# Patient Record
Sex: MALE | Race: ASIAN | NOT HISPANIC OR LATINO | Employment: OTHER | URBAN - METROPOLITAN AREA
[De-identification: names, ages, dates, MRNs, and addresses within clinical notes are randomized per-mention and may not be internally consistent; named-entity substitution may affect disease eponyms.]

---

## 2017-01-18 DIAGNOSIS — M25.559 HIP JOINT PAIN: Primary | ICD-10-CM

## 2017-01-18 LAB
% GRANULOCYTES: 53.5 % (ref 42.2–75.2)
ERYTHROCYTE [SEDIMENTATION RATE] IN BLOOD: 1 MM/HR (ref 0–15)
HCT VFR BLD AUTO: 52.7 % (ref 39–51)
HEMOGLOBIN: 17 G/DL (ref 13.4–17.5)
LYMPHOCYTES NFR BLD AUTO: 38 % (ref 20.5–51.1)
MCH RBC QN AUTO: 29.9 PG (ref 27–31)
MCHC RBC AUTO-ENTMCNC: 32.4 G/DL (ref 33–37)
MCV RBC AUTO: 92.3 FL (ref 80–100)
MONOCYTES NFR BLD AUTO: 8.5 % (ref 1.7–9.3)
PLATELET # BLD AUTO: 160 K/UL (ref 140–450)
RBC # BLD AUTO: 5.7 X10/CMM (ref 4.2–5.9)
WBC # BLD AUTO: 7.5 X10/CMM (ref 3.8–11)

## 2017-01-18 PROCEDURE — 85025 COMPLETE CBC W/AUTO DIFF WBC: CPT | Performed by: FAMILY MEDICINE

## 2017-01-18 PROCEDURE — 86140 C-REACTIVE PROTEIN: CPT | Mod: 90 | Performed by: FAMILY MEDICINE

## 2017-01-18 PROCEDURE — 36415 COLL VENOUS BLD VENIPUNCTURE: CPT | Performed by: FAMILY MEDICINE

## 2017-01-18 PROCEDURE — 85651 RBC SED RATE NONAUTOMATED: CPT | Performed by: FAMILY MEDICINE

## 2017-01-19 LAB — CRP SERPL-MCNC: 5.7 MG/L (ref 0–4.9)

## 2017-02-06 NOTE — PROGRESS NOTES
[facility]  [address] [place] [id number] [phone]  Dept: [phone]    PRE-OP EVALUATION:  Today's date: 2017    Oseas Edwards (: 1970) presents for pre-operative evaluation assessment as requested by Dr. Alcaraz.  He requires evaluation and anesthesia risk assessment prior to undergoing surgery/procedure for treatment of LEFT hip pain .  Proposed procedure: LEFT TOTAL HIP ARTHROPLASTY REVISION     Date of Surgery/ Procedure: 17  Time of Surgery/ Procedure: 10:00am  Hospital/Surgical Facility: [facility]  Primary Physician: Willy Forrest  Type of Anesthesia Anticipated: General    Patient has a Health Care Directive or Living Will:  NO    1. NO - Do you have a history of heart attack, stroke, stent, bypass or surgery on an artery in the head, neck, heart or legs?  2. NO - Do you ever have any pain or discomfort in your chest?  3. NO - Do you have a history of  Heart Failure?  4. NO - Are you troubled by shortness of breath when: walking on the level, up a slight hill or at night?  5. NO - DO YOU CURRENTLY HAVE A COLD, BRONCHITIS OR OTHER RESPIRATORY INFECTION?  6. No - DO YOU HAVE A COUGH, SHORTNESS OF BREATH OR WHEEZING?   7. YES - DO YOU SOMETIMES GET PAINS IN THE CALVES OF YOUR LEGS WHEN YOU WALK? Yes because of hip  8. NO - Do you or anyone in your family have previous history of blood clots?  9. NO - Do you or does anyone in your family have a serious bleeding problem such as prolonged bleeding following surgeries or cuts?  10. NO - Have you ever had problems with anemia or been told to take iron pills?   11. YES - HAVE YOU HAD ANY ABNORMAL BLOOD LOSS SUCH AS BLACK, TARRY OR BLOODY STOOLS, OR ABNORMAL VAGINAL BLEEDING?  2013 due to medication  12. YES - HAVE YOU EVER HAD A BLOOD TRANSFUSION?   13. NO - Have you or any of your relatives ever had problems with anesthesia?  14. YES - DO YOU HAVE SLEEP APNEA, EXCESSIVE SNORING OR DAYTIME DROWSINESS?  snoring  15. NO - Do you have any prosthetic heart valves?  16. YES - DO YOU HAVE PROSTHETIC JOINTS? Bilateral hips  17. NO - Is there any chance that you may be pregnant?      HPI:                                                      Brief HPI related to upcoming procedure: Long term hip pain from JAMES's at 24 years old now ready for revision.  Because of his history of GI bleed and heavy use of Indocin he has ended up on narcotic pain relief which is now at a fairly high level.      See problem list for active medical problems.  Problems all longstanding and stable, except as noted/documented.  See ROS for pertinent symptoms related to these conditions.                                                                                                  .    MEDICAL HISTORY:                                                      Patient Active Problem List    Diagnosis Date Noted     Keloid scar, mostly just dark 02/07/2017     Priority: Medium     Chronic pain syndrome 06/20/2016     Priority: Medium     Hx of gastric ulcer 06/06/2016     Priority: Medium     Rectus diastasis 11/11/2013     Priority: Medium     Chronic narcotic dependence (H) 11/04/2013     Priority: Medium     Due to long term pain management.  Patient is followed by FARHAT ALBARADO for ongoing prescription of pain medication.  All refills should be approved by this provider, or covering partner.    Medication(s): oxycodone IR around 3 per day and Tylenol #3 30/300 max of 4 per day.     Clinic visit frequency required: Q 2 month     Controlled substance agreement on file: Yes       Date(s): 10/15/15    Pain Clinic evaluation in the past: but now referring to [facility] pain clinic    DIRE Total Score(s):  No flowsheet data found.    Last [facility] website verification:  none   [email or web address]         Health Care Home 10/07/2013     Priority: Medium     Low serum testosterone level 01/30/2012     Priority: Medium     PA (pernicious anemia)  06/16/2011     Priority: Medium     AS (ankylosing spondylitis) (H) 04/28/2011     Priority: Medium      Past Medical History   Diagnosis Date     Cellulitis of leg 2011     PA (pernicious anemia) 6/16/2011     Low serum testosterone level 1/30/2012     AS (ankylosing spondylitis) (H) 1985     Optic neuritis 3/20/2012     Chronic narcotic dependence (H) 11/4/2013     Due to long term pain management.      Hypertension      Arthritis      Ankylosing spondylitis (H)      Keloid scar, mostly just dark 2/7/2017     Past Surgical History   Procedure Laterality Date     Joint replacement, hip rt/lt  1994     Left     Joint replacement, hip rt/lt  2000ish     Right     Herniorrhaphy ventral  11/11/2013     Procedure: HERNIORRHAPHY VENTRAL;  UMBILICAL HERNIA REPAIR WITH MESH, RECTUS DIASTASIS REPAIR WITH MESH;  Surgeon: Jason Dosdon MD;  Location: [facility]     Open separation component herniorrhaphy  11/11/2013     Procedure: OPEN SEPARATION COMPONENT HERNIORRHAPHY;;  Surgeon: Jason Dodson MD;  Location: [facility]     Current Outpatient Prescriptions   Medication Sig Dispense Refill     oxyCODONE (ROXICODONE) 5 MG IR tablet Take 1 tablet (5 mg) by mouth every 8 hours as needed for pain Try to keep to 2 a day 200 tablet 0     acetaminophen-codeine (TYLENOL #3) 300-30 MG per tablet Take 1 tablet by mouth every 4 hours as needed for pain maximum 4 tablet(s) per day 200 tablet 0     VIAGRA 100 MG cap/tab Take 1 tablet (100 mg) by mouth daily as needed for erectile dysfunction 12 tablet 1     ANDROGEL 50 MG/5GM (1%) gel APPLY ONE PACKET TOPICALLY DAILY 450 g 0     finasteride (PROSCAR) 5 MG tablet Take 1 tablet (5 mg) by mouth daily 90 tablet 3     lisinopril-hydrochlorothiazide (PRINZIDE,ZESTORETIC) 10-12.5 MG per tablet Take 1 tablet by mouth daily 90 tablet 3     indomethacin (INDOCIN) 50 MG capsule TAKE ONE CAPSULE BY MOUTH THREE TIMES DAILY WITH MEALS 270 capsule 3     cyclobenzaprine (FLEXERIL) 10 MG tablet  "TAKE ONE TABLET BY MOUTH TWICE DAILY AS NEEDED FOR MUSCLE SPASM 200 tablet 0     Avanafil 200 MG TABS Take 200 mg by mouth as needed 12 tablet 3     docusate sodium (COLACE) 100 MG tablet Take 100 mg by mouth daily 60 tablet 1     SIMPONI 50 MG/0.5ML auto-injector pen Inject 50 mg Subcutaneous every 28 days        cyanocobalamin (VITAMIN B12) 1000 MCG/ML injection INJECT 1ML (1000MCG) INTRAMUSCULARLY EVERY 30 DAYS 1 mL 11     triamcinolone (KENALOG) 0.1 % ointment Apply sparingly to affected area 80 g 1     mometasone (ELOCON) 0.1 % ointment Apply sparingly to affected area twice daily as needed.  Do not apply to face. 60 g 0     Multiple Vitamin (MULTI VITAMIN MENS PO) Take  by mouth daily.       omeprazole (PRILOSEC) 20 MG capsule TAKE ONE CAPSULE BY MOUTH ONCE DAILY (TAKE 30-60 MINUTES BEFORE A MEAL) 90 capsule 3     OTC products: None, except as noted above    Allergies   Allergen Reactions     Infliximab      Other reaction(s): *Unknown  Caused LT eye blindness     No Clinical Screening - See Comments      methotrexate- mood alterating     Remicade [Infliximab Injection]      Blindness in one eye      Latex Allergy: NO    Social History   Substance Use Topics     Smoking status: Former Smoker     Quit date: 01/01/2009     Smokeless tobacco: Never Used      Comment: quit 2 1/2 yrs     Alcohol Use: Yes      Comment: 7 per week     History   Drug Use No       REVIEW OF SYSTEMS:                                                    Constitutional, neuro, ENT, endocrine, pulmonary, cardiac, gastrointestinal, genitourinary, musculoskeletal, integument and psychiatric systems are negative, except as otherwise noted.    EXAM:                                                    /86 mmHg  Pulse 77  Ht 1.473 m (4' 10\")  Wt 74.844 kg (165 lb)  BMI 34.49 kg/m2  SpO2 98%    GENERAL APPEARANCE: healthy, alert and no distress     EYES: EOMI, - PERRL     HENT: ear canals and TM's normal and nose and mouth without ulcers " or lesions     NECK: no adenopathy, no asymmetry, masses, or scars and thyroid normal to palpation     RESP: lungs clear to auscultation - no rales, rhonchi or wheezes     BREAST: normal without masses, tenderness or nipple discharge and no palpable axillary masses or adenopathy     CV: regular rates and rhythm, normal S1 S2, no S3 or S4 and no murmur, click or rub -     ABDOMEN:  soft, nontender, no HSM or masses and bowel sounds normal     MS: arthritis of the the lower spine and decreased rom in both hips     SKIN: no suspicious lesions or rashes     NEURO: Normal strength and tone, sensory exam grossly normal, mentation intact and speech normal     PSYCH: mentation appears normal. and affect normal/bright     LYMPHATICS: No axillary, cervical, inguinal, or supraclavicular nodes    DIAGNOSTICS:                                                      EKG: appears normal, NSR, normal axis, normal intervals, no acute ST/T changes c/w ischemia, no LVH by voltage criteria, unchanged from previous tracings  Labs Resulted Today:   Results for orders placed or performed in visit on 02/07/17   CBC with Diff/Plt ([facility])   Result Value Ref Range    WBC x10/cmm 4.8 3.8 - 11.0 x10/cmm    % Lymphocytes 28.4 20.5 - 51.1 %    % Monocytes 8.2 1.7 - 9.3 %    % Granulocytes 63.4 42.2 - 75.2 %    RBC x10/cmm 5.31 4.2 - 5.9 x10/cmm    Hemoglobin 15.8 13.4 - 17.5 g/dl    Hematocrit 49.0 39 - 51 %    MCV 92.2 80 - 100 fL    MCH 29.8 27.0 - 31.0 pg    MCHC 32.3 (A) 33.0 - 37.0 g/dL    Platelet Count 139 (A) 140 - 450 K/uL       Recent Labs   Lab Test  01/18/17   1319  06/06/16   1141  06/06/16   1055   07/14/14   1223   HGB  17.0  16.5   --    < >   --    PLT  160  202   --    < >   --    NA   --    --   141   --   137   POTASSIUM   --    --   4.1   --   4.5   CR   --    --   0.87   --   0.94    < > = values in this interval not displayed.        IMPRESSION:                                                    Reason for surgery/procedure:  artificial hips that now need revision.    The proposed surgical procedure is considered INTERMEDIATE risk.    REVISED CARDIAC RISK INDEX  The patient has the following serious cardiovascular risks for perioperative complications such as (MI, PE, VFib and 3  AV Block):  No serious cardiac risks  INTERPRETATION: 0 risks: Class I (very low risk - 0.4% complication rate)    The patient has the following additional risks for perioperative complications:  High tolerance to opioid analgesics due to Current dose of Tylenol # 3 taken with IR Oxycontin 5mg up to 5 times daily.      ICD-10-CM    1. Preop general physical exam Z01.818 CBC with Diff/Plt ([facility])     Potassium  Serum ([facility])     EKG 12-lead complete w/read - Clinics   2. AS (ankylosing spondylitis) (H) M45.9 oxyCODONE (ROXICODONE) 5 MG IR tablet     acetaminophen-codeine (TYLENOL #3) 300-30 MG per tablet   3. PA (pernicious anemia) D51.0    4. Low serum testosterone level E29.1    5. Chronic narcotic dependence (H) F11.20    6. Rectus diastasis M62.08    7. Hx of gastric ulcer Z87.19    8. Keloid scar, mostly just dark L91.0        RECOMMENDATIONS:                                                    --Will need to look at decreasing narcotic use as pain improves.    --Consult hospital rounder / IM to assist post-op medical management    --Patient is to take all scheduled medications on the day of surgery EXCEPT for modifications listed below.    APPROVAL GIVEN to proceed with proposed procedure, without further diagnostic evaluation         Signed Electronically by: Willy Forrest MD    Copy of this evaluation report is provided to requesting physician.    [facility] Preop Guidelines

## 2017-02-07 ENCOUNTER — OFFICE VISIT (OUTPATIENT)
Dept: FAMILY MEDICINE | Facility: CLINIC | Age: 47
End: 2017-02-07

## 2017-02-07 VITALS
WEIGHT: 165 LBS | OXYGEN SATURATION: 98 % | SYSTOLIC BLOOD PRESSURE: 122 MMHG | DIASTOLIC BLOOD PRESSURE: 86 MMHG | HEART RATE: 77 BPM | BODY MASS INDEX: 32.39 KG/M2 | HEIGHT: 60 IN

## 2017-02-07 DIAGNOSIS — D51.0 PA (PERNICIOUS ANEMIA): ICD-10-CM

## 2017-02-07 DIAGNOSIS — M45.9 AS (ANKYLOSING SPONDYLITIS) (H): ICD-10-CM

## 2017-02-07 DIAGNOSIS — Z01.818 PREOP GENERAL PHYSICAL EXAM: Primary | ICD-10-CM

## 2017-02-07 DIAGNOSIS — R79.89 LOW SERUM TESTOSTERONE LEVEL: ICD-10-CM

## 2017-02-07 DIAGNOSIS — L91.0 KELOID SCAR: ICD-10-CM

## 2017-02-07 DIAGNOSIS — F11.20 CHRONIC NARCOTIC DEPENDENCE (H): ICD-10-CM

## 2017-02-07 DIAGNOSIS — Z87.11 HX OF GASTRIC ULCER: ICD-10-CM

## 2017-02-07 DIAGNOSIS — M62.08 RECTUS DIASTASIS: ICD-10-CM

## 2017-02-07 LAB
% GRANULOCYTES: 63.4 % (ref 42.2–75.2)
HCT VFR BLD AUTO: 49 % (ref 39–51)
HEMOGLOBIN: 15.8 G/DL (ref 13.4–17.5)
LYMPHOCYTES NFR BLD AUTO: 28.4 % (ref 20.5–51.1)
MCH RBC QN AUTO: 29.8 PG (ref 27–31)
MCHC RBC AUTO-ENTMCNC: 32.3 G/DL (ref 33–37)
MCV RBC AUTO: 92.2 FL (ref 80–100)
MONOCYTES NFR BLD AUTO: 8.2 % (ref 1.7–9.3)
PLATELET # BLD AUTO: 139 K/UL (ref 140–450)
RBC # BLD AUTO: 5.31 X10/CMM (ref 4.2–5.9)
WBC # BLD AUTO: 4.8 X10/CMM (ref 3.8–11)

## 2017-02-07 PROCEDURE — 36415 COLL VENOUS BLD VENIPUNCTURE: CPT | Performed by: FAMILY MEDICINE

## 2017-02-07 PROCEDURE — 99215 OFFICE O/P EST HI 40 MIN: CPT | Performed by: FAMILY MEDICINE

## 2017-02-07 PROCEDURE — 85025 COMPLETE CBC W/AUTO DIFF WBC: CPT | Performed by: FAMILY MEDICINE

## 2017-02-07 PROCEDURE — 93000 ELECTROCARDIOGRAM COMPLETE: CPT | Performed by: FAMILY MEDICINE

## 2017-02-07 PROCEDURE — 84132 ASSAY OF SERUM POTASSIUM: CPT | Mod: 90 | Performed by: FAMILY MEDICINE

## 2017-02-07 RX ORDER — OXYCODONE HYDROCHLORIDE 5 MG/1
5 TABLET ORAL EVERY 8 HOURS PRN
Qty: 200 TABLET | Refills: 0 | Status: ON HOLD | OUTPATIENT
Start: 2017-02-07 | End: 2017-02-16

## 2017-02-08 LAB — POTASSIUM SERPL-SCNC: 4.6 MMOL/L (ref 3.5–5.2)

## 2017-02-10 NOTE — H&P (VIEW-ONLY)
[facility]  [address] [place] [id number] [phone]  Dept: [phone]    PRE-OP EVALUATION:  Today's date: 2017    Oseas Edawrds (: 1970) presents for pre-operative evaluation assessment as requested by Dr. Alcaraz.  He requires evaluation and anesthesia risk assessment prior to undergoing surgery/procedure for treatment of LEFT hip pain .  Proposed procedure: LEFT TOTAL HIP ARTHROPLASTY REVISION     Date of Surgery/ Procedure: 17  Time of Surgery/ Procedure: 10:00am  Hospital/Surgical Facility: [facility]  Primary Physician: Willy Forrest  Type of Anesthesia Anticipated: General    Patient has a Health Care Directive or Living Will:  NO    1. NO - Do you have a history of heart attack, stroke, stent, bypass or surgery on an artery in the head, neck, heart or legs?  2. NO - Do you ever have any pain or discomfort in your chest?  3. NO - Do you have a history of  Heart Failure?  4. NO - Are you troubled by shortness of breath when: walking on the level, up a slight hill or at night?  5. NO - DO YOU CURRENTLY HAVE A COLD, BRONCHITIS OR OTHER RESPIRATORY INFECTION?  6. No - DO YOU HAVE A COUGH, SHORTNESS OF BREATH OR WHEEZING?   7. YES - DO YOU SOMETIMES GET PAINS IN THE CALVES OF YOUR LEGS WHEN YOU WALK? Yes because of hip  8. NO - Do you or anyone in your family have previous history of blood clots?  9. NO - Do you or does anyone in your family have a serious bleeding problem such as prolonged bleeding following surgeries or cuts?  10. NO - Have you ever had problems with anemia or been told to take iron pills?   11. YES - HAVE YOU HAD ANY ABNORMAL BLOOD LOSS SUCH AS BLACK, TARRY OR BLOODY STOOLS, OR ABNORMAL VAGINAL BLEEDING?  2013 due to medication  12. YES - HAVE YOU EVER HAD A BLOOD TRANSFUSION?   13. NO - Have you or any of your relatives ever had problems with anesthesia?  14. YES - DO YOU HAVE SLEEP APNEA, EXCESSIVE SNORING OR DAYTIME DROWSINESS?  snoring  15. NO - Do you have any prosthetic heart valves?  16. YES - DO YOU HAVE PROSTHETIC JOINTS? Bilateral hips  17. NO - Is there any chance that you may be pregnant?      HPI:                                                      Brief HPI related to upcoming procedure: Long term hip pain from JAMES's at 24 years old now ready for revision.  Because of his history of GI bleed and heavy use of Indocin he has ended up on narcotic pain relief which is now at a fairly high level.      See problem list for active medical problems.  Problems all longstanding and stable, except as noted/documented.  See ROS for pertinent symptoms related to these conditions.                                                                                                  .    MEDICAL HISTORY:                                                      Patient Active Problem List    Diagnosis Date Noted     Keloid scar, mostly just dark 02/07/2017     Priority: Medium     Chronic pain syndrome 06/20/2016     Priority: Medium     Hx of gastric ulcer 06/06/2016     Priority: Medium     Rectus diastasis 11/11/2013     Priority: Medium     Chronic narcotic dependence (H) 11/04/2013     Priority: Medium     Due to long term pain management.  Patient is followed by FARHAT ALBARADO for ongoing prescription of pain medication.  All refills should be approved by this provider, or covering partner.    Medication(s): oxycodone IR around 3 per day and Tylenol #3 30/300 max of 4 per day.     Clinic visit frequency required: Q 2 month     Controlled substance agreement on file: Yes       Date(s): 10/15/15    Pain Clinic evaluation in the past: but now referring to [facility] pain clinic    DIRE Total Score(s):  No flowsheet data found.    Last [facility] website verification:  none   [email or web address]         Health Care Home 10/07/2013     Priority: Medium     Low serum testosterone level 01/30/2012     Priority: Medium     PA (pernicious anemia)  06/16/2011     Priority: Medium     AS (ankylosing spondylitis) (H) 04/28/2011     Priority: Medium      Past Medical History   Diagnosis Date     Cellulitis of leg 2011     PA (pernicious anemia) 6/16/2011     Low serum testosterone level 1/30/2012     AS (ankylosing spondylitis) (H) 1985     Optic neuritis 3/20/2012     Chronic narcotic dependence (H) 11/4/2013     Due to long term pain management.      Hypertension      Arthritis      Ankylosing spondylitis (H)      Keloid scar, mostly just dark 2/7/2017     Past Surgical History   Procedure Laterality Date     Joint replacement, hip rt/lt  1994     Left     Joint replacement, hip rt/lt  2000ish     Right     Herniorrhaphy ventral  11/11/2013     Procedure: HERNIORRHAPHY VENTRAL;  UMBILICAL HERNIA REPAIR WITH MESH, RECTUS DIASTASIS REPAIR WITH MESH;  Surgeon: Jason Dodson MD;  Location: [facility]     Open separation component herniorrhaphy  11/11/2013     Procedure: OPEN SEPARATION COMPONENT HERNIORRHAPHY;;  Surgeon: Jason Dodson MD;  Location: [facility]     Current Outpatient Prescriptions   Medication Sig Dispense Refill     oxyCODONE (ROXICODONE) 5 MG IR tablet Take 1 tablet (5 mg) by mouth every 8 hours as needed for pain Try to keep to 2 a day 200 tablet 0     acetaminophen-codeine (TYLENOL #3) 300-30 MG per tablet Take 1 tablet by mouth every 4 hours as needed for pain maximum 4 tablet(s) per day 200 tablet 0     VIAGRA 100 MG cap/tab Take 1 tablet (100 mg) by mouth daily as needed for erectile dysfunction 12 tablet 1     ANDROGEL 50 MG/5GM (1%) gel APPLY ONE PACKET TOPICALLY DAILY 450 g 0     finasteride (PROSCAR) 5 MG tablet Take 1 tablet (5 mg) by mouth daily 90 tablet 3     lisinopril-hydrochlorothiazide (PRINZIDE,ZESTORETIC) 10-12.5 MG per tablet Take 1 tablet by mouth daily 90 tablet 3     indomethacin (INDOCIN) 50 MG capsule TAKE ONE CAPSULE BY MOUTH THREE TIMES DAILY WITH MEALS 270 capsule 3     cyclobenzaprine (FLEXERIL) 10 MG tablet  "TAKE ONE TABLET BY MOUTH TWICE DAILY AS NEEDED FOR MUSCLE SPASM 200 tablet 0     Avanafil 200 MG TABS Take 200 mg by mouth as needed 12 tablet 3     docusate sodium (COLACE) 100 MG tablet Take 100 mg by mouth daily 60 tablet 1     SIMPONI 50 MG/0.5ML auto-injector pen Inject 50 mg Subcutaneous every 28 days        cyanocobalamin (VITAMIN B12) 1000 MCG/ML injection INJECT 1ML (1000MCG) INTRAMUSCULARLY EVERY 30 DAYS 1 mL 11     triamcinolone (KENALOG) 0.1 % ointment Apply sparingly to affected area 80 g 1     mometasone (ELOCON) 0.1 % ointment Apply sparingly to affected area twice daily as needed.  Do not apply to face. 60 g 0     Multiple Vitamin (MULTI VITAMIN MENS PO) Take  by mouth daily.       omeprazole (PRILOSEC) 20 MG capsule TAKE ONE CAPSULE BY MOUTH ONCE DAILY (TAKE 30-60 MINUTES BEFORE A MEAL) 90 capsule 3     OTC products: None, except as noted above    Allergies   Allergen Reactions     Infliximab      Other reaction(s): *Unknown  Caused LT eye blindness     No Clinical Screening - See Comments      methotrexate- mood alterating     Remicade [Infliximab Injection]      Blindness in one eye      Latex Allergy: NO    Social History   Substance Use Topics     Smoking status: Former Smoker     Quit date: 01/01/2009     Smokeless tobacco: Never Used      Comment: quit 2 1/2 yrs     Alcohol Use: Yes      Comment: 7 per week     History   Drug Use No       REVIEW OF SYSTEMS:                                                    Constitutional, neuro, ENT, endocrine, pulmonary, cardiac, gastrointestinal, genitourinary, musculoskeletal, integument and psychiatric systems are negative, except as otherwise noted.    EXAM:                                                    /86 mmHg  Pulse 77  Ht 1.473 m (4' 10\")  Wt 74.844 kg (165 lb)  BMI 34.49 kg/m2  SpO2 98%    GENERAL APPEARANCE: healthy, alert and no distress     EYES: EOMI, - PERRL     HENT: ear canals and TM's normal and nose and mouth without ulcers " or lesions     NECK: no adenopathy, no asymmetry, masses, or scars and thyroid normal to palpation     RESP: lungs clear to auscultation - no rales, rhonchi or wheezes     BREAST: normal without masses, tenderness or nipple discharge and no palpable axillary masses or adenopathy     CV: regular rates and rhythm, normal S1 S2, no S3 or S4 and no murmur, click or rub -     ABDOMEN:  soft, nontender, no HSM or masses and bowel sounds normal     MS: arthritis of the the lower spine and decreased rom in both hips     SKIN: no suspicious lesions or rashes     NEURO: Normal strength and tone, sensory exam grossly normal, mentation intact and speech normal     PSYCH: mentation appears normal. and affect normal/bright     LYMPHATICS: No axillary, cervical, inguinal, or supraclavicular nodes    DIAGNOSTICS:                                                      EKG: appears normal, NSR, normal axis, normal intervals, no acute ST/T changes c/w ischemia, no LVH by voltage criteria, unchanged from previous tracings  Labs Resulted Today:   Results for orders placed or performed in visit on 02/07/17   CBC with Diff/Plt ([facility])   Result Value Ref Range    WBC x10/cmm 4.8 3.8 - 11.0 x10/cmm    % Lymphocytes 28.4 20.5 - 51.1 %    % Monocytes 8.2 1.7 - 9.3 %    % Granulocytes 63.4 42.2 - 75.2 %    RBC x10/cmm 5.31 4.2 - 5.9 x10/cmm    Hemoglobin 15.8 13.4 - 17.5 g/dl    Hematocrit 49.0 39 - 51 %    MCV 92.2 80 - 100 fL    MCH 29.8 27.0 - 31.0 pg    MCHC 32.3 (A) 33.0 - 37.0 g/dL    Platelet Count 139 (A) 140 - 450 K/uL       Recent Labs   Lab Test  01/18/17   1319  06/06/16   1141  06/06/16   1055   07/14/14   1223   HGB  17.0  16.5   --    < >   --    PLT  160  202   --    < >   --    NA   --    --   141   --   137   POTASSIUM   --    --   4.1   --   4.5   CR   --    --   0.87   --   0.94    < > = values in this interval not displayed.        IMPRESSION:                                                    Reason for surgery/procedure:  artificial hips that now need revision.    The proposed surgical procedure is considered INTERMEDIATE risk.    REVISED CARDIAC RISK INDEX  The patient has the following serious cardiovascular risks for perioperative complications such as (MI, PE, VFib and 3  AV Block):  No serious cardiac risks  INTERPRETATION: 0 risks: Class I (very low risk - 0.4% complication rate)    The patient has the following additional risks for perioperative complications:  High tolerance to opioid analgesics due to Current dose of Tylenol # 3 taken with IR Oxycontin 5mg up to 5 times daily.      ICD-10-CM    1. Preop general physical exam Z01.818 CBC with Diff/Plt ([facility])     Potassium  Serum ([facility])     EKG 12-lead complete w/read - Clinics   2. AS (ankylosing spondylitis) (H) M45.9 oxyCODONE (ROXICODONE) 5 MG IR tablet     acetaminophen-codeine (TYLENOL #3) 300-30 MG per tablet   3. PA (pernicious anemia) D51.0    4. Low serum testosterone level E29.1    5. Chronic narcotic dependence (H) F11.20    6. Rectus diastasis M62.08    7. Hx of gastric ulcer Z87.19    8. Keloid scar, mostly just dark L91.0        RECOMMENDATIONS:                                                    --Will need to look at decreasing narcotic use as pain improves.    --Consult hospital rounder / IM to assist post-op medical management    --Patient is to take all scheduled medications on the day of surgery EXCEPT for modifications listed below.    APPROVAL GIVEN to proceed with proposed procedure, without further diagnostic evaluation         Signed Electronically by: Willy Forrest MD    Copy of this evaluation report is provided to requesting physician.    [facility] Preop Guidelines

## 2017-02-14 ENCOUNTER — APPOINTMENT (OUTPATIENT)
Dept: GENERAL RADIOLOGY | Facility: CLINIC | Age: 47
DRG: 467 | End: 2017-02-14
Attending: ORTHOPAEDIC SURGERY
Payer: COMMERCIAL

## 2017-02-14 ENCOUNTER — ANESTHESIA EVENT (OUTPATIENT)
Dept: SURGERY | Facility: CLINIC | Age: 47
DRG: 467 | End: 2017-02-14
Payer: COMMERCIAL

## 2017-02-14 ENCOUNTER — APPOINTMENT (OUTPATIENT)
Dept: PHYSICAL THERAPY | Facility: CLINIC | Age: 47
DRG: 467 | End: 2017-02-14
Attending: ORTHOPAEDIC SURGERY
Payer: COMMERCIAL

## 2017-02-14 ENCOUNTER — HOSPITAL ENCOUNTER (INPATIENT)
Facility: CLINIC | Age: 47
LOS: 2 days | Discharge: HOME | DRG: 467 | End: 2017-02-16
Attending: ORTHOPAEDIC SURGERY | Admitting: ORTHOPAEDIC SURGERY
Payer: COMMERCIAL

## 2017-02-14 ENCOUNTER — ANESTHESIA (OUTPATIENT)
Dept: SURGERY | Facility: CLINIC | Age: 47
DRG: 467 | End: 2017-02-14
Payer: COMMERCIAL

## 2017-02-14 DIAGNOSIS — Z96.649 STATUS POST REVISION OF TOTAL HIP REPLACEMENT: Primary | ICD-10-CM

## 2017-02-14 PROBLEM — T84.028A FAILED TOTAL HIP ARTHROPLASTY WITH DISLOCATION, INITIAL ENCOUNTER (H): Status: ACTIVE | Noted: 2017-02-14

## 2017-02-14 LAB
ABO + RH BLD: NORMAL
ABO + RH BLD: NORMAL
APPEARANCE FLD: NORMAL
BLD GP AB SCN SERPL QL: NORMAL
BLOOD BANK CMNT PATIENT-IMP: NORMAL
COLOR FLD: NORMAL
CREAT SERPL-MCNC: 1.09 MG/DL (ref 0.66–1.25)
EOSINOPHIL NFR FLD MANUAL: 1 %
GFR SERPL CREATININE-BSD FRML MDRD: 72 ML/MIN/1.7M2
GRAM STN SPEC: NORMAL
HGB BLD-MCNC: 16.3 G/DL (ref 13.3–17.7)
LYMPHOCYTES NFR FLD MANUAL: 59 %
MICRO REPORT STATUS: NORMAL
NEUTS BAND NFR FLD MANUAL: 40 %
PLATELET # BLD AUTO: 185 10E9/L (ref 150–450)
POTASSIUM SERPL-SCNC: 4.2 MMOL/L (ref 3.4–5.3)
RBC # FLD: NORMAL /UL
SPECIMEN EXP DATE BLD: NORMAL
SPECIMEN SOURCE FLD: NORMAL
SPECIMEN SOURCE: NORMAL
WBC # FLD AUTO: NORMAL /UL

## 2017-02-14 PROCEDURE — 85049 AUTOMATED PLATELET COUNT: CPT | Performed by: PHYSICIAN ASSISTANT

## 2017-02-14 PROCEDURE — 25000128 H RX IP 250 OP 636: Performed by: ANESTHESIOLOGY

## 2017-02-14 PROCEDURE — 0SUB09Z SUPPLEMENT LEFT HIP JOINT WITH LINER, OPEN APPROACH: ICD-10-PCS | Performed by: ORTHOPAEDIC SURGERY

## 2017-02-14 PROCEDURE — 99222 1ST HOSP IP/OBS MODERATE 55: CPT | Performed by: PHYSICIAN ASSISTANT

## 2017-02-14 PROCEDURE — 85018 HEMOGLOBIN: CPT | Performed by: PHYSICIAN ASSISTANT

## 2017-02-14 PROCEDURE — 87075 CULTR BACTERIA EXCEPT BLOOD: CPT | Performed by: ORTHOPAEDIC SURGERY

## 2017-02-14 PROCEDURE — 25000125 ZZHC RX 250: Performed by: ORTHOPAEDIC SURGERY

## 2017-02-14 PROCEDURE — 25000128 H RX IP 250 OP 636

## 2017-02-14 PROCEDURE — 25000132 ZZH RX MED GY IP 250 OP 250 PS 637: Mod: GY | Performed by: ORTHOPAEDIC SURGERY

## 2017-02-14 PROCEDURE — 82565 ASSAY OF CREATININE: CPT | Performed by: PHYSICIAN ASSISTANT

## 2017-02-14 PROCEDURE — 0SPB09Z REMOVAL OF LINER FROM LEFT HIP JOINT, OPEN APPROACH: ICD-10-PCS | Performed by: ORTHOPAEDIC SURGERY

## 2017-02-14 PROCEDURE — 97110 THERAPEUTIC EXERCISES: CPT | Mod: GP | Performed by: PHYSICAL THERAPIST

## 2017-02-14 PROCEDURE — 87070 CULTURE OTHR SPECIMN AEROBIC: CPT | Performed by: ORTHOPAEDIC SURGERY

## 2017-02-14 PROCEDURE — 86901 BLOOD TYPING SEROLOGIC RH(D): CPT | Performed by: PHYSICIAN ASSISTANT

## 2017-02-14 PROCEDURE — C1776 JOINT DEVICE (IMPLANTABLE): HCPCS | Performed by: ORTHOPAEDIC SURGERY

## 2017-02-14 PROCEDURE — 27211024 ZZHC OR SUPPLY OTHER OPNP: Performed by: ORTHOPAEDIC SURGERY

## 2017-02-14 PROCEDURE — 36000069 ZZH SURGERY LEVEL 5 EA 15 ADDTL MIN: Performed by: ORTHOPAEDIC SURGERY

## 2017-02-14 PROCEDURE — 40000940 XR PELVIS AND HIP PORTABLE LEFT 1 VIEW

## 2017-02-14 PROCEDURE — 97161 PT EVAL LOW COMPLEX 20 MIN: CPT | Mod: GP | Performed by: PHYSICAL THERAPIST

## 2017-02-14 PROCEDURE — 0SRB019 REPLACEMENT OF LEFT HIP JOINT WITH METAL SYNTHETIC SUBSTITUTE, CEMENTED, OPEN APPROACH: ICD-10-PCS | Performed by: ORTHOPAEDIC SURGERY

## 2017-02-14 PROCEDURE — 86900 BLOOD TYPING SEROLOGIC ABO: CPT | Performed by: PHYSICIAN ASSISTANT

## 2017-02-14 PROCEDURE — 25000125 ZZHC RX 250: Performed by: PHYSICIAN ASSISTANT

## 2017-02-14 PROCEDURE — A9270 NON-COVERED ITEM OR SERVICE: HCPCS | Mod: GY | Performed by: ORTHOPAEDIC SURGERY

## 2017-02-14 PROCEDURE — 25800025 ZZH RX 258: Performed by: ORTHOPAEDIC SURGERY

## 2017-02-14 PROCEDURE — 25000128 H RX IP 250 OP 636: Performed by: ORTHOPAEDIC SURGERY

## 2017-02-14 PROCEDURE — 37000009 ZZH ANESTHESIA TECHNICAL FEE, EACH ADDTL 15 MIN: Performed by: ORTHOPAEDIC SURGERY

## 2017-02-14 PROCEDURE — A9270 NON-COVERED ITEM OR SERVICE: HCPCS | Mod: GY | Performed by: ANESTHESIOLOGY

## 2017-02-14 PROCEDURE — 25000132 ZZH RX MED GY IP 250 OP 250 PS 637: Mod: GY | Performed by: ANESTHESIOLOGY

## 2017-02-14 PROCEDURE — 25000125 ZZHC RX 250: Performed by: ANESTHESIOLOGY

## 2017-02-14 PROCEDURE — 25000128 H RX IP 250 OP 636: Performed by: NURSE ANESTHETIST, CERTIFIED REGISTERED

## 2017-02-14 PROCEDURE — 86850 RBC ANTIBODY SCREEN: CPT | Performed by: PHYSICIAN ASSISTANT

## 2017-02-14 PROCEDURE — 25000125 ZZHC RX 250: Performed by: NURSE ANESTHETIST, CERTIFIED REGISTERED

## 2017-02-14 PROCEDURE — 25000566 ZZH SEVOFLURANE, EA 15 MIN: Performed by: ORTHOPAEDIC SURGERY

## 2017-02-14 PROCEDURE — 27210995 ZZH RX 272: Performed by: ORTHOPAEDIC SURGERY

## 2017-02-14 PROCEDURE — 25000128 H RX IP 250 OP 636: Performed by: PHYSICIAN ASSISTANT

## 2017-02-14 PROCEDURE — 25800025 ZZH RX 258: Performed by: NURSE ANESTHETIST, CERTIFIED REGISTERED

## 2017-02-14 PROCEDURE — 37000008 ZZH ANESTHESIA TECHNICAL FEE, 1ST 30 MIN: Performed by: ORTHOPAEDIC SURGERY

## 2017-02-14 PROCEDURE — 71000012 ZZH RECOVERY PHASE 1 LEVEL 1 FIRST HR: Performed by: ORTHOPAEDIC SURGERY

## 2017-02-14 PROCEDURE — C1762 CONN TISS, HUMAN(INC FASCIA): HCPCS | Performed by: ORTHOPAEDIC SURGERY

## 2017-02-14 PROCEDURE — 25900017 H RX MED GY IP 259 OP 259 PS 637: Mod: GY | Performed by: ORTHOPAEDIC SURGERY

## 2017-02-14 PROCEDURE — 40000170 ZZH STATISTIC PRE-PROCEDURE ASSESSMENT II: Performed by: ORTHOPAEDIC SURGERY

## 2017-02-14 PROCEDURE — 40000940 XR PELVIS PORT 1/2 VW

## 2017-02-14 PROCEDURE — 12000007 ZZH R&B INTERMEDIATE

## 2017-02-14 PROCEDURE — 89051 BODY FLUID CELL COUNT: CPT | Performed by: ORTHOPAEDIC SURGERY

## 2017-02-14 PROCEDURE — 40000193 ZZH STATISTIC PT WARD VISIT: Performed by: PHYSICAL THERAPIST

## 2017-02-14 PROCEDURE — 84132 ASSAY OF SERUM POTASSIUM: CPT | Performed by: PHYSICIAN ASSISTANT

## 2017-02-14 PROCEDURE — 97530 THERAPEUTIC ACTIVITIES: CPT | Mod: GP | Performed by: PHYSICAL THERAPIST

## 2017-02-14 PROCEDURE — 25800025 ZZH RX 258: Performed by: ANESTHESIOLOGY

## 2017-02-14 PROCEDURE — A9270 NON-COVERED ITEM OR SERVICE: HCPCS | Mod: GY | Performed by: PHYSICIAN ASSISTANT

## 2017-02-14 PROCEDURE — C1713 ANCHOR/SCREW BN/BN,TIS/BN: HCPCS | Performed by: ORTHOPAEDIC SURGERY

## 2017-02-14 PROCEDURE — 87205 SMEAR GRAM STAIN: CPT | Performed by: ORTHOPAEDIC SURGERY

## 2017-02-14 PROCEDURE — 27210794 ZZH OR GENERAL SUPPLY STERILE: Performed by: ORTHOPAEDIC SURGERY

## 2017-02-14 PROCEDURE — 0SPB0JZ REMOVAL OF SYNTHETIC SUBSTITUTE FROM LEFT HIP JOINT, OPEN APPROACH: ICD-10-PCS | Performed by: ORTHOPAEDIC SURGERY

## 2017-02-14 PROCEDURE — 36000067 ZZH SURGERY LEVEL 5 1ST 30 MIN: Performed by: ORTHOPAEDIC SURGERY

## 2017-02-14 PROCEDURE — 36415 COLL VENOUS BLD VENIPUNCTURE: CPT | Performed by: PHYSICIAN ASSISTANT

## 2017-02-14 PROCEDURE — 25000132 ZZH RX MED GY IP 250 OP 250 PS 637: Mod: GY | Performed by: PHYSICIAN ASSISTANT

## 2017-02-14 DEVICE — IMPLANTABLE DEVICE: Type: IMPLANTABLE DEVICE | Site: HIP | Status: FUNCTIONAL

## 2017-02-14 DEVICE — GRAFT BONE PUTTY DBX 05ML 038050: Type: IMPLANTABLE DEVICE | Site: HIP | Status: FUNCTIONAL

## 2017-02-14 RX ORDER — SODIUM CHLORIDE, SODIUM LACTATE, POTASSIUM CHLORIDE, CALCIUM CHLORIDE 600; 310; 30; 20 MG/100ML; MG/100ML; MG/100ML; MG/100ML
INJECTION, SOLUTION INTRAVENOUS CONTINUOUS PRN
Status: DISCONTINUED | OUTPATIENT
Start: 2017-02-14 | End: 2017-02-14

## 2017-02-14 RX ORDER — ONDANSETRON 2 MG/ML
4 INJECTION INTRAMUSCULAR; INTRAVENOUS EVERY 6 HOURS PRN
Status: DISCONTINUED | OUTPATIENT
Start: 2017-02-14 | End: 2017-02-16 | Stop reason: HOSPADM

## 2017-02-14 RX ORDER — LIDOCAINE 40 MG/G
CREAM TOPICAL
Status: DISCONTINUED | OUTPATIENT
Start: 2017-02-14 | End: 2017-02-16 | Stop reason: HOSPADM

## 2017-02-14 RX ORDER — PROPOFOL 10 MG/ML
INJECTION, EMULSION INTRAVENOUS PRN
Status: DISCONTINUED | OUTPATIENT
Start: 2017-02-14 | End: 2017-02-14

## 2017-02-14 RX ORDER — ONDANSETRON 4 MG/1
4 TABLET, ORALLY DISINTEGRATING ORAL EVERY 30 MIN PRN
Status: DISCONTINUED | OUTPATIENT
Start: 2017-02-14 | End: 2017-02-14 | Stop reason: HOSPADM

## 2017-02-14 RX ORDER — TRIAMCINOLONE ACETONIDE 1 MG/G
OINTMENT TOPICAL DAILY PRN
Status: DISCONTINUED | OUTPATIENT
Start: 2017-02-14 | End: 2017-02-16 | Stop reason: HOSPADM

## 2017-02-14 RX ORDER — LISINOPRIL 10 MG/1
10 TABLET ORAL DAILY
Status: DISCONTINUED | OUTPATIENT
Start: 2017-02-15 | End: 2017-02-16 | Stop reason: HOSPADM

## 2017-02-14 RX ORDER — SODIUM CHLORIDE, SODIUM LACTATE, POTASSIUM CHLORIDE, CALCIUM CHLORIDE 600; 310; 30; 20 MG/100ML; MG/100ML; MG/100ML; MG/100ML
INJECTION, SOLUTION INTRAVENOUS CONTINUOUS
Status: DISCONTINUED | OUTPATIENT
Start: 2017-02-14 | End: 2017-02-14 | Stop reason: HOSPADM

## 2017-02-14 RX ORDER — DEXTROSE MONOHYDRATE, SODIUM CHLORIDE, AND POTASSIUM CHLORIDE 50; 1.49; 4.5 G/1000ML; G/1000ML; G/1000ML
INJECTION, SOLUTION INTRAVENOUS CONTINUOUS
Status: DISCONTINUED | OUTPATIENT
Start: 2017-02-14 | End: 2017-02-16

## 2017-02-14 RX ORDER — NALOXONE HYDROCHLORIDE 0.4 MG/ML
.1-.4 INJECTION, SOLUTION INTRAMUSCULAR; INTRAVENOUS; SUBCUTANEOUS
Status: DISCONTINUED | OUTPATIENT
Start: 2017-02-14 | End: 2017-02-16 | Stop reason: HOSPADM

## 2017-02-14 RX ORDER — DEXAMETHASONE SODIUM PHOSPHATE 4 MG/ML
INJECTION, SOLUTION INTRA-ARTICULAR; INTRALESIONAL; INTRAMUSCULAR; INTRAVENOUS; SOFT TISSUE PRN
Status: DISCONTINUED | OUTPATIENT
Start: 2017-02-14 | End: 2017-02-14

## 2017-02-14 RX ORDER — FENTANYL CITRATE 0.05 MG/ML
25-50 INJECTION, SOLUTION INTRAMUSCULAR; INTRAVENOUS
Status: DISCONTINUED | OUTPATIENT
Start: 2017-02-14 | End: 2017-02-14 | Stop reason: HOSPADM

## 2017-02-14 RX ORDER — DEXMEDETOMIDINE HYDROCHLORIDE 100 UG/ML
INJECTION, SOLUTION INTRAVENOUS CONTINUOUS PRN
Status: DISCONTINUED | OUTPATIENT
Start: 2017-02-14 | End: 2017-02-14

## 2017-02-14 RX ORDER — DIPHENHYDRAMINE HYDROCHLORIDE 50 MG/ML
25 INJECTION INTRAMUSCULAR; INTRAVENOUS EVERY 6 HOURS PRN
Status: DISCONTINUED | OUTPATIENT
Start: 2017-02-14 | End: 2017-02-16 | Stop reason: HOSPADM

## 2017-02-14 RX ORDER — VECURONIUM BROMIDE 1 MG/ML
INJECTION, POWDER, LYOPHILIZED, FOR SOLUTION INTRAVENOUS PRN
Status: DISCONTINUED | OUTPATIENT
Start: 2017-02-14 | End: 2017-02-14

## 2017-02-14 RX ORDER — ONDANSETRON 2 MG/ML
INJECTION INTRAMUSCULAR; INTRAVENOUS PRN
Status: DISCONTINUED | OUTPATIENT
Start: 2017-02-14 | End: 2017-02-14

## 2017-02-14 RX ORDER — KETAMINE HCL IN 0.9 % NACL 20 MG/2 ML
SYRINGE (ML) INTRAVENOUS PRN
Status: DISCONTINUED | OUTPATIENT
Start: 2017-02-14 | End: 2017-02-14

## 2017-02-14 RX ORDER — MAGNESIUM HYDROXIDE 1200 MG/15ML
LIQUID ORAL PRN
Status: DISCONTINUED | OUTPATIENT
Start: 2017-02-14 | End: 2017-02-14 | Stop reason: HOSPADM

## 2017-02-14 RX ORDER — HYDROMORPHONE HYDROCHLORIDE 1 MG/ML
INJECTION, SOLUTION INTRAMUSCULAR; INTRAVENOUS; SUBCUTANEOUS
Status: COMPLETED
Start: 2017-02-14 | End: 2017-02-14

## 2017-02-14 RX ORDER — FENTANYL CITRATE 50 UG/ML
INJECTION, SOLUTION INTRAMUSCULAR; INTRAVENOUS PRN
Status: DISCONTINUED | OUTPATIENT
Start: 2017-02-14 | End: 2017-02-14

## 2017-02-14 RX ORDER — MOMETASONE FUROATE 1 MG/G
OINTMENT TOPICAL 2 TIMES DAILY PRN
COMMUNITY
End: 2018-01-15

## 2017-02-14 RX ORDER — KETOROLAC TROMETHAMINE 30 MG/ML
30 INJECTION, SOLUTION INTRAMUSCULAR; INTRAVENOUS EVERY 6 HOURS
Status: COMPLETED | OUTPATIENT
Start: 2017-02-14 | End: 2017-02-15

## 2017-02-14 RX ORDER — MORPHINE SULFATE 15 MG/1
15 TABLET, FILM COATED, EXTENDED RELEASE ORAL EVERY 12 HOURS
Status: COMPLETED | OUTPATIENT
Start: 2017-02-14 | End: 2017-02-16

## 2017-02-14 RX ORDER — AMOXICILLIN 250 MG
1-2 CAPSULE ORAL 2 TIMES DAILY
Status: DISCONTINUED | OUTPATIENT
Start: 2017-02-14 | End: 2017-02-16 | Stop reason: HOSPADM

## 2017-02-14 RX ORDER — CEFAZOLIN SODIUM 1 G/3ML
1 INJECTION, POWDER, FOR SOLUTION INTRAMUSCULAR; INTRAVENOUS EVERY 8 HOURS
Status: COMPLETED | OUTPATIENT
Start: 2017-02-14 | End: 2017-02-15

## 2017-02-14 RX ORDER — TESTOSTERONE 12.5 MG/1.25G
50 GEL TOPICAL
Status: DISCONTINUED | OUTPATIENT
Start: 2017-02-14 | End: 2017-02-16 | Stop reason: HOSPADM

## 2017-02-14 RX ORDER — DIPHENHYDRAMINE HCL 25 MG
25 CAPSULE ORAL EVERY 6 HOURS PRN
Status: DISCONTINUED | OUTPATIENT
Start: 2017-02-14 | End: 2017-02-16 | Stop reason: HOSPADM

## 2017-02-14 RX ORDER — CYANOCOBALAMIN 1000 UG/ML
1000 INJECTION, SOLUTION INTRAMUSCULAR; SUBCUTANEOUS ONCE
Status: COMPLETED | OUTPATIENT
Start: 2017-02-14 | End: 2017-02-14

## 2017-02-14 RX ORDER — KETOROLAC TROMETHAMINE 30 MG/ML
INJECTION, SOLUTION INTRAMUSCULAR; INTRAVENOUS PRN
Status: DISCONTINUED | OUTPATIENT
Start: 2017-02-14 | End: 2017-02-14

## 2017-02-14 RX ORDER — MOMETASONE FUROATE 1 MG/G
OINTMENT TOPICAL DAILY PRN
Status: DISCONTINUED | OUTPATIENT
Start: 2017-02-14 | End: 2017-02-16 | Stop reason: HOSPADM

## 2017-02-14 RX ORDER — GABAPENTIN 300 MG/1
300 CAPSULE ORAL ONCE
Status: COMPLETED | OUTPATIENT
Start: 2017-02-14 | End: 2017-02-14

## 2017-02-14 RX ORDER — MULTIPLE VITAMINS W/ MINERALS TAB 9MG-400MCG
1 TAB ORAL DAILY
Status: DISCONTINUED | OUTPATIENT
Start: 2017-02-14 | End: 2017-02-16 | Stop reason: HOSPADM

## 2017-02-14 RX ORDER — HYDROMORPHONE HYDROCHLORIDE 1 MG/ML
.3-.5 INJECTION, SOLUTION INTRAMUSCULAR; INTRAVENOUS; SUBCUTANEOUS
Status: DISCONTINUED | OUTPATIENT
Start: 2017-02-14 | End: 2017-02-16 | Stop reason: HOSPADM

## 2017-02-14 RX ORDER — PROCHLORPERAZINE MALEATE 5 MG
5-10 TABLET ORAL EVERY 6 HOURS PRN
Status: DISCONTINUED | OUTPATIENT
Start: 2017-02-14 | End: 2017-02-16 | Stop reason: HOSPADM

## 2017-02-14 RX ORDER — OXYCODONE HCL 10 MG/1
10 TABLET, FILM COATED, EXTENDED RELEASE ORAL ONCE
Status: COMPLETED | OUTPATIENT
Start: 2017-02-14 | End: 2017-02-14

## 2017-02-14 RX ORDER — TRIAMCINOLONE ACETONIDE 1 MG/G
OINTMENT TOPICAL DAILY PRN
COMMUNITY
End: 2017-11-06

## 2017-02-14 RX ORDER — ONDANSETRON 2 MG/ML
4 INJECTION INTRAMUSCULAR; INTRAVENOUS EVERY 30 MIN PRN
Status: DISCONTINUED | OUTPATIENT
Start: 2017-02-14 | End: 2017-02-14 | Stop reason: HOSPADM

## 2017-02-14 RX ORDER — CYCLOBENZAPRINE HCL 10 MG
10 TABLET ORAL 3 TIMES DAILY PRN
Status: DISCONTINUED | OUTPATIENT
Start: 2017-02-14 | End: 2017-02-16 | Stop reason: HOSPADM

## 2017-02-14 RX ORDER — ONDANSETRON 4 MG/1
4 TABLET, ORALLY DISINTEGRATING ORAL EVERY 6 HOURS PRN
Status: DISCONTINUED | OUTPATIENT
Start: 2017-02-14 | End: 2017-02-16 | Stop reason: HOSPADM

## 2017-02-14 RX ORDER — LIDOCAINE HYDROCHLORIDE 20 MG/ML
INJECTION, SOLUTION INFILTRATION; PERINEURAL PRN
Status: DISCONTINUED | OUTPATIENT
Start: 2017-02-14 | End: 2017-02-14

## 2017-02-14 RX ORDER — CEFAZOLIN SODIUM 1 G/3ML
1 INJECTION, POWDER, FOR SOLUTION INTRAMUSCULAR; INTRAVENOUS SEE ADMIN INSTRUCTIONS
Status: DISCONTINUED | OUTPATIENT
Start: 2017-02-14 | End: 2017-02-14 | Stop reason: HOSPADM

## 2017-02-14 RX ORDER — NEOSTIGMINE METHYLSULFATE 1 MG/ML
VIAL (ML) INJECTION PRN
Status: DISCONTINUED | OUTPATIENT
Start: 2017-02-14 | End: 2017-02-14

## 2017-02-14 RX ORDER — EPHEDRINE SULFATE 50 MG/ML
INJECTION, SOLUTION INTRAMUSCULAR; INTRAVENOUS; SUBCUTANEOUS PRN
Status: DISCONTINUED | OUTPATIENT
Start: 2017-02-14 | End: 2017-02-14

## 2017-02-14 RX ORDER — INDOMETHACIN 50 MG/1
50 CAPSULE ORAL 2 TIMES DAILY
Status: DISCONTINUED | OUTPATIENT
Start: 2017-02-14 | End: 2017-02-14

## 2017-02-14 RX ORDER — DOCUSATE SODIUM 100 MG/1
100 CAPSULE, LIQUID FILLED ORAL DAILY PRN
Status: DISCONTINUED | OUTPATIENT
Start: 2017-02-14 | End: 2017-02-16 | Stop reason: HOSPADM

## 2017-02-14 RX ORDER — LISINOPRIL/HYDROCHLOROTHIAZIDE 10-12.5 MG
1 TABLET ORAL DAILY
Status: DISCONTINUED | OUTPATIENT
Start: 2017-02-14 | End: 2017-02-14

## 2017-02-14 RX ORDER — OXYCODONE HYDROCHLORIDE 5 MG/1
5-10 TABLET ORAL
Status: DISCONTINUED | OUTPATIENT
Start: 2017-02-14 | End: 2017-02-16

## 2017-02-14 RX ORDER — FINASTERIDE 5 MG/1
5 TABLET, FILM COATED ORAL EVERY EVENING
Status: DISCONTINUED | OUTPATIENT
Start: 2017-02-14 | End: 2017-02-16 | Stop reason: HOSPADM

## 2017-02-14 RX ORDER — ACETAMINOPHEN 325 MG/1
325 TABLET ORAL EVERY 8 HOURS
Status: DISCONTINUED | OUTPATIENT
Start: 2017-02-14 | End: 2017-02-16 | Stop reason: HOSPADM

## 2017-02-14 RX ORDER — CEFAZOLIN SODIUM 2 G/100ML
2 INJECTION, SOLUTION INTRAVENOUS
Status: COMPLETED | OUTPATIENT
Start: 2017-02-14 | End: 2017-02-14

## 2017-02-14 RX ORDER — TESTOSTERONE 12.5 MG/1.25G
50 GEL TOPICAL
COMMUNITY
End: 2017-05-01

## 2017-02-14 RX ADMIN — SODIUM CHLORIDE, POTASSIUM CHLORIDE, SODIUM LACTATE AND CALCIUM CHLORIDE: 600; 310; 30; 20 INJECTION, SOLUTION INTRAVENOUS at 08:00

## 2017-02-14 RX ADMIN — MIDAZOLAM HYDROCHLORIDE 1 MG: 1 INJECTION, SOLUTION INTRAMUSCULAR; INTRAVENOUS at 08:07

## 2017-02-14 RX ADMIN — HYDROMORPHONE HYDROCHLORIDE 0.5 MG: 1 INJECTION, SOLUTION INTRAMUSCULAR; INTRAVENOUS; SUBCUTANEOUS at 19:13

## 2017-02-14 RX ADMIN — FENTANYL CITRATE 100 MCG: 50 INJECTION, SOLUTION INTRAMUSCULAR; INTRAVENOUS at 08:47

## 2017-02-14 RX ADMIN — PHENYLEPHRINE HYDROCHLORIDE 200 MCG: 10 INJECTION, SOLUTION INTRAMUSCULAR; INTRAVENOUS; SUBCUTANEOUS at 11:15

## 2017-02-14 RX ADMIN — Medication 20 MG: at 08:19

## 2017-02-14 RX ADMIN — NEOSTIGMINE METHYLSULFATE 3.5 MG: 1 INJECTION INTRAMUSCULAR; INTRAVENOUS; SUBCUTANEOUS at 11:38

## 2017-02-14 RX ADMIN — DEXAMETHASONE SODIUM PHOSPHATE 4 MG: 4 INJECTION, SOLUTION INTRAMUSCULAR; INTRAVENOUS at 08:17

## 2017-02-14 RX ADMIN — LIDOCAINE HYDROCHLORIDE 60 MG: 20 INJECTION, SOLUTION INFILTRATION; PERINEURAL at 08:09

## 2017-02-14 RX ADMIN — OXYCODONE HYDROCHLORIDE 10 MG: 10 TABLET, FILM COATED, EXTENDED RELEASE ORAL at 07:15

## 2017-02-14 RX ADMIN — SUCCINYLCHOLINE CHLORIDE 100 MG: 20 INJECTION, SOLUTION INTRAMUSCULAR; INTRAVENOUS at 08:09

## 2017-02-14 RX ADMIN — Medication 10 MG: at 09:49

## 2017-02-14 RX ADMIN — PHENYLEPHRINE HYDROCHLORIDE 200 MCG: 10 INJECTION, SOLUTION INTRAMUSCULAR; INTRAVENOUS; SUBCUTANEOUS at 10:46

## 2017-02-14 RX ADMIN — TRANEXAMIC ACID 1 G: 100 INJECTION, SOLUTION INTRAVENOUS at 11:06

## 2017-02-14 RX ADMIN — Medication 2.5 MG: at 21:40

## 2017-02-14 RX ADMIN — PHENYLEPHRINE HYDROCHLORIDE 200 MCG: 10 INJECTION, SOLUTION INTRAMUSCULAR; INTRAVENOUS; SUBCUTANEOUS at 11:05

## 2017-02-14 RX ADMIN — CYANOCOBALAMIN 1000 MCG: 1000 INJECTION, SOLUTION INTRAMUSCULAR at 18:30

## 2017-02-14 RX ADMIN — PHENYLEPHRINE HYDROCHLORIDE 50 MCG: 10 INJECTION, SOLUTION INTRAMUSCULAR; INTRAVENOUS; SUBCUTANEOUS at 09:38

## 2017-02-14 RX ADMIN — TRANEXAMIC ACID 1 G: 100 INJECTION, SOLUTION INTRAVENOUS at 08:29

## 2017-02-14 RX ADMIN — Medication 5 MG: at 09:38

## 2017-02-14 RX ADMIN — PHENYLEPHRINE HYDROCHLORIDE 150 MCG: 10 INJECTION, SOLUTION INTRAMUSCULAR; INTRAVENOUS; SUBCUTANEOUS at 10:08

## 2017-02-14 RX ADMIN — PHENYLEPHRINE HYDROCHLORIDE 200 MCG: 10 INJECTION, SOLUTION INTRAMUSCULAR; INTRAVENOUS; SUBCUTANEOUS at 09:53

## 2017-02-14 RX ADMIN — MORPHINE SULFATE 15 MG: 15 TABLET, EXTENDED RELEASE ORAL at 18:30

## 2017-02-14 RX ADMIN — SODIUM CHLORIDE, POTASSIUM CHLORIDE, SODIUM LACTATE AND CALCIUM CHLORIDE: 600; 310; 30; 20 INJECTION, SOLUTION INTRAVENOUS at 11:15

## 2017-02-14 RX ADMIN — ACETAMINOPHEN 325 MG: 325 TABLET, FILM COATED ORAL at 21:40

## 2017-02-14 RX ADMIN — CEFAZOLIN SODIUM 1 G: 1 INJECTION, POWDER, FOR SOLUTION INTRAMUSCULAR; INTRAVENOUS at 18:30

## 2017-02-14 RX ADMIN — PHENYLEPHRINE HYDROCHLORIDE 200 MCG: 10 INJECTION, SOLUTION INTRAMUSCULAR; INTRAVENOUS; SUBCUTANEOUS at 11:25

## 2017-02-14 RX ADMIN — PHENYLEPHRINE HYDROCHLORIDE 200 MCG: 10 INJECTION, SOLUTION INTRAMUSCULAR; INTRAVENOUS; SUBCUTANEOUS at 11:10

## 2017-02-14 RX ADMIN — VECURONIUM BROMIDE 1 MG: 1 INJECTION, POWDER, LYOPHILIZED, FOR SOLUTION INTRAVENOUS at 09:30

## 2017-02-14 RX ADMIN — KETOROLAC TROMETHAMINE 30 MG: 30 INJECTION, SOLUTION INTRAMUSCULAR at 18:30

## 2017-02-14 RX ADMIN — VECURONIUM BROMIDE 1 MG: 1 INJECTION, POWDER, LYOPHILIZED, FOR SOLUTION INTRAVENOUS at 10:55

## 2017-02-14 RX ADMIN — HYDROMORPHONE HYDROCHLORIDE 0.5 MG: 1 INJECTION, SOLUTION INTRAMUSCULAR; INTRAVENOUS; SUBCUTANEOUS at 10:31

## 2017-02-14 RX ADMIN — VECURONIUM BROMIDE 2 MG: 1 INJECTION, POWDER, LYOPHILIZED, FOR SOLUTION INTRAVENOUS at 10:09

## 2017-02-14 RX ADMIN — PHENYLEPHRINE HYDROCHLORIDE 200 MCG: 10 INJECTION, SOLUTION INTRAMUSCULAR; INTRAVENOUS; SUBCUTANEOUS at 11:30

## 2017-02-14 RX ADMIN — SODIUM CHLORIDE, POTASSIUM CHLORIDE, SODIUM LACTATE AND CALCIUM CHLORIDE: 600; 310; 30; 20 INJECTION, SOLUTION INTRAVENOUS at 09:36

## 2017-02-14 RX ADMIN — PHENYLEPHRINE HYDROCHLORIDE 100 MCG: 10 INJECTION, SOLUTION INTRAMUSCULAR; INTRAVENOUS; SUBCUTANEOUS at 08:23

## 2017-02-14 RX ADMIN — CEFAZOLIN SODIUM 2 G: 2 INJECTION, SOLUTION INTRAVENOUS at 08:25

## 2017-02-14 RX ADMIN — HYDROMORPHONE HYDROCHLORIDE 0.5 MG: 1 INJECTION, SOLUTION INTRAMUSCULAR; INTRAVENOUS; SUBCUTANEOUS at 15:33

## 2017-02-14 RX ADMIN — SENNOSIDES AND DOCUSATE SODIUM 1 TABLET: 8.6; 5 TABLET ORAL at 21:40

## 2017-02-14 RX ADMIN — PHENYLEPHRINE HYDROCHLORIDE 200 MCG: 10 INJECTION, SOLUTION INTRAMUSCULAR; INTRAVENOUS; SUBCUTANEOUS at 10:16

## 2017-02-14 RX ADMIN — PHENYLEPHRINE HYDROCHLORIDE 200 MCG: 10 INJECTION, SOLUTION INTRAMUSCULAR; INTRAVENOUS; SUBCUTANEOUS at 09:56

## 2017-02-14 RX ADMIN — TESTOSTERONE 50 MG: 50 GEL TOPICAL at 21:39

## 2017-02-14 RX ADMIN — FENTANYL CITRATE 50 MCG: 50 INJECTION, SOLUTION INTRAMUSCULAR; INTRAVENOUS at 09:46

## 2017-02-14 RX ADMIN — PHENYLEPHRINE HYDROCHLORIDE 200 MCG: 10 INJECTION, SOLUTION INTRAMUSCULAR; INTRAVENOUS; SUBCUTANEOUS at 11:01

## 2017-02-14 RX ADMIN — KETOROLAC TROMETHAMINE 0.5 MG: 30 INJECTION, SOLUTION INTRAMUSCULAR at 11:38

## 2017-02-14 RX ADMIN — PHENYLEPHRINE HYDROCHLORIDE 200 MCG: 10 INJECTION, SOLUTION INTRAMUSCULAR; INTRAVENOUS; SUBCUTANEOUS at 10:57

## 2017-02-14 RX ADMIN — HYDROMORPHONE HYDROCHLORIDE 0.5 MG: 1 INJECTION, SOLUTION INTRAMUSCULAR; INTRAVENOUS; SUBCUTANEOUS at 10:53

## 2017-02-14 RX ADMIN — SODIUM CHLORIDE, POTASSIUM CHLORIDE, SODIUM LACTATE AND CALCIUM CHLORIDE: 600; 310; 30; 20 INJECTION, SOLUTION INTRAVENOUS at 07:16

## 2017-02-14 RX ADMIN — PHENYLEPHRINE HYDROCHLORIDE 100 MCG: 10 INJECTION, SOLUTION INTRAMUSCULAR; INTRAVENOUS; SUBCUTANEOUS at 08:55

## 2017-02-14 RX ADMIN — FENTANYL CITRATE 100 MCG: 50 INJECTION, SOLUTION INTRAMUSCULAR; INTRAVENOUS at 08:09

## 2017-02-14 RX ADMIN — ONDANSETRON 4 MG: 2 INJECTION INTRAMUSCULAR; INTRAVENOUS at 11:00

## 2017-02-14 RX ADMIN — CEFAZOLIN SODIUM 1 G: 2 INJECTION, SOLUTION INTRAVENOUS at 10:25

## 2017-02-14 RX ADMIN — HYDROMORPHONE HYDROCHLORIDE 0.5 MG: 1 INJECTION, SOLUTION INTRAMUSCULAR; INTRAVENOUS; SUBCUTANEOUS at 12:21

## 2017-02-14 RX ADMIN — PHENYLEPHRINE HYDROCHLORIDE 200 MCG: 10 INJECTION, SOLUTION INTRAMUSCULAR; INTRAVENOUS; SUBCUTANEOUS at 11:21

## 2017-02-14 RX ADMIN — DEXMEDETOMIDINE HYDROCHLORIDE 0.3 MCG/KG/HR: 4 INJECTION, SOLUTION INTRAVENOUS at 08:32

## 2017-02-14 RX ADMIN — MIDAZOLAM HYDROCHLORIDE 1 MG: 1 INJECTION, SOLUTION INTRAMUSCULAR; INTRAVENOUS at 08:00

## 2017-02-14 RX ADMIN — POTASSIUM CHLORIDE, DEXTROSE MONOHYDRATE AND SODIUM CHLORIDE: 150; 5; 450 INJECTION, SOLUTION INTRAVENOUS at 17:40

## 2017-02-14 RX ADMIN — HYDROMORPHONE HYDROCHLORIDE 0.5 MG: 1 INJECTION, SOLUTION INTRAMUSCULAR; INTRAVENOUS; SUBCUTANEOUS at 10:44

## 2017-02-14 RX ADMIN — Medication 20 MG: at 11:00

## 2017-02-14 RX ADMIN — VECURONIUM BROMIDE 2 MG: 1 INJECTION, POWDER, LYOPHILIZED, FOR SOLUTION INTRAVENOUS at 09:45

## 2017-02-14 RX ADMIN — PROPOFOL 150 MG: 10 INJECTION, EMULSION INTRAVENOUS at 08:09

## 2017-02-14 RX ADMIN — HYDROMORPHONE HYDROCHLORIDE 0.5 MG: 1 INJECTION, SOLUTION INTRAMUSCULAR; INTRAVENOUS; SUBCUTANEOUS at 11:58

## 2017-02-14 RX ADMIN — HYDROMORPHONE HYDROCHLORIDE 0.5 MG: 1 INJECTION, SOLUTION INTRAMUSCULAR; INTRAVENOUS; SUBCUTANEOUS at 11:44

## 2017-02-14 RX ADMIN — KETOROLAC TROMETHAMINE 30 MG: 30 INJECTION, SOLUTION INTRAMUSCULAR at 12:07

## 2017-02-14 RX ADMIN — VECURONIUM BROMIDE 3 MG: 1 INJECTION, POWDER, LYOPHILIZED, FOR SOLUTION INTRAVENOUS at 09:03

## 2017-02-14 RX ADMIN — FINASTERIDE 5 MG: 5 TABLET, FILM COATED ORAL at 21:40

## 2017-02-14 RX ADMIN — GABAPENTIN 300 MG: 300 CAPSULE ORAL at 07:51

## 2017-02-14 RX ADMIN — VECURONIUM BROMIDE 2 MG: 1 INJECTION, POWDER, LYOPHILIZED, FOR SOLUTION INTRAVENOUS at 10:31

## 2017-02-14 RX ADMIN — Medication 5 MG: at 09:07

## 2017-02-14 RX ADMIN — OXYCODONE HYDROCHLORIDE 10 MG: 5 TABLET ORAL at 21:40

## 2017-02-14 RX ADMIN — PHENYLEPHRINE HYDROCHLORIDE 100 MCG: 10 INJECTION, SOLUTION INTRAMUSCULAR; INTRAVENOUS; SUBCUTANEOUS at 09:07

## 2017-02-14 RX ADMIN — LIDOCAINE HYDROCHLORIDE 0.2 ML: 10 INJECTION, SOLUTION EPIDURAL; INFILTRATION; INTRACAUDAL; PERINEURAL at 07:17

## 2017-02-14 RX ADMIN — ROCURONIUM BROMIDE 50 MG: 10 INJECTION INTRAVENOUS at 08:14

## 2017-02-14 RX ADMIN — PHENYLEPHRINE HYDROCHLORIDE 100 MCG: 10 INJECTION, SOLUTION INTRAMUSCULAR; INTRAVENOUS; SUBCUTANEOUS at 09:35

## 2017-02-14 ASSESSMENT — COPD QUESTIONNAIRES: COPD: 0

## 2017-02-14 ASSESSMENT — ENCOUNTER SYMPTOMS: DYSRHYTHMIAS: 0

## 2017-02-14 ASSESSMENT — LIFESTYLE VARIABLES: TOBACCO_USE: 1

## 2017-02-14 ASSESSMENT — PAIN DESCRIPTION - DESCRIPTORS
DESCRIPTORS: ACHING
DESCRIPTORS: ACHING

## 2017-02-14 NOTE — IP AVS SNAPSHOT
MRN:[id number]                      After Visit Summary   2/14/2017    Oseas Edwards    MRN: [id number]           Thank you!     Thank you for choosing [facility] for your care. Our goal is always to provide you with excellent care. Hearing back from our patients is one way we can continue to improve our services. Please take a few minutes to complete the written survey that you may receive in the mail after you visit with us. Thank you!        Patient Information     Date Of Birth          1970        About your hospital stay     You were admitted on:  February 14, 2017 You last received care in the:  [address] [facility]    You were discharged on:  February 16, 2017        Reason for your hospital stay       Left total hip arthroplasty revision                  Who to Call     For medical emergencies, please call 911.  For non-urgent questions about your medical care, please call your primary care provider or clinic, [phone]  For questions related to your surgery, please call your surgery clinic        Attending Provider     Provider Specialty    Saravanan Alcaraz MD Orthopedics       Primary Care Provider Office Phone # Fax #    Willy Forrest [phone]       [facility] [address] [facility] [address]         When to contact your care team       When you should become concerned:     Call your surgeon if you have:   - a temperature higher than 101.6   - problems or signs of infection at your incision site: increased redness, tenderness, drainage, odor, warmth, or green or yellow discharge   - any change in movement such as new weakness or inability to move affected arm or leg   - any change in sensation such as new numbness or tingling   - any unusual bruising or bleeding some spotting is normal  - severe pain that is not relieved by medicine, rest, or ice   - any other problems, questions, or concerns about your  surgery    Call your primary provider if you have:  - dizziness or lightheadedness that will not stop   - nausea (upset stomach) and vomiting (throwing up) that will not stop   - any bowel problems such as constipation or bloody stools   - any problems urinating such as burning, urgency, or frequency   - any other problems, questions, or concerns                  After Care Instructions     Activity       Your activity upon discharge: activity as tolerated: partial weight bearing with assistive device for ambulation. Observe standard total hip arthroplasty precautions.   Activity Total Hip or Partial Hip Precautions For Total Hip Replacement or partial hip replacement:  1) Posterolateral Approach:   -No hip internal rotation: do not point your toes together   -No hip adduction beyond neutral: do not cross your legs    2) Flexion restrictions: May flex to only 90 degrees/only bend to 90 degrees at the waist   3) When lying in bed place a pillow between legs and do not to cross legs.   4) When turning in bed, place 1 to 2 pillows between legs to support affected hip.            Diet       Follow this diet upon discharge: Orders Placed This Encounter      Advance Diet as Tolerated: Regular Diet Adult            Wound care and dressings       Instructions to care for your wound at home: Leave Aquacel dressing in place until your two week post operative appointment.   Caring for your dressing: Leave dressing undisturbed until follow up with orthopedic surgeon. Okay to shower with dressing in place. Do not soak incision/wound. If dressing becomes saturated >60% then transition to daily dry dressing changes.                  Follow-up Appointments     Follow Up and recommended labs and tests       Follow up appointments: Follow up in 10-14 days in clinic with Dr. Alcaraz. Please call the clinic to schedule an appointment. [facility] [address] [place] [address] Phone: [phone]             Follow-up and recommended labs and tests        Follow up appointments: Follow up in 10-14 days in clinic with Dr. Alcaraz. Please call the clinic to schedule an appointment. [facility] [address] [place] [address] Phone: [phone]                  Additional Services     Home Care PT Referral for Hospital Discharge       PT to eval and treat    Your provider has ordered home care - physical therapy. If you have not been contacted within 2 days of your discharge please call the department phone number listed on the top of this document.                  Further instructions from your care team       Home care arranged by:  [facility]  Phone number #[phone]  Fax [phone]      Pending Results     Date and Time Order Name Status Description    2/14/2017 0903 Fluid Culture Aerobic Bacterial Preliminary     2/14/2017 0903 Anaerobic bacterial culture Preliminary             Statement of Approval     Ordered          02/16/17 1346  I have reviewed and agree with all the recommendations and orders detailed in this document.  EFFECTIVE NOW     Approved and electronically signed by:  Onel Nichols PA-C             Admission Information     Date & Time Provider Department Dept. Phone    2/14/2017 Saravanan Alcaraz MD [place] [address] [facility] [phone]      Your Vitals Were     Blood Pressure Pulse Temperature Respirations Height Weight    116/77 100 97.9  F (36.6  C) (Oral) 16 1.524 m (5') 71.4 kg (157 lb 8 oz)    Pulse Oximetry BMI (Body Mass Index)                98% 30.76 kg/m2          MyChart Information     [employer] gives you secure access to your electronic health record. If you see a primary care provider, you can also send messages to your care team and make appointments. If you have questions, please call your primary care clinic.  If you do not have a primary care provider, please call [phone] and they will assist you.        Care EveryWhere  ID     This is your Care EveryWhere ID. This could be used by other organizations to access your [facility] medical records  [id number]           Review of your medicines      START taking        Dose / Directions    enoxaparin 40 MG/0.4ML injection   Commonly known as:  LOVENOX   Used for:  Status post revision of total hip replacement        Dose:  40 mg   Inject 0.4 mLs (40 mg) Subcutaneous every 24 hours   Quantity:  14 Syringe   Refills:  0       ondansetron 4 MG ODT tab   Commonly known as:  ZOFRAN-ODT   Used for:  Status post revision of total hip replacement        Dose:  4 mg   Take 1 tablet (4 mg) by mouth every 6 hours as needed for nausea   Quantity:  96 tablet   Refills:  0       order for DME   Used for:  Status post revision of total hip replacement        Equipment being ordered: Crutches () Treatment Diagnosis: impaired gait.   Quantity:  1 each   Refills:  0         CONTINUE these medicines which may have CHANGED, or have new prescriptions. If we are uncertain of the size of tablets/capsules you have at home, strength may be listed as something that might have changed.        Dose / Directions    * oxyCODONE 10 MG IR tablet   Commonly known as:  ROXICODONE   This may have changed:    - medication strength  - how much to take  - when to take this  - reasons to take this  - additional instructions   Used for:  Status post revision of total hip replacement        Dose:  10-15 mg   Take 1-1.5 tablets (10-15 mg) by mouth every 3 hours as needed for moderate to severe pain   Quantity:  120 tablet   Refills:  0       * oxyCODONE 10 MG 12 hr tablet   Commonly known as:  OxyCONTIN   This may have changed:  You were already taking a medication with the same name, and this prescription was added. Make sure you understand how and when to take each.   Used for:  Status post revision of total hip replacement        Dose:  10 mg   Take 1 tablet (10 mg) by mouth every 12 hours as needed for moderate to severe  pain maximum 2 tablet(s) per day   Quantity:  36 tablet   Refills:  0       * Notice:  This list has 2 medication(s) that are the same as other medications prescribed for you. Read the directions carefully, and ask your doctor or other care provider to review them with you.      CONTINUE these medicines which have NOT CHANGED        Dose / Directions    ARTIFICIAL TEARS OP        Dose:  1 drop   Place 1 drop into both eyes daily as needed   Refills:  0       cyanocobalamin 1000 MCG/ML injection   Commonly known as:  VITAMIN B12   Used for:  Encounter for medication refill        INJECT 1ML (1000MCG) INTRAMUSCULARLY EVERY 30 DAYS   Quantity:  1 mL   Refills:  11       cyclobenzaprine 10 MG tablet   Commonly known as:  FLEXERIL   Used for:  Encounter for medication refill        TAKE ONE TABLET BY MOUTH TWICE DAILY AS NEEDED FOR MUSCLE SPASM   Quantity:  200 tablet   Refills:  0       docusate sodium 100 MG tablet   Commonly known as:  COLACE        Dose:  100 mg   Take 100 mg by mouth daily as needed   Quantity:  60 tablet   Refills:  1       finasteride 5 MG tablet   Commonly known as:  PROSCAR   Used for:  Encounter for medication refill        Dose:  1 tablet   Take 1 tablet (5 mg) by mouth daily   Quantity:  90 tablet   Refills:  3       lisinopril-hydrochlorothiazide 10-12.5 MG per tablet   Commonly known as:  PRINZIDE/ZESTORETIC   Used for:  Encounter for medication refill        Dose:  1 tablet   Take 1 tablet by mouth daily   Quantity:  90 tablet   Refills:  3       mometasone 0.1 % ointment   Commonly known as:  ELOCON        Apply topically 2 times daily as needed   Refills:  0       MULTI VITAMIN MENS PO        Dose:  1 tablet   Take 1 tablet by mouth daily as needed   Refills:  0       OMEPRAZOLE PO        Dose:  20 mg   Take 20 mg by mouth daily as needed   Refills:  0       SIMPONI 50 MG/0.5ML auto-injector pen   Generic drug:  golimumab        Dose:  50 mg   Inject 50 mg Subcutaneous every 28 days    Refills:  0       testosterone 50 MG/5GM (1%) topical gel   Commonly known as:  ANDROGEL/TESTIM        Dose:  50 mg   Place 50 mg onto the skin every 48 hours   Refills:  0       triamcinolone 0.1 % ointment   Commonly known as:  KENALOG        Apply topically daily as needed for irritation   Refills:  0         STOP taking     acetaminophen-codeine 300-30 MG per tablet   Commonly known as:  TYLENOL #3           INDOMETHACIN PO                Where to get your medicines      These medications were sent to [facility] - [address]  [address], [place] [address]     Phone:  [phone]     enoxaparin 40 MG/0.4ML injection    ondansetron 4 MG ODT tab         Some of these will need a paper prescription and others can be bought over the counter. Ask your nurse if you have questions.     Bring a paper prescription for each of these medications     order for DME    oxyCODONE 10 MG 12 hr tablet    oxyCODONE 10 MG IR tablet                Protect others around you: Learn how to safely use, store and throw away your medicines at www.disposemymeds.org.             Medication List: This is a list of all your medications and when to take them. Check marks below indicate your daily home schedule. Keep this list as a reference.      Medications           Morning Afternoon Evening Bedtime As Needed    ARTIFICIAL TEARS OP   Place 1 drop into both eyes daily as needed                                cyanocobalamin 1000 MCG/ML injection   Commonly known as:  VITAMIN B12   INJECT 1ML (1000MCG) INTRAMUSCULARLY EVERY 30 DAYS   Last time this was given:  1,000 mcg on 2/14/2017  6:30 PM                                   cyclobenzaprine 10 MG tablet   Commonly known as:  FLEXERIL   TAKE ONE TABLET BY MOUTH TWICE DAILY AS NEEDED FOR MUSCLE SPASM                                   docusate sodium 100 MG tablet   Commonly known as:  COLACE   Take 100 mg by mouth daily as needed                                    enoxaparin 40 MG/0.4ML injection   Commonly known as:  LOVENOX   Inject 0.4 mLs (40 mg) Subcutaneous every 24 hours   Last time this was given:  40 mg on 2/16/2017  9:00 AM            2/17/2017 9:00 am                       finasteride 5 MG tablet   Commonly known as:  PROSCAR   Take 1 tablet (5 mg) by mouth daily   Last time this was given:  5 mg on 2/15/2017  8:16 PM                    2/16/2017 6:00 pm                 lisinopril-hydrochlorothiazide 10-12.5 MG per tablet   Commonly known as:  PRINZIDE/ZESTORETIC   Take 1 tablet by mouth daily                                   mometasone 0.1 % ointment   Commonly known as:  ELOCON   Apply topically 2 times daily as needed                                   MULTI VITAMIN MENS PO   Take 1 tablet by mouth daily as needed                                   OMEPRAZOLE PO   Take 20 mg by mouth daily as needed   Last time this was given:  20 mg on 2/16/2017  9:12 AM            2/17/2017  9:00 am                         ondansetron 4 MG ODT tab   Commonly known as:  ZOFRAN-ODT   Take 1 tablet (4 mg) by mouth every 6 hours as needed for nausea                                   order for DME   Equipment being ordered: Crutches () Treatment Diagnosis: impaired gait.                                * oxyCODONE 10 MG IR tablet   Commonly known as:  ROXICODONE   Take 1-1.5 tablets (10-15 mg) by mouth every 3 hours as needed for moderate to severe pain   Last time this was given:  15 mg on 2/16/2017  4:20 PM                    2/17/2017 7:20 pm                 * oxyCODONE 10 MG 12 hr tablet   Commonly known as:  OxyCONTIN   Take 1 tablet (10 mg) by mouth every 12 hours as needed for moderate to severe pain maximum 2 tablet(s) per day   Last time this was given:  10 mg on 2/14/2017  7:15 AM                                SIMPONI 50 MG/0.5ML auto-injector pen   Inject 50 mg Subcutaneous every 28 days   Generic drug:  golimumab                                    testosterone 50 MG/5GM (1%) topical gel   Commonly known as:  ANDROGEL/TESTIM   Place 50 mg onto the skin every 48 hours   Last time this was given:  50 mg on 2/14/2017  9:39 PM                                   triamcinolone 0.1 % ointment   Commonly known as:  KENALOG   Apply topically daily as needed for irritation                                   * Notice:  This list has 2 medication(s) that are the same as other medications prescribed for you. Read the directions carefully, and ask your doctor or other care provider to review them with you.

## 2017-02-14 NOTE — ANESTHESIA CARE TRANSFER NOTE
Patient: Oseas Edwards    Procedure(s):  LEFT TOTAL HIP ARTHROPLASTY REVISION (OUT: OSTEONICS^ / IN TU STEM, BIOMET CUP)^  - Wound Class: I-Clean    Diagnosis: FAILED TOTAL LEFT HIP ARTHROPLASTY  Diagnosis Additional Information: No value filed.    Anesthesia Type:   General, ETT     Note:  Airway :Face Mask  Patient transferred to:PACU  Comments: Awake, VSS report to RN, monitors and alarms on, IV patent.  Sats 98%      Vitals: (Last set prior to Anesthesia Care Transfer)    CRNA VITALS  2/14/2017 1115 - 2/14/2017 1215      2/14/2017             Pulse: 101    SpO2: (!)  81 %    Resp Rate (set): 10    EKG: Sinus rhythm                Electronically Signed By: CIRA Del Rio CRNA  February 14, 2017  12:36 PM

## 2017-02-14 NOTE — IP AVS SNAPSHOT
[place] [address] [facility]: [phone]                                              INTERAGENCY TRANSFER FORM - PHYSICIAN ORDERS   2017                    Hospital Admission Date: 2017  JUANPABLO RODRIGUEZ   : 1970  Sex: Male        Attending Provider: Sraavanan Alcaraz MD     Allergies:  Infliximab, No Clinical Screening - See Comments, Remicade [Infliximab Injection]    Infection:  None   Service:  SURGERY    Ht:  1.524 m (5')   Wt:  71.4 kg (157 lb 8 oz)   Admission Wt:  71.4 kg (157 lb 8 oz)    BMI:  30.76 kg/m 2   BSA:  1.74 m 2            Patient PCP Information     Provider PCP Type    Willy Forrest MD General      ED Clinical Impression     Diagnosis Description Comment Added By Time Added    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] 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]  Onel Nichols PA-C 2017  7:41 AM      Hospital Problems as of 2017              Priority Class Noted POA    Failed total hip arthroplasty with dislocation, initial encounter (H) Medium  2017 Yes      Non-Hospital Problems as of 2017              Priority Class Noted    AS (ankylosing spondylitis) (H)   2011    PA (pernicious anemia)   2011    Low serum testosterone level   2012    Health Care Home Medium  10/7/2013    Chronic narcotic dependence (H)   2013    Rectus diastasis   2013    Hx of gastric ulcer Medium  2016    Chronic pain syndrome Medium  2016    Keloid scar, mostly just dark Medium  2017      Code Status History     Date Active Date Inactive Code Status Order ID Comments User Context    2013  1:42 PM 2013  1:44 PM Full Code [id number]  Dia Vaca, RN Inpatient         Medication Review      START taking        Dose / Directions Comments    enoxaparin 40 MG/0.4ML injection   Commonly known as:  LOVENOX   Used for:  Status post revision of total hip replacement        Dose:   40 mg   Inject 0.4 mLs (40 mg) Subcutaneous every 24 hours   Quantity:  14 Syringe   Refills:  0        ondansetron 4 MG ODT tab   Commonly known as:  ZOFRAN-ODT   Used for:  Status post revision of total hip replacement        Dose:  4 mg   Take 1 tablet (4 mg) by mouth every 6 hours as needed for nausea   Quantity:  96 tablet   Refills:  0        order for DME   Used for:  Status post revision of total hip replacement        Equipment being ordered: Crutches () Treatment Diagnosis: impaired gait.   Quantity:  1 each   Refills:  0          CONTINUE these medications which may have CHANGED, or have new prescriptions. If we are uncertain of the size of tablets/capsules you have at home, strength may be listed as something that might have changed.        Dose / Directions Comments    * oxyCODONE 10 MG IR tablet   Commonly known as:  ROXICODONE   This may have changed:    - medication strength  - how much to take  - when to take this  - reasons to take this  - additional instructions   Used for:  Status post revision of total hip replacement        Dose:  10-15 mg   Take 1-1.5 tablets (10-15 mg) by mouth every 3 hours as needed for moderate to severe pain   Quantity:  120 tablet   Refills:  0        * oxyCODONE 10 MG 12 hr tablet   Commonly known as:  OxyCONTIN   This may have changed:  You were already taking a medication with the same name, and this prescription was added. Make sure you understand how and when to take each.   Used for:  Status post revision of total hip replacement        Dose:  10 mg   Take 1 tablet (10 mg) by mouth every 12 hours as needed for moderate to severe pain maximum 2 tablet(s) per day   Quantity:  36 tablet   Refills:  0        * Notice:  This list has 2 medication(s) that are the same as other medications prescribed for you. Read the directions carefully, and ask your doctor or other care provider to review them with you.      CONTINUE these medications which have NOT CHANGED         Dose / Directions Comments    ARTIFICIAL TEARS OP        Dose:  1 drop   Place 1 drop into both eyes daily as needed   Refills:  0        cyanocobalamin 1000 MCG/ML injection   Commonly known as:  VITAMIN B12   Used for:  Encounter for medication refill        INJECT 1ML (1000MCG) INTRAMUSCULARLY EVERY 30 DAYS   Quantity:  1 mL   Refills:  11        cyclobenzaprine 10 MG tablet   Commonly known as:  FLEXERIL   Used for:  Encounter for medication refill        TAKE ONE TABLET BY MOUTH TWICE DAILY AS NEEDED FOR MUSCLE SPASM   Quantity:  200 tablet   Refills:  0        docusate sodium 100 MG tablet   Commonly known as:  COLACE        Dose:  100 mg   Take 100 mg by mouth daily as needed   Quantity:  60 tablet   Refills:  1        finasteride 5 MG tablet   Commonly known as:  PROSCAR   Used for:  Encounter for medication refill        Dose:  1 tablet   Take 1 tablet (5 mg) by mouth daily   Quantity:  90 tablet   Refills:  3        lisinopril-hydrochlorothiazide 10-12.5 MG per tablet   Commonly known as:  PRINZIDE/ZESTORETIC   Used for:  Encounter for medication refill        Dose:  1 tablet   Take 1 tablet by mouth daily   Quantity:  90 tablet   Refills:  3        mometasone 0.1 % ointment   Commonly known as:  ELOCON        Apply topically 2 times daily as needed   Refills:  0        MULTI VITAMIN MENS PO        Dose:  1 tablet   Take 1 tablet by mouth daily as needed   Refills:  0        OMEPRAZOLE PO        Dose:  20 mg   Take 20 mg by mouth daily as needed   Refills:  0        SIMPONI 50 MG/0.5ML auto-injector pen   Generic drug:  golimumab        Dose:  50 mg   Inject 50 mg Subcutaneous every 28 days   Refills:  0        testosterone 50 MG/5GM (1%) topical gel   Commonly known as:  ANDROGEL/TESTIM        Dose:  50 mg   Place 50 mg onto the skin every 48 hours   Refills:  0        triamcinolone 0.1 % ointment   Commonly known as:  KENALOG        Apply topically daily as needed for irritation   Refills:  0           STOP taking     acetaminophen-codeine 300-30 MG per tablet   Commonly known as:  TYLENOL #3           INDOMETHACIN PO                   Summary of Visit     Reason for your hospital stay       Left total hip arthroplasty revision             After Care     Activity       Your activity upon discharge: activity as tolerated: partial weight bearing with assistive device for ambulation. Observe standard total hip arthroplasty precautions.   Activity Total Hip or Partial Hip Precautions For Total Hip Replacement or partial hip replacement:  1) Posterolateral Approach:   -No hip internal rotation: do not point your toes together   -No hip adduction beyond neutral: do not cross your legs    2) Flexion restrictions: May flex to only 90 degrees/only bend to 90 degrees at the waist   3) When lying in bed place a pillow between legs and do not to cross legs.   4) When turning in bed, place 1 to 2 pillows between legs to support affected hip.       Diet       Follow this diet upon discharge: Orders Placed This Encounter      Advance Diet as Tolerated: Regular Diet Adult       Wound care and dressings       Instructions to care for your wound at home: Leave Aquacel dressing in place until your two week post operative appointment.   Caring for your dressing: Leave dressing undisturbed until follow up with orthopedic surgeon. Okay to shower with dressing in place. Do not soak incision/wound. If dressing becomes saturated >60% then transition to daily dry dressing changes.             Referrals     Home Care PT Referral for Hospital Discharge       PT to eval and treat    Your provider has ordered home care - physical therapy. If you have not been contacted within 2 days of your discharge please call the department phone number listed on the top of this document.              MD face to face encounter       Documentation of Face to Face and Certification for Home Health Services    I certify that patient: Oseas Edwards is under my  care and that I, or a nurse practitioner or physician's assistant working with me, had a face-to-face encounter that meets the physician face-to-face encounter requirements with this patient on: 2/16/2017.    This encounter with the patient was in whole, or in part, for the following medical condition, which is the primary reason for home health care: total hip revision.    I certify that, based on my findings, the following services are medically necessary home health services: Physical Therapy.    My clinical findings support the need for the above services because: Physical Therapy Services are needed to assess and treat the following functional impairments: deconditioning.    Further, I certify that my clinical findings support that this patient is homebound (i.e. absences from home require considerable and taxing effort and are for medical reasons or Baptism services or infrequently or of short duration when for other reasons) because: Requires assistance of another person or specialized equipment to access medical services because patient: Has prohibitive pain during ambulation...    Based on the above findings. I certify that this patient is confined to the home and needs intermittent skilled nursing care, physical therapy and/or speech therapy.  The patient is under my care, and I have initiated the establishment of the plan of care.  This patient will be followed by a physician who will periodically review the plan of care.  Physician/Provider to provide follow up care: Willy Forrest    Attending [facility] physician (the Medicare certified PEC provider): Saravanan Alcaraz MD  Physician Signature: See electronic signature associated with these discharge orders.  Date: 2/16/2017                  Follow-Up Appointment Instructions     Future Labs/Procedures    Follow Up and recommended labs and tests     Comments:    Follow up appointments: Follow up in 10-14 days in clinic with Dr. Alcaraz. Please  call the clinic to schedule an appointment. [facility] [address] [place] [id number] Phone: [phone]    Follow-up and recommended labs and tests      Comments:    Follow up appointments: Follow up in 10-14 days in clinic with Dr. Alcaraz. Please call the clinic to schedule an appointment. [facility] [address] Phone: [phone]      Follow-Up Appointment Instructions     Follow Up and recommended labs and tests       Follow up appointments: Follow up in 10-14 days in clinic with Dr. Alcaraz. Please call the clinic to schedule an appointment. [facility] [address] Phone: [phone]       Follow-up and recommended labs and tests        Follow up appointments: Follow up in 10-14 days in clinic with Dr. Alcaraz. Please call the clinic to schedule an appointment. [facility] [address] Phone: [phone]             Statement of Approval     Ordered          02/16/17 1346  I have reviewed and agree with all the recommendations and orders detailed in this document.  EFFECTIVE NOW     Approved and electronically signed by:  Onel Nichols PA-C

## 2017-02-14 NOTE — ANESTHESIA PREPROCEDURE EVALUATION
Anesthesia Evaluation     . Pt has had prior anesthetic. Type: General    No history of anesthetic complications     ROS/MED HX    ENT/Pulmonary:     (+)tobacco use, Past use , . .   (-) asthma, COPD, sleep apnea and recent URI   Neurologic: Comment: Optic neuritis in past     (-) CVA   Cardiovascular:     (+) hypertension----. : . . . :. .      (-) CAD, CHF, arrhythmias and dyslipidemia   METS/Exercise Tolerance:  >4 METS   Hematologic:     (+) History of Transfusion -      Musculoskeletal: Comment: Ankylosing spondylitis  S/p bilateral JAMES  Stiff neck, back pain        GI/Hepatic: Comment: GI Bleed 2/2 NSAIDS  Gastric ulcer        Renal/Genitourinary:      (-) renal disease   Endo:         Psychiatric:  - neg psychiatric ROS       Infectious Disease:         Malignancy:         Other:    (+) H/O Chronic Pain,H/O chronic opiod use ,              Physical Exam      Airway   Mallampati: III  TM distance: >3 FB  Neck ROM: limited    Dental   (+) missing and chipped    Cardiovascular   Rhythm and rate: regular and normal      Pulmonary    breath sounds clear to auscultation                    Anesthesia Plan      History & Physical Review  History and physical reviewed and following examination; no interval change.    ASA Status:  3 .    NPO Status:  > 8 hours    Plan for General and ETT with Intravenous and Propofol induction. Maintenance will be Balanced.    PONV prophylaxis:  Ondansetron (or other 5HT-3) and Dexamethasone or Solumedrol  Additional equipment: Videolaryngoscope Oxycontin, neurontin pre op  Dilaudid and Ketamine intraop.      Postoperative Care  Postoperative pain management:  IV analgesics and Oral pain medications.      Consents  Anesthetic plan, risks, benefits and alternatives discussed with:  Patient.  Use of blood products discussed: Yes.   Use of blood products discussed with Patient.  Consented to blood products.  .                          .

## 2017-02-14 NOTE — IP AVS SNAPSHOT
[address] [facility]    [address] [place] [address]    Phone:  [phone]                                       After Visit Summary   2/14/2017    Oseas Edwards    MRN: [id number]           After Visit Summary Signature Page     I have received my discharge instructions, and my questions have been answered. I have discussed any challenges I see with this plan with the nurse or doctor.    ..........................................................................................................................................  Patient/Patient Representative Signature      ..........................................................................................................................................  Patient Representative Print Name and Relationship to Patient    ..................................................               ................................................  Date                                            Time    ..........................................................................................................................................  Reviewed by Signature/Title    ...................................................              ..............................................  Date                                                            Time

## 2017-02-14 NOTE — BRIEF OP NOTE
[facility] Brief Operative Note    Pre-operative diagnosis: FAILED TOTAL LEFT HIP ARTHROPLASTY   Post-operative diagnosis same   Procedure: Procedure(s):  LEFT TOTAL HIP ARTHROPLASTY REVISION (OUT: OSTEONICS^ / IN TU STEM, BIOMET CUP)^  - Wound Class: I-Clean   Surgeon(s): Surgeon(s) and Role:     * Saravanan Alcaraz MD - Primary     * Onel Nichols PA-C - Assisting   Estimated blood loss: 350 mL    Specimens:   ID Type Source Tests Collected by Time Destination   1 : LEFT HIP FLUID Fluid Hip, Left ANAEROBIC BACTERIAL CULTURE, CELL COUNT WITH DIFFERENTIAL FLUID, FLUID CULTURE AEROBIC BACTERIAL, GRAM STAIN Saravanan Alcaraz MD 2/14/2017  8:58 AM       Findings: Grossly loose cup and solid stem

## 2017-02-14 NOTE — CONSULTS
INTERNAL MEDICINE CONSULTATION       PRIMARY CARE PROVIDER:  Willy Forrest MD      REQUESTING PHYSICIAN:  Saravanan Alcaraz MD      REASON FOR CONSULTATION:  Hospitalist consult.        DATE OF SERVICE:  02/14/2017.      HISTORY OF PRESENT ILLNESS:  Oseas Edwards is a 46-year-old male with past medical history of hypertension, optic neuritis, ankylosing spondylitis and history of GI bleed who is under the care of Orthopedics and is status post left total hip arthroplasty revision.  He previously had undergone bilateral hip arthroplasties, left which has failed.  He is on chronic pain management.  He elected to undergo surgical intervention which was performed by Dr. Saravanan Alcaraz under general anesthesia.  The patient tolerated the procedure well.  Estimated blood loss was 350  mL.  Presently the patient is evaluated in his hospital room with family at bedside.  Complains of some nasal congestion, but otherwise no complaints.  Denies chest pain or shortness breath.  No nausea or vomiting.  He took his antihypertensives today prior to surgery.      PAST MEDICAL HISTORY:   1.  History of GI bleed in 2011.   2.  Ankylosing spondylitis.   3.  Hypertension.   4.  Optic neuritis.   5.  Chronic pain.   6.  Low serum testosterone.      PRIOR TO ADMISSION MEDICATIONS:    Prior to Admission Medications   Prescriptions Last Dose Informant Patient Reported? Taking?   Hypromellose (ARTIFICIAL TEARS OP) Past Week at prn Self Yes Yes   Sig: Place 1 drop into both eyes daily as needed   INDOMETHACIN PO 2/7/2017 Self Yes Yes   Sig: Take 50 mg by mouth 2 times daily   Multiple Vitamin (MULTI VITAMIN MENS PO) more than a month at prn Self Yes Yes   Sig: Take 1 tablet by mouth daily as needed    OMEPRAZOLE PO more than a month at prn Self Yes Yes   Sig: Take 20 mg by mouth daily as needed   SIMPONI 50 MG/0.5ML auto-injector pen more than 3 weeks Self Yes Yes   Sig: Inject 50 mg Subcutaneous every 28 days    acetaminophen-codeine (TYLENOL #3)  300-30 MG per tablet 2/14/2017 at 0400 Self No Yes   Sig: Take 1 tablet by mouth every 4 hours as needed for pain maximum 4 tablet(s) per day   cyanocobalamin (VITAMIN B12) 1000 MCG/ML injection more than a month Self No Yes   Sig: INJECT 1ML (1000MCG) INTRAMUSCULARLY EVERY 30 DAYS   cyclobenzaprine (FLEXERIL) 10 MG tablet 2/13/2017 at pm Self No Yes   Sig: TAKE ONE TABLET BY MOUTH TWICE DAILY AS NEEDED FOR MUSCLE SPASM   docusate sodium (COLACE) 100 MG tablet more than a week at prn Self Yes Yes   Sig: Take 100 mg by mouth daily as needed    finasteride (PROSCAR) 5 MG tablet 2/13/2017 at pm Self No Yes   Sig: Take 1 tablet (5 mg) by mouth daily   lisinopril-hydrochlorothiazide (PRINZIDE,ZESTORETIC) 10-12.5 MG per tablet 2/14/2017 at 0400 Self No Yes   Sig: Take 1 tablet by mouth daily   mometasone (ELOCON) 0.1 % ointment more than a week at prn Self Yes Yes   Sig: Apply topically 2 times daily as needed   oxyCODONE (ROXICODONE) 5 MG IR tablet 2/14/2017 at 0400 Self No Yes   Sig: Take 1 tablet (5 mg) by mouth every 8 hours as needed for pain Try to keep to 2 a day   testosterone (ANDROGEL/TESTIM) 50 MG/5GM (1%) topical gel 2/12/2017 Self Yes Yes   Sig: Place 50 mg onto the skin every 48 hours   triamcinolone (KENALOG) 0.1 % ointment more than a week at prn Self Yes Yes   Sig: Apply topically daily as needed for irritation      Facility-Administered Medications: None          SURGICAL HISTORY:     1.  Bilateral hip arthroplasties.   2.  Hernia repair.      FAMILY HISTORY:  Denies family history of heart disease, cancer or diabetes.      SOCIAL HISTORY:  He is a previous smoker, he smoked for approximately 20 years and quit in 2009.  Drinks minimal alcohol, is .      REVIEW OF SYSTEMS:  A 10-point review of systems was completed.  Pertinent positives are noted.  All other systems negative.      PHYSICAL EXAMINATION:   GENERAL:  Oseas Edwards is a well-developed, well-nourished 46-year-old male who appears his stated  age and is lying in bed in no acute distress.   VITAL SIGNS:  Blood pressure is 115/74, heart rate 92, temperature 98.4-70% on 2 liters.   HEENT:  Normocephalic, atraumatic.  Eyes:  Pupils equal, round, reactive to light.   NECK:  Supple, no adenopathy, no thyromegaly.   CARDIOVASCULAR:  Regular rhythm, no murmurs.   PULMONARY:  Normal effort.  Clear to auscultation anteriorly.   ABDOMEN:  Nondistended, diminished bowel sounds,  soft and nontender.   EXTREMITIES:  Dorsalis pedis and radial pulses are palpated bilaterally.   NEUROLOGIC:  Alert and oriented.  Cranial nerves II-XII are grossly intact.      LABORATORY DATA:  Labs reviewed in Epic.      ASSESSMENT:  Oseas Edwards is a 46-year-old male with past medical history of ankylosing spondylitis, hypertension and chronic pain who is status post a left total hip arthroplasty revision.  Hospitalist Service was consulted for medical comanagement.   1.  Status post left total hip arthroplasty revision,  postoperative day #0.  Routine postoperative cares and pain control will be deferred to Orthopedics.   2.  Hypertension:  Blood pressure is adequately controlled.  Prior to admission regimen includes   3.  Lisinopril/hydrochlorothiazide 10/12.5 mg tablet.  Will hold hydrochlorothiazide, continue lisinopril with parameters in place.  Repeat BMP in the morning and resume as indicated.   4.  Chronic pain.  The patient has chronic pain secondary to arthritis and ankylosing spondylitis.  He is managed by his PCP.  Prior to admission, he was on Tylenol No. 3, Flexeril and indomethacin.  Postoperative pain has been deferred to Orthopedics.  I see patient has been initiated on MS Contin every 12 hours as well as p.r.n. oxycodone.  The patient has also been initiated on scheduled ketorolac postoperatively.  Will hold indomethacin while receiving ketorolac.  Can resume at discharge.   5.  History of gastrointestinal bleed.  The patient has a history of GI bleed, though has been  prescribed high-dose NSAID by his PCP.  He denies any melena or hematochezia.  Hemoglobin is stable at 16.3.  Will continue PPI.  As stated previously, indomethacin is on hold while receiving ketorolac.  Will follow hemoglobin postoperatively.   6.  Ankylosing spondylitis.  Will hold prior to admission golimumab which he receives injected every 28 days while hospitalized.  This can be resumed at discharge.   7.  Low testosterone.  Continue prior to admission finasteride and AndroGel.   8.  Deep venous thrombosis prophylaxis.  Lovenox per Ortho.      CODE STATUS:  Full code.      We, the Hospitalist Service, thank you for this consult.  We will follow along with you.  Please call if questions.      This patient was discussed with Dr. Danielle Pimentel of the Hospitalist Service who independently interviewed and examined the patient.  She is in agreement with the above plan.         DANIELLE PIMENTEL MD       As dictated by ROSA ISELA RUIZ PA-C            D: 2017 14:11   T: 2017 14:34   MT: RADHA      Name:     JUANPABLO RODRIGUEZ   MRN:      [id number]        Account:       [id number]   :      1970           Consult Date:  2017      Document: [id number]       cc: Saravanan Forrest MD

## 2017-02-14 NOTE — PROGRESS NOTES
02/14/17 1515   Quick Adds   Type of Visit Initial PT Evaluation   Living Environment   Lives With spouse;child(jena), dependent   Living Arrangements house   Home Accessibility stairs to enter home;stairs within home;bed and bath are not on the first floor   Number of Stairs to Enter Home 4  (railing present)   Number of Stairs Within Home 12  (railing present)   Transportation Available car;family or friend will provide   Living Environment Comment Bedroom/bathroom on top floor.   Self-Care   Usual Activity Tolerance moderate   Current Activity Tolerance moderate   Regular Exercise no  (was limited by pain)   Equipment Currently Used at Home cane, straight   Activity/Exercise/Self-Care Comment Was needing to lean on shopping cart to walk around grocery store, used a cane in the community.   Functional Level Prior   Ambulation 0-->independent  (used a cane if walking > 1 block)   Transferring 0-->independent   Toileting 0-->independent   Bathing 0-->independent   Dressing 0-->independent   Eating 0-->independent   Communication 0-->understands/communicates without difficulty   Swallowing 0-->swallows foods/liquids without difficulty   Cognition 0 - no cognition issues reported   Fall history within last six months no   Which of the above functional risks had a recent onset or change? ambulation;transferring   General Information   Onset of Illness/Injury or Date of Surgery - Date 02/14/17   Referring Physician Saravanan Alcaraz MD   Patient/Family Goals Statement Walk with crutches   Pertinent History of Current Problem (include personal factors and/or comorbidities that impact the POC) Per chart: 46-year-old male with past medical history of hypertension, optic neuritis, ankylosing spondylitis and history of GI bleed who is under the care of Orthopedics and is status post left total hip arthroplasty revision.  He previously had undergone bilateral hip arthroplasties, left which has failed.  He is on chronic  pain management.  He elected to undergo surgical intervention which was performed by Dr. Saravanan Alcaraz under general anesthesia.    Precautions/Limitations left hip precautions   Weight-Bearing Status - LLE weight-bearing as tolerated;partial weight-bearing (% in comments)  (both orders entered by Dr. Alcaraz- will need to clarify)   General Info Comments Activity: ambulate with assist; Posterior or lateral approach hip precautions   Cognitive Status Examination   Orientation orientation to person, place and time   Level of Consciousness alert   Follows Commands and Answers Questions 100% of the time   Personal Safety and Judgment intact   Memory intact   Pain Assessment   Patient Currently in Pain Yes, see Vital Sign flowsheet   Integumentary/Edema   Integumentary/Edema Comments No significant edema in LEs   Posture    Posture Forward head position   Range of Motion (ROM)   ROM Comment Grossly WFL in B LEs, limited LLE due to posterior hip precautions   Strength   Strength Comments WFL in B LEs in all planes of motion with exception of less then antigravity strength in L hip flexion   Bed Mobility   Bed Mobility Comments mod A x 1   Transfer Skills   Transfer Comments CGA-min A x 1    Gait   Gait Comments CGA for gait with FWW at EOB x 3 ft, partial weight bearing ~ 50% and slowed gait speed in step to pattern   Balance   Balance Comments Impaired requiring B UE support for transfers and gait, sitting balance WNL   Sensory Examination   Sensory Perception Comments Denies numbness/tingling in hands or feet. Intact to light touch and pressure in LLE    Coordination   Coordination Comments WFL   Muscle Tone   Muscle Tone Comments WFL   General Therapy Interventions   Planned Therapy Interventions balance training;bed mobility training;gait training;ROM;strengthening;transfer training;risk factor education;home program guidelines;progressive activity/exercise   Clinical Impression   Criteria for Skilled Therapeutic  "Intervention yes, treatment indicated   PT Diagnosis difficulty with functional transfers, gait and stair negotiation   Influenced by the following impairments balance impairments, weakness, pain, postural deficits   Functional limitations due to impairments difficulty with functional transfers, gait and stair negotiation   Clinical Presentation Stable/Uncomplicated   Clinical Presentation Rationale VSS, pain controlled with medications, agreeable to recs   Clinical Decision Making (Complexity) Low complexity   Therapy Frequency` 2 times/day   Predicted Duration of Therapy Intervention (days/wks) 3 days   Anticipated Equipment Needs at Discharge front wheeled walker;crutches  (pending progress )   Anticipated Discharge Disposition Home with Assist   Risk & Benefits of therapy have been explained Yes   Patient, Family & other staff in agreement with plan of care Yes   [facility] TM \"6 Clicks\"   2016, Trustees of [facility], under license to [employer].  All rights reserved.   6 Clicks Short Forms Basic Mobility Inpatient Short Form   [facility] AM-PAC  \"6 Clicks\" V.2 Basic Mobility Inpatient Short Form   1. Turning from your back to your side while in a flat bed without using bedrails? 3 - A Little   2. Moving from lying on your back to sitting on the side of a flat bed without using bedrails? 2 - A Lot   3. Moving to and from a bed to a chair (including a wheelchair)? 3 - A Little   4. Standing up from a chair using your arms (e.g., wheelchair, or bedside chair)? 3 - A Little   5. To walk in hospital room? 3 - A Little   6. Climbing 3-5 steps with a railing? 2 - A Lot   Basic Mobility Raw Score (Score out of 24.Lower scores equate to lower levels of function) 16   Total Evaluation Time   Total Evaluation Time (Minutes) 11     "

## 2017-02-14 NOTE — IP AVS SNAPSHOT
"` ` Patient Information     Patient Name Sex     Oseas Rodriguez ([id number]) Male 1970       Room Bed    0161 5527-02      Patient Demographics     Address Phone E-mail Address    [address] [place] [address] [phone] (Home)  [phone] (Mobile) [email or web address]      Patient Ethnicity & Race     Ethnic Group Patient Race    Sinhala       PCP and Center    Primary Care Provider  Phone [place]     Willy Forrest [phone] [facility] [address]        Emergency Contact(s)     Name Relation Home Work Mobile    CHUCK BERRIOS Spouse [phone]    BRIONNA RODRIGUEZ Son [phone]      Documents on File        Status Date Received Description       Documents for the Patient    Waiver - Payment  09     Privacy Notice - [place] Received 12     Face Sheet  () 09     MICHELLE Face Sheet Received () 09     Consent for Services - [facility] Received () 11     Privacy Notice - [facility] Received () 11 [facility]-PRIVACY    Patient ID Received 10/21/13 -    Insurance Card Received () 11 BC/[facility]- Secondary Card to     Insurance Card Received () 11 Medicare-Primary    HIM ANN Authorization - File Only   \"My Chart\" Authorization..............2011    Insurance Card Received () 12 [facility]-BCBS    External Medication Information Consent Accepted () 12 [facility]-EXTERNAL    Consent for Services - Hospital/Clinic Received () 12     Consent for Services - Hospital/Clinic Received () 12     Consent for Services - Hospital/Clinic  () 12 GENERAL CONSENT FORM - ENGLISH    Consent for Services - [facility] Received () 12 [facility]-CONSENT    Privacy Notice - [facility] Received 12 [facility]-PRIVACY    Consent for EHR Access  13 Copied from existing Consent for services - C/HOD collected on 2012    " Insurance Card Received () 13 [facility]-BCBS    HIM ANN Authorization - File Only   12 MEDICAL RELEASE TO [facility]    Consent for Services - [facility] Received () 13 [facility]-CONSENT    External Medication Information Consent Accepted 10/07/13 [facility]-EXTERNAL    [facility] Specified Other       Insurance Card Received 10/21/13 -    Insurance Card Received 10/21/13 -    Consent for EHR Access Received 10/21/13     Consent for Services - Hospital/Clinic Received () 10/21/13     Consent to Communicate Received 10/21/13     Insurance Card Received 10/21/13 -    Consent for Services - [facility] Received () 14 [facility]-CONSENT    Insurance Card Received () 05/18/15 [facility]-BC/BS    HIM ANN Authorization - File Only   14 MEDICAL RELEASE TO [facility]    Consent for Services - [facility] Received () 10/15/15 [facility]-Consent    Insurance Card Received 17 [facility]-BC/BS    HIM ANN Authorization - File Only   16 MEDICL RELEASE TO [facility]    Consent for Services/Privacy Notice - Hospital/Clinic Received 16     Patient ID Received 16 -    Insurance Card Received 16 -    Consent for Services - [facility] Received 16 [facility]-CONSENT    HIM ANN Authorization  17 [facility]THOPED[facility]    Insurance Card  (Deleted) 09        Documents for the Encounter    CMS IM for Patient Signature         Admission Information     Attending Provider Admitting Provider Admission Type Admission Date/Time    Saravanan Alcaraz MD Johnson, Saravanan Perrin MD Elective 17  0618    Discharge Date Hospital Service Auth/Cert Status Service Area     Surgery Incomplete [facility] SERVICES    Unit Room/Bed Admission Status        55 ORTHO SPEC UNIT 5527/5527-02 Admission (Confirmed)       Admission     Complaint    FAILED TOTAL LEFT HIP ARTHROPLASTY, Failed total hip arthroplasty with dislocation, initial encounter (H)      Hospital Account     Name Acct ID Class Status Primary Coverage     Oseas Edwards [id number] Inpatient Open MEDICARE - MEDICARE            Guarantor Account (for Hospital Account #[id number])     Name Relation to Pt Service Area Active? Acct Type    Oseas Edwards  FCS Yes Personal/Family    Address Phone          [address] [place] [address] [phone](H)  [phone](O)              Coverage Information (for Hospital Account #[id number])     1. MEDICARE/MEDICARE     F/O Payor/Plan Precert #    MEDICARE/MEDICARE     Subscriber Subscriber #    Oseas Edwards [id number]    Address Phone    [employer] CLAIM[employer]  [address]  [place] [address] [phone]          2. BCBS/BCBS OUT OF STATE     F/O Payor/Plan Precert #    BCBS/BCBS OUT OF STATE     Subscriber Subscriber #    Oseas Edwards [id number]    Address Phone    [address] [address]  [place], [place] [id number] [phone]

## 2017-02-14 NOTE — ANESTHESIA POSTPROCEDURE EVALUATION
Patient: Oseas Edwards    Procedure(s):  LEFT TOTAL HIP ARTHROPLASTY REVISION (OUT: OSTEONICS^ / IN TU STEM, BIOMET CUP)^  - Wound Class: I-Clean    Diagnosis:FAILED TOTAL LEFT HIP ARTHROPLASTY  Diagnosis Additional Information: No value filed.    Anesthesia Type:  General, ETT    Note:  Anesthesia Post Evaluation    Patient location during evaluation: PACU  Patient participation: Able to fully participate in evaluation  Level of consciousness: awake  Pain management: adequate  Airway patency: patent  Cardiovascular status: acceptable  Respiratory status: acceptable  Hydration status: acceptable  PONV: none     Anesthetic complications: None          Last vitals:  Vitals:    02/14/17 1310 02/14/17 1318 02/14/17 1330   BP: 112/83  115/74   Resp: 15  12   Temp:   36.9  C (98.4  F)   SpO2:  98% 98%         Electronically Signed By: Renato Pope MD  February 14, 2017  1:55 PM

## 2017-02-14 NOTE — PROGRESS NOTES
Admission medication history interview status for the 2/14/2017  admission is complete. See EPIC admission navigator for prior to admission medications     Medication history source reliability:Good    Medication history interview source(s):Patient    Medication history resources (including written lists, pill bottles, clinic record):None    Primary pharmacy.[facility]    Additional medication history information not noted on PTA med list :None    Time spent in this activity: 45 minutes    Prior to Admission medications    Medication Sig Last Dose Taking? Auth Provider   testosterone (ANDROGEL/TESTIM) 50 MG/5GM (1%) topical gel Place 50 mg onto the skin every 48 hours 2/12/2017 Yes Reported, Patient   INDOMETHACIN PO Take 50 mg by mouth 2 times daily 2/7/2017 Yes Reported, Patient   mometasone (ELOCON) 0.1 % ointment Apply topically 2 times daily as needed more than a week at prn Yes Reported, Patient   OMEPRAZOLE PO Take 20 mg by mouth daily as needed more than a month at prn Yes Reported, Patient   triamcinolone (KENALOG) 0.1 % ointment Apply topically daily as needed for irritation more than a week at prn Yes Reported, Patient   Hypromellose (ARTIFICIAL TEARS OP) Place 1 drop into both eyes daily as needed Past Week at prn Yes Reported, Patient   oxyCODONE (ROXICODONE) 5 MG IR tablet Take 1 tablet (5 mg) by mouth every 8 hours as needed for pain Try to keep to 2 a day 2/14/2017 at 0400 Yes Willy Forrest MD   acetaminophen-codeine (TYLENOL #3) 300-30 MG per tablet Take 1 tablet by mouth every 4 hours as needed for pain maximum 4 tablet(s) per day 2/14/2017 at 0400 Yes Willy Forrest MD   finasteride (PROSCAR) 5 MG tablet Take 1 tablet (5 mg) by mouth daily 2/13/2017 at pm Yes Willy Forrest MD   lisinopril-hydrochlorothiazide (PRINZIDE,ZESTORETIC) 10-12.5 MG per tablet Take 1 tablet by mouth daily 2/14/2017 at 0400 Yes Willy Forrest MD   cyclobenzaprine (FLEXERIL) 10 MG tablet TAKE ONE  TABLET BY MOUTH TWICE DAILY AS NEEDED FOR MUSCLE SPASM 2/13/2017 at pm Yes Willy Forrest MD   docusate sodium (COLACE) 100 MG tablet Take 100 mg by mouth daily as needed  more than a week at prn Yes    SIMPONI 50 MG/0.5ML auto-injector pen Inject 50 mg Subcutaneous every 28 days  more than 3 weeks Yes Reported, Patient   cyanocobalamin (VITAMIN B12) 1000 MCG/ML injection INJECT 1ML (1000MCG) INTRAMUSCULARLY EVERY 30 DAYS more than a month Yes Willy Forrest MD   Multiple Vitamin (MULTI VITAMIN MENS PO) Take 1 tablet by mouth daily as needed  more than a month at prn Yes Reported, Patient

## 2017-02-14 NOTE — OP NOTE
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       PROCEDURE:  Left total hip arthroplasty revision with replacement of a 50 cup to a 56 Regenerex cup with a standard liner and going from a +5, 28 mm head to a +2.5 head and over 90 mL of bone grafting in both the acetabulum and femur.      ASSISTANT:  Onel Nichols PA-C      INDICATIONS:  Oseas Edwards is a 46-year-old male with hypertension and ankylosing spondylitis.  He had a total hip on the left side 20 some years ago.  He came in to see one of my partners and was found to have a cup that had totally verticalized, partially subluxing his head and was extremely uncomfortable, we got him on as soon as possible.  He had slight elevated CRP and sed rate, but he does have ankylosing spondylitis.  The risks, benefits and possible outcomes were explained to him at length.  He had a very small cup to begin with.       ANTIBIOTICS:  Two grams of Ancef were given preoperatively.  He will be on 24 hours of IV antibiotics.  He will be given DVT prophylaxis using Lovenox and then go home on Ancef.  He received tranexamic acid at the beginning and at the end of the case.      DESCRIPTION OF PROCEDURE:  The patient was brought to the operating room and underwent general anesthesia and intubation without difficulty, although he is very stiff.  He was placed in the lateral position with his left side up using a Oneal frame.  He was prepped and draped in a sterile fashion.  His previous incision was marked with a marker.  We then paused to ensure laterality.  We made a longitudinal and cut and took over his previous incision.  We did not lengthen on either end.  We came down through the fascia in a similar fashion and identified the posterior capsule.  The  previous approach had gone posterior, so we went posterior.  We came down right off the femur and removed all the bursa and short external rotators.  We  then came down on the capsule and tagged that in 3 separate places for later repair.  We then cleaned off lots of scar tissue from around inside the joint.  We did send a culture, in which no organisms were seen.  When we had enough mobilization, we dislocated the hip, removed the femoral head and then placed it anteriorly with some anterior retractors which were on the bone.  The cup was grossly loose, and we just took it out by levering it out with a    Reid.  He had lots of bone loss behind the cup.  We cleaned it at length and clean the tissues out.  He had 1 hole through the acetabulum posteriorly at about the central portion.  The rest of them were not through the cup at all.  We then reamed from a 50 up to a 55.  We had excellent fit.  At this point, we then took a part of our 90 mL of bone and impacted that into the acetabulum in all places.  We also finger packed our bone as well.        We then placed a 56 Regenerex cup, and we did 4 screws, 1 very anterior and 1 very posterior and 2 up, all of which had excellent bites.  Our cup was placed in about 25 degrees of anteversion and 40-45 degrees of abduction.  We placed our liner and cleaned out the proximal femur.  We were concerned that he might have some femoral loosening, but it was rock solid along the stem.  We therefore cleaned all the osteolysis around the proximal stem and took the rest of our crushed cancellous bone and placed that with DBX bone cement 10 mL mixed together and packed that aggressively into the cavernous osteolytic areas around his proximal femur.  At this point, the stem again remained solid.  We trialed between a 0 and a +2.5.  Intraoperative x-rays with a +5 showed the cup to be in good position, but he was slightly long.  I do not know where he was preoperatively when his cup  was placed , but I think he was long, and so we came down to 2.5.  The 0 was a little loose.      We then reattached our posterior capsule with #5 Ethibond that we had tagged previously, and then held did 3 minutes of Betadine and washed it out with pulse lavage.  We injected all around the hip with ropivacaine and then closed the posterior capsule bursa and short external rotators with #1 Vicryl, closed the fascia with #1 Vicryl, closed the skin with #1 Vicryl, 2-0 Vicryl, 2-0 Stratafix and then staples.        At the end of the case, sponge, and needle count were correct x2.  There were no apparent complications.  He tolerated the procedure very well and was transferred to the [facility] in stable condition.         BRYAN GARCIA MD             D: 2017 11:39   T: 2017 15:47   MT: [id number]      Name:     JUANPABLO RODRIGUEZ   MRN:      [id number]        Account:        [id number]   :      1970           Procedure Date: 2017      Document: [id number]

## 2017-02-14 NOTE — PLAN OF CARE
Problem: Goal Outcome Summary  Goal: Goal Outcome Summary  PT: PT lives in house with his wife, has 4 stairs to enter the home and flight of stairs to reach bedroom/bathroom, railing present both inside and outside. Previously using SPC for ambulation > 1 block, no AD shorter, household distances. Was limited in activity tolerance and exercise secondary to 9/10 pain prior to surgery. Currently reporting 3-5/10 pain throughout session.        Surgeon Discharge Plan: None documented.     Current Functional Status: Mod A x 1 for supine <> sit following posterior hip precautions. CGA-min A x 1 for sit <> stand from EOB to FWW, ambulated forward/backward at EOB with FWW maintaining partial weight bearing approx 50% through LLE. Pt complete LE ROM/strengthening exercises per JAMES protocol, requiring min A for heel slide.      Barriers to Plan/Home: Pain control, stairs to enter home and stairs to reach bedroom/bathroom.

## 2017-02-15 ENCOUNTER — APPOINTMENT (OUTPATIENT)
Dept: PHYSICAL THERAPY | Facility: CLINIC | Age: 47
DRG: 467 | End: 2017-02-15
Attending: ORTHOPAEDIC SURGERY
Payer: COMMERCIAL

## 2017-02-15 LAB
ANION GAP SERPL CALCULATED.3IONS-SCNC: 7 MMOL/L (ref 3–14)
BUN SERPL-MCNC: 18 MG/DL (ref 7–30)
CALCIUM SERPL-MCNC: 7.8 MG/DL (ref 8.5–10.1)
CHLORIDE SERPL-SCNC: 107 MMOL/L (ref 94–109)
CO2 SERPL-SCNC: 26 MMOL/L (ref 20–32)
CREAT SERPL-MCNC: 0.96 MG/DL (ref 0.66–1.25)
GFR SERPL CREATININE-BSD FRML MDRD: 84 ML/MIN/1.7M2
GLUCOSE SERPL-MCNC: 121 MG/DL (ref 70–99)
HGB BLD-MCNC: 11.6 G/DL (ref 13.3–17.7)
POTASSIUM SERPL-SCNC: 3.9 MMOL/L (ref 3.4–5.3)
SODIUM SERPL-SCNC: 140 MMOL/L (ref 133–144)

## 2017-02-15 PROCEDURE — 25000132 ZZH RX MED GY IP 250 OP 250 PS 637: Mod: GY | Performed by: PHYSICIAN ASSISTANT

## 2017-02-15 PROCEDURE — 25000132 ZZH RX MED GY IP 250 OP 250 PS 637: Mod: GY | Performed by: ORTHOPAEDIC SURGERY

## 2017-02-15 PROCEDURE — 99232 SBSQ HOSP IP/OBS MODERATE 35: CPT | Performed by: INTERNAL MEDICINE

## 2017-02-15 PROCEDURE — A9270 NON-COVERED ITEM OR SERVICE: HCPCS | Mod: GY | Performed by: ORTHOPAEDIC SURGERY

## 2017-02-15 PROCEDURE — 12000007 ZZH R&B INTERMEDIATE

## 2017-02-15 PROCEDURE — 25900017 H RX MED GY IP 259 OP 259 PS 637: Mod: GY | Performed by: ORTHOPAEDIC SURGERY

## 2017-02-15 PROCEDURE — 25000128 H RX IP 250 OP 636: Performed by: ORTHOPAEDIC SURGERY

## 2017-02-15 PROCEDURE — 97110 THERAPEUTIC EXERCISES: CPT | Mod: GP

## 2017-02-15 PROCEDURE — A9270 NON-COVERED ITEM OR SERVICE: HCPCS | Mod: GY | Performed by: PHYSICIAN ASSISTANT

## 2017-02-15 PROCEDURE — 25800025 ZZH RX 258: Performed by: ORTHOPAEDIC SURGERY

## 2017-02-15 PROCEDURE — 97116 GAIT TRAINING THERAPY: CPT | Mod: GP

## 2017-02-15 PROCEDURE — 80048 BASIC METABOLIC PNL TOTAL CA: CPT | Performed by: ORTHOPAEDIC SURGERY

## 2017-02-15 PROCEDURE — 40000193 ZZH STATISTIC PT WARD VISIT

## 2017-02-15 PROCEDURE — 85018 HEMOGLOBIN: CPT | Performed by: ORTHOPAEDIC SURGERY

## 2017-02-15 PROCEDURE — 36415 COLL VENOUS BLD VENIPUNCTURE: CPT | Performed by: ORTHOPAEDIC SURGERY

## 2017-02-15 RX ADMIN — CEFAZOLIN SODIUM 1 G: 1 INJECTION, POWDER, FOR SOLUTION INTRAMUSCULAR; INTRAVENOUS at 01:50

## 2017-02-15 RX ADMIN — OMEPRAZOLE 20 MG: 20 CAPSULE, DELAYED RELEASE ORAL at 08:34

## 2017-02-15 RX ADMIN — ACETAMINOPHEN 325 MG: 325 TABLET, FILM COATED ORAL at 13:46

## 2017-02-15 RX ADMIN — FINASTERIDE 5 MG: 5 TABLET, FILM COATED ORAL at 20:16

## 2017-02-15 RX ADMIN — MULTIPLE VITAMINS W/ MINERALS TAB 1 TABLET: TAB at 08:35

## 2017-02-15 RX ADMIN — HYDROMORPHONE HYDROCHLORIDE 0.5 MG: 1 INJECTION, SOLUTION INTRAMUSCULAR; INTRAVENOUS; SUBCUTANEOUS at 20:20

## 2017-02-15 RX ADMIN — Medication 5 MG: at 23:56

## 2017-02-15 RX ADMIN — OXYCODONE HYDROCHLORIDE 10 MG: 5 TABLET ORAL at 07:04

## 2017-02-15 RX ADMIN — Medication 2.5 MG: at 08:34

## 2017-02-15 RX ADMIN — LISINOPRIL 10 MG: 10 TABLET ORAL at 08:34

## 2017-02-15 RX ADMIN — HYDROMORPHONE HYDROCHLORIDE 0.5 MG: 1 INJECTION, SOLUTION INTRAMUSCULAR; INTRAVENOUS; SUBCUTANEOUS at 01:50

## 2017-02-15 RX ADMIN — SENNOSIDES AND DOCUSATE SODIUM 2 TABLET: 8.6; 5 TABLET ORAL at 20:16

## 2017-02-15 RX ADMIN — OXYCODONE HYDROCHLORIDE 10 MG: 5 TABLET ORAL at 10:42

## 2017-02-15 RX ADMIN — KETOROLAC TROMETHAMINE 30 MG: 30 INJECTION, SOLUTION INTRAMUSCULAR at 06:21

## 2017-02-15 RX ADMIN — POTASSIUM CHLORIDE, DEXTROSE MONOHYDRATE AND SODIUM CHLORIDE: 150; 5; 450 INJECTION, SOLUTION INTRAVENOUS at 00:08

## 2017-02-15 RX ADMIN — OXYCODONE HYDROCHLORIDE 10 MG: 5 TABLET ORAL at 21:54

## 2017-02-15 RX ADMIN — ENOXAPARIN SODIUM 40 MG: 40 INJECTION SUBCUTANEOUS at 08:35

## 2017-02-15 RX ADMIN — HYDROMORPHONE HYDROCHLORIDE 0.5 MG: 1 INJECTION, SOLUTION INTRAMUSCULAR; INTRAVENOUS; SUBCUTANEOUS at 08:33

## 2017-02-15 RX ADMIN — OXYCODONE HYDROCHLORIDE 10 MG: 5 TABLET ORAL at 04:07

## 2017-02-15 RX ADMIN — SENNOSIDES AND DOCUSATE SODIUM 1 TABLET: 8.6; 5 TABLET ORAL at 08:35

## 2017-02-15 RX ADMIN — OXYCODONE HYDROCHLORIDE 10 MG: 5 TABLET ORAL at 17:46

## 2017-02-15 RX ADMIN — MORPHINE SULFATE 15 MG: 15 TABLET, EXTENDED RELEASE ORAL at 18:49

## 2017-02-15 RX ADMIN — KETOROLAC TROMETHAMINE 30 MG: 30 INJECTION, SOLUTION INTRAMUSCULAR at 00:08

## 2017-02-15 RX ADMIN — OXYCODONE HYDROCHLORIDE 10 MG: 5 TABLET ORAL at 00:51

## 2017-02-15 RX ADMIN — MORPHINE SULFATE 15 MG: 15 TABLET, EXTENDED RELEASE ORAL at 06:21

## 2017-02-15 RX ADMIN — OXYCODONE HYDROCHLORIDE 10 MG: 5 TABLET ORAL at 13:46

## 2017-02-15 RX ADMIN — ACETAMINOPHEN 325 MG: 325 TABLET, FILM COATED ORAL at 21:54

## 2017-02-15 NOTE — PROGRESS NOTES
[facility]    Hospitalist Progress Note    Date of Service (when I saw the patient): 02/15/2017    Assessment & Plan   Oseas Edwards is a 46 year old male who was admitted on 2/14/2017. He underwent revision of left total hip arthroplasty 2/14/17. IM consultation is requested for evaluation of patient's chronic medical issues as listed below.     Failed total hip arthroplasty with dislocation, initial encounter   -  S/P revision left total hip arthroplasty 2/14/17 by Saravanan Alcaraz MD  -  Continue post op care per Orthopedics      -   Including pain control and deep vein thrombosis prophylaxis    Acute blood loss anemia  -  Mild and expected due to type of procedure performed (this is not considered a complication)  -  Pre-op Hemoglobin 16.3  -  Follow serial Hemoglobin levels until stable  -  Support with Packed red blood cells transfusion for Hemoglobin less than 7    Hypertension  - Chronic and stable  - current home medications include: lisinopril, hydrochlorothiazide   - Changes made during hospital stay: None    Chronic pain syndrome  Continuous opiate dependence  - Chronic and stable  - current home medications include: indomethacin, cyclobenzaprine, Tylenol #3  - Changes made during hospital stay: None so far      -   Pain medications being managed by surgical team    Ankylosing spondylitis   - Chronic and stable  - current home medications include: indomethacin, Simponi  - Changes made during hospital stay: plan to resume Simponi at discharge (this is a every 4 week injection)    Personal history of peptic ulcer disease   -  Patient on chronic NSAID therapy  -  Continue proton pump inhibitor for prophylaxis  -  Patient has previous history of GI bleed    DVT Prophylaxis: Enoxaprain (Lovenox) SQ  Code Status: Full Code    Disposition: Expected discharge in 1-2 days once cleared by Orthopedics.    Misha Jett MD   Text page me here   Available 7 am - 6 pm     Interval History   The  patient is doing well this morning. His pain is well controlled. He was curious as to why he doesn't have a drain with this surgery as he did with the initial surgery. He denies nausea/vomiting. He is working well with PT.     -Data reviewed today: I reviewed all new labs and imaging results over the last 24 hours. I personally reviewed no images or EKG's today.    Physical Exam   Temp: 98.3  F (36.8  C) Temp src: Oral BP: 121/74 Pulse: 90 Heart Rate: 90 Resp: 16 SpO2: 97 % O2 Device: None (Room air) Oxygen Delivery: 1 LPM  Vitals:    02/14/17 0726   Weight: 71.4 kg (157 lb 8 oz)     Vital Signs with Ranges  Temp:  [97  F (36.1  C)-99  F (37.2  C)] 98.3  F (36.8  C)  Pulse:  [90] 90  Heart Rate:  [] 90  Resp:  [11-19] 16  BP: ()/(61-93) 121/74  SpO2:  [95 %-100 %] 97 %  I/O last 3 completed shifts:  In: 4550 [P.O.:350; I.V.:4200]  Out: 2325 [Urine:1975; Blood:350]    General Appearance: Pleasant, alert. No acute distress  Head Exam: Normocephalic  Eye Exam: Conjunctiva clear  OroPharynx Exam: Membranes moist.  Neck Exam:  Supple.   Chest/Respiratory Exam: Normal chest wall and respirations. Clear to auscultation bilaterally with no wheezes, crackles, or rhonchi.  Cardiovascular Exam: Regular rate and rhythm. Normal S1, S2. No murmur. No gallops or rubs. No lower extremity edema.  Gastrointestinal Exam: Soft, nontender and nondistended. Bowel sounds present.  Skin: No rashes or jaundice.  Psychiatric Exam: Alert and oriented, appropriate affect.     Medications     dextrose 5% and 0.45% NaCl + KCl 20 mEq/L 100 mL/hr at 02/15/17 0008       finasteride  5 mg Oral QPM     multivitamin, therapeutic with minerals  1 tablet Oral Daily     omeprazole (priLOSEC) CR capsule 20 mg  20 mg Oral QAM     testosterone  50 mg Transdermal Q48H     sodium chloride (PF)  3 mL Intracatheter Q8H     enoxaparin  40 mg Subcutaneous Q24H     morphine  15 mg Oral Q12H     senna-docusate  1-2 tablet Oral BID     acetaminophen  325  mg Oral Q8H     lisinopril  10 mg Oral Daily       Data     Recent Labs  Lab 02/15/17  0650 02/14/17  0707   HGB 11.6* 16.3   PLT  --  185     --    POTASSIUM 3.9 4.2   CHLORIDE 107  --    CO2 26  --    BUN 18  --    CR 0.96 1.09   ANIONGAP 7  --    ANDREW 7.8*  --    *  --        Recent Results (from the past 24 hour(s))   XR Pelvis Port 1/2 Views    Narrative    PORTABLE PELVIS ONE-TWO VIEWS  2/14/2017 10:30 AM     HISTORY: Intraoperative pelvis film.    COMPARISON: None.      Impression    IMPRESSION: Intraoperative AP view shows right acetabular cup  prosthesis and right femoral prosthesis sizing device.    KIMANI FOX MD   XR Pelvis w Hip Port Left 1 View    Narrative    XR PELVIS AND HIP PORTABLE LEFT 1 VIEW  2/14/2017 12:30 PM     HISTORY:  Postop.      Impression    IMPRESSION: 2 views of the left hip. Left total hip arthroplasty.  Components appear well seated. Previous right JAMES.    DEANDRE LIN MD

## 2017-02-15 NOTE — PROGRESS NOTES
ORTHOPEDIC PROGRESS NOTE LOWER EXTREMITY    PROCEDURE: left revision hip replacement  POD 1  PAIN: moderate  NAUSEA: absent    Blood pressure 131/73, pulse 100, temperature 98.4  F (36.9  C), temperature source Oral, resp. rate 16, height 1.524 m (5'), weight 71.4 kg (157 lb 8 oz), SpO2 99 %.    Temp (24hrs), Av.4  F (36.9  C), Min:98  F (36.7  C), Max:99  F (37.2  C)    Body mass index is 30.76 kg/(m^2).          Intake/Output Summary (Last 24 hours) at 02/15/17 1324  Last data filed at 02/15/17 1000   Gross per 24 hour   Intake             1510 ml   Output             1600 ml   Net              -90 ml       Patient Vitals for the past 48 hrs:   BP Temp Temp src Pulse Heart Rate Resp SpO2 Height Weight   02/15/17 1155 131/73 98.4  F (36.9  C) Oral 100 - 16 99 % - -   02/15/17 1129 113/66 98.5  F (36.9  C) Oral 101 102 16 95 % - -   02/15/17 0925 - - - - - 16 - - -   02/15/17 0848 - - - - - 16 - - -   02/15/17 0830 - - - - - 16 - - -   02/15/17 0800 - - - - - 16 - - -   02/15/17 0732 121/74 98.3  F (36.8  C) Oral 90 90 16 97 % - -   02/15/17 0407 - - - - - 16 - - -   02/15/17 0350 115/71 98  F (36.7  C) Oral - 97 16 95 % - -   02/15/17 0150 - - - - - 16 - - -   02/15/17 0051 - - - - - 16 - - -   02/15/17 0015 115/74 99  F (37.2  C) Oral - 105 16 98 % - -   02/15/17 0008 - - - - - 16 - - -   17 2000 98/61 98.1  F (36.7  C) Oral - 118 16 99 % - -   17 1630 106/73 98.8  F (37.1  C) Axillary - 110 14 99 % - -   17 1545 - - - - - 12 - - -   17 1533 109/82 - - - 102 - 99 % - -   17 1530 111/74 - - - 99 - 96 % - -   17 1430 117/74 - - - 110 12 100 % - -   17 1400 116/68 - - - 105 12 100 % - -   17 1330 115/74 98.4  F (36.9  C) Oral - 92 12 98 % - -   17 1318 - - - - - - 98 % - -   17 1310 112/83 - - - 97 15 - - -   17 1300 134/84 - - - 97 11 - - -   17 1250 116/90 - - - 85 12 - - -   17 1245 - - - - - - 97 % - -   17 1240 99/77 - - - 86  11 96 % - -   02/14/17 1230 (!) 145/93 - - - 80 15 100 % - -   02/14/17 1220 126/86 - - - 84 16 98 % - -   02/14/17 1210 138/72 - - - 86 19 97 % - -   02/14/17 1148 146/77 97  F (36.1  C) - - 84 16 100 % - -   02/14/17 0726 (!) 116/91 97.2  F (36.2  C) Temporal - 85 - 99 % 1.524 m (5') 71.4 kg (157 lb 8 oz)        Recent Labs   Lab Test  02/15/17   0650  02/14/17   0707  02/07/17   1503   HGB  11.6*  16.3  15.8     No results for input(s): INR in the last 54708 hours.  Recent Labs   Lab Test  02/14/17   0707  02/07/17   1503  01/18/17   1319   PLT  185  139*  160     Recent Labs   Lab Test  02/07/17   1503  01/18/17   1319  06/06/16   1141   WBC  4.8  7.5  7.5     Invalid input(s): K      EXAM   The patient is healthy, alert and no distress  Calves are soft and non-tender.   Sensation is intact.  Dorsiflexion and plantar flexion is intact.  Dorsalis pedis pulses intact.   The incision is covered.     Patient Active Problem List   Diagnosis     AS (ankylosing spondylitis) (H)     PA (pernicious anemia)     Low serum testosterone level     Health Care Home     Chronic narcotic dependence (H)     Rectus diastasis     Hx of gastric ulcer     Chronic pain syndrome     Keloid scar, mostly just dark     Failed total hip arthroplasty with dislocation, initial encounter (H)         ASSESSMENT  Doing well   PLAN  Patient would like to go home tomorrow.  I told him that we should check some things before we make that decision, but it is possible      Saravanan Alcaraz M.D  [facility]  [address] [place] [address]

## 2017-02-15 NOTE — PROGRESS NOTES
ORTHOPEDIC PROGRESS NOTE LOWER EXTREMITY    PROCEDURE: left revision hip replacement  POD 1  PAIN: moderate  NAUSEA: absent    Blood pressure 121/74, pulse 90, temperature 98.3  F (36.8  C), temperature source Oral, resp. rate 16, height 1.524 m (5'), weight 71.4 kg (157 lb 8 oz), SpO2 97 %.    Temp (24hrs), Av.2  F (36.8  C), Min:97  F (36.1  C), Max:99  F (37.2  C)    Body mass index is 30.76 kg/(m^2).      Intake/Output Summary (Last 24 hours) at 02/15/17 0816  Last data filed at 02/15/17 0647   Gross per 24 hour   Intake             4550 ml   Output             2325 ml   Net             2225 ml        Recent Labs   Lab Test  02/15/17   0650  17   0707  17   1503   HGB  11.6*  16.3  15.8     No results for input(s): INR in the last 13729 hours.  Recent Labs   Lab Test  17   0707  17   1503  17   1319   PLT  185  139*  160     Recent Labs   Lab Test  17   1503  17   1319  16   1141   WBC  4.8  7.5  7.5     Invalid input(s): K      EXAM   The patient is healthy, alert and no distress  Calves are soft and non-tender.   Sensation is intact.  Dorsiflexion and plantar flexion is intact.  Dorsalis pedis pulses intact.   The incision is dry and intact.     Patient Active Problem List   Diagnosis     AS (ankylosing spondylitis) (H)     PA (pernicious anemia)     Low serum testosterone level     Health Care Home     Chronic narcotic dependence (H)     Rectus diastasis     Hx of gastric ulcer     Chronic pain syndrome     Keloid scar, mostly just dark     Failed total hip arthroplasty with dislocation, initial encounter (H)         ASSESSMENT  S/p left total hip arthroplasty revision - doing well; no new concerns.     PLAN  Pain well controlled w/ current orders  Partial weight bearing for at least 2 weeks; use assistive device for ambulation and transfers   Lovenox, PCD, and ambulation for DVT prophylaxis    Anticipated discharge to home w/o skilled service POD 2 or 3    Normal diet; advance as tolerated and encourage oral fluid intake   Encourage incentive spirometry     Onel Nichols PA-C   [facility]  [phone]

## 2017-02-15 NOTE — PLAN OF CARE
Problem: Goal Outcome Summary  Goal: Goal Outcome Summary  Outcome: Improving  Patient doing well post op.  VSS, Dressing CDI, ferrer patient, using IS.  Makes needs known.  Continue to monitor.

## 2017-02-15 NOTE — PLAN OF CARE
Problem: Goal Outcome Summary  Goal: Goal Outcome Summary  Outcome: Improving  Pain well controlled, dressing clean, dry and intact, CMS is WNL, VSS, taking food and fluids without nausea. Belcher patent and intact.

## 2017-02-15 NOTE — PLAN OF CARE
Problem: Goal Outcome Summary  Goal: Goal Outcome Summary  Outcome: Improving  Pain well controlled with Oxycodone, dressing clean, dry and intact, CMS is WNL, VSS, taking food and fluids without nausea. Voids per urinal in good amounts.

## 2017-02-15 NOTE — PLAN OF CARE
Problem: Goal Outcome Summary  Goal: Goal Outcome Summary  Outcome: Improving  Patient taking PO pain pills & IV dilaudid given x 1 for breakthrough pain. Belcher removed this morning, DTV. Repositioned Q2H. Progressing per plan of care.

## 2017-02-15 NOTE — PLAN OF CARE
"Problem: Goal Outcome Summary  Goal: Goal Outcome Summary  Clarified that pt is TTWB left LE.       Surgeon Discharge Plan:  \"Anticipated discharge to home w/o skilled service POD 2 or 3\"      Current Functional Status:  SBA with bed mobility, CGA and FWW with functional transfers, CGA and FWW with ambulation up to 300'.  Pt demonstrated good adherence to TTWB left LE with all mobility.        Barriers to Plan/Home:  Stairs, current level of assist required.                "

## 2017-02-16 ENCOUNTER — APPOINTMENT (OUTPATIENT)
Dept: PHYSICAL THERAPY | Facility: CLINIC | Age: 47
DRG: 467 | End: 2017-02-16
Attending: ORTHOPAEDIC SURGERY
Payer: COMMERCIAL

## 2017-02-16 VITALS
DIASTOLIC BLOOD PRESSURE: 77 MMHG | RESPIRATION RATE: 16 BRPM | TEMPERATURE: 97.9 F | HEART RATE: 100 BPM | SYSTOLIC BLOOD PRESSURE: 116 MMHG | BODY MASS INDEX: 30.92 KG/M2 | OXYGEN SATURATION: 98 % | WEIGHT: 157.5 LBS | HEIGHT: 60 IN

## 2017-02-16 LAB
GLUCOSE SERPL-MCNC: 122 MG/DL (ref 70–99)
HGB BLD-MCNC: 11.1 G/DL (ref 13.3–17.7)

## 2017-02-16 PROCEDURE — 82947 ASSAY GLUCOSE BLOOD QUANT: CPT | Performed by: ORTHOPAEDIC SURGERY

## 2017-02-16 PROCEDURE — 25000128 H RX IP 250 OP 636: Performed by: ORTHOPAEDIC SURGERY

## 2017-02-16 PROCEDURE — A9270 NON-COVERED ITEM OR SERVICE: HCPCS | Mod: GY | Performed by: PHYSICIAN ASSISTANT

## 2017-02-16 PROCEDURE — 99231 SBSQ HOSP IP/OBS SF/LOW 25: CPT | Performed by: HOSPITALIST

## 2017-02-16 PROCEDURE — 97116 GAIT TRAINING THERAPY: CPT | Mod: GP

## 2017-02-16 PROCEDURE — 97530 THERAPEUTIC ACTIVITIES: CPT | Mod: GP

## 2017-02-16 PROCEDURE — A9270 NON-COVERED ITEM OR SERVICE: HCPCS | Mod: GY | Performed by: ORTHOPAEDIC SURGERY

## 2017-02-16 PROCEDURE — 85018 HEMOGLOBIN: CPT | Performed by: ORTHOPAEDIC SURGERY

## 2017-02-16 PROCEDURE — 40000894 ZZH STATISTIC OT IP EVAL DEFER

## 2017-02-16 PROCEDURE — 40000193 ZZH STATISTIC PT WARD VISIT

## 2017-02-16 PROCEDURE — 25000132 ZZH RX MED GY IP 250 OP 250 PS 637: Mod: GY | Performed by: ORTHOPAEDIC SURGERY

## 2017-02-16 PROCEDURE — 25000132 ZZH RX MED GY IP 250 OP 250 PS 637: Mod: GY | Performed by: PHYSICIAN ASSISTANT

## 2017-02-16 PROCEDURE — 36415 COLL VENOUS BLD VENIPUNCTURE: CPT | Performed by: ORTHOPAEDIC SURGERY

## 2017-02-16 RX ORDER — OXYCODONE HYDROCHLORIDE 10 MG/1
10-15 TABLET ORAL
Qty: 120 TABLET | Refills: 0 | Status: SHIPPED | OUTPATIENT
Start: 2017-02-16 | End: 2017-03-06

## 2017-02-16 RX ORDER — ONDANSETRON 4 MG/1
4 TABLET, ORALLY DISINTEGRATING ORAL EVERY 6 HOURS PRN
Qty: 96 TABLET | Refills: 0 | Status: SHIPPED
Start: 2017-02-16 | End: 2020-04-23

## 2017-02-16 RX ORDER — OXYCODONE HCL 10 MG/1
10 TABLET, FILM COATED, EXTENDED RELEASE ORAL EVERY 12 HOURS
Status: CANCELLED | OUTPATIENT
Start: 2017-02-16 | End: 2017-02-18

## 2017-02-16 RX ORDER — OXYCODONE HYDROCHLORIDE 5 MG/1
10-15 TABLET ORAL
Status: DISCONTINUED | OUTPATIENT
Start: 2017-02-16 | End: 2017-02-16 | Stop reason: HOSPADM

## 2017-02-16 RX ORDER — OXYCODONE HCL 10 MG/1
10 TABLET, FILM COATED, EXTENDED RELEASE ORAL EVERY 12 HOURS PRN
Qty: 36 TABLET | Refills: 0 | Status: SHIPPED | OUTPATIENT
Start: 2017-02-16 | End: 2017-04-03 | Stop reason: DRUGHIGH

## 2017-02-16 RX ADMIN — OXYCODONE HYDROCHLORIDE 15 MG: 5 TABLET ORAL at 16:20

## 2017-02-16 RX ADMIN — Medication 5 MG: at 09:12

## 2017-02-16 RX ADMIN — OXYCODONE HYDROCHLORIDE 10 MG: 5 TABLET ORAL at 06:12

## 2017-02-16 RX ADMIN — MORPHINE SULFATE 15 MG: 15 TABLET, EXTENDED RELEASE ORAL at 06:11

## 2017-02-16 RX ADMIN — ACETAMINOPHEN 325 MG: 325 TABLET, FILM COATED ORAL at 16:20

## 2017-02-16 RX ADMIN — LISINOPRIL 10 MG: 10 TABLET ORAL at 09:12

## 2017-02-16 RX ADMIN — SENNOSIDES AND DOCUSATE SODIUM 1 TABLET: 8.6; 5 TABLET ORAL at 09:12

## 2017-02-16 RX ADMIN — OMEPRAZOLE 20 MG: 20 CAPSULE, DELAYED RELEASE ORAL at 09:12

## 2017-02-16 RX ADMIN — ACETAMINOPHEN 325 MG: 325 TABLET, FILM COATED ORAL at 06:11

## 2017-02-16 RX ADMIN — OXYCODONE HYDROCHLORIDE 10 MG: 5 TABLET ORAL at 01:57

## 2017-02-16 RX ADMIN — ENOXAPARIN SODIUM 40 MG: 40 INJECTION SUBCUTANEOUS at 09:00

## 2017-02-16 RX ADMIN — MULTIPLE VITAMINS W/ MINERALS TAB 1 TABLET: TAB at 09:12

## 2017-02-16 RX ADMIN — OXYCODONE HYDROCHLORIDE 15 MG: 5 TABLET ORAL at 12:47

## 2017-02-16 RX ADMIN — OXYCODONE HYDROCHLORIDE 15 MG: 5 TABLET ORAL at 09:12

## 2017-02-16 RX ADMIN — Medication 5 MG: at 16:21

## 2017-02-16 NOTE — PROGRESS NOTES
ORTHOPEDIC PROGRESS NOTE LOWER EXTREMITY    PROCEDURE: left revision hip replacement  POD 2  PAIN: moderate  NAUSEA: absent    Blood pressure 122/82, pulse 100, temperature 98.5  F (36.9  C), temperature source Oral, resp. rate 16, height 1.524 m (5'), weight 71.4 kg (157 lb 8 oz), SpO2 98 %.    Temp (24hrs), Av.5  F (36.9  C), Min:97.8  F (36.6  C), Max:99.2  F (37.3  C)    Body mass index is 30.76 kg/(m^2).          Intake/Output Summary (Last 24 hours) at 17 0738  Last data filed at 17 0631   Gross per 24 hour   Intake             1020 ml   Output             1150 ml   Net             -130 ml       Patient Vitals for the past 48 hrs:   BP Temp Temp src Pulse Heart Rate Resp SpO2   17 0612 - - - - - 17 0332 122/82 98.5  F (36.9  C) Oral - 112 16 98 %   17 0232 - - - - - 17 0157 - - - - - 17 0056 - - - - - 17 0040 126/79 98.6  F (37  C) Oral - 114 16 98 %   02/15/17 2240 - - - - - 16 -   02/15/17 2154 - - - - - 16 -   02/15/17 2100 - - - - - 16 -   02/15/17 2020 - - - - - 16 -   02/15/17 2012 109/77 99.2  F (37.3  C) Oral - 113 16 99 %   02/15/17 1651 113/68 97.8  F (36.6  C) Oral - 95 16 99 %   02/15/17 1400 - - - - - 12 -   02/15/17 1155 131/73 98.4  F (36.9  C) Oral 100 - 16 99 %   02/15/17 0925 - - - - - 16 -   02/15/17 0848 - - - - - 16 -   02/15/17 0830 - - - - - 16 -   02/15/17 0800 - - - - - 15/17 0732 121/74 98.3  F (36.8  C) Oral 90 90 16 97 %   02/15/17 0407 - - - - -  -   02/15/17 0350 115/71 98  F (36.7  C) Oral - 97 16 95 %   02/15/17 0150 - - - - -  -   02/15/17 0051 - - - - -  -   02/15/17 0015 115/74 99  F (37.2  C) Oral - 105 16 98 %   02/15/17 0008 - - - - -  -   17 2000 98/61 98.1  F (36.7  C) Oral - 118 16 99 %   17 1630 106/73 98.8  F (37.1  C) Axillary - 110 14 99 %   17 1545 - - - - - 12 -   17 1533 109/82 - - - 102 - 99 %   17 1530 111/74 - - - 99 - 96 %   17  1430 117/74 - - - 110 12 100 %   02/14/17 1400 116/68 - - - 105 12 100 %   02/14/17 1330 115/74 98.4  F (36.9  C) Oral - 92 12 98 %   02/14/17 1318 - - - - - - 98 %   02/14/17 1310 112/83 - - - 97 15 -   02/14/17 1300 134/84 - - - 97 11 -   02/14/17 1250 116/90 - - - 85 12 -   02/14/17 1245 - - - - - - 97 %   02/14/17 1240 99/77 - - - 86 11 96 %   02/14/17 1230 (!) 145/93 - - - 80 15 100 %   02/14/17 1220 126/86 - - - 84 16 98 %   02/14/17 1210 138/72 - - - 86 19 97 %   02/14/17 1148 146/77 97  F (36.1  C) - - 84 16 100 %        Recent Labs   Lab Test  02/16/17   0713  02/15/17   0650  02/14/17   0707   HGB  11.1*  11.6*  16.3     No results for input(s): INR in the last 45420 hours.  Recent Labs   Lab Test  02/14/17   0707  02/07/17   1503  01/18/17   1319   PLT  185  139*  160     Recent Labs   Lab Test  02/07/17   1503  01/18/17   1319  06/06/16   1141   WBC  4.8  7.5  7.5     Invalid input(s): K      EXAM   The patient is healthy, alert and no distress  Calves are soft and non-tender.   Sensation is intact.  Dorsiflexion and plantar flexion is intact.  Dorsalis pedis pulses intact.   The incision is dry and intact.     Patient Active Problem List   Diagnosis     AS (ankylosing spondylitis) (H)     PA (pernicious anemia)     Low serum testosterone level     Health Care Home     Chronic narcotic dependence (H)     Rectus diastasis     Hx of gastric ulcer     Chronic pain syndrome     Keloid scar, mostly just dark     Failed total hip arthroplasty with dislocation, initial encounter (H)         ASSESSMENT  Doing well pain is still an issue   PLAN  Will change pain meds around and see if he wants to leave.  He is interested in going home today if his pain is controlled better      Saravanan Alcaraz M.D  [facility]  [address] [place] [phone]

## 2017-02-16 NOTE — PROGRESS NOTES
Pt VSS on RA. Ambulating well with crutches. Taking oxycodone for pain with good relief. Pt is to discharge to home with spouse all instructions and medications given. Aquacel dressing in place.

## 2017-02-16 NOTE — PROGRESS NOTES
Spoke to ortho MD regarding [facility] following at discharge, and he said it was okay for home PT. Telephone order placed. Patient was at first reluctant to have HHC at discharge, but PT happened to be in the next bed working and overheard the beginnings of refusal and notified him of their plan to help him with the goal to get up the stairs, so referral faxed to [place]. Will follow. Joyce Aceves RN

## 2017-02-16 NOTE — PLAN OF CARE
Problem: Goal Outcome Summary  Goal: Goal Outcome Summary  Outcome: Improving  A/OX4,cms intact.Pain control with oxycodone,valium.Up with SBA/walker.Dreg CDI.Voiding per urinal.Progressing per plan of  care.

## 2017-02-16 NOTE — PLAN OF CARE
Problem: Goal Outcome Summary  Goal: Goal Outcome Summary  Surgeon Discharge Plan:  Home      Current Functional Status:  SBA with functional transfers with FWW and axillary crutches with good adherence to TTWB left LE, pt ambulated with FWW and axillary crutches with SBA with good adherence to TTWB left LE initially but inconsistent adherence as he fatigued.  Pt ascended/descended 4 stairs x 2 with B handrails and CGA with good adherence to TTWB left LE during first trial but inconsistent adherence as he fatigued.          Barriers to Plan/Home:  No barriers to home - pt's wife is taking 4 weeks of from work and will be able to assist.  Pt will be staying on the main level initially and will have home PT.

## 2017-02-16 NOTE — PLAN OF CARE
Problem: Goal Outcome Summary  Goal: Goal Outcome Summary  Physical Therapy Discharge Summary     Reason for therapy discharge:    Discharged to home with assist from wife and home PT     Progress towards therapy goal(s). See goals on [facility] record for goal details.  Goals met     Therapy recommendation(s):    Continued therapy is recommended.  Rationale/Recommendations:  Recommend home PT to progress mobility.

## 2017-02-16 NOTE — PROGRESS NOTES
[facility]    Hospitalist Progress Note    Date of Service (when I saw the patient): 02/16/2017    Assessment & Plan   Oseas Edwards is a 46 year old male who was admitted on 2/14/2017. He underwent revision of left total hip arthroplasty 2/14/17. IM consultation is requested for evaluation of patient's chronic medical issues as listed below.     Failed total hip arthroplasty with dislocation, initial encounter   -  S/P revision left total hip arthroplasty 2/14/17 by Saravanan Alcaraz MD  -  Continue post op care per Orthopedics      -   Including pain control and deep vein thrombosis prophylaxis    Tachycardia, mild  Suspect due to uncontrolled pain.  Denies fever/chills, palpitations, lightheadedness, chest pressure, dyspnea.  - do not feel this should hold discharge    Acute blood loss anemia  -  Mild and expected due to type of procedure performed (this is not considered a complication)  -  Pre-op Hemoglobin 16.3, appears to be stabilizing about 11 g/dL    Hypertension  - current home medications include: lisinopril, hydrochlorothiazide     Chronic pain syndrome  Continuous opiate dependence  - current home medications include: indomethacin, cyclobenzaprine, Tylenol #3  - pain management per surgery    Ankylosing spondylitis   - current home medications include: indomethacin, Simponi  - plan to resume Simponi at discharge (this is a every 4 week injection)    Personal history of peptic ulcer disease with GIB   -  Patient on chronic NSAID therapy, continue proton pump inhibitor for prophylaxis    DVT Prophylaxis: Enoxaprain (Lovenox) SQ  Code Status: Full Code    Disposition: Expected discharge today or tomorrow per Orthopedics.    Mark Rashid MD       Interval History   Feeling well after adjustments in pain meds, feels like he wants to go home.  Denies fever/chills, palpitations, lightheadedness, chest pressure, dyspnea.    -Data reviewed today: I reviewed all new labs and imaging results over  the last 24 hours. I personally reviewed no images or EKG's today.    Physical Exam   Temp: 98.9  F (37.2  C) Temp src: Oral BP: 121/76 Pulse: 100 Heart Rate: 108 Resp: 16 SpO2: 97 % O2 Device: None (Room air)    Vitals:    02/14/17 0726   Weight: 71.4 kg (157 lb 8 oz)     Vital Signs with Ranges  Temp:  [97.8  F (36.6  C)-99.2  F (37.3  C)] 98.9  F (37.2  C)  Pulse:  [100] 100  Heart Rate:  [] 108  Resp:  [12-16] 16  BP: (109-131)/(68-82) 121/76  SpO2:  [97 %-99 %] 97 %  I/O last 3 completed shifts:  In: 1020 [P.O.:1020]  Out: 1150 [Urine:1150]    General Appearance: Pleasant, alert. No acute distress  Chest/Respiratory Exam: Clear to auscultation bilaterally with no wheezes, crackles, or rhonchi.  Cardiovascular Exam: mild tachycardia, regular rhythm. Normal S1, S2. No murmur. No gallops or rubs.   Gastrointestinal Exam: Soft. Bowel sounds present.  Psychiatric Exam: Alert and oriented, appropriate affect.     Medications        finasteride  5 mg Oral QPM     multivitamin, therapeutic with minerals  1 tablet Oral Daily     omeprazole (priLOSEC) CR capsule 20 mg  20 mg Oral QAM     testosterone  50 mg Transdermal Q48H     sodium chloride (PF)  3 mL Intracatheter Q8H     enoxaparin  40 mg Subcutaneous Q24H     senna-docusate  1-2 tablet Oral BID     acetaminophen  325 mg Oral Q8H     lisinopril  10 mg Oral Daily       Data     Recent Labs  Lab 02/16/17  0713 02/15/17  0650 02/14/17  0707   HGB 11.1* 11.6* 16.3   PLT  --   --  185   NA  --  140  --    POTASSIUM  --  3.9 4.2   CHLORIDE  --  107  --    CO2  --  26  --    BUN  --  18  --    CR  --  0.96 1.09   ANIONGAP  --  7  --    ANDREW  --  7.8*  --    * 121*  --        No results found for this or any previous visit (from the past 24 hour(s)).

## 2017-02-16 NOTE — PLAN OF CARE
Problem: Goal Outcome Summary  Goal: Goal Outcome Summary  OT: Order received, chart reviewed. Pt working with PT initially. Upon return to initiate OT eval, pt declined skilled OT need stating wife plans to provide A for ADLs for the next ~4 weeks. Provided brief education re: dressing and bathing. Pt appears to have good understanding and in agreement to discontinue OT order at this time.

## 2017-02-16 NOTE — DISCHARGE SUMMARY
[facility] Discharge Summary    Oseas Edwards MRN# [id number]   Age: 46 year old YOB: 1970     Date of Admission:  2/14/2017  Date of Discharge:  2/16/2017  Admitting Provider:  Saravanan Alcaraz MD  Discharge Provider:  Onel Nichols PA-C  [facility]      Primary Provider: Willy Forrest  Primary Care Physician Phone Number: [phone]          Admission Diagnoses:   Principle Diagnosis: FAILED TOTAL LEFT HIP ARTHROPLASTY  Failed total hip arthroplasty with dislocation, initial encounter (H)  Secondary Diagnoses:          Discharge Diagnosis:   FAILED TOTAL LEFT HIP ARTHROPLASTY          Procedures:   Procedure(s): Left total hip arthroplasty revision             Discharge Medications:     Current Discharge Medication List      START taking these medications    Details   enoxaparin (LOVENOX) 40 MG/0.4ML injection Inject 0.4 mLs (40 mg) Subcutaneous every 24 hours  Qty: 14 Syringe, Refills: 0    Associated Diagnoses: Status post revision of total hip replacement      ondansetron (ZOFRAN-ODT) 4 MG ODT tab Take 1 tablet (4 mg) by mouth every 6 hours as needed for nausea  Qty: 96 tablet, Refills: 0    Associated Diagnoses: Status post revision of total hip replacement      order for DME Equipment being ordered: Crutches ()  Treatment Diagnosis: impaired gait.  Qty: 1 each, Refills: 0    Associated Diagnoses: Status post revision of total hip replacement      oxyCODONE (OXYCONTIN) 10 MG 12 hr tablet Take 1 tablet (10 mg) by mouth every 12 hours as needed for moderate to severe pain maximum 2 tablet(s) per day  Qty: 36 tablet, Refills: 0    Associated Diagnoses: Status post revision of total hip replacement         CONTINUE these medications which have CHANGED    Details   oxyCODONE (ROXICODONE) 10 MG IR tablet Take 1-1.5 tablets (10-15 mg) by mouth every 3 hours as needed for moderate to severe pain  Qty: 120 tablet, Refills: 0    Associated  Diagnoses: Status post revision of total hip replacement         CONTINUE these medications which have NOT CHANGED    Details   testosterone (ANDROGEL/TESTIM) 50 MG/5GM (1%) topical gel Place 50 mg onto the skin every 48 hours      mometasone (ELOCON) 0.1 % ointment Apply topically 2 times daily as needed      OMEPRAZOLE PO Take 20 mg by mouth daily as needed      triamcinolone (KENALOG) 0.1 % ointment Apply topically daily as needed for irritation      Hypromellose (ARTIFICIAL TEARS OP) Place 1 drop into both eyes daily as needed      finasteride (PROSCAR) 5 MG tablet Take 1 tablet (5 mg) by mouth daily  Qty: 90 tablet, Refills: 3    Associated Diagnoses: Encounter for medication refill      lisinopril-hydrochlorothiazide (PRINZIDE,ZESTORETIC) 10-12.5 MG per tablet Take 1 tablet by mouth daily  Qty: 90 tablet, Refills: 3    Associated Diagnoses: Encounter for medication refill      cyclobenzaprine (FLEXERIL) 10 MG tablet TAKE ONE TABLET BY MOUTH TWICE DAILY AS NEEDED FOR MUSCLE SPASM  Qty: 200 tablet, Refills: 0    Associated Diagnoses: Encounter for medication refill      docusate sodium (COLACE) 100 MG tablet Take 100 mg by mouth daily as needed   Qty: 60 tablet, Refills: 1      SIMPONI 50 MG/0.5ML auto-injector pen Inject 50 mg Subcutaneous every 28 days       cyanocobalamin (VITAMIN B12) 1000 MCG/ML injection INJECT 1ML (1000MCG) INTRAMUSCULARLY EVERY 30 DAYS  Qty: 1 mL, Refills: 11    Associated Diagnoses: Encounter for medication refill      Multiple Vitamin (MULTI VITAMIN MENS PO) Take 1 tablet by mouth daily as needed          STOP taking these medications       INDOMETHACIN PO Comments:   Reason for Stopping:         acetaminophen-codeine (TYLENOL #3) 300-30 MG per tablet Comments:   Reason for Stopping:                   Allergies:         Allergies   Allergen Reactions     Infliximab      Other reaction(s): *Unknown  Caused LT eye blindness     No Clinical Screening - See Comments      methotrexate- mood  alterating     Remicade [Infliximab Injection]      Blindness in one eye             Brief History of Illness:   Oseas Edwards underwent the above stated procedure.     After discussing the risks, benefits, and possible complications, informed consent was obtained and the patient underwent the above procedure.  There were no complications.  Please see the Operative Report for full details.           Hospital Course:   Oseas Edwards's hospital course was unremarkable.  He recovered as anticipated and experienced no post-operative complications.     On the date of discharge, the patient was discharged to home in stable condition and afebrile.  He verbalized understanding of all discharge instructions and felt comfortable with the discharge plan.  He was asked to call with any further questions or concerns.         Condition on Discharge:      Discharge condition: Stable   Discharge vitals: Blood pressure 116/77, pulse 100, temperature 97.9  F (36.6  C), temperature source Oral, resp. rate 16, height 1.524 m (5'), weight 71.4 kg (157 lb 8 oz), SpO2 98 %.           Discharge Disposition:   Discharged to home          Discharge Instructions and Follow-Up:      Follow up appointments: Follow up in 10-14 days in clinic with Dr. Alcaraz. Please call the clinic to schedule an appointment. [facility] [address] [place] [address] Phone: [phone]        Onel Nichols PA-C   [facility]  [phone]

## 2017-02-19 LAB
BACTERIA SPEC CULT: NO GROWTH
MICRO REPORT STATUS: NORMAL
SPECIMEN SOURCE: NORMAL

## 2017-02-21 ENCOUNTER — CARE COORDINATION (OUTPATIENT)
Dept: CARE COORDINATION | Facility: CLINIC | Age: 47
End: 2017-02-21

## 2017-02-21 RX ORDER — FERROUS SULFATE 325(65) MG
1 TABLET ORAL WEEKLY
COMMUNITY
Start: 2011-06-18 | End: 2018-07-16

## 2017-02-21 RX ORDER — SILDENAFIL CITRATE 100 MG
TABLET ORAL PRN
COMMUNITY
Start: 2016-12-29 | End: 2017-08-29

## 2017-02-21 NOTE — PROGRESS NOTES
"Care Coordination Contact Attempt    Referral Source: IP list    Clinical Data: 2/16/17 - failed total left hip arthroplasty, with dislocation    Outreach attempted x 1.  Left message on voicemail with call back information and requested return call.    Plan:  Will attempt to contact in 1 business days and await response to message.    levenox 0.4mg inj, zofran as needed, oxydocone 10mg 1-1.5mg q 3hr, oxycotine 10 mg 1 po q 12 hrs as needed  0 ER- 1@ IP  Has 3/6/17 appt with PCP - Lon      ED/Discharge Protocol    \"Hi, my name is @ Graciela Mccabe MA CC  @, a Care Coordinator, and I am calling on behalf of Dr. Forrest, Willy Vaca's office at [facility].  I am calling to follow up and see how things are going for you after your recent visit.\"    \"I see that you were in the (ER/UC/IP) on IP - released 2/16/17.    How are you doing now that you are home?\" ok. Using crutches - 2 level home with 14 steps - using extra caution - also has active 3 yo son  Using prune juice to control BM's to avoid constipation, nl diet  Has home care RN twice weekly - wound care - changed dressing this am    Is patient experiencing symptoms that may require a hospital visit?  no    Discharge Instructions    \"Let's review your discharge instructions.  What is/are the follow-up recommendations?  Pt. Response: watch for change in pain level, drainage from surgical site, watch for fever, using prune juice & taking pain meds, caution with 2 level home while on crutches    \"Were you instructed to make a follow-up appointment?\"  Pt. Response: Yes.  Has appointment been made?   Yes  3/3/17 with surgeon - Dr Alcaraz, 3/7/17 with PCP - Dr Forrest    \"When you see the provider, I would recommend that you bring your discharge instructions with you. And bring medications/or list along with you.    Medications    \"How many new medications are you on since your hospitalization/ED visit?\"    2 or more - [facility] referral needed  \"How many of " "your current medicines changed (dose, timing, name, etc.) while you were in the hospital/ED visit?\"   No changes  \"Do you have questions about your medications?\"   No  \"Were you newly diagnosed with heart failure, COPD, diabetes, dvt, blood clot, pulmonary emboli or did you have a heart attack?\"   No  For patients on insulin: \"Did you start on insulin in the hospital or did you have your insulin dose changed?\"   No    Medication reconciliation completed? Yes    Was MTM referral placed (*Make sure to put transitions as reason for referral)?   No    Call Summary    \"Do you have any questions or concerns about your condition or care plan at the moment?\"    No  Triage advice given: yes will follow up with PCP             yes will continue with ED advise             yes will  contact hospital, if symptoms return or get worse    Patient was in ER 0 in the past year (assess appropriateness of ER visits.)      \"If you have questions or things don't continue to improve, we encourage you contact us through the main clinic number,  [phone]. If the clinic is not open, the on-call provider is available to help you.     \"Thank you for your time and take care!\"              "

## 2017-02-28 LAB
BACTERIA SPEC CULT: NORMAL
Lab: NORMAL
MICRO REPORT STATUS: NORMAL
SPECIMEN SOURCE: NORMAL

## 2017-03-06 ENCOUNTER — OFFICE VISIT (OUTPATIENT)
Dept: FAMILY MEDICINE | Facility: CLINIC | Age: 47
End: 2017-03-06

## 2017-03-06 VITALS
OXYGEN SATURATION: 99 % | HEART RATE: 99 BPM | SYSTOLIC BLOOD PRESSURE: 90 MMHG | WEIGHT: 160 LBS | BODY MASS INDEX: 31.25 KG/M2 | DIASTOLIC BLOOD PRESSURE: 80 MMHG

## 2017-03-06 DIAGNOSIS — Z96.649 STATUS POST REVISION OF TOTAL HIP REPLACEMENT: ICD-10-CM

## 2017-03-06 DIAGNOSIS — F11.20 CHRONIC NARCOTIC DEPENDENCE (H): ICD-10-CM

## 2017-03-06 DIAGNOSIS — Z96.649 FAILED TOTAL HIP ARTHROPLASTY WITH DISLOCATION, INITIAL ENCOUNTER (H): Primary | ICD-10-CM

## 2017-03-06 DIAGNOSIS — T84.028A FAILED TOTAL HIP ARTHROPLASTY WITH DISLOCATION, INITIAL ENCOUNTER (H): Primary | ICD-10-CM

## 2017-03-06 DIAGNOSIS — M45.9 AS (ANKYLOSING SPONDYLITIS) (H): ICD-10-CM

## 2017-03-06 PROCEDURE — 99214 OFFICE O/P EST MOD 30 MIN: CPT | Performed by: FAMILY MEDICINE

## 2017-03-06 RX ORDER — OXYCODONE HYDROCHLORIDE 5 MG/1
TABLET ORAL
COMMUNITY
Start: 2017-02-27 | End: 2017-04-03 | Stop reason: DRUGHIGH

## 2017-03-06 RX ORDER — OXYCODONE HYDROCHLORIDE 10 MG/1
10-15 TABLET ORAL
Qty: 120 TABLET | Refills: 0 | Status: SHIPPED | OUTPATIENT
Start: 2017-03-06 | End: 2017-04-03

## 2017-03-06 NOTE — PROGRESS NOTES
Problem(s) Oriented visit        SUBJECTIVE:                                                    Oseas Edwards is a 46 year old male who presents to clinic today for the following health issues :        Hospital Follow-up Visit:    Hospital/Nursing Home/ Rehab Facility: [facility] Discharge Summary     Oseas Edwards MRN# [id number]   Age: 46 year old YOB: 1970      Date of Admission:  2/14/2017  Date of Discharge:  2/16/2017  Admitting Provider:  Saravanan Alcaraz MD  Discharge Provider:  Onel Nichols PA-C  [facility]       Primary Provider: Willy Forrest  Primary Care Physician Phone Number: [phone]      Admission Diagnoses:   Principle Diagnosis: FAILED TOTAL LEFT HIP ARTHROPLASTY  Failed total hip arthroplasty with dislocation, initial encounter (H)  Secondary Diagnoses:          Discharge Diagnosis:    FAILED TOTAL LEFT HIP ARTHROPLASTY           Procedures:    Procedure(s): Left total hip arthroplasty revision    Discharge Medications:            Current Discharge Medication List             START taking these medications     Details   enoxaparin (LOVENOX) 40 MG/0.4ML injection Inject 0.4 mLs (40 mg) Subcutaneous every 24 hours  Qty: 14 Syringe, Refills: 0     Associated Diagnoses: Status post revision of total hip replacement       ondansetron (ZOFRAN-ODT) 4 MG ODT tab Take 1 tablet (4 mg) by mouth every 6 hours as needed for nausea  Qty: 96 tablet, Refills: 0     Associated Diagnoses: Status post revision of total hip replacement       order for DME Equipment being ordered: Crutches ()  Treatment Diagnosis: impaired gait.  Qty: 1 each, Refills: 0     Associated Diagnoses: Status post revision of total hip replacement       oxyCODONE (OXYCONTIN) 10 MG 12 hr tablet Take 1 tablet (10 mg) by mouth every 12 hours as needed for moderate to severe pain maximum 2 tablet(s) per day  Qty: 36 tablet, Refills: 0      Associated Diagnoses: Status post revision of total hip replacement                 CONTINUE these medications which have CHANGED     Details   oxyCODONE (ROXICODONE) 10 MG IR tablet Take 1-1.5 tablets (10-15 mg) by mouth every 3 hours as needed for moderate to severe pain  Qty: 120 tablet, Refills: 0     Associated Diagnoses: Status post revision of total hip replacement                 CONTINUE these medications which have NOT CHANGED     Details   testosterone (ANDROGEL/TESTIM) 50 MG/5GM (1%) topical gel Place 50 mg onto the skin every 48 hours       mometasone (ELOCON) 0.1 % ointment Apply topically 2 times daily as needed       OMEPRAZOLE PO Take 20 mg by mouth daily as needed       triamcinolone (KENALOG) 0.1 % ointment Apply topically daily as needed for irritation       Hypromellose (ARTIFICIAL TEARS OP) Place 1 drop into both eyes daily as needed       finasteride (PROSCAR) 5 MG tablet Take 1 tablet (5 mg) by mouth daily  Qty: 90 tablet, Refills: 3     Associated Diagnoses: Encounter for medication refill       lisinopril-hydrochlorothiazide (PRINZIDE,ZESTORETIC) 10-12.5 MG per tablet Take 1 tablet by mouth daily  Qty: 90 tablet, Refills: 3     Associated Diagnoses: Encounter for medication refill       cyclobenzaprine (FLEXERIL) 10 MG tablet TAKE ONE TABLET BY MOUTH TWICE DAILY AS NEEDED FOR MUSCLE SPASM  Qty: 200 tablet, Refills: 0     Associated Diagnoses: Encounter for medication refill       docusate sodium (COLACE) 100 MG tablet Take 100 mg by mouth daily as needed   Qty: 60 tablet, Refills: 1       SIMPONI 50 MG/0.5ML auto-injector pen Inject 50 mg Subcutaneous every 28 days        cyanocobalamin (VITAMIN B12) 1000 MCG/ML injection INJECT 1ML (1000MCG) INTRAMUSCULARLY EVERY 30 DAYS  Qty: 1 mL, Refills: 11     Associated Diagnoses: Encounter for medication refill       Multiple Vitamin (MULTI VITAMIN MENS PO) Take 1 tablet by mouth daily as needed                 STOP taking these medications          INDOMETHACIN PO Comments:   Reason for Stopping:            acetaminophen-codeine (TYLENOL #3) 300-30 MG per tablet Comments:   Reason for Stopping:                    Allergies:               Allergies   Allergen Reactions     Infliximab         Other reaction(s): *Unknown  Caused LT eye blindness     No Clinical Screening - See Comments         methotrexate- mood alterating     Remicade [Infliximab Injection]         Blindness in one eye          Brief History of Illness:   Oseas Edwards underwent the above stated procedure.      After discussing the risks, benefits, and possible complications, informed consent was obtained and the patient underwent the above procedure. There were no complications. Please see the Operative Report for full details.      Hospital Course:   Oseas Edwards's hospital course was unremarkable. He recovered as anticipated and experienced no post-operative complications.      On the date of discharge, the patient was discharged to home in stable condition and afebrile. He verbalized understanding of all discharge instructions and felt comfortable with the discharge plan. He was asked to call with any further questions or concerns.          Condition on Discharge:     Discharge condition: Stable   Discharge vitals: Blood pressure 116/77, pulse 100, temperature 97.9  F (36.6  C), temperature source Oral, resp. rate 16, height 1.524 m (5'), weight 71.4 kg (157 lb 8 oz), SpO2 98 %.          Discharge Disposition:    Discharged to home      Discharge Instructions and Follow-Up:    Follow up appointments: Follow up in 10-14 days in clinic with Dr. Alcaraz. Please call the clinic to schedule an appointment. [facility] [address] [place] [address] Phone: [phone]                  Problems taking medications regularly:  None       Medication changes since discharge: None       Problems adhering to non-medication therapy:  None    Summary of hospitalization:  [facility]  discharge summary reviewed  Diagnostic Tests/Treatments reviewed.  Follow up needed: none  Other Healthcare Providers Involved in Patient s Care:         None  Update since discharge: improved.     Post Discharge Medication Reconciliation: discharge medications reconciled and changed, per note/orders (see AVS).  Plan of care communicated with patient     Coding guidelines for this visit:  Type of Medical   Decision Making Face-to-Face Visit       within 7 Days of discharge Face-to-Face Visit        within 14 days of discharge   Moderate Complexity [id number]   High Complexity [id number]                Problem list, Medication list, Allergies, and Medical/Social/Surgical histories reviewed in [facility] and updated as appropriate.   Additional history: as documented    ROS:  General and CV completed and negative except as noted above    Histories:   Patient Active Problem List   Diagnosis     AS (ankylosing spondylitis) (H)     PA (pernicious anemia)     Low serum testosterone level     Health Care Home     Chronic narcotic dependence (H)     Rectus diastasis     Hx of gastric ulcer     Chronic pain syndrome     Keloid scar, mostly just dark     Failed total hip arthroplasty with dislocation, initial encounter (H)     Past Surgical History   Procedure Laterality Date     Joint replacement, hip rt/lt  1994     Left     Joint replacement, hip rt/lt  2000ish     Right     Herniorrhaphy ventral  11/11/2013     Procedure: HERNIORRHAPHY VENTRAL;  UMBILICAL HERNIA REPAIR WITH MESH, RECTUS DIASTASIS REPAIR WITH MESH;  Surgeon: Jason Dodson MD;  Location: [facility]     Open separation component herniorrhaphy  11/11/2013     Procedure: OPEN SEPARATION COMPONENT HERNIORRHAPHY;;  Surgeon: Jason Dodson MD;  Location: [facility]     Arthroplasty revision hip Left 2/14/2017     Procedure: ARTHROPLASTY REVISION HIP;  Surgeon: Saravanan Alcaraz MD;  Location:  OR       Social History   Substance Use Topics     Smoking  status: Former Smoker     Quit date: 1/1/2009     Smokeless tobacco: Never Used      Comment: quit 2 1/2 yrs     Alcohol use Yes      Comment: 1 to 2 per week     No family history on file.        OBJECTIVE:                                                    BP 90/80  Pulse 99  Wt 72.6 kg (160 lb)  SpO2 99%  BMI 31.25 kg/m2  Body mass index is 31.25 kg/(m^2).   APPEARANCE: = mild distress  Conj/Eyelids = noninjected and lids and lashes are without inflammation  PERRLA/Irises = Pupils Round Reactive to Light and Irisis without inflammation  Neck = No anterior or posterior adenopathy appreciated.  Thyroid = Not enlarged and no masses felt  Resp effort = Calm regular breathing  Breath Sounds = Good air movement with no rales or rhonchi in any lung fields  Heart Rate, Rythym, & sounds (no Murm)  = Regular rate and rythym with no S3, S4, or murmer appreciated.  Carotid Art's = Pulses full and equal and no bruits appreciated  Abdomen = Soft, nontender, no masses, & bowel sounds in all quadrants  Liver/Spleen = Normal span and no splenomegaly noted  Digits and Nails = FROM in all finger joints, no nail dystrophy  Ext (edema) = No pretibial edema noted or elsewhere  Musculsktl =  Strength and ROM of major joints are within normal limits  SKIN = absent significant rashes or lesions   Recent/Remote Memory = Alert and Oriented x 3  Mood/Affect = Cooperative and interested     ASSESSMENT/PLAN:                                                        Oseas was seen today for surgical followup.    Diagnoses and all orders for this visit:    Failed total hip arthroplasty with dislocation, initial encounter (H)    AS (ankylosing spondylitis) (H)    Chronic narcotic dependence (H)      See Patient Instructions  GOing back to pain clinic to explore topical  The following health maintenance items are reviewed in Epic and correct as of today:  Health Maintenance   Topic Date Due     JOSÉ QUESTIONNAIRE 1 YEAR  1970     URINE DRUG  SCREEN Q1 YR  04/04/1984     PHQ-9 Q1YR (NO INBASKET)  06/06/2017     INFLUENZA VACCINE (SYSTEM ASSIGNED)  09/01/2017     TETANUS IMMUNIZATION (SYSTEM ASSIGNED)  08/26/2020     LIPID SCREEN Q5 YR MALE (SYSTEM ASSIGNED)  06/06/2021       Willy Forrest MD  [facility]  [phone]    For any issues my office # is [phone]

## 2017-03-06 NOTE — MR AVS SNAPSHOT
After Visit Summary   3/6/2017    Oseas Edwards    MRN: [id number]           Patient Information     Date Of Birth          1970        Visit Information        Provider Department      3/6/2017 9:45 AM Willy Forrest MD [facility]        Today's Diagnoses     Failed total hip arthroplasty with dislocation, initial encounter (H)    -  1    AS (ankylosing spondylitis) (H)        Chronic narcotic dependence (H)        Status post revision of total hip replacement           Follow-ups after your visit        Who to contact     If you have questions or need follow up information about today's clinic visit or your schedule please contact [facility] directly at [phone].  Normal or non-critical lab and imaging results will be communicated to you by Muzico Internationalhart, letter or phone within 4 business days after the clinic has received the results. If you do not hear from us within 7 days, please contact the clinic through InSupplyt or phone. If you have a critical or abnormal lab result, we will notify you by phone as soon as possible.  Submit refill requests through Poke'n Call or call your pharmacy and they will forward the refill request to us. Please allow 3 business days for your refill to be completed.          Additional Information About Your Visit        MyChart Information     Poke'n Call gives you secure access to your electronic health record. If you see a primary care provider, you can also send messages to your care team and make appointments. If you have questions, please call your primary care clinic.  If you do not have a primary care provider, please call [phone] and they will assist you.        Care EveryWhere ID     This is your Care EveryWhere ID. This could be used by other organizations to access your [place] medical records  [id number]        Your Vitals Were     Pulse Pulse Oximetry BMI (Body Mass Index)             99 99% 31.25 kg/m2          Blood Pressure  from Last 3 Encounters:   03/06/17 90/80   02/16/17 116/77   02/07/17 122/86    Weight from Last 3 Encounters:   03/06/17 72.6 kg (160 lb)   02/14/17 71.4 kg (157 lb 8 oz)   02/07/17 74.8 kg (165 lb)              Today, you had the following     No orders found for display         Where to get your medicines      Some of these will need a paper prescription and others can be bought over the counter.  Ask your nurse if you have questions.     Bring a paper prescription for each of these medications     oxyCODONE 10 MG IR tablet          Primary Care Provider Office Phone # Fax #    Willy Forrest [phone]       [facility] [address]  [facility] [address]        Thank you!     Thank you for choosing [facility]  for your care. Our goal is always to provide you with excellent care. Hearing back from our patients is one way we can continue to improve our services. Please take a few minutes to complete the written survey that you may receive in the mail after your visit with us. Thank you!             Your Updated Medication List - Protect others around you: Learn how to safely use, store and throw away your medicines at www.disposemymeds.org.          This list is accurate as of: 3/6/17 10:09 AM.  Always use your most recent med list.                   Brand Name Dispense Instructions for use    ARTIFICIAL TEARS OP      Place 1 drop into both eyes daily as needed       calcium 500/D 500-200 MG-UNIT per tablet   Generic drug:  calcium carb 1250 mg (500 mg [place])/vitamin D 200 unit      Take 1 tablet by mouth once a week       cyanocobalamin 1000 MCG/ML injection    VITAMIN B12    1 mL    INJECT 1ML (1000MCG) INTRAMUSCULARLY EVERY 30 DAYS       cyclobenzaprine 10 MG tablet    FLEXERIL    200 tablet    TAKE ONE TABLET BY MOUTH TWICE DAILY AS NEEDED FOR MUSCLE SPASM       docusate sodium 100 MG tablet    COLACE    60 tablet    Take 100 mg by mouth daily as needed        enoxaparin 40 MG/0.4ML injection    LOVENOX    14 Syringe    Inject 0.4 mLs (40 mg) Subcutaneous every 24 hours       ferrous sulfate 325 (65 FE) MG tablet    IRON     Take 1 tablet by mouth once a week       finasteride 5 MG tablet    PROSCAR    90 tablet    Take 1 tablet (5 mg) by mouth daily       lisinopril-hydrochlorothiazide 10-12.5 MG per tablet    PRINZIDE/ZESTORETIC    90 tablet    Take 1 tablet by mouth daily       mometasone 0.1 % ointment    ELOCON     Apply topically 2 times daily as needed       MULTI VITAMIN MENS PO      Take 1 tablet by mouth daily as needed       OMEGA 3 PO      Take by mouth once a week       OMEPRAZOLE PO      Take 20 mg by mouth daily as needed       ondansetron 4 MG ODT tab    ZOFRAN-ODT    96 tablet    Take 1 tablet (4 mg) by mouth every 6 hours as needed for nausea       order for DME     1 each    Equipment being ordered: Crutches () Treatment Diagnosis: impaired gait.       * oxyCODONE 10 MG 12 hr tablet    OxyCONTIN    36 tablet    Take 1 tablet (10 mg) by mouth every 12 hours as needed for moderate to severe pain maximum 2 tablet(s) per day       * oxyCODONE 5 MG IR tablet    ROXICODONE     Reported on 3/6/2017       * oxyCODONE 10 MG IR tablet    ROXICODONE    120 tablet    Take 1-1.5 tablets (10-15 mg) by mouth every 3 hours as needed for moderate to severe pain       SIMPONI 50 MG/0.5ML auto-injector pen   Generic drug:  golimumab      Inject 50 mg Subcutaneous every 28 days       testosterone 50 MG/5GM (1%) topical gel    ANDROGEL/TESTIM     Place 50 mg onto the skin every 48 hours       triamcinolone 0.1 % ointment    KENALOG     Apply topically daily as needed for irritation       VIAGRA 100 MG cap/tab   Generic drug:  sildenafil      as needed       * Notice:  This list has 3 medication(s) that are the same as other medications prescribed for you. Read the directions carefully, and ask your doctor or other care provider to review them with you.

## 2017-03-30 ENCOUNTER — TRANSFERRED RECORDS (OUTPATIENT)
Dept: FAMILY MEDICINE | Facility: CLINIC | Age: 47
End: 2017-03-30

## 2017-04-03 ENCOUNTER — OFFICE VISIT (OUTPATIENT)
Dept: FAMILY MEDICINE | Facility: CLINIC | Age: 47
End: 2017-04-03

## 2017-04-03 VITALS
HEIGHT: 60 IN | WEIGHT: 164 LBS | HEART RATE: 87 BPM | OXYGEN SATURATION: 99 % | BODY MASS INDEX: 32.2 KG/M2 | DIASTOLIC BLOOD PRESSURE: 78 MMHG | SYSTOLIC BLOOD PRESSURE: 102 MMHG

## 2017-04-03 DIAGNOSIS — M45.9 AS (ANKYLOSING SPONDYLITIS) (H): Primary | ICD-10-CM

## 2017-04-03 PROCEDURE — 99213 OFFICE O/P EST LOW 20 MIN: CPT | Performed by: FAMILY MEDICINE

## 2017-04-03 RX ORDER — OXYCODONE HYDROCHLORIDE 10 MG/1
10-15 TABLET ORAL EVERY 6 HOURS PRN
Qty: 130 TABLET | Refills: 0 | Status: SHIPPED | OUTPATIENT
Start: 2017-04-03 | End: 2017-08-29

## 2017-04-03 NOTE — MR AVS SNAPSHOT
"              After Visit Summary   4/3/2017    Oseas Edwards    MRN: [id number]           Patient Information     Date Of Birth          1970        Visit Information        Provider Department      4/3/2017 9:30 AM Willy Forrest MD [facility]        Today's Diagnoses     AS (ankylosing spondylitis) (H)    -  1       Follow-ups after your visit        Who to contact     If you have questions or need follow up information about today's clinic visit or your schedule please contact [facility] directly at [phone].  Normal or non-critical lab and imaging results will be communicated to you by Segwayhart, letter or phone within 4 business days after the clinic has received the results. If you do not hear from us within 7 days, please contact the clinic through Iframe Appst or phone. If you have a critical or abnormal lab result, we will notify you by phone as soon as possible.  Submit refill requests through [employer] or call your pharmacy and they will forward the refill request to us. Please allow 3 business days for your refill to be completed.          Additional Information About Your Visit        MyChart Information     [employer] gives you secure access to your electronic health record. If you see a primary care provider, you can also send messages to your care team and make appointments. If you have questions, please call your primary care clinic.  If you do not have a primary care provider, please call [phone] and they will assist you.        Care EveryWhere ID     This is your Care EveryWhere ID. This could be used by other organizations to access your [place] medical records  [id number]        Your Vitals Were     Pulse Height Pulse Oximetry BMI (Body Mass Index)          87 1.473 m (4' 10\") 99% 34.28 kg/m2         Blood Pressure from Last 3 Encounters:   04/03/17 102/78   03/06/17 90/80   02/16/17 116/77    Weight from Last 3 Encounters:   04/03/17 74.4 kg (164 lb)   03/06/17 " 72.6 kg (160 lb)   02/14/17 71.4 kg (157 lb 8 oz)              Today, you had the following     No orders found for display         Today's Medication Changes          These changes are accurate as of: 4/3/17 10:13 AM.  If you have any questions, ask your nurse or doctor.               These medicines have changed or have updated prescriptions.        Dose/Directions    oxyCODONE 10 MG IR tablet   Commonly known as:  ROXICODONE   This may have changed:    - when to take this  - Another medication with the same name was removed. Continue taking this medication, and follow the directions you see here.   Used for:  AS (ankylosing spondylitis) (H)   Changed by:  Willy Forrest MD        Dose:  10-15 mg   Take 1-1.5 tablets (10-15 mg) by mouth every 6 hours as needed for moderate to severe pain   Quantity:  130 tablet   Refills:  0            Where to get your medicines      Some of these will need a paper prescription and others can be bought over the counter.  Ask your nurse if you have questions.     Bring a paper prescription for each of these medications     oxyCODONE 10 MG IR tablet                Primary Care Provider Office Phone # Fax #    Willy Forrest [phone]       [facility] [address] [facility] [address]        Thank you!     Thank you for choosing [facility]  for your care. Our goal is always to provide you with excellent care. Hearing back from our patients is one way we can continue to improve our services. Please take a few minutes to complete the written survey that you may receive in the mail after your visit with us. Thank you!             Your Updated Medication List - Protect others around you: Learn how to safely use, store and throw away your medicines at www.disposemymeds.org.          This list is accurate as of: 4/3/17 10:13 AM.  Always use your most recent med list.                   Brand Name Dispense Instructions for use     ARTIFICIAL TEARS OP      Place 1 drop into both eyes daily as needed       calcium 500/D 500-200 MG-UNIT per tablet   Generic drug:  calcium carb 1250 mg (500 mg [place])/vitamin D 200 unit      Take 1 tablet by mouth once a week       cyanocobalamin 1000 MCG/ML injection    VITAMIN B12    3 mL    INJECT ONE ML (CC) INTRAMUSCULARLY ONCE EVERY MONTH       cyclobenzaprine 10 MG tablet    FLEXERIL    200 tablet    TAKE ONE TABLET BY MOUTH TWICE DAILY AS NEEDED FOR MUSCLE SPASM       docusate sodium 100 MG tablet    COLACE    60 tablet    Take 100 mg by mouth daily as needed       enoxaparin 40 MG/0.4ML injection    LOVENOX    14 Syringe    Inject 0.4 mLs (40 mg) Subcutaneous every 24 hours       ferrous sulfate 325 (65 FE) MG tablet    IRON     Take 1 tablet by mouth once a week       finasteride 5 MG tablet    PROSCAR    90 tablet    Take 1 tablet (5 mg) by mouth daily       lisinopril-hydrochlorothiazide 10-12.5 MG per tablet    PRINZIDE/ZESTORETIC    90 tablet    Take 1 tablet by mouth daily       mometasone 0.1 % ointment    ELOCON     Apply topically 2 times daily as needed       MULTI VITAMIN MENS PO      Take 1 tablet by mouth daily as needed       OMEGA 3 PO      Take by mouth once a week       OMEPRAZOLE PO      Take 20 mg by mouth daily as needed       ondansetron 4 MG ODT tab    ZOFRAN-ODT    96 tablet    Take 1 tablet (4 mg) by mouth every 6 hours as needed for nausea       order for DME     1 each    Equipment being ordered: Lionutches () Treatment Diagnosis: impaired gait.       oxyCODONE 10 MG IR tablet    ROXICODONE    130 tablet    Take 1-1.5 tablets (10-15 mg) by mouth every 6 hours as needed for moderate to severe pain       SIMPONI 50 MG/0.5ML auto-injector pen   Generic drug:  golimumab      Inject 50 mg Subcutaneous every 28 days       testosterone 50 MG/5GM (1%) topical gel    ANDROGEL/TESTIM     Place 50 mg onto the skin every 48 hours       triamcinolone 0.1 % ointment    KENALOG     Apply  topically daily as needed for irritation       VIAGRA 100 MG cap/tab   Generic drug:  sildenafil      as needed

## 2017-04-03 NOTE — PROGRESS NOTES
Problem(s) Oriented visit        SUBJECTIVE:                                                    Oseas Edwards is a 46 year old male who presents to clinic today for the following health issues :      1. AS (ankylosing spondylitis) (H)  Hips are much improved, now off the T3's and the indocin  And is only taking the Oxycodone  Is going to see the pain doc to try and find alternates  - oxyCODONE (ROXICODONE) 10 MG IR tablet; Take 1-1.5 tablets (10-15 mg) by mouth every 6 hours as needed for moderate to severe pain  Dispense: 130 tablet; Refill: 0         Problem list, Medication list, Allergies, and Medical/Social/Surgical histories reviewed in [facility] and updated as appropriate.   Additional history: as documented    ROS:  General and CV completed and negative except as noted above    Histories:   Patient Active Problem List   Diagnosis     AS (ankylosing spondylitis) (H)     PA (pernicious anemia)     Low serum testosterone level     Health Care Home     Chronic narcotic dependence (H)     Rectus diastasis     Hx of gastric ulcer     Chronic pain syndrome     Keloid scar, mostly just dark     Failed total hip arthroplasty with dislocation, initial encounter (H)     Past Surgical History:   Procedure Laterality Date     ARTHROPLASTY REVISION HIP Left 2/14/2017    Procedure: ARTHROPLASTY REVISION HIP;  Surgeon: Saravanan Alcaraz MD;  Location:  OR     HERNIORRHAPHY VENTRAL  11/11/2013    Procedure: HERNIORRHAPHY VENTRAL;  UMBILICAL HERNIA REPAIR WITH MESH, RECTUS DIASTASIS REPAIR WITH MESH;  Surgeon: Jason Dodson MD;  Location: [facility]     JOINT REPLACEMENT, HIP RT/LT  1994    Left     JOINT REPLACEMENT, HIP RT/LT  2000ish    Right     OPEN SEPARATION COMPONENT HERNIORRHAPHY  11/11/2013    Procedure: OPEN SEPARATION COMPONENT HERNIORRHAPHY;;  Surgeon: Jason Dodson MD;  Location: [facility]       Social History   Substance Use Topics     Smoking status: Former Smoker     Quit date: 1/1/2009     Smokeless  "tobacco: Never Used      Comment: quit 2 1/2 yrs     Alcohol use Yes      Comment: 1 to 2 per week     No family history on file.        OBJECTIVE:                                                    /78  Pulse 87  Ht 1.473 m (4' 10\")  Wt 74.4 kg (164 lb)  SpO2 99%  BMI 34.28 kg/m2  Body mass index is 34.28 kg/(m^2).   APPEARANCE: = alert and mild distress  Conj/Eyelids = noninjected and lids and lashes are without inflammation  PERRLA/Irises = Pupils Round Reactive to Light and Irisis without inflammation  Neck = No anterior or posterior adenopathy appreciated.  Thyroid = Not enlarged and no masses felt  Resp effort = Calm regular breathing  Breath Sounds = Good air movement with no rales or rhonchi in any lung fields  Heart Rate, Rythym, & sounds (no Murm)  = Regular rate and rythym with no S3, S4, or murmer appreciated.  Carotid Art's = Pulses full and equal and no bruits appreciated  Abdomen = Soft, nontender, no masses, & bowel sounds in all quadrants  Liver/Spleen = Normal span and no splenomegaly noted  Digits and Nails = FROM in all finger joints, no nail dystrophy  Ext (edema) = No pretibial edema noted or elsewhere  Musculsktl =  Strength and ROM of major joints are within normal limits  SKIN = absent significant rashes or lesions   Recent/Remote Memory = Alert and Oriented x 3  Mood/Affect = Cooperative and interested     ASSESSMENT/PLAN:                                                        Oseas was seen today for recheck.    Diagnoses and all orders for this visit:    AS (ankylosing spondylitis) (H)  -     oxyCODONE (ROXICODONE) 10 MG IR tablet; Take 1-1.5 tablets (10-15 mg) by mouth every 6 hours as needed for moderate to severe pain            The following health maintenance items are reviewed in Epic and correct as of today:  Health Maintenance   Topic Date Due     JOSÉ QUESTIONNAIRE 1 YEAR  1970     URINE DRUG SCREEN Q1 YR  04/04/1984     PHQ-9 Q1YR (NO INBASKET)  06/06/2017     " INFLUENZA VACCINE (SYSTEM ASSIGNED)  09/01/2017     TETANUS IMMUNIZATION (SYSTEM ASSIGNED)  08/26/2020     LIPID SCREEN Q5 YR MALE (SYSTEM ASSIGNED)  06/06/2021       Willy Forrest MD  [facility]  [phone]    For any issues my office # is [phone]

## 2017-04-24 ENCOUNTER — TRANSFERRED RECORDS (OUTPATIENT)
Dept: FAMILY MEDICINE | Facility: CLINIC | Age: 47
End: 2017-04-24

## 2017-05-01 ENCOUNTER — OFFICE VISIT (OUTPATIENT)
Dept: FAMILY MEDICINE | Facility: CLINIC | Age: 47
End: 2017-05-01

## 2017-05-01 VITALS
DIASTOLIC BLOOD PRESSURE: 70 MMHG | BODY MASS INDEX: 33.86 KG/M2 | SYSTOLIC BLOOD PRESSURE: 100 MMHG | OXYGEN SATURATION: 97 % | HEART RATE: 90 BPM | WEIGHT: 162 LBS

## 2017-05-01 DIAGNOSIS — Z76.0 ENCOUNTER FOR MEDICATION REFILL: ICD-10-CM

## 2017-05-01 DIAGNOSIS — R79.89 LOW SERUM TESTOSTERONE LEVEL: Primary | ICD-10-CM

## 2017-05-01 PROCEDURE — 99213 OFFICE O/P EST LOW 20 MIN: CPT | Performed by: FAMILY MEDICINE

## 2017-05-01 NOTE — MR AVS SNAPSHOT
After Visit Summary   5/1/2017    Oseas Edwards    MRN: [id number]           Patient Information     Date Of Birth          1970        Visit Information        Provider Department      5/1/2017 9:15 AM Willy Forrest MD [facility]        Care Instructions              Follow-ups after your visit        Who to contact     If you have questions or need follow up information about today's clinic visit or your schedule please contact [facility] directly at [phone].  Normal or non-critical lab and imaging results will be communicated to you by aScentiashart, letter or phone within 4 business days after the clinic has received the results. If you do not hear from us within 7 days, please contact the clinic through Quickcuet or phone. If you have a critical or abnormal lab result, we will notify you by phone as soon as possible.  Submit refill requests through [employer] Games or call your pharmacy and they will forward the refill request to us. Please allow 3 business days for your refill to be completed.          Additional Information About Your Visit        aScentiashart Information     [employer] gives you secure access to your electronic health record. If you see a primary care provider, you can also send messages to your care team and make appointments. If you have questions, please call your primary care clinic.  If you do not have a primary care provider, please call [phone] and they will assist you.        Care EveryWhere ID     This is your Care EveryWhere ID. This could be used by other organizations to access your [place] medical records  [id number]        Your Vitals Were     Pulse Pulse Oximetry BMI (Body Mass Index)             90 97% 33.86 kg/m2          Blood Pressure from Last 3 Encounters:   05/01/17 100/70   04/03/17 102/78   03/06/17 90/80    Weight from Last 3 Encounters:   05/01/17 73.5 kg (162 lb)   04/03/17 74.4 kg (164 lb)   03/06/17 72.6 kg (160 lb)               Today, you had the following     No orders found for display       Primary Care Provider Office Phone # Fax #    Willy Forrest [phone]       [facility] [address]  [facility] [address]        Thank you!     Thank you for choosing [facility]  for your care. Our goal is always to provide you with excellent care. Hearing back from our patients is one way we can continue to improve our services. Please take a few minutes to complete the written survey that you may receive in the mail after your visit with us. Thank you!             Your Updated Medication List - Protect others around you: Learn how to safely use, store and throw away your medicines at www.disposemymeds.org.          This list is accurate as of: 5/1/17 10:30 AM.  Always use your most recent med list.                   Brand Name Dispense Instructions for use    ARTIFICIAL TEARS OP      Place 1 drop into both eyes daily as needed       calcium 500/D 500-200 MG-UNIT per tablet   Generic drug:  calcium carb 1250 mg (500 mg [place])/vitamin D 200 unit      Take 1 tablet by mouth once a week       cyanocobalamin 1000 MCG/ML injection    VITAMIN B12    3 mL    INJECT ONE ML (CC) INTRAMUSCULARLY ONCE EVERY MONTH       cyclobenzaprine 10 MG tablet    FLEXERIL    200 tablet    TAKE ONE TABLET BY MOUTH TWICE DAILY AS NEEDED FOR MUSCLE SPASM       docusate sodium 100 MG tablet    COLACE    60 tablet    Take 100 mg by mouth daily as needed       ENBREL 50 MG/ML injection   Generic drug:  etanercept      Inject 50 mg Subcutaneous       enoxaparin 40 MG/0.4ML injection    LOVENOX    14 Syringe    Inject 0.4 mLs (40 mg) Subcutaneous every 24 hours       ferrous sulfate 325 (65 FE) MG tablet    IRON     Take 1 tablet by mouth once a week       finasteride 5 MG tablet    PROSCAR    90 tablet    Take 1 tablet (5 mg) by mouth daily       lisinopril-hydrochlorothiazide 10-12.5 MG per tablet    PRINZIDE/ZESTORETIC     90 tablet    Take 1 tablet by mouth daily       mometasone 0.1 % ointment    ELOCON     Apply topically 2 times daily as needed       MULTI VITAMIN MENS PO      Take 1 tablet by mouth daily as needed       OMEGA 3 PO      Take by mouth once a week       OMEPRAZOLE PO      Take 20 mg by mouth daily as needed       ondansetron 4 MG ODT tab    ZOFRAN-ODT    96 tablet    Take 1 tablet (4 mg) by mouth every 6 hours as needed for nausea       order for DME     1 each    Equipment being ordered: Crutches () Treatment Diagnosis: impaired gait.       oxyCODONE 10 MG IR tablet    ROXICODONE    130 tablet    Take 1-1.5 tablets (10-15 mg) by mouth every 6 hours as needed for moderate to severe pain       testosterone 50 MG/5GM (1%) topical gel    ANDROGEL/TESTIM     Place 50 mg onto the skin every 48 hours       triamcinolone 0.1 % ointment    KENALOG     Apply topically daily as needed for irritation       VIAGRA 100 MG cap/tab   Generic drug:  sildenafil      as needed

## 2017-05-01 NOTE — PROGRESS NOTES
Problem(s) Oriented visit        SUBJECTIVE:                                                    Oseas Edwards is a 47 year old male who presents to clinic today for the following health issues :      1. Encounter for medication refill      2. Low serum testosterone level  Slow improvement         Problem list, Medication list, Allergies, and Medical/Social/Surgical histories reviewed in [facility] and updated as appropriate.   Additional history: as documented    ROS:  General and CV completed and negative except as noted above    Histories:   Patient Active Problem List   Diagnosis     AS (ankylosing spondylitis) (H)     PA (pernicious anemia)     Low serum testosterone level     Health Care Home     Chronic narcotic dependence (H)     Rectus diastasis     Hx of gastric ulcer     Chronic pain syndrome     Keloid scar, mostly just dark     Failed total hip arthroplasty with dislocation, initial encounter (H)     Past Surgical History:   Procedure Laterality Date     ARTHROPLASTY REVISION HIP Left 2/14/2017    Procedure: ARTHROPLASTY REVISION HIP;  Surgeon: Saravanan Alcaraz MD;  Location:  OR     HERNIORRHAPHY VENTRAL  11/11/2013    Procedure: HERNIORRHAPHY VENTRAL;  UMBILICAL HERNIA REPAIR WITH MESH, RECTUS DIASTASIS REPAIR WITH MESH;  Surgeon: Jason Dodson MD;  [facility]     JOINT REPLACEMENT, HIP RT/LT  1994    Left     JOINT REPLACEMENT, HIP RT/LT  2000ish    Right     OPEN SEPARATION COMPONENT HERNIORRHAPHY  11/11/2013    Procedure: OPEN SEPARATION COMPONENT HERNIORRHAPHY;;  Surgeon: Jason Dodson MD;  Location: [facility]       Social History   Substance Use Topics     Smoking status: Former Smoker     Quit date: 1/1/2009     Smokeless tobacco: Never Used      Comment: quit 2 1/2 yrs     Alcohol use Yes      Comment: 1 to 2 per week     No family history on file.        OBJECTIVE:                                                    /70  Pulse 90  Wt 73.5 kg (162 lb)  SpO2 97%  BMI  33.86 kg/m2  Body mass index is 33.86 kg/(m^2).   APPEARANCE: = Relaxed and in no distress  Neck = No anterior or posterior adenopathy appreciated.  Resp effort = Calm regular breathing  Heart Rate, Rythym, & sounds (no Murm)  = Regular rate and rythym with no S3, S4, or murmer appreciated.  Musculsktl =  Strength and ROM of major joints are within normal limits  Recent/Remote Memory = Alert and Oriented x 3     ASSESSMENT/PLAN:                                                        Oseas was seen today for recheck.    Diagnoses and all orders for this visit:    Low serum testosterone level    Encounter for medication refill      Meds refilled.      The following health maintenance items are reviewed in Epic and correct as of today:  Health Maintenance   Topic Date Due     JOSÉ QUESTIONNAIRE 1 YEAR  1970     URINE DRUG SCREEN Q1 YR  04/04/1984     PHQ-9 Q1YR (NO INBASKET)  06/06/2017     INFLUENZA VACCINE (SYSTEM ASSIGNED)  09/01/2017     TETANUS IMMUNIZATION (SYSTEM ASSIGNED)  08/26/2020     LIPID SCREEN Q5 YR MALE (SYSTEM ASSIGNED)  06/06/2021       Willy Forrest MD  [facility]  Family Practice  [facility]  [phone]    For any issues my office # is [phone]

## 2017-05-04 DIAGNOSIS — Z76.0 ENCOUNTER FOR MEDICATION REFILL: ICD-10-CM

## 2017-05-04 RX ORDER — CYCLOBENZAPRINE HCL 10 MG
TABLET ORAL
Qty: 200 TABLET | Refills: 0 | Status: SHIPPED | OUTPATIENT
Start: 2017-05-04 | End: 2017-09-13

## 2017-05-15 ENCOUNTER — TRANSFERRED RECORDS (OUTPATIENT)
Dept: FAMILY MEDICINE | Facility: CLINIC | Age: 47
End: 2017-05-15

## 2017-08-07 ENCOUNTER — TRANSFERRED RECORDS (OUTPATIENT)
Dept: FAMILY MEDICINE | Facility: CLINIC | Age: 47
End: 2017-08-07

## 2017-08-29 ENCOUNTER — OFFICE VISIT (OUTPATIENT)
Dept: FAMILY MEDICINE | Facility: CLINIC | Age: 47
End: 2017-08-29

## 2017-08-29 VITALS
HEART RATE: 97 BPM | WEIGHT: 152 LBS | BODY MASS INDEX: 31.77 KG/M2 | SYSTOLIC BLOOD PRESSURE: 80 MMHG | OXYGEN SATURATION: 98 % | DIASTOLIC BLOOD PRESSURE: 60 MMHG

## 2017-08-29 DIAGNOSIS — Z23 NEED FOR PROPHYLACTIC VACCINATION AND INOCULATION AGAINST INFLUENZA: ICD-10-CM

## 2017-08-29 DIAGNOSIS — Z23 NEEDS FLU SHOT: Primary | ICD-10-CM

## 2017-08-29 DIAGNOSIS — M45.0 ANKYLOSING SPONDYLITIS OF MULTIPLE SITES IN SPINE (H): ICD-10-CM

## 2017-08-29 PROCEDURE — 99214 OFFICE O/P EST MOD 30 MIN: CPT | Mod: 25 | Performed by: FAMILY MEDICINE

## 2017-08-29 PROCEDURE — 90686 IIV4 VACC NO PRSV 0.5 ML IM: CPT | Performed by: FAMILY MEDICINE

## 2017-08-29 RX ORDER — OXYCODONE HYDROCHLORIDE 10 MG/1
10-15 TABLET ORAL EVERY 6 HOURS PRN
Qty: 180 TABLET | Refills: 0 | Status: SHIPPED | OUTPATIENT
Start: 2017-10-01 | End: 2017-10-25

## 2017-08-29 RX ORDER — OXYCODONE HYDROCHLORIDE 10 MG/1
10-15 TABLET ORAL EVERY 6 HOURS PRN
Qty: 180 TABLET | Refills: 0 | Status: SHIPPED | OUTPATIENT
Start: 2017-09-01 | End: 2017-08-29

## 2017-08-29 ASSESSMENT — ANXIETY QUESTIONNAIRES
3. WORRYING TOO MUCH ABOUT DIFFERENT THINGS: NOT AT ALL
5. BEING SO RESTLESS THAT IT IS HARD TO SIT STILL: NOT AT ALL
6. BECOMING EASILY ANNOYED OR IRRITABLE: NOT AT ALL
2. NOT BEING ABLE TO STOP OR CONTROL WORRYING: NOT AT ALL
1. FEELING NERVOUS, ANXIOUS, OR ON EDGE: NOT AT ALL
GAD7 TOTAL SCORE: 0
7. FEELING AFRAID AS IF SOMETHING AWFUL MIGHT HAPPEN: NOT AT ALL

## 2017-08-29 ASSESSMENT — PATIENT HEALTH QUESTIONNAIRE - PHQ9
SUM OF ALL RESPONSES TO PHQ QUESTIONS 1-9: 8
5. POOR APPETITE OR OVEREATING: NOT AT ALL

## 2017-08-29 NOTE — PROGRESS NOTES
Patient wants to switch back here for pain meds, there has been no change in management and it is more convenient for him to come here.  He reports that he is due for refill on 9/4  Long discussion.  The pain clinic has no ideas for changes, I have read there notes and he is seeing a nurse monthly     We discussed option of returning to exclusive management by us.  He is due for next refill on Sept 4, 2017  >25 min spent with patient, greater than 50% spent on discussion/education/planning, etc. About The primary encounter diagnosis was Needs flu shot. Diagnoses of Ankylosing spondylitis of multiple sites in spine (H) and Need for prophylactic vaccination and inoculation against influenza were also pertinent to this visit.      Assessment/Plan:  Farooq was seen today for flu shot and recheck medication.    Diagnoses and all orders for this visit:    Needs flu shot  -     Cancel: C FLU VAC QUADRIVALENT SPLIT VIRUS 3+YRS IM    Ankylosing spondylitis of multiple sites in spine (H)  -     Discontinue: oxyCODONE (ROXICODONE) 10 MG IR tablet; Take 1-1.5 tablets (10-15 mg) by mouth every 6 hours as needed for moderate to severe pain Max of 6 per day  -     oxyCODONE (ROXICODONE) 10 MG IR tablet; Take 1-1.5 tablets (10-15 mg) by mouth every 6 hours as needed for moderate to severe pain Max of 6 per day    Need for prophylactic vaccination and inoculation against influenza  -     FLU VAC, SPLIT VIRUS IM > 3 YO (QUADRIVALENT) [39287]      Willy Forrest MD  [facility]  [phone]

## 2017-08-29 NOTE — MR AVS SNAPSHOT
After Visit Summary   8/29/2017    Oseas Edwards    MRN: [id number]           Patient Information     Date Of Birth          1970        Visit Information        Provider Department      8/29/2017 1:30 PM Willy Forrest MD [facility]        Today's Diagnoses     Needs flu shot    -  1    Ankylosing spondylitis of multiple sites in spine (H)        Need for prophylactic vaccination and inoculation against influenza           Follow-ups after your visit        Your next 10 appointments already scheduled     Oct 24, 2017  2:00 PM CDT   SHORT with Willy Forrest MD   [facility] ([facility]    [address] [place] [address] [phone]              Who to contact     If you have questions or need follow up information about today's clinic visit or your schedule please contact [facility] directly at [phone].  Normal or non-critical lab and imaging results will be communicated to you by [facility] [employer], letter or phone within 4 business days after the clinic has received the results. If you do not hear from us within 7 days, please contact the clinic through [employer] [employer] or phone. If you have a critical or abnormal lab result, we will notify you by phone as soon as possible.  Submit refill requests through [facility] or call your pharmacy and they will forward the refill request to us. Please allow 3 business days for your refill to be completed.          Additional Information About Your Visit        MyChart Information     [facility] gives you secure access to your electronic health record. If you see a primary care provider, you can also send messages to your care team and make appointments. If you have questions, please call your primary care clinic.  If you do not have a primary care provider, please call [phone] and they will assist you.        Care EveryWhere ID     This is your Care EveryWhere ID. This could be used by other  organizations to access your [facility] medical records  [id number]        Your Vitals Were     Pulse Pulse Oximetry BMI (Body Mass Index)             97 98% 31.77 kg/m2          Blood Pressure from Last 3 Encounters:   08/29/17 (!) 80/60   05/01/17 100/70   04/03/17 102/78    Weight from Last 3 Encounters:   08/29/17 68.9 kg (152 lb)   05/01/17 73.5 kg (162 lb)   04/03/17 74.4 kg (164 lb)              We Performed the Following     FLU VAC, SPLIT VIRUS IM > 3 YO (QUADRIVALENT) [92006]          Today's Medication Changes          These changes are accurate as of: 8/29/17  5:32 PM.  If you have any questions, ask your nurse or doctor.               Start taking these medicines.        Dose/Directions    oxyCODONE 10 MG IR tablet   Commonly known as:  ROXICODONE   Used for:  Ankylosing spondylitis of multiple sites in spine (H)   Started by:  Willy Forrest MD        Dose:  10-15 mg   Start taking on:  10/1/2017   Take 1-1.5 tablets (10-15 mg) by mouth every 6 hours as needed for moderate to severe pain Max of 6 per day   Quantity:  180 tablet   Refills:  0            Where to get your medicines      Some of these will need a paper prescription and others can be bought over the counter.  Ask your nurse if you have questions.     Bring a paper prescription for each of these medications     oxyCODONE 10 MG IR tablet                Primary Care Provider Office Phone # Fax #    Willy Forrest [phone] [address]  [facility] [place] [address]        Equal Access to Services     [facility] Center[facility] AH: Hadii bj ku hadasho Soomaali, waaxda luqadaha, qaybta kaalmada adeegbecky, enid idibrady ba. So [facility] [phone].    ATENCIÓN: Si habla español, tiene a fabian disposición servicios gratuitos de asistencia lingüística. Llame al [phone].    We comply with applicable federal civil rights laws and [place] laws. We do not discriminate on the basis of race, color, national  origin, age, disability sex, sexual orientation or gender identity.            Thank you!     Thank you for choosing [facility]  for your care. Our goal is always to provide you with excellent care. Hearing back from our patients is one way we can continue to improve our services. Please take a few minutes to complete the written survey that you may receive in the mail after your visit with us. Thank you!             Your Updated Medication List - Protect others around you: Learn how to safely use, store and throw away your medicines at www.disposemymeds.org.          This list is accurate as of: 8/29/17  5:32 PM.  Always use your most recent med list.                   Brand Name Dispense Instructions for use Diagnosis    ARTIFICIAL TEARS OP      Place 1 drop into both eyes daily as needed        calcium 500/D 500-200 MG-UNIT per tablet   Generic drug:  calcium carb 1250 mg (500 mg [place])/vitamin D 200 unit      Take 1 tablet by mouth once a week        cyanocobalamin 1000 MCG/ML injection    VITAMIN B12    3 mL    INJECT ONE ML (CC) INTRAMUSCULARLY ONCE EVERY MONTH    Encounter for medication refill       cyclobenzaprine 10 MG tablet    FLEXERIL    200 tablet    TAKE ONE TABLET BY MOUTH TWICE DAILY AS NEEDED FOR MUSCLE SPASM    Encounter for medication refill       docusate sodium 100 MG tablet    COLACE    60 tablet    Take 100 mg by mouth daily as needed        ENBREL 50 MG/ML injection   Generic drug:  etanercept      Inject 50 mg Subcutaneous        enoxaparin 40 MG/0.4ML injection    LOVENOX    14 Syringe    Inject 0.4 mLs (40 mg) Subcutaneous every 24 hours    Status post revision of total hip replacement       ferrous sulfate 325 (65 FE) MG tablet    IRON     Take 1 tablet by mouth once a week        finasteride 5 MG tablet    PROSCAR    90 tablet    Take 1 tablet (5 mg) by mouth daily    Encounter for medication refill       lisinopril-hydrochlorothiazide 10-12.5 MG per tablet     PRINZIDE/ZESTORETIC    90 tablet    Take 1 tablet by mouth daily    Encounter for medication refill       mometasone 0.1 % ointment    ELOCON     Apply topically 2 times daily as needed        MULTI VITAMIN MENS PO      Take 1 tablet by mouth daily as needed        OMEGA 3 PO      Take by mouth once a week        ondansetron 4 MG ODT tab    ZOFRAN-ODT    96 tablet    Take 1 tablet (4 mg) by mouth every 6 hours as needed for nausea    Status post revision of total hip replacement       order for DME     1 each    Equipment being ordered: Crutches () Treatment Diagnosis: impaired gait.    Status post revision of total hip replacement       oxyCODONE 10 MG IR tablet   Start taking on:  10/1/2017    ROXICODONE    180 tablet    Take 1-1.5 tablets (10-15 mg) by mouth every 6 hours as needed for moderate to severe pain Max of 6 per day    Ankylosing spondylitis of multiple sites in spine (H)       testosterone 50 MG/5GM (1%) topical gel    ANDROGEL/TESTIM    90 packet    Place 1 packet (50 mg) onto the skin daily    Encounter for medication refill       triamcinolone 0.1 % ointment    KENALOG     Apply topically daily as needed for irritation

## 2017-08-29 NOTE — PROGRESS NOTES
Injectable Influenza Immunization Documentation    1.  Is the person to be vaccinated sick today?  No    2. Does the person to be vaccinated have an allergy to eggs or to a component of the vaccine?  No    3. Has the person to be vaccinated today ever had a serious reaction to influenza vaccine in the past?  No    4. Has the person to be vaccinated ever had Guillain-[place] syndrome?  No     Form completed by MARIA LUZ JAUREGUI MA

## 2017-08-29 NOTE — LETTER
[facility]  [address] [place] [address] [phone]      August 29, 2017      Oseas Edwards  [address]  [place] [address]              Dear Oseas    [facility]  [address]  [place] [address]  [phone]    Agreement on Controlled Medications    August 29, 2017         Oseas Edwards 1970 [id number]      I understand that Willy Forrest MD has prescribed controlled substances (narcotics, tranquilizers, and/or stimulants) to help manage my condition(s).  I am taking this medicine to help me function or work.  I know that this is strong medicine.  It could have serious side effects and even cause a dependency on the drug.  If I stop these medicines suddenly, I could have severe withdrawal symptoms.    The risks, benefits, and side effects of these medication(s) were explained to me.  I agree that:  1. I will take part in other treatments as advised by my provider.  This may be psychiatry or counseling, physical therapy, behavioral therapy, group treatment, or a referral to a pain clinic.  I will reduce or stop my medicine when my provider tells me to do so.   2. I will take my medicines as prescribed.  I will not change the dose or schedule unless my provider tells me to.  There will be no refills if I  run out early.   I may be contacted at any time without warning and asked to complete a drug test or pill count.   3. [facility] requires a medication follow up appointment with Willy Forrest MD  every three months. The medication prescribed for you requires a written prescription script; this script will be given to you at your scheduled medication follow up visits and you will receive three one month written scripts for your medication. There may be times when your medication will need a refill between appointments. Refills will be made only during regular office hours. Refills will not be made at night, on weekends or during holidays. It is  your responsibility to make appointment arrangements 7-10 days before your medication runs out.  You may be given a refill for 5-7 day quantity if your provider is out of the office for an extended period to last until they return.    4. Only Willy Forrest MD of  [facility] will be prescribing all controlled substance medications for chronic pain and other medication. We expect that you will not accept or seek controlled substance medications from other providers, Emergency Room or Urgent Care.  5. I will use one pharmacy to fill all of my controlled substance prescriptions.  If my prescription is mailed to my pharmacy, it may take 5 to 7 days for my medicine to be ready. Pharmacy:   6. [id number] - [place], [place] - [address]  7. [address] [place] [address] SO.  8. [facility] [address]  9. Phone: [phone] Fax: [phone]  10.   11. I understand that my provider, clinic care team, and pharmacy can track controlled substance prescriptions from other providers through a central database (prescription monitoring program).  12. I will bring in my list of medications (or my medicine bottles) each time I come to the clinic.  13. Refills of controlled substances will be made only during office hours.  It is up to me to make sure that I do not run out of my medicines on weekends or holidays.     14. I am responsible for my prescriptions.  If the medicine is lost or stolen, it will not be replaced.   I also agree not to share these medicines with anyone.  15. I agree to not use ANY illegal or recreational drugs.  This includes marijuana, cocaine, bath salts or other drugs.  I agree not to use alcohol unless my provider says I may.  I agree to give urine samples whenever asked.  If I fail to give a urine sample, the provider may stop my medicine.    16. I understand that this medicine can affect my thinking and judgment.  It may be unsafe for me to drive, use machinery and do  dangerous tasks.  I will not do any of these things until I know how the medicine affects me.  If my dose changes, I will wait to see how it affects me.  I will contact my provider if I have concerns about medicine side effects.   17. We have agreed to use the following medications on a regular scheduled basis to control pain: to be discussed  18. We have agreed to use the following medications for breakthrough pain:to be discussed  19.     I understand that if I do not follow any of the conditions above, my prescriptions or treatment may be stopped.    I agree that my provider, clinic care team, and pharmacy may work with any city, state or federal law enforcement agency that investigates the misuse, sale, or other diversion of my controlled medicine. I will allow my provider to discuss my care with or share a copy of this agreement with any other treating provider, pharmacy or emergency room where I receive care.  I agree to give up (waive) any right of privacy or confidentiality with respect to these authorizations.   I have read this agreement and have asked questions about anything I did not understand.   ___________________________________    ___________________________  Oseas Edwards                                                          August 29, 2017  ___________________________________     ____________________________  Willy Forrest MD                                                     August 29, 2017            Sincerely,    Willy Forrest M.D.

## 2017-08-30 ASSESSMENT — ANXIETY QUESTIONNAIRES: GAD7 TOTAL SCORE: 0

## 2017-09-13 DIAGNOSIS — Z76.0 ENCOUNTER FOR MEDICATION REFILL: ICD-10-CM

## 2017-09-13 NOTE — TELEPHONE ENCOUNTER
finestride, flexoril, lisinopril-HCTZ last OV - 8/29/17 last lab 2/16/17  Graciela Mccabe MA September 13, 2017 3:47 PM    BP Readings from Last 3 Encounters:   08/29/17 (!) 80/60   05/01/17 100/70   04/03/17 102/78     Last Basic Metabolic Panel:  Lab Results   Component Value Date     02/15/2017      Lab Results   Component Value Date    POTASSIUM 3.9 02/15/2017     Lab Results   Component Value Date    CHLORIDE 107 02/15/2017     Lab Results   Component Value Date    ANDREW 7.8 02/15/2017     Lab Results   Component Value Date    CO2 26 02/15/2017     Lab Results   Component Value Date    BUN 18 02/15/2017     Lab Results   Component Value Date    CR 0.96 02/15/2017     Lab Results   Component Value Date     02/16/2017

## 2017-09-15 RX ORDER — CYCLOBENZAPRINE HCL 10 MG
TABLET ORAL
Qty: 200 TABLET | Refills: 0 | Status: SHIPPED | OUTPATIENT
Start: 2017-09-15 | End: 2018-03-15

## 2017-09-15 RX ORDER — FINASTERIDE 5 MG/1
TABLET, FILM COATED ORAL
Qty: 90 TABLET | Refills: 3 | Status: SHIPPED | OUTPATIENT
Start: 2017-09-15 | End: 2018-09-10

## 2017-09-15 RX ORDER — LISINOPRIL/HYDROCHLOROTHIAZIDE 10-12.5 MG
TABLET ORAL
Qty: 90 TABLET | Refills: 3 | Status: SHIPPED | OUTPATIENT
Start: 2017-09-15 | End: 2018-09-10

## 2017-10-25 DIAGNOSIS — M45.0 ANKYLOSING SPONDYLITIS OF MULTIPLE SITES IN SPINE (H): ICD-10-CM

## 2017-10-26 RX ORDER — OXYCODONE HYDROCHLORIDE 10 MG/1
10-15 TABLET ORAL EVERY 6 HOURS PRN
Qty: 180 TABLET | Refills: 0 | Status: SHIPPED | OUTPATIENT
Start: 2017-10-26 | End: 2017-11-06

## 2017-11-06 ENCOUNTER — OFFICE VISIT (OUTPATIENT)
Dept: FAMILY MEDICINE | Facility: CLINIC | Age: 47
End: 2017-11-06

## 2017-11-06 VITALS
RESPIRATION RATE: 16 BRPM | TEMPERATURE: 98.4 F | HEIGHT: 60 IN | OXYGEN SATURATION: 98 % | HEART RATE: 80 BPM | SYSTOLIC BLOOD PRESSURE: 98 MMHG | WEIGHT: 153 LBS | BODY MASS INDEX: 30.04 KG/M2 | DIASTOLIC BLOOD PRESSURE: 66 MMHG

## 2017-11-06 DIAGNOSIS — L20.89 FLEXURAL ATOPIC DERMATITIS: Primary | ICD-10-CM

## 2017-11-06 DIAGNOSIS — M45.0 ANKYLOSING SPONDYLITIS OF MULTIPLE SITES IN SPINE (H): ICD-10-CM

## 2017-11-06 PROCEDURE — 99213 OFFICE O/P EST LOW 20 MIN: CPT | Performed by: FAMILY MEDICINE

## 2017-11-06 RX ORDER — TRIAMCINOLONE ACETONIDE 1 MG/G
OINTMENT TOPICAL DAILY PRN
Qty: 60 G | Refills: 1 | Status: SHIPPED | OUTPATIENT
Start: 2017-11-06 | End: 2018-01-15

## 2017-11-06 RX ORDER — OXYCODONE HYDROCHLORIDE 10 MG/1
10-15 TABLET ORAL EVERY 6 HOURS PRN
Qty: 180 TABLET | Refills: 0 | Status: SHIPPED | OUTPATIENT
Start: 2017-12-01 | End: 2017-11-06

## 2017-11-06 RX ORDER — OXYCODONE AND ACETAMINOPHEN 10; 325 MG/1; MG/1
1-2 TABLET ORAL
COMMUNITY
Start: 2017-11-01 | End: 2018-10-15

## 2017-11-06 RX ORDER — OXYCODONE HYDROCHLORIDE 10 MG/1
10-15 TABLET ORAL EVERY 6 HOURS PRN
Qty: 180 TABLET | Refills: 0 | Status: SHIPPED | OUTPATIENT
Start: 2018-01-01 | End: 2018-01-15

## 2017-11-06 NOTE — PROGRESS NOTES
Problem(s) Oriented visit        SUBJECTIVE:                                                    Oseas Edwards is a 47 year old male who presents to clinic today for the following health issues :      1. Ankylosing spondylitis of multiple sites in spine (H)  Long term chronic pain with multiple attempts at other pain modalities  - oxyCODONE IR (ROXICODONE) 10 MG tablet; Take 1-1.5 tablets (10-15 mg) by mouth every 6 hours as needed for moderate to severe pain Max of 6 per day  Dispense: 180 tablet; Refill: 0    2. Flexural atopic dermatitis  Needs a refill  - triamcinolone (KENALOG) 0.1 % ointment; Apply topically daily as needed for irritation  Dispense: 60 g; Refill: 1         Problem list, Medication list, Allergies, and Medical/Social/Surgical histories reviewed in [facility] and updated as appropriate.   Additional history: as documented    ROS:  General and Resp. completed and negative except as noted above    Histories:   Patient Active Problem List   Diagnosis     AS (ankylosing spondylitis) (H)     PA (pernicious anemia)     Low serum testosterone level     Health Care Home     Chronic narcotic dependence (H)     Rectus diastasis     Hx of gastric ulcer     Chronic pain syndrome     Keloid scar, mostly just dark     Failed total hip arthroplasty with dislocation, initial encounter (H)     Past Surgical History:   Procedure Laterality Date     ARTHROPLASTY REVISION HIP Left 2/14/2017    Procedure: ARTHROPLASTY REVISION HIP;  Surgeon: Saravanan Alcaraz MD;  Location:  OR     HERNIORRHAPHY VENTRAL  11/11/2013    Procedure: HERNIORRHAPHY VENTRAL;  UMBILICAL HERNIA REPAIR WITH MESH, RECTUS DIASTASIS REPAIR WITH MESH;  Surgeon: Jason Dodson MD;  Location:  [facility]     JOINT REPLACEMENT, HIP RT/LT  1994    Left     JOINT REPLACEMENT, HIP RT/LT  2000ish    Right     OPEN SEPARATION COMPONENT HERNIORRHAPHY  11/11/2013    Procedure: OPEN SEPARATION COMPONENT HERNIORRHAPHY;;  Surgeon: Jason Dodson MD;   "Location: [facility]       Social History   Substance Use Topics     Smoking status: Former Smoker     Quit date: 1/1/2009     Smokeless tobacco: Never Used      Comment: quit 2 1/2 yrs     Alcohol use Yes      Comment: 1 to 2 per week     No family history on file.        OBJECTIVE:                                                    BP 98/66  Pulse 80  Temp 98.4  F (36.9  C) (Oral)  Resp 16  Ht 1.473 m (4' 10\")  Wt 69.4 kg (153 lb)  SpO2 98%  BMI 31.98 kg/m2  Body mass index is 31.98 kg/(m^2).   APPEARANCE: = healthy, alert and mild distress     ASSESSMENT/PLAN:                                                        Oseas was seen today for follow up for.    Diagnoses and all orders for this visit:    Flexural atopic dermatitis  -     triamcinolone (KENALOG) 0.1 % ointment; Apply topically daily as needed for irritation    Ankylosing spondylitis of multiple sites in spine (H)  -     Discontinue: oxyCODONE IR (ROXICODONE) 10 MG tablet; Take 1-1.5 tablets (10-15 mg) by mouth every 6 hours as needed for moderate to severe pain Max of 6 per day  -     oxyCODONE IR (ROXICODONE) 10 MG tablet; Take 1-1.5 tablets (10-15 mg) by mouth every 6 hours as needed for moderate to severe pain Max of 6 per day        Regular exercise  See Patient Instructions    The following health maintenance items are reviewed in Epic and correct as of today:  Health Maintenance   Topic Date Due     URINE DRUG SCREEN Q1 YR  04/04/1984     INFLUENZA VACCINE (SYSTEM ASSIGNED)  09/01/2017     JOSÉ QUESTIONNAIRE 1 YEAR  08/29/2018     PHQ-9 Q1YR  08/29/2018     TETANUS IMMUNIZATION (SYSTEM ASSIGNED)  08/26/2020     LIPID SCREEN Q5 YR MALE (SYSTEM ASSIGNED)  06/06/2021       Willy Forrest MD  [facility]  Family Practice  [facility]  [phone]    For any issues my office # is [phone]        "

## 2017-11-06 NOTE — MR AVS SNAPSHOT
"              After Visit Summary   11/6/2017    Oseas Edwards    MRN: [id number]           Patient Information     Date Of Birth          1970        Visit Information        Provider Department      11/6/2017 11:45 AM Willy Forrest MD [facility]        Today's Diagnoses     Flexural atopic dermatitis    -  1    Ankylosing spondylitis of multiple sites in spine (H)           Follow-ups after your visit        Who to contact     If you have questions or need follow up information about today's clinic visit or your schedule please contact [facility] directly at [phone].  Normal or non-critical lab and imaging results will be communicated to you by [employer], letter or phone within 4 business days after the clinic has received the results. If you do not hear from us within 7 days, please contact the clinic through [employer] or phone. If you have a critical or abnormal lab result, we will notify you by phone as soon as possible.  Submit refill requests through [employer] or call your pharmacy and they will forward the refill request to us. Please allow 3 business days for your refill to be completed.          Additional Information About Your Visit        MyChart Information     [employer] gives you secure access to your electronic health record. If you see a primary care provider, you can also send messages to your care team and make appointments. If you have questions, please call your primary care clinic.  If you do not have a primary care provider, please call [phone] and they will assist you.        Care EveryWhere ID     This is your Care EveryWhere ID. This could be used by other organizations to access your [place] medical records  [id number]        Your Vitals Were     Pulse Temperature Respirations Height Pulse Oximetry BMI (Body Mass Index)    80 98.4  F (36.9  C) (Oral) 16 1.473 m (4' 10\") 98% 31.98 kg/m2       Blood Pressure from Last 3 Encounters:   11/06/17 98/66 "   08/29/17 (!) 80/60   05/01/17 100/70    Weight from Last 3 Encounters:   11/06/17 69.4 kg (153 lb)   08/29/17 68.9 kg (152 lb)   05/01/17 73.5 kg (162 lb)              Today, you had the following     No orders found for display         Today's Medication Changes          These changes are accurate as of: 11/6/17 11:59 PM.  If you have any questions, ask your nurse or doctor.               Start taking these medicines.        Dose/Directions    oxyCODONE IR 10 MG tablet   Commonly known as:  ROXICODONE   Used for:  Ankylosing spondylitis of multiple sites in spine (H)   Started by:  Willy Forrest MD        Dose:  10-15 mg   Start taking on:  1/1/2018   Take 1-1.5 tablets (10-15 mg) by mouth every 6 hours as needed for moderate to severe pain Max of 6 per day   Quantity:  180 tablet   Refills:  0            Where to get your medicines      These medications were sent to [facility] [address] [place] [address] [address]  [address], [facility] [address]     Phone:  [phone]     triamcinolone 0.1 % ointment         Some of these will need a paper prescription and others can be bought over the counter.  Ask your nurse if you have questions.     Bring a paper prescription for each of these medications     oxyCODONE IR 10 MG tablet                Primary Care Provider Office Phone # Fax #    Willy Forrest [phone] [address]  [facility] [address]        Equal Access to Services     [facility]: Hadii bj alicea hadasho Somello, waaxda luqadaha, qaybta kaalmada adeegyaisaias, enid wood . So [facility] [phone].    ATENCIÓN: Si habla español, tiene a fabian disposición servicios gratuitos de asistencia lingüística. Llame al [phone].    We comply with applicable federal civil rights laws and [place] laws. We do not discriminate on the basis of race, color, national origin, age, disability, sex, sexual  orientation, or gender identity.            Thank you!     Thank you for choosing [facility]  for your care. Our goal is always to provide you with excellent care. Hearing back from our patients is one way we can continue to improve our services. Please take a few minutes to complete the written survey that you may receive in the mail after your visit with us. Thank you!             Your Updated Medication List - Protect others around you: Learn how to safely use, store and throw away your medicines at www.disposemymeds.org.          This list is accurate as of: 11/6/17 11:59 PM.  Always use your most recent med list.                   Brand Name Dispense Instructions for use Diagnosis    ARTIFICIAL TEARS OP      Place 1 drop into both eyes daily as needed        calcium 500/D 500-200 MG-UNIT per tablet   Generic drug:  calcium carb 1250 mg (500 mg Jamul)/vitamin D 200 unit      Take 1 tablet by mouth once a week        cyanocobalamin 1000 MCG/ML injection    VITAMIN B12    3 mL    INJECT ONE ML (CC) INTRAMUSCULARLY ONCE EVERY MONTH    Encounter for medication refill       cyclobenzaprine 10 MG tablet    FLEXERIL    200 tablet    TAKE ONE TABLET BY MOUTH TWICE DAILY AS NEEDED FOR MUSCLE SPASM    Encounter for medication refill       docusate sodium 100 MG tablet    COLACE    60 tablet    Take 100 mg by mouth daily as needed        ENBREL 50 MG/ML injection   Generic drug:  etanercept      Inject 50 mg Subcutaneous        enoxaparin 40 MG/0.4ML injection    LOVENOX    14 Syringe    Inject 0.4 mLs (40 mg) Subcutaneous every 24 hours    Status post revision of total hip replacement       ferrous sulfate 325 (65 FE) MG tablet    IRON     Take 1 tablet by mouth once a week        finasteride 5 MG tablet    PROSCAR    90 tablet    TAKE ONE TABLET BY MOUTH ONCE DAILY    Encounter for medication refill       lisinopril-hydrochlorothiazide 10-12.5 MG per tablet    PRINZIDE/ZESTORETIC    90 tablet    TAKE ONE  TABLET BY MOUTH ONCE DAILY    Encounter for medication refill       mometasone 0.1 % ointment    ELOCON     Apply topically 2 times daily as needed        MULTI VITAMIN MENS PO      Take 1 tablet by mouth daily as needed        OMEGA 3 PO      Take by mouth once a week        ondansetron 4 MG ODT tab    ZOFRAN-ODT    96 tablet    Take 1 tablet (4 mg) by mouth every 6 hours as needed for nausea    Status post revision of total hip replacement       order for DME     1 each    Equipment being ordered: Crutches () Treatment Diagnosis: impaired gait.    Status post revision of total hip replacement       oxyCODONE IR 10 MG tablet   Start taking on:  1/1/2018    ROXICODONE    180 tablet    Take 1-1.5 tablets (10-15 mg) by mouth every 6 hours as needed for moderate to severe pain Max of 6 per day    Ankylosing spondylitis of multiple sites in spine (H)       oxyCODONE-acetaminophen  MG per tablet    PERCOCET     Take 1-2 tablets by mouth        testosterone 50 MG/5GM (1%) topical gel    ANDROGEL/TESTIM    90 packet    Place 1 packet (50 mg) onto the skin daily    Encounter for medication refill       triamcinolone 0.1 % ointment    KENALOG    60 g    Apply topically daily as needed for irritation    Flexural atopic dermatitis

## 2017-12-18 ENCOUNTER — TELEPHONE (OUTPATIENT)
Dept: FAMILY MEDICINE | Facility: CLINIC | Age: 47
End: 2017-12-18

## 2017-12-18 NOTE — TELEPHONE ENCOUNTER
Received fax from [facility] pharmacy that patients rx for Oxycodone 10 mg needs PA.  Submitted PA on covermymeds that stated there was an error and PA not done.  Called [facility] at [phone] and was able to obtain PA over the phone.  Approval dates 12/18/2017-12/18/2018.   Called [employer] [facility] [employer] to inform them of approval.

## 2018-01-15 ENCOUNTER — OFFICE VISIT (OUTPATIENT)
Dept: FAMILY MEDICINE | Facility: CLINIC | Age: 48
End: 2018-01-15

## 2018-01-15 VITALS
WEIGHT: 152 LBS | SYSTOLIC BLOOD PRESSURE: 96 MMHG | BODY MASS INDEX: 31.77 KG/M2 | DIASTOLIC BLOOD PRESSURE: 62 MMHG | HEART RATE: 98 BPM | OXYGEN SATURATION: 97 %

## 2018-01-15 DIAGNOSIS — L20.89 OTHER ATOPIC DERMATITIS: Primary | ICD-10-CM

## 2018-01-15 DIAGNOSIS — M45.0 ANKYLOSING SPONDYLITIS OF MULTIPLE SITES IN SPINE (H): ICD-10-CM

## 2018-01-15 PROCEDURE — 99214 OFFICE O/P EST MOD 30 MIN: CPT | Performed by: FAMILY MEDICINE

## 2018-01-15 RX ORDER — OXYCODONE HYDROCHLORIDE 10 MG/1
10-15 TABLET ORAL EVERY 6 HOURS PRN
Qty: 180 TABLET | Refills: 0 | Status: SHIPPED | OUTPATIENT
Start: 2018-02-01 | End: 2018-01-15

## 2018-01-15 RX ORDER — OXYCODONE HYDROCHLORIDE 10 MG/1
10-15 TABLET ORAL EVERY 6 HOURS PRN
Qty: 180 TABLET | Refills: 0 | Status: SHIPPED | OUTPATIENT
Start: 2018-03-01 | End: 2018-01-15

## 2018-01-15 RX ORDER — OXYCODONE HYDROCHLORIDE 10 MG/1
10-15 TABLET ORAL EVERY 6 HOURS PRN
Qty: 180 TABLET | Refills: 0 | Status: SHIPPED | OUTPATIENT
Start: 2018-04-01 | End: 2018-04-16

## 2018-01-15 NOTE — PROGRESS NOTES
Problem(s) Oriented visit        SUBJECTIVE:                                                    Oseas Edwards is a 47 year old male who presents to clinic today for the following health issues :      1. Ankylosing spondylitis of multiple sites in spine (H)  Long term chronic pain with multiple attempts at other pain modalities see notes from pain clinic who agreed with this management  Again discussed the hope that he would always use the least amount possible  - oxyCODONE IR (ROXICODONE) 10 MG tablet; Take 1-1.5 tablets (10-15 mg) by mouth every 6 hours as needed for moderate to severe pain Max of 6 per day  Dispense: 180 tablet; Refill: 0    2. Flexural atopic dermatitis  Triamcinalone didn't help         Problem list, Medication list, Allergies, and Medical/Social/Surgical histories reviewed in EPIC and updated as appropriate.   Additional history: as documented    ROS:  General and Resp. completed and negative except as noted above    Histories:   Patient Active Problem List   Diagnosis     AS (ankylosing spondylitis) (H)     PA (pernicious anemia)     Low serum testosterone level     Health Care Home     Chronic narcotic dependence (H)     Rectus diastasis     Hx of gastric ulcer     Chronic pain syndrome     Keloid scar, mostly just dark     Failed total hip arthroplasty with dislocation, initial encounter (H)     Past Surgical History:   Procedure Laterality Date     ARTHROPLASTY REVISION HIP Left 2/14/2017    Procedure: ARTHROPLASTY REVISION HIP;  Surgeon: Saravanan Alcaraz MD;  Location:  OR     HERNIORRHAPHY VENTRAL  11/11/2013    Procedure: HERNIORRHAPHY VENTRAL;  UMBILICAL HERNIA REPAIR WITH MESH, RECTUS DIASTASIS REPAIR WITH MESH;  Surgeon: Jason Dodson MD;  Location:  [facility]     JOINT REPLACEMENT, HIP RT/LT  1994    Left     JOINT REPLACEMENT, HIP RT/LT  2000ish    Right     OPEN SEPARATION COMPONENT HERNIORRHAPHY  11/11/2013    Procedure: OPEN SEPARATION COMPONENT HERNIORRHAPHY;;  Surgeon:  Jason Dodson MD;  Location: [facility]       Social History   Substance Use Topics     Smoking status: Former Smoker     Quit date: 1/1/2009     Smokeless tobacco: Never Used      Comment: quit 2 1/2 yrs     Alcohol use Yes      Comment: 1 to 2 per week     No family history on file.        OBJECTIVE:                                                    BP 96/62  Pulse 98  Wt 68.9 kg (152 lb)  SpO2 97%  BMI 31.77 kg/m2  Body mass index is 31.77 kg/(m^2).   APPEARANCE: = healthy, alert and mild distress     ASSESSMENT/PLAN:                                                        Oseas was seen today for recheck medication.    Diagnoses and all orders for this visit:    Other atopic dermatitis    Ankylosing spondylitis of multiple sites in spine (H)  -     Discontinue: oxyCODONE IR (ROXICODONE) 10 MG tablet; Take 1-1.5 tablets (10-15 mg) by mouth every 6 hours as needed for moderate to severe pain Max of 6 per day  -     Discontinue: oxyCODONE IR (ROXICODONE) 10 MG tablet; Take 1-1.5 tablets (10-15 mg) by mouth every 6 hours as needed for moderate to severe pain Max of 6 per day  -     oxyCODONE IR (ROXICODONE) 10 MG tablet; Take 1-1.5 tablets (10-15 mg) by mouth every 6 hours as needed for moderate to severe pain Max of 6 per day        Regular exercise  See Patient Instructions    The following health maintenance items are reviewed in Epic and correct as of today:  Health Maintenance   Topic Date Due     URINE DRUG SCREEN Q1 YR  04/04/1984     INFLUENZA VACCINE (SYSTEM ASSIGNED)  09/01/2017     JOSÉ QUESTIONNAIRE 1 YEAR  08/29/2018     PHQ-9 Q1YR  08/29/2018     TETANUS IMMUNIZATION (SYSTEM ASSIGNED)  08/26/2020     LIPID SCREEN Q5 YR MALE (SYSTEM ASSIGNED)  06/06/2021       Willy Forrest MD  [facility]  Family Practice  [facility]  [phone]    For any issues my office # is [phone]

## 2018-01-15 NOTE — MR AVS SNAPSHOT
After Visit Summary   1/15/2018    Oseas Edwards    MRN: [id number]           Patient Information     Date Of Birth          1970        Visit Information        Provider Department      1/15/2018 10:45 AM Willy Forrest MD [facility]        Today's Diagnoses     Other atopic dermatitis    -  1    Ankylosing spondylitis of multiple sites in spine (H)           Follow-ups after your visit        Who to contact     If you have questions or need follow up information about today's clinic visit or your schedule please contact [facility] directly at [phone].  Normal or non-critical lab and imaging results will be communicated to you by [employer], letter or phone within 4 business days after the clinic has received the results. If you do not hear from us within 7 days, please contact the clinic through [employer] or phone. If you have a critical or abnormal lab result, we will notify you by phone as soon as possible.  Submit refill requests through [employer] or call your pharmacy and they will forward the refill request to us. Please allow 3 business days for your refill to be completed.          Additional Information About Your Visit        MyChart Information     [employer] gives you secure access to your electronic health record. If you see a primary care provider, you can also send messages to your care team and make appointments. If you have questions, please call your primary care clinic.  If you do not have a primary care provider, please call [phone] and they will assist you.        Care EveryWhere ID     This is your Care EveryWhere ID. This could be used by other organizations to access your [place] medical records  [id number]        Your Vitals Were     Pulse Pulse Oximetry BMI (Body Mass Index)             98 97% 31.77 kg/m2          Blood Pressure from Last 3 Encounters:   01/15/18 96/62   11/06/17 98/66   08/29/17 (!) 80/60    Weight from Last 3 Encounters:    01/15/18 68.9 kg (152 lb)   11/06/17 69.4 kg (153 lb)   08/29/17 68.9 kg (152 lb)              Today, you had the following     No orders found for display         Today's Medication Changes          These changes are accurate as of: 1/15/18 11:36 AM.  If you have any questions, ask your nurse or doctor.               Start taking these medicines.        Dose/Directions    oxyCODONE IR 10 MG tablet   Commonly known as:  ROXICODONE   Used for:  Ankylosing spondylitis of multiple sites in spine (H)   Started by:  Willy Forrest MD        Dose:  10-15 mg   Start taking on:  4/1/2018   Take 1-1.5 tablets (10-15 mg) by mouth every 6 hours as needed for moderate to severe pain Max of 6 per day   Quantity:  180 tablet   Refills:  0         Stop taking these medicines if you haven't already. Please contact your care team if you have questions.     mometasone 0.1 % ointment   Commonly known as:  ELOCON   Stopped by:  Willy Forrest MD           triamcinolone 0.1 % ointment   Commonly known as:  KENALOG   Stopped by:  Willy Forrest MD                Where to get your medicines      Some of these will need a paper prescription and others can be bought over the counter.  Ask your nurse if you have questions.     Bring a paper prescription for each of these medications     oxyCODONE IR 10 MG tablet                Primary Care Provider Office Phone # Fax #    Willy Forrest [phone] [address]  [facility] [address]        Equal Access to Services     [facility]: Hadii bj ku hadasho Soomaali, waaxda luqadaha, qaybta kaalmada adeegyada, enid ba. So [facility] [phone].    ATENCIÓN: Si habla español, tiene a fabian disposición servicios gratuitos de asistencia lingüística. Llame al [phone].    We comply with applicable federal civil rights laws and [place] laws. We do not discriminate on the basis of race, color, national origin,  age, disability, sex, sexual orientation, or gender identity.            Thank you!     Thank you for choosing [facility]  for your care. Our goal is always to provide you with excellent care. Hearing back from our patients is one way we can continue to improve our services. Please take a few minutes to complete the written survey that you may receive in the mail after your visit with us. Thank you!             Your Updated Medication List - Protect others around you: Learn how to safely use, store and throw away your medicines at www.disposemymeds.org.          This list is accurate as of: 1/15/18 11:36 AM.  Always use your most recent med list.                   Brand Name Dispense Instructions for use Diagnosis    ARTIFICIAL TEARS OP      Place 1 drop into both eyes daily as needed        calcium 500/D 500-200 MG-UNIT per tablet   Generic drug:  calcium carb 1250 mg (500 mg [place])/vitamin D 200 unit      Take 1 tablet by mouth once a week        cyanocobalamin 1000 MCG/ML injection    VITAMIN B12    3 mL    INJECT ONE ML (CC) INTRAMUSCULARLY ONCE EVERY MONTH    Encounter for medication refill       cyclobenzaprine 10 MG tablet    FLEXERIL    200 tablet    TAKE ONE TABLET BY MOUTH TWICE DAILY AS NEEDED FOR MUSCLE SPASM    Encounter for medication refill       docusate sodium 100 MG tablet    COLACE    60 tablet    Take 100 mg by mouth daily as needed        ENBREL 50 MG/ML injection   Generic drug:  etanercept      Inject 50 mg Subcutaneous        enoxaparin 40 MG/0.4ML injection    LOVENOX    14 Syringe    Inject 0.4 mLs (40 mg) Subcutaneous every 24 hours    Status post revision of total hip replacement       ferrous sulfate 325 (65 FE) MG tablet    IRON     Take 1 tablet by mouth once a week        finasteride 5 MG tablet    PROSCAR    90 tablet    TAKE ONE TABLET BY MOUTH ONCE DAILY    Encounter for medication refill       lisinopril-hydrochlorothiazide 10-12.5 MG per tablet    PRINZIDE/ZESTORETIC     90 tablet    TAKE ONE TABLET BY MOUTH ONCE DAILY    Encounter for medication refill       MULTI VITAMIN MENS PO      Take 1 tablet by mouth daily as needed        OMEGA 3 PO      Take by mouth once a week        ondansetron 4 MG ODT tab    ZOFRAN-ODT    96 tablet    Take 1 tablet (4 mg) by mouth every 6 hours as needed for nausea    Status post revision of total hip replacement       order for DME     1 each    Equipment being ordered: Crutches () Treatment Diagnosis: impaired gait.    Status post revision of total hip replacement       oxyCODONE IR 10 MG tablet   Start taking on:  4/1/2018    ROXICODONE    180 tablet    Take 1-1.5 tablets (10-15 mg) by mouth every 6 hours as needed for moderate to severe pain Max of 6 per day    Ankylosing spondylitis of multiple sites in spine (H)       oxyCODONE-acetaminophen  MG per tablet    PERCOCET     Take 1-2 tablets by mouth        testosterone 50 MG/5GM (1%) topical gel    ANDROGEL/TESTIM    90 packet    Place 1 packet (50 mg) onto the skin daily    Encounter for medication refill

## 2018-01-18 ENCOUNTER — TELEPHONE (OUTPATIENT)
Dept: FAMILY MEDICINE | Facility: CLINIC | Age: 48
End: 2018-01-18

## 2018-01-18 DIAGNOSIS — L20.9 ATOPIC DERMATITIS: Primary | ICD-10-CM

## 2018-01-18 RX ORDER — MOMETASONE FUROATE 1 MG/G
OINTMENT TOPICAL
Qty: 45 G | Refills: 1 | Status: SHIPPED | OUTPATIENT
Start: 2018-01-18 | End: 2021-02-19

## 2018-01-18 NOTE — TELEPHONE ENCOUNTER
Patient called requesting prescription for atopic dermatitis.  Patient saw Dr. Forrest on 1/15/18.  Per Dr. Forrest prescription sent to pharmacy.  Madiha Flores

## 2018-03-15 DIAGNOSIS — Z76.0 ENCOUNTER FOR MEDICATION REFILL: ICD-10-CM

## 2018-03-15 RX ORDER — CYCLOBENZAPRINE HCL 10 MG
TABLET ORAL
Qty: 200 TABLET | Refills: 0 | Status: SHIPPED | OUTPATIENT
Start: 2018-03-15 | End: 2018-12-19

## 2018-04-16 ENCOUNTER — OFFICE VISIT (OUTPATIENT)
Dept: FAMILY MEDICINE | Facility: CLINIC | Age: 48
End: 2018-04-16

## 2018-04-16 VITALS
BODY MASS INDEX: 30.39 KG/M2 | WEIGHT: 154.8 LBS | RESPIRATION RATE: 12 BRPM | HEIGHT: 60 IN | DIASTOLIC BLOOD PRESSURE: 64 MMHG | SYSTOLIC BLOOD PRESSURE: 116 MMHG | HEART RATE: 88 BPM

## 2018-04-16 DIAGNOSIS — D51.0 PA (PERNICIOUS ANEMIA): Chronic | ICD-10-CM

## 2018-04-16 DIAGNOSIS — M45.0 ANKYLOSING SPONDYLITIS OF MULTIPLE SITES IN SPINE (H): ICD-10-CM

## 2018-04-16 DIAGNOSIS — Z13.6 SCREENING FOR CARDIOVASCULAR CONDITION: ICD-10-CM

## 2018-04-16 DIAGNOSIS — F11.20 CHRONIC NARCOTIC DEPENDENCE (H): Primary | ICD-10-CM

## 2018-04-16 DIAGNOSIS — Z12.5 SCREENING FOR PROSTATE CANCER: ICD-10-CM

## 2018-04-16 LAB
% GRANULOCYTES: 62.8 % (ref 42.2–75.2)
HCT VFR BLD AUTO: 44.5 % (ref 39–51)
HEMOGLOBIN: 14.8 G/DL (ref 13.4–17.5)
LYMPHOCYTES NFR BLD AUTO: 29.2 % (ref 20.5–51.1)
MCH RBC QN AUTO: 30.9 PG (ref 27–31)
MCHC RBC AUTO-ENTMCNC: 33.4 G/DL (ref 33–37)
MCV RBC AUTO: 92.6 FL (ref 80–100)
MONOCYTES NFR BLD AUTO: 8 % (ref 1.7–9.3)
PLATELET # BLD AUTO: 181 K/UL (ref 140–450)
RBC # BLD AUTO: 4.8 X10/CMM (ref 4.2–5.9)
WBC # BLD AUTO: 6.2 X10/CMM (ref 3.8–11)

## 2018-04-16 PROCEDURE — 85025 COMPLETE CBC W/AUTO DIFF WBC: CPT | Performed by: FAMILY MEDICINE

## 2018-04-16 PROCEDURE — 99214 OFFICE O/P EST MOD 30 MIN: CPT | Performed by: FAMILY MEDICINE

## 2018-04-16 PROCEDURE — 36415 COLL VENOUS BLD VENIPUNCTURE: CPT | Performed by: FAMILY MEDICINE

## 2018-04-16 RX ORDER — INDOMETHACIN 50 MG/1
50 CAPSULE ORAL DAILY
Qty: 90 CAPSULE | Refills: 3 | Status: SHIPPED | OUTPATIENT
Start: 2018-04-16 | End: 2019-05-13

## 2018-04-16 RX ORDER — OXYCODONE HYDROCHLORIDE 10 MG/1
10-15 TABLET ORAL EVERY 6 HOURS PRN
Qty: 180 TABLET | Refills: 0 | Status: SHIPPED | OUTPATIENT
Start: 2018-04-16 | End: 2018-07-16

## 2018-04-16 RX ORDER — INDOMETHACIN 50 MG/1
50 CAPSULE ORAL DAILY
Qty: 42 CAPSULE | Refills: 1 | Status: SHIPPED | OUTPATIENT
Start: 2018-04-16 | End: 2018-04-16

## 2018-04-16 RX ORDER — INDOMETHACIN 25 MG/1
CAPSULE ORAL DAILY
Qty: 60 CAPSULE | Status: CANCELLED | OUTPATIENT
Start: 2018-04-16

## 2018-04-16 ASSESSMENT — PAIN SCALES - GENERAL: PAINLEVEL: MODERATE PAIN (4)

## 2018-04-16 NOTE — PROGRESS NOTES
"Problem(s) Oriented visit        SUBJECTIVE:                                                    Oseas Edwards is a 48 year old male who presents to clinic today for the following health issues :            1. Ankylosing spondylitis of multiple sites in spine (H)  Due for a refill  - Calcium carb-Vitamin D 500 mg [place]-200 units (OSCAL WITH D;OYSTER SHELL CALCIUM) 500-200 MG-UNIT per tablet; Take 1 tablet by mouth daily   - diclofenac (VOLTAREN) 1 % GEL topical gel; Apply topically daily   - INDOMETHACIN PO; Take 50 mg by mouth daily  - oxyCODONE IR (ROXICODONE) 10 MG tablet; Take 1-1.5 tablets (10-15 mg) by mouth every 6 hours as needed for moderate to severe pain Max of 6 per day  Dispense: 180 tablet; Refill: 0  - indomethacin (INDOCIN) 50 MG capsule; Take 1 capsule (50 mg) by mouth daily  Dispense: 42 capsule; Refill: 1  - ToxASSURE Urine Drug Screen ([facility])  - Basic Metabolic Panel (8) ([facility])    2. Chronic narcotic dependence (H)  Long discssion, multiple reasons.  .    The blood count has been lower than expected and desired.   No evidence for obvious blood loss.  No recent surgeries, no nosebleeds, no obvious intestinal blood loss, no hematochezia.   Does take NSAIDs regularly, does not drink alcohol regularly, does not donate blood products regularly.   Reports nutrition as \"good\"  Has  workup for previous anemia before.   PA and due for recheck    Reviewed labs in [facility]:    Lab Results   Component Value Date    HGB 11.1 02/16/2017    HGB 11.6 02/15/2017    HGB 16.3 02/14/2017    HGB 15.8 02/07/2017    HGB 17.0 01/18/2017    HGB 16.5 06/06/2016    HGB 15.9 05/18/2015    HGB 14.7 07/14/2014    HGB 14.4 11/04/2013    HGB 15.6 10/07/2013    HGB 15.8 06/04/2013    HGB 16.0 03/26/2012    HGB 10.9 07/21/2011    HGB 6.2 06/16/2011    HGB 12.0 04/06/2011    HGB 13.2 11/02/2009    HGB 12.1 04/10/2009              Problem list, Medication list, Allergies, and Medical/Social/Surgical histories reviewed in EPIC and " "updated as appropriate.   Additional history: as documented    ROS:  General and CV completed and negative except as noted above    Histories:   Patient Active Problem List   Diagnosis     AS (ankylosing spondylitis) (H)     PA (pernicious anemia)     Low serum testosterone level     Health Care Home     Chronic narcotic dependence (H)     Rectus diastasis     Hx of gastric ulcer     Chronic pain syndrome     Keloid scar, mostly just dark     Failed total hip arthroplasty with dislocation, initial encounter (H)     Past Surgical History:   Procedure Laterality Date     ARTHROPLASTY REVISION HIP Left 2/14/2017    Procedure: ARTHROPLASTY REVISION HIP;  Surgeon: Saravanan Alcaraz MD;  Location:  OR     HERNIORRHAPHY VENTRAL  11/11/2013    Procedure: HERNIORRHAPHY VENTRAL;  UMBILICAL HERNIA REPAIR WITH MESH, RECTUS DIASTASIS REPAIR WITH MESH;  Surgeon: Jason Dodson MD;  Location: [facility]     JOINT REPLACEMENT, HIP RT/LT  1994    Left     JOINT REPLACEMENT, HIP RT/LT  2000ish    Right     OPEN SEPARATION COMPONENT HERNIORRHAPHY  11/11/2013    Procedure: OPEN SEPARATION COMPONENT HERNIORRHAPHY;;  Surgeon: Jason Dodson MD;  Location: [facility]       Social History   Substance Use Topics     Smoking status: Former Smoker     Quit date: 1/1/2009     Smokeless tobacco: Never Used      Comment: quit 2 1/2 yrs     Alcohol use Yes      Comment: 1 to 2 per week     No family history on file.        OBJECTIVE:                                                    /64  Pulse 88  Resp 12  Ht 1.473 m (4' 10\")  Wt 70.2 kg (154 lb 12.8 oz)  BMI 32.35 kg/m2  Body mass index is 32.35 kg/(m^2).   APPEARANCE: = Relaxed and in no distress  Conj/Eyelids = noninjected and lids and lashes are without inflammation  PERRLA/Irises = Pupils Round Reactive to Light and Irisis without inflammation  Neck = No anterior or posterior adenopathy appreciated.  Thyroid = Not enlarged and no masses felt  Resp effort = Calm " regular breathing  Breath Sounds = Good air movement with no rales or rhonchi in any lung fields  Heart Rate, Rythym, & sounds (no Murm)  = Regular rate and rythym with no S3, S4, or murmer appreciated.  Carotid Art's = Pulses full and equal and no bruits appreciated  Abdomen = Soft, nontender, no masses, & bowel sounds in all quadrants  Liver/Spleen = Normal span and no splenomegaly noted  Digits and Nails = FROM in all finger joints, no nail dystrophy  Ext (edema) = No pretibial edema noted or elsewhere  Musculsktl =  Strength and ROM of major joints are within normal limits  SKIN = absent significant rashes or lesions   Recent/Remote Memory = Alert and Oriented x 3  Mood/Affect = Cooperative and interested     ASSESSMENT/PLAN:                                                      Oseas was seen today for nrf, pain management, blood draw and hypertension.    Diagnoses and all orders for this visit:    Chronic narcotic dependence (H)    Ankylosing spondylitis of multiple sites in spine (H)  -     oxyCODONE IR (ROXICODONE) 10 MG tablet; Take 1-1.5 tablets (10-15 mg) by mouth every 6 hours as needed for moderate to severe pain Max of 6 per day  -     Discontinue: indomethacin (INDOCIN) 50 MG capsule; Take 1 capsule (50 mg) by mouth daily  -     ToxASSURE Urine Drug Screen ([facility])  -     Basic Metabolic Panel (8) ([facility])  -     indomethacin (INDOCIN) 50 MG capsule; Take 1 capsule (50 mg) by mouth daily    PA (pernicious anemia)  -     CBC with Diff/Plt ([facility])    Screening for cardiovascular condition  -     Comp. Metabolic Panel (14) ([facility])  -     Lipid Panel ([facility])    Screening for prostate cancer  -     PSA Serum ([facility])    Other orders  -     Cancel: indomethacin (INDOCIN) 25 MG capsule; Take by mouth daily    >25 min spent with patient, greater than 50% spent on discussion/education/planning, etc. About The primary encounter diagnosis was Chronic narcotic dependence (H). Diagnoses of Ankylosing spondylitis  of multiple sites in spine (H), PA (pernicious anemia), Screening for cardiovascular condition, and Screening for prostate cancer were also pertinent to this visit.        Regular exercise    The following health maintenance items are reviewed in Epic and correct as of today:  Health Maintenance   Topic Date Due     URINE DRUG SCREEN Q1 YR  04/04/1984     JOSÉ QUESTIONNAIRE 1 YEAR  08/29/2018     PHQ-9 Q1YR  08/29/2018     INFLUENZA VACCINE (SYSTEM ASSIGNED)  09/01/2018     TETANUS IMMUNIZATION (SYSTEM ASSIGNED)  08/26/2020     LIPID SCREEN Q5 YR MALE (SYSTEM ASSIGNED)  06/06/2021       Willy Forrest MD  [facility]  Family Practice  [facility]  [phone]    For any issues my office # is [phone]

## 2018-04-16 NOTE — MR AVS SNAPSHOT
"              After Visit Summary   4/16/2018    Oseas Edwards    MRN: [id number]           Patient Information     Date Of Birth          1970        Visit Information        Provider Department      4/16/2018 10:30 AM Willy Forrest MD [facility]        Today's Diagnoses     Chronic narcotic dependence (H)    -  1    Ankylosing spondylitis of multiple sites in spine (H)           Follow-ups after your visit        Who to contact     If you have questions or need follow up information about today's clinic visit or your schedule please contact [facility] directly at [phone].  Normal or non-critical lab and imaging results will be communicated to you by Miraklhart, letter or phone within 4 business days after the clinic has received the results. If you do not hear from us within 7 days, please contact the clinic through Listikit or phone. If you have a critical or abnormal lab result, we will notify you by phone as soon as possible.  Submit refill requests through vidIQ or call your pharmacy and they will forward the refill request to us. Please allow 3 business days for your refill to be completed.          Additional Information About Your Visit        MyChart Information     vidIQ gives you secure access to your electronic health record. If you see a primary care provider, you can also send messages to your care team and make appointments. If you have questions, please call your primary care clinic.  If you do not have a primary care provider, please call [phone] and they will assist you.        Care EveryWhere ID     This is your Care EveryWhere ID. This could be used by other organizations to access your [place] medical records  [id number]        Your Vitals Were     Pulse Respirations Height BMI (Body Mass Index)          88 12 1.473 m (4' 10\") 32.35 kg/m2         Blood Pressure from Last 3 Encounters:   04/16/18 116/64   01/15/18 96/62   11/06/17 98/66    Weight " from Last 3 Encounters:   04/16/18 70.2 kg (154 lb 12.8 oz)   01/15/18 68.9 kg (152 lb)   11/06/17 69.4 kg (153 lb)              We Performed the Following     Basic Metabolic Panel (8) ([facility])     ToxASSURE Urine Drug Screen ([facility])          Today's Medication Changes          These changes are accurate as of 4/16/18 10:58 AM.  If you have any questions, ask your nurse or doctor.               These medicines have changed or have updated prescriptions.        Dose/Directions    * INDOMETHACIN PO   This may have changed:  Another medication with the same name was added. Make sure you understand how and when to take each.   Used for:  Ankylosing spondylitis of multiple sites in spine (H)   Changed by:  Willy Forrest MD        Dose:  50 mg   Take 50 mg by mouth daily   Refills:  0       * indomethacin 50 MG capsule   Commonly known as:  INDOCIN   This may have changed:  You were already taking a medication with the same name, and this prescription was added. Make sure you understand how and when to take each.   Used for:  Ankylosing spondylitis of multiple sites in spine (H)   Changed by:  Willy Forrest MD        Dose:  50 mg   Take 1 capsule (50 mg) by mouth daily   Quantity:  42 capsule   Refills:  1       * Notice:  This list has 2 medication(s) that are the same as other medications prescribed for you. Read the directions carefully, and ask your doctor or other care provider to review them with you.         Where to get your medicines      These medications were sent to [facility] [address] [place] [address] [address]  [address], [facility] [address]     Phone:  [phone]     indomethacin 50 MG capsule         Some of these will need a paper prescription and others can be bought over the counter.  Ask your nurse if you have questions.     Bring a paper prescription for each of these medications     oxyCODONE IR 10 MG tablet                Primary  Care Provider Office Phone # Fax #    Willy Forrest [phone] [address]  [facility] [address]        Equal Access to Services     CHEPE LUND : Lyndsey bj alicea carrio Sokayali, waaxda luqadaha, qaybta kaalmada adeandrewda, enid frazier laNadinehlai ba. So [facility] [phone].    ATENCIÓN: Si habla español, tiene a fabian disposición servicios gratuitos de asistencia lingüística. Llame al [phone].    We comply with applicable federal civil rights laws and [place] laws. We do not discriminate on the basis of race, color, national origin, age, disability, sex, sexual orientation, or gender identity.            Thank you!     Thank you for choosing [facility]  for your care. Our goal is always to provide you with excellent care. Hearing back from our patients is one way we can continue to improve our services. Please take a few minutes to complete the written survey that you may receive in the mail after your visit with us. Thank you!             Your Updated Medication List - Protect others around you: Learn how to safely use, store and throw away your medicines at www.disposemymeds.org.          This list is accurate as of 4/16/18 10:58 AM.  Always use your most recent med list.                   Brand Name Dispense Instructions for use Diagnosis    ARTIFICIAL TEARS OP      Place 1 drop into both eyes daily as needed        Calcium carb-Vitamin D 500 mg [place]-200 units 500-200 MG-UNIT per tablet    OSCAL with D;Oyster Shell Calcium     Take 1 tablet by mouth daily    Ankylosing spondylitis of multiple sites in spine (H)       cyanocobalamin 1000 MCG/ML injection    VITAMIN B12    3 mL    INJECT ONE ML (CC) INTRAMUSCULARLY ONCE EVERY MONTH    Encounter for medication refill       cyclobenzaprine 10 MG tablet    FLEXERIL    200 tablet    TAKE ONE TABLET BY MOUTH TWICE DAILY AS NEEDED FOR MUSCLE SPASM    Encounter for medication refill       diclofenac 1 %  Gel topical gel    VOLTAREN     Apply topically daily    Ankylosing spondylitis of multiple sites in spine (H)       docusate sodium 100 MG tablet    COLACE    60 tablet    Take 100 mg by mouth daily as needed        ENBREL 50 MG/ML injection   Generic drug:  etanercept      Inject 50 mg Subcutaneous        ferrous sulfate 325 (65 Fe) MG tablet    IRON     Take 1 tablet by mouth once a week        finasteride 5 MG tablet    PROSCAR    90 tablet    TAKE ONE TABLET BY MOUTH ONCE DAILY    Encounter for medication refill       * INDOMETHACIN PO      Take 50 mg by mouth daily    Ankylosing spondylitis of multiple sites in spine (H)       * indomethacin 50 MG capsule    INDOCIN    42 capsule    Take 1 capsule (50 mg) by mouth daily    Ankylosing spondylitis of multiple sites in spine (H)       lisinopril-hydrochlorothiazide 10-12.5 MG per tablet    PRINZIDE/ZESTORETIC    90 tablet    TAKE ONE TABLET BY MOUTH ONCE DAILY    Encounter for medication refill       mometasone 0.1 % ointment    ELOCON    45 g    Apply sparingly to affected area twice daily as needed.  Do not apply to face.    Atopic dermatitis       MULTI VITAMIN MENS PO      Take 1 tablet by mouth daily as needed        OMEGA 3 PO      Take by mouth once a week        ondansetron 4 MG ODT tab    ZOFRAN-ODT    96 tablet    Take 1 tablet (4 mg) by mouth every 6 hours as needed for nausea    Status post revision of total hip replacement       order for DME     1 each    Equipment being ordered: Itzches () Treatment Diagnosis: impaired gait.    Status post revision of total hip replacement       oxyCODONE IR 10 MG tablet    ROXICODONE    180 tablet    Take 1-1.5 tablets (10-15 mg) by mouth every 6 hours as needed for moderate to severe pain Max of 6 per day    Ankylosing spondylitis of multiple sites in spine (H)       oxyCODONE-acetaminophen  MG per tablet    PERCOCET     Take 1-2 tablets by mouth        testosterone 50 MG/5GM (1%) topical gel     ANDROGEL/TESTIM    90 packet    Place 1 packet (50 mg) onto the skin daily    Encounter for medication refill       * Notice:  This list has 2 medication(s) that are the same as other medications prescribed for you. Read the directions carefully, and ask your doctor or other care provider to review them with you.

## 2018-04-17 LAB
ALBUMIN SERPL-MCNC: 3.6 G/DL (ref 3.5–5.5)
ALBUMIN/GLOB SERPL: 1.1 {RATIO} (ref 1.2–2.2)
ALP SERPL-CCNC: 72 IU/L (ref 39–117)
ALT SERPL-CCNC: 14 IU/L (ref 0–44)
AST SERPL-CCNC: 20 IU/L (ref 0–40)
BILIRUB SERPL-MCNC: 0.5 MG/DL (ref 0–1.2)
BUN SERPL-MCNC: 19 MG/DL (ref 6–24)
BUN/CREATININE RATIO: 21 (ref 9–20)
CALCIUM SERPL-MCNC: 9.4 MG/DL (ref 8.7–10.2)
CHLORIDE SERPLBLD-SCNC: 104 MMOL/L (ref 96–106)
CHOLEST SERPL-MCNC: 193 MG/DL (ref 100–199)
CREAT SERPL-MCNC: 0.91 MG/DL (ref 0.76–1.27)
EGFR IF AFRICN AM: 114 ML/MIN/1.73
EGFR IF NONAFRICN AM: 99 ML/MIN/1.73
GLOBULIN, TOTAL: 3.2 G/DL (ref 1.5–4.5)
GLUCOSE SERPL-MCNC: 104 MG/DL (ref 65–99)
HDLC SERPL-MCNC: 59 MG/DL
LDL/HDL RATIO: 2 RATIO (ref 0–3.6)
LDLC SERPL CALC-MCNC: 116 MG/DL (ref 0–99)
POTASSIUM SERPL-SCNC: 4.6 MMOL/L (ref 3.5–5.2)
PROT SERPL-MCNC: 6.8 G/DL (ref 6–8.5)
PSA NG/ML: 0.2 NG/ML (ref 0–4)
SODIUM SERPL-SCNC: 142 MMOL/L (ref 134–144)
TOTAL CO2: 24 MMOL/L (ref 18–28)
TRIGL SERPL-MCNC: 92 MG/DL (ref 0–149)
VLDLC SERPL CALC-MCNC: 18 MG/DL (ref 5–40)

## 2018-04-20 LAB — SUMMARY OF DRUGS IDENTIFIED: NORMAL

## 2018-04-26 NOTE — PROGRESS NOTES
Dear Oseas,  Here is a copy of your labs, we will discuss them at your upcoming visit.  Willy Forrest MD

## 2018-06-05 DIAGNOSIS — D51.0 PA (PERNICIOUS ANEMIA): Primary | Chronic | ICD-10-CM

## 2018-06-05 DIAGNOSIS — Z76.0 ENCOUNTER FOR MEDICATION REFILL: ICD-10-CM

## 2018-06-05 RX ORDER — CYANOCOBALAMIN 1000 UG/ML
INJECTION, SOLUTION INTRAMUSCULAR; SUBCUTANEOUS
Qty: 3 ML | Refills: 0 | Status: SHIPPED | OUTPATIENT
Start: 2018-06-05 | End: 2018-09-10

## 2018-07-16 ENCOUNTER — OFFICE VISIT (OUTPATIENT)
Dept: FAMILY MEDICINE | Facility: CLINIC | Age: 48
End: 2018-07-16

## 2018-07-16 VITALS
SYSTOLIC BLOOD PRESSURE: 104 MMHG | DIASTOLIC BLOOD PRESSURE: 70 MMHG | HEART RATE: 68 BPM | RESPIRATION RATE: 12 BRPM | WEIGHT: 150.8 LBS | BODY MASS INDEX: 31.52 KG/M2

## 2018-07-16 DIAGNOSIS — M45.0 ANKYLOSING SPONDYLITIS OF MULTIPLE SITES IN SPINE (H): ICD-10-CM

## 2018-07-16 PROCEDURE — 99214 OFFICE O/P EST MOD 30 MIN: CPT | Performed by: FAMILY MEDICINE

## 2018-07-16 RX ORDER — OXYCODONE HYDROCHLORIDE 10 MG/1
10-15 TABLET ORAL EVERY 6 HOURS PRN
Qty: 180 TABLET | Refills: 0 | Status: SHIPPED | OUTPATIENT
Start: 2018-07-16 | End: 2018-07-16

## 2018-07-16 RX ORDER — OXYCODONE HYDROCHLORIDE 10 MG/1
10-15 TABLET ORAL EVERY 6 HOURS PRN
Qty: 180 TABLET | Refills: 0 | Status: SHIPPED | OUTPATIENT
Start: 2018-09-16 | End: 2018-10-15

## 2018-07-16 RX ORDER — OXYCODONE HYDROCHLORIDE 10 MG/1
10-15 TABLET ORAL EVERY 6 HOURS PRN
Qty: 180 TABLET | Refills: 0 | Status: SHIPPED | OUTPATIENT
Start: 2018-08-16 | End: 2018-07-16

## 2018-07-16 ASSESSMENT — PAIN SCALES - GENERAL: PAINLEVEL: SEVERE PAIN (7)

## 2018-07-16 NOTE — PROGRESS NOTES
>25 min spent with patient, greater than 50% spent on discussion/education/planning, etc. About The encounter diagnosis was Ankylosing spondylitis of multiple sites in spine (H).  Reminder that he is between a rock and a hard place with his AS  We have never looked into the possibilty of nerve stimulators.......    Assessment/Plan:  Oseas was seen today for nrf and pain management.    Diagnoses and all orders for this visit:    Ankylosing spondylitis of multiple sites in spine (H)  -     Discontinue: oxyCODONE IR (ROXICODONE) 10 MG tablet; Take 1-1.5 tablets (10-15 mg) by mouth every 6 hours as needed for moderate to severe pain Max of 6 per day  -     Discontinue: oxyCODONE IR (ROXICODONE) 10 MG tablet; Take 1-1.5 tablets (10-15 mg) by mouth every 6 hours as needed for moderate to severe pain Max of 6 per day  -     oxyCODONE IR (ROXICODONE) 10 MG tablet; Take 1-1.5 tablets (10-15 mg) by mouth every 6 hours as needed for moderate to severe pain Max of 6 per day      Willy Forrest MD  Family Practice  [facility]  [phone]

## 2018-07-16 NOTE — MR AVS SNAPSHOT
After Visit Summary   7/16/2018    Oseas Edwards    MRN: [id number]           Patient Information     Date Of Birth          1970        Visit Information        Provider Department      7/16/2018 10:00 AM Willy Forrest MD [facility]        Today's Diagnoses     Ankylosing spondylitis of multiple sites in spine (H)           Follow-ups after your visit        Who to contact     If you have questions or need follow up information about today's clinic visit or your schedule please contact [facility] directly at [phone].  Normal or non-critical lab and imaging results will be communicated to you by SimGymhart, letter or phone within 4 business days after the clinic has received the results. If you do not hear from us within 7 days, please contact the clinic through Qriously or phone. If you have a critical or abnormal lab result, we will notify you by phone as soon as possible.  Submit refill requests through Qriously or call your pharmacy and they will forward the refill request to us. Please allow 3 business days for your refill to be completed.          Additional Information About Your Visit        MyChart Information     Qriously gives you secure access to your electronic health record. If you see a primary care provider, you can also send messages to your care team and make appointments. If you have questions, please call your primary care clinic.  If you do not have a primary care provider, please call [phone] and they will assist you.        Care EveryWhere ID     This is your Care EveryWhere ID. This could be used by other organizations to access your [place] medical records  [id number]        Your Vitals Were     Pulse Respirations BMI (Body Mass Index)             68 12 31.52 kg/m2          Blood Pressure from Last 3 Encounters:   07/16/18 104/70   04/16/18 116/64   01/15/18 96/62    Weight from Last 3 Encounters:   07/16/18 68.4 kg (150 lb 12.8 oz)    04/16/18 70.2 kg (154 lb 12.8 oz)   01/15/18 68.9 kg (152 lb)              Today, you had the following     No orders found for display         Today's Medication Changes          These changes are accurate as of 7/16/18 11:16 AM.  If you have any questions, ask your nurse or doctor.               Start taking these medicines.        Dose/Directions    oxyCODONE IR 10 MG tablet   Commonly known as:  ROXICODONE   Used for:  Ankylosing spondylitis of multiple sites in spine (H)   Started by:  Willy Forrest MD        Dose:  10-15 mg   Start taking on:  9/16/2018   Take 1-1.5 tablets (10-15 mg) by mouth every 6 hours as needed for moderate to severe pain Max of 6 per day   Quantity:  180 tablet   Refills:  0         Stop taking these medicines if you haven't already. Please contact your care team if you have questions.     MULTI VITAMIN MENS PO   Stopped by:  Willy Forrest MD                Where to get your medicines      Some of these will need a paper prescription and others can be bought over the counter.  Ask your nurse if you have questions.     Bring a paper prescription for each of these medications     oxyCODONE IR 10 MG tablet               Information about OPIOIDS     PRESCRIPTION OPIOIDS: WHAT YOU NEED TO KNOW   We gave you an opioid (narcotic) pain medicine. It is important to manage your pain, but opioids are not always the best choice. You should first try all the other options your care team gave you. Take this medicine for as short a time (and as few doses) as possible.     These medicines have risks:    DO NOT drive when on new or higher doses of pain medicine. These medicines can affect your alertness and reaction times, and you could be arrested for driving under the influence (DUI). If you need to use opioids long-term, talk to your care team about driving.    DO NOT operate heave machinery    DO NOT do any other dangerous activities while taking these medicines.     DO NOT drink  any alcohol while taking these medicines.      If the opioid prescribed includes acetaminophen, DO NOT take with any other medicines that contain acetaminophen. Read all labels carefully. Look for the word  acetaminophen  or  Tylenol.  Ask your pharmacist if you have questions or are unsure.    You can get addicted to pain medicines, especially if you have a history of addiction (chemical, alcohol or substance dependence). Talk to your care team about ways to reduce this risk.    Store your pills in a secure place, locked if possible. We will not replace any lost or stolen medicine. If you don t finish your medicine, please throw away (dispose) as directed by your pharmacist. The [place] Pollution Control Agency has more information about safe disposal: https://www.pca.[facility].mn.us/living-green/managing-unwanted-medications.     All opioids tend to cause constipation. Drink plenty of water and eat foods that have a lot of fiber, such as fruits, vegetables, prune juice, apple juice and high-fiber cereal. Take a laxative (Miralax, milk of magnesia, Colace, Senna) if you don t move your bowels at least every other day.          Primary Care Provider Office Phone # Fax #    Willy Forrest [phone] [id number]  [facility] [phone]        Equal Access to Services     CHEPE LUND : Hadii bj ku hadasho Soomaali, waaxda luqadaha, qaybta kaalmada adeegyada, waxay pamela ba. So [facility] [phone].    ATENCIÓN: Si habla español, tiene a fabian disposición servicios gratuitos de asistencia lingüística. Llame al [phone].    We comply with applicable federal civil rights laws and [place] laws. We do not discriminate on the basis of race, color, national origin, age, disability, sex, sexual orientation, or gender identity.            Thank you!     Thank you for choosing [facility]  for your care. Our goal is always to provide you with excellent  care. Hearing back from our patients is one way we can continue to improve our services. Please take a few minutes to complete the written survey that you may receive in the mail after your visit with us. Thank you!             Your Updated Medication List - Protect others around you: Learn how to safely use, store and throw away your medicines at www.disposemymeds.org.          This list is accurate as of 7/16/18 11:16 AM.  Always use your most recent med list.                   Brand Name Dispense Instructions for use Diagnosis    ARTIFICIAL TEARS OP      Place 1 drop into both eyes daily as needed        Calcium carb-Vitamin D 500 mg [place]-200 units 500-200 MG-UNIT per tablet    OSCAL with D;Oyster Shell Calcium     Take 1 tablet by mouth daily    Ankylosing spondylitis of multiple sites in spine (H)       cyanocobalamin 1000 MCG/ML injection    VITAMIN B12    3 mL    INJECT 1ML INTRAMUSCULARLY ONCE EVERY MONTH    Encounter for medication refill       cyclobenzaprine 10 MG tablet    FLEXERIL    200 tablet    TAKE ONE TABLET BY MOUTH TWICE DAILY AS NEEDED FOR MUSCLE SPASM    Encounter for medication refill       diclofenac 1 % Gel topical gel    VOLTAREN     Apply topically daily    Ankylosing spondylitis of multiple sites in spine (H)       docusate sodium 100 MG tablet    COLACE    60 tablet    Take 100 mg by mouth daily as needed        ENBREL 50 MG/ML injection   Generic drug:  etanercept      Inject 50 mg Subcutaneous        finasteride 5 MG tablet    PROSCAR    90 tablet    TAKE ONE TABLET BY MOUTH ONCE DAILY    Encounter for medication refill       indomethacin 50 MG capsule    INDOCIN    90 capsule    Take 1 capsule (50 mg) by mouth daily    Ankylosing spondylitis of multiple sites in spine (H)       lisinopril-hydrochlorothiazide 10-12.5 MG per tablet    PRINZIDE/ZESTORETIC    90 tablet    TAKE ONE TABLET BY MOUTH ONCE DAILY    Encounter for medication refill       mometasone 0.1 % ointment    ELOCON    45  g    Apply sparingly to affected area twice daily as needed.  Do not apply to face.    Atopic dermatitis       MULTIVITAMIN/IRON PO      Take 1 tablet by mouth daily        ondansetron 4 MG ODT tab    ZOFRAN-ODT    96 tablet    Take 1 tablet (4 mg) by mouth every 6 hours as needed for nausea    Status post revision of total hip replacement       order for DME     1 each    Equipment being ordered: Crutches () Treatment Diagnosis: impaired gait.    Status post revision of total hip replacement       oxyCODONE IR 10 MG tablet   Start taking on:  9/16/2018    ROXICODONE    180 tablet    Take 1-1.5 tablets (10-15 mg) by mouth every 6 hours as needed for moderate to severe pain Max of 6 per day    Ankylosing spondylitis of multiple sites in spine (H)       oxyCODONE-acetaminophen  MG per tablet    PERCOCET     Take 1-2 tablets by mouth        testosterone 50 MG/5GM (1%) topical gel    ANDROGEL/TESTIM    90 packet    Place 1 packet (50 mg) onto the skin daily    Encounter for medication refill

## 2018-08-21 NOTE — TELEPHONE ENCOUNTER
Agency/Facility Name: [facility]  Spoke To: Priscila  Outcome: Patient accepted.   RN JUAN Tony notified.      cyanocobalamin (VITAMIN B12) 1000 MCG/ML injection   LAST  Med check 4/16/18   last labs(for RX) 5/18/15  Next  appt scheduled =  None - due  Graciela Mccabe MA    Vitamin B12 1315 (H) 211 - 946 pg/mL Final 05/18/2015

## 2018-09-10 DIAGNOSIS — D51.0 PA (PERNICIOUS ANEMIA): Chronic | ICD-10-CM

## 2018-09-10 DIAGNOSIS — Z79.899 ENCOUNTER FOR LONG-TERM (CURRENT) USE OF MEDICATIONS: Primary | ICD-10-CM

## 2018-09-10 DIAGNOSIS — R79.89 LOW SERUM TESTOSTERONE LEVEL: ICD-10-CM

## 2018-09-10 NOTE — TELEPHONE ENCOUNTER
lisinopril-hydrochlorothiazide (PRINZIDE/ZESTORETIC) 10-12.5 MG   finasteride (PROSCAR) 5 MG  : cyanocobalamin (VITAMIN B12) 1000 MCG/ML injection   LAST  Med check 7/16/18   last labs(for RX) 4/16/18  Next  appt scheduled =  10/15/18 med ck appt  Graciela Mccabe MA    BP Readings from Last 3 Encounters:   07/16/18 104/70   04/16/18 116/64   01/15/18 96/62

## 2018-09-11 RX ORDER — LISINOPRIL/HYDROCHLOROTHIAZIDE 10-12.5 MG
TABLET ORAL
Qty: 90 TABLET | Refills: 3 | Status: SHIPPED | OUTPATIENT
Start: 2018-09-11 | End: 2019-10-02

## 2018-09-11 RX ORDER — CYANOCOBALAMIN 1000 UG/ML
INJECTION, SOLUTION INTRAMUSCULAR; SUBCUTANEOUS
Qty: 1 ML | Refills: 0 | Status: SHIPPED | OUTPATIENT
Start: 2018-09-11 | End: 2018-10-15

## 2018-09-11 RX ORDER — FINASTERIDE 5 MG/1
TABLET, FILM COATED ORAL
Qty: 90 TABLET | Refills: 3 | Status: SHIPPED | OUTPATIENT
Start: 2018-09-11 | End: 2019-09-19

## 2018-09-17 ENCOUNTER — TELEPHONE (OUTPATIENT)
Dept: FAMILY MEDICINE | Facility: CLINIC | Age: 48
End: 2018-09-17

## 2018-09-17 NOTE — TELEPHONE ENCOUNTER
----- Message from Willy Forrest MD sent at 9/6/2018  3:18 PM CDT -----  Regarding: RE: [facility]  Lets have him see them to see if injections would work as a first step to nerve stimulators.  KN  ----- Message -----     From: Adriana Jung     Sent: 9/4/2018   4:58 PM       To: Willy Forrest MD  Subject: [facility]                               Dr Forrest,  I left several calls for [facility].   I finally did get a hold of someone who says they see patients with ankylosing spondylitis but do not do nerve stimulators. They refer to Stephan or Chris for that.  They treat the pain with injections.          ----- Message -----     From: Willy Forrest MD     Sent: 7/16/2018  11:16 AM       To: [facility] Triage    Can we check with [place] [facility] to see if they would have any nerve stimulators or possibility to help with his AS pain?  MATILDA

## 2018-09-17 NOTE — TELEPHONE ENCOUNTER
Faxed over records to [facility] at [phone].  They will review and call patient to set up consult if he is a candidate for their program.   Patient informed that records will be reviewed.

## 2018-10-15 ENCOUNTER — OFFICE VISIT (OUTPATIENT)
Dept: FAMILY MEDICINE | Facility: CLINIC | Age: 48
End: 2018-10-15

## 2018-10-15 VITALS
BODY MASS INDEX: 32.4 KG/M2 | DIASTOLIC BLOOD PRESSURE: 70 MMHG | WEIGHT: 155 LBS | HEART RATE: 74 BPM | SYSTOLIC BLOOD PRESSURE: 110 MMHG

## 2018-10-15 DIAGNOSIS — D51.0 PA (PERNICIOUS ANEMIA): Chronic | ICD-10-CM

## 2018-10-15 DIAGNOSIS — Z23 FLU VACCINE NEED: Primary | ICD-10-CM

## 2018-10-15 DIAGNOSIS — M45.0 ANKYLOSING SPONDYLITIS OF MULTIPLE SITES IN SPINE (H): ICD-10-CM

## 2018-10-15 PROCEDURE — 36415 COLL VENOUS BLD VENIPUNCTURE: CPT | Performed by: FAMILY MEDICINE

## 2018-10-15 PROCEDURE — 99214 OFFICE O/P EST MOD 30 MIN: CPT | Mod: 25 | Performed by: FAMILY MEDICINE

## 2018-10-15 PROCEDURE — 90686 IIV4 VACC NO PRSV 0.5 ML IM: CPT | Performed by: FAMILY MEDICINE

## 2018-10-15 PROCEDURE — 90471 IMMUNIZATION ADMIN: CPT | Performed by: FAMILY MEDICINE

## 2018-10-15 RX ORDER — OXYCODONE HYDROCHLORIDE 10 MG/1
10-15 TABLET ORAL EVERY 6 HOURS PRN
Qty: 180 TABLET | Refills: 0 | Status: SHIPPED | OUTPATIENT
Start: 2018-10-15 | End: 2018-10-15

## 2018-10-15 RX ORDER — OXYCODONE HYDROCHLORIDE 10 MG/1
10-15 TABLET ORAL EVERY 6 HOURS PRN
Qty: 180 TABLET | Refills: 0 | Status: SHIPPED | OUTPATIENT
Start: 2018-12-15 | End: 2019-01-14

## 2018-10-15 RX ORDER — CYANOCOBALAMIN 1000 UG/ML
INJECTION, SOLUTION INTRAMUSCULAR; SUBCUTANEOUS
Qty: 30 ML | Refills: 11 | Status: SHIPPED | OUTPATIENT
Start: 2018-10-15 | End: 2019-12-23

## 2018-10-15 RX ORDER — OXYCODONE HYDROCHLORIDE 10 MG/1
10-15 TABLET ORAL EVERY 6 HOURS PRN
Qty: 180 TABLET | Refills: 0 | Status: SHIPPED | OUTPATIENT
Start: 2018-11-15 | End: 2018-10-15

## 2018-10-15 ASSESSMENT — ANXIETY QUESTIONNAIRES
6. BECOMING EASILY ANNOYED OR IRRITABLE: NOT AT ALL
5. BEING SO RESTLESS THAT IT IS HARD TO SIT STILL: NOT AT ALL
1. FEELING NERVOUS, ANXIOUS, OR ON EDGE: NOT AT ALL
3. WORRYING TOO MUCH ABOUT DIFFERENT THINGS: NOT AT ALL
GAD7 TOTAL SCORE: 0
2. NOT BEING ABLE TO STOP OR CONTROL WORRYING: NOT AT ALL
7. FEELING AFRAID AS IF SOMETHING AWFUL MIGHT HAPPEN: NOT AT ALL

## 2018-10-15 ASSESSMENT — PATIENT HEALTH QUESTIONNAIRE - PHQ9: 5. POOR APPETITE OR OVEREATING: NOT AT ALL

## 2018-10-15 NOTE — PROGRESS NOTES
Problem(s) Oriented visit        SUBJECTIVE:                                                    Oseas Edwards is a 48 year old male who presents to clinic today for the following health issues :      1. PA (pernicious anemia)    - cyanocobalamin (VITAMIN B12) 1000 MCG/ML injection; INJECT 1 ML INTRAMUSCULARLY ONCE PER MONTH. PATIENT NEEDS B12 BLOOD LEVELS CHECKED BEFORE NEXT REFILL  Dispense: 30 mL; Refill: 11    2. Ankylosing spondylitis of multiple sites in spine (H)  Still waiting to hear back from [place] [facility] to see if they will help seek alternatives to his long term narcotic management for his inoperable back issue.  - oxyCODONE IR (ROXICODONE) 10 MG tablet; Take 1-1.5 tablets (10-15 mg) by mouth every 6 hours as needed for moderate to severe pain Max of 6 per day  Dispense: 180 tablet; Refill: 0         Problem list, Medication list, Allergies, and Medical/Social/Surgical histories reviewed in EPIC and updated as appropriate.   Additional history: as documented    ROS:  General and CV completed and negative except as noted above    Histories:   Patient Active Problem List   Diagnosis     AS (ankylosing spondylitis) (H)     PA (pernicious anemia)     Low serum testosterone level     Health Care Home     Chronic narcotic dependence (H)     Rectus diastasis     Hx of gastric ulcer     Chronic pain syndrome     Keloid scar, mostly just dark     Failed total hip arthroplasty with dislocation, initial encounter (H)     Past Surgical History:   Procedure Laterality Date     ARTHROPLASTY REVISION HIP Left 2/14/2017    Procedure: ARTHROPLASTY REVISION HIP;  Surgeon: Saravanan Alcaraz MD;  Location:  OR     HERNIORRHAPHY VENTRAL  11/11/2013    Procedure: HERNIORRHAPHY VENTRAL;  UMBILICAL HERNIA REPAIR WITH MESH, RECTUS DIASTASIS REPAIR WITH MESH;  Surgeon: Jason Dodson MD;  Location:  [facility]     JOINT REPLACEMENT, HIP RT/LT  1994    Left     JOINT REPLACEMENT, HIP RT/LT  2000ish    Right     OPEN  SEPARATION COMPONENT HERNIORRHAPHY  11/11/2013    Procedure: OPEN SEPARATION COMPONENT HERNIORRHAPHY;;  Surgeon: Jason Dodson MD;  Location: [facility]       Social History   Substance Use Topics     Smoking status: Former Smoker     Quit date: 1/1/2009     Smokeless tobacco: Never Used      Comment: quit 2 1/2 yrs     Alcohol use Yes      Comment: 1 to 2 per week     No family history on file.        OBJECTIVE:                                                    /70  Pulse 74  Wt 70.3 kg (155 lb)  BMI 32.4 kg/m2  Body mass index is 32.4 kg/(m^2).   APPEARANCE: = Relaxed and in no distress  Conj/Eyelids = noninjected and lids and lashes are without inflammation  PERRLA/Irises = Pupils Round Reactive to Light and Irisis without inflammation  Neck = No anterior or posterior adenopathy appreciated.  Thyroid = Not enlarged and no masses felt  Resp effort = Calm regular breathing  Breath Sounds = Good air movement with no rales or rhonchi in any lung fields  Heart Rate, Rhythm, & sounds (no Murm)  = Regular rate and rhythm with no S3, S4, or murmur appreciated.  Carotid Art's = Pulses full and equal and no bruits appreciated  Abdomen = Soft, nontender, no masses, & bowel sounds in all quadrants  Liver/Spleen = Normal span and no splenomegaly noted  Digits and Nails = FROM in all finger joints, no nail dystrophy  Ext (edema) = No pretibial edema noted or elsewhere  Musculsktl: arthritic changes of the back with decreased rom as he walks and changes position.  SKIN = absent significant rashes or lesions   Recent/Remote Memory = Alert and Oriented x 3  Mood/Affect = Cooperative and interested     ASSESSMENT/PLAN:                                                        Oseas was seen today for recheck medication and lab only.    Diagnoses and all orders for this visit:    PA (pernicious anemia)  -     cyanocobalamin (VITAMIN B12) 1000 MCG/ML injection; INJECT 1 ML INTRAMUSCULARLY ONCE PER MONTH. PATIENT NEEDS B12  BLOOD LEVELS CHECKED BEFORE NEXT REFILL    Ankylosing spondylitis of multiple sites in spine (H)  -     Discontinue: oxyCODONE IR (ROXICODONE) 10 MG tablet; Take 1-1.5 tablets (10-15 mg) by mouth every 6 hours as needed for moderate to severe pain Max of 6 per day  -     Discontinue: oxyCODONE IR (ROXICODONE) 10 MG tablet; Take 1-1.5 tablets (10-15 mg) by mouth every 6 hours as needed for moderate to severe pain Max of 6 per day  -     oxyCODONE IR (ROXICODONE) 10 MG tablet; Take 1-1.5 tablets (10-15 mg) by mouth every 6 hours as needed for moderate to severe pain Max of 6 per day        Regular exercise    The following health maintenance items are reviewed in Epic and correct as of today:  Health Maintenance   Topic Date Due     JOSÉ QUESTIONNAIRE 1 YEAR  08/29/2018     PHQ-9 Q1YR  08/29/2018     INFLUENZA VACCINE (1) 09/01/2018     URINE DRUG SCREEN Q1 YR  04/06/2019     TETANUS IMMUNIZATION (SYSTEM ASSIGNED)  08/26/2020     LIPID SCREEN Q5 YR MALE (SYSTEM ASSIGNED)  04/16/2023     HIV SCREEN (SYSTEM ASSIGNED)  Completed       Willy Forrest MD  [facility]  Family Practice  [facility]  [phone]    For any issues my office # is [phone]

## 2018-10-15 NOTE — NURSING NOTE
"Injectable Influenza Immunization Documentation    1.  Has the patient received the information for the injectable influenza vaccine? YES     2. Is the patient 6 months of age or older? YES     3. Does the patient have any of the following contraindications?         Severe allergy to eggs? No     Severe allergic reaction to previous influenza vaccines? No   Severe allergy to latex? No       History of Guillain-[place] syndrome? No     Currently have a temperature greater than 100.4F? No        4.  Severely egg allergic patients should have flu vaccine eligibility assessed by an MD, RN, or pharmacist, and those who received flu vaccine should be observed for 15 min by an MD, RN, Pharmacist, Medical Technician, or member of clinic staff.\": YES    5. Latex-allergic patients should be given latex-free influenza vaccine Yes. Please reference the Vaccine latex table to determine if your clinic s product is latex-containing.       Vaccination given by Riky Solitario LPN      10/15/2018    11:15 AM          "

## 2018-10-15 NOTE — MR AVS SNAPSHOT
After Visit Summary   10/15/2018    Oseas Edwards    MRN: [id number]           Patient Information     Date Of Birth          1970        Visit Information        Provider Department      10/15/2018 10:30 AM Willy Forrest MD [facility]        Today's Diagnoses     PA (pernicious anemia)        Ankylosing spondylitis of multiple sites in spine (H)           Follow-ups after your visit        Who to contact     If you have questions or need follow up information about today's clinic visit or your schedule please contact [facility] directly at [phone].  Normal or non-critical lab and imaging results will be communicated to you by m-spatialhart, letter or phone within 4 business days after the clinic has received the results. If you do not hear from us within 7 days, please contact the clinic through [employer]t or phone. If you have a critical or abnormal lab result, we will notify you by phone as soon as possible.  Submit refill requests through [employer] or call your pharmacy and they will forward the refill request to us. Please allow 3 business days for your refill to be completed.          Additional Information About Your Visit        MyChart Information     [employer] gives you secure access to your electronic health record. If you see a primary care provider, you can also send messages to your care team and make appointments. If you have questions, please call your primary care clinic.  If you do not have a primary care provider, please call [phone] and they will assist you.        Care EveryWhere ID     This is your Care EveryWhere ID. This could be used by other organizations to access your [place] medical records  [id number]        Your Vitals Were     Pulse BMI (Body Mass Index)                74 32.4 kg/m2           Blood Pressure from Last 3 Encounters:   10/15/18 110/70   07/16/18 104/70   04/16/18 116/64    Weight from Last 3 Encounters:   10/15/18 70.3 kg  (155 lb)   07/16/18 68.4 kg (150 lb 12.8 oz)   04/16/18 70.2 kg (154 lb 12.8 oz)              Today, you had the following     No orders found for display         Today's Medication Changes          These changes are accurate as of 10/15/18 10:57 AM.  If you have any questions, ask your nurse or doctor.               Start taking these medicines.        Dose/Directions    oxyCODONE IR 10 MG tablet   Commonly known as:  ROXICODONE   Used for:  Ankylosing spondylitis of multiple sites in spine (H)   Started by:  Willy Forrest MD        Dose:  10-15 mg   Start taking on:  12/15/2018   Take 1-1.5 tablets (10-15 mg) by mouth every 6 hours as needed for moderate to severe pain Max of 6 per day   Quantity:  180 tablet   Refills:  0            Where to get your medicines      These medications were sent to [facility] [address] [place] [address] [address]  [address], [facility] [address]     Phone:  [phone]     cyanocobalamin 1000 MCG/ML injection         Some of these will need a paper prescription and others can be bought over the counter.  Ask your nurse if you have questions.     Bring a paper prescription for each of these medications     oxyCODONE IR 10 MG tablet               Information about OPIOIDS     PRESCRIPTION OPIOIDS: WHAT YOU NEED TO KNOW   We gave you an opioid (narcotic) pain medicine. It is important to manage your pain, but opioids are not always the best choice. You should first try all the other options your care team gave you. Take this medicine for as short a time (and as few doses) as possible.    Some activities can increase your pain, such as bandage changes or therapy sessions. It may help to take your pain medicine 30 to 60 minutes before these activities. Reduce your stress by getting enough sleep, working on hobbies you enjoy and practicing relaxation or meditation. Talk to your care team about ways to manage your pain beyond  prescription opioids.    These medicines have risks:    DO NOT drive when on new or higher doses of pain medicine. These medicines can affect your alertness and reaction times, and you could be arrested for driving under the influence (DUI). If you need to use opioids long-term, talk to your care team about driving.    DO NOT operate heavy machinery    DO NOT do any other dangerous activities while taking these medicines.    DO NOT drink any alcohol while taking these medicines.     If the opioid prescribed includes acetaminophen, DO NOT take with any other medicines that contain acetaminophen. Read all labels carefully. Look for the word  acetaminophen  or  Tylenol.  Ask your pharmacist if you have questions or are unsure.    You can get addicted to pain medicines, especially if you have a history of addiction (chemical, alcohol or substance dependence). Talk to your care team about ways to reduce this risk.    All opioids tend to cause constipation. Drink plenty of water and eat foods that have a lot of fiber, such as fruits, vegetables, prune juice, apple juice and high-fiber cereal. Take a laxative (Miralax, milk of magnesia, Colace, Senna) if you don t move your bowels at least every other day. Other side effects include upset stomach, sleepiness, dizziness, throwing up, tolerance (needing more of the medicine to have the same effect), physical dependence and slowed breathing.    Store your pills in a secure place, locked if possible. We will not replace any lost or stolen medicine. If you don t finish your medicine, please throw away (dispose) as directed by your pharmacist. The [place] Pollution Control Agency has more information about safe disposal: [email or web address]://[email or web address]/living-green/managing-unwanted-medications         Primary Care Provider Office Phone # Fax #    Willy Forrest [phone] [address]  [facility] [address]        Equal Access to Services      CHEPE LUND : Hadii aad ku radha Casillas, waaxda luqadaha, qaybta kaalmada radha, enid pamela hayhali simonelainager wood . So [facility] [phone].    ATENCIÓN: Si habla español, tiene a fabian disposición servicios gratuitos de asistencia lingüística. Llame al [phone].    We comply with applicable federal civil rights laws and [place] laws. We do not discriminate on the basis of race, color, national origin, age, disability, sex, sexual orientation, or gender identity.            Thank you!     Thank you for choosing [facility]  for your care. Our goal is always to provide you with excellent care. Hearing back from our patients is one way we can continue to improve our services. Please take a few minutes to complete the written survey that you may receive in the mail after your visit with us. Thank you!             Your Updated Medication List - Protect others around you: Learn how to safely use, store and throw away your medicines at www.disposemymeds.org.          This list is accurate as of 10/15/18 10:57 AM.  Always use your most recent med list.                   Brand Name Dispense Instructions for use Diagnosis    ARTIFICIAL TEARS OP      Place 1 drop into both eyes daily as needed        calcium carbonate 500 mg-vitamin D 200 units 500-200 MG-UNIT per tablet    OSCAL with D;OYSTER SHELL CALCIUM     Take 1 tablet by mouth daily    Ankylosing spondylitis of multiple sites in spine (H)       cyanocobalamin 1000 MCG/ML injection    VITAMIN B12    30 mL    INJECT 1 ML INTRAMUSCULARLY ONCE PER MONTH. PATIENT NEEDS B12 BLOOD LEVELS CHECKED BEFORE NEXT REFILL    PA (pernicious anemia)       cyclobenzaprine 10 MG tablet    FLEXERIL    200 tablet    TAKE ONE TABLET BY MOUTH TWICE DAILY AS NEEDED FOR MUSCLE SPASM    Encounter for medication refill       diclofenac 1 % Gel topical gel    VOLTAREN     Apply topically daily    Ankylosing spondylitis of multiple sites in spine (H)       docusate  sodium 100 MG tablet    COLACE    60 tablet    Take 100 mg by mouth daily as needed        ENBREL 50 MG/ML injection   Generic drug:  etanercept      Inject 50 mg Subcutaneous once a week        finasteride 5 MG tablet    PROSCAR    90 tablet    TAKE ONE TABLET BY MOUTH ONCE DAILY    Low serum testosterone level       indomethacin 50 MG capsule    INDOCIN    90 capsule    Take 1 capsule (50 mg) by mouth daily    Ankylosing spondylitis of multiple sites in spine (H)       lisinopril-hydrochlorothiazide 10-12.5 MG per tablet    PRINZIDE/ZESTORETIC    90 tablet    TAKE ONE TABLET BY MOUTH ONCE DAILY    Encounter for long-term (current) use of medications       mometasone 0.1 % ointment    ELOCON    45 g    Apply sparingly to affected area twice daily as needed.  Do not apply to face.    Atopic dermatitis       MULTIVITAMIN/IRON PO      Take 1 tablet by mouth daily        ondansetron 4 MG ODT tab    ZOFRAN-ODT    96 tablet    Take 1 tablet (4 mg) by mouth every 6 hours as needed for nausea    Status post revision of total hip replacement       order for DME     1 each    Equipment being ordered: Crutches () Treatment Diagnosis: impaired gait.    Status post revision of total hip replacement       oxyCODONE IR 10 MG tablet   Start taking on:  12/15/2018    ROXICODONE    180 tablet    Take 1-1.5 tablets (10-15 mg) by mouth every 6 hours as needed for moderate to severe pain Max of 6 per day    Ankylosing spondylitis of multiple sites in spine (H)       testosterone 50 MG/5GM (1%) topical gel    ANDROGEL/TESTIM    90 packet    Place 1 packet (50 mg) onto the skin daily    Encounter for medication refill

## 2018-10-16 LAB
FOLATE: >20 NG/ML
VIT B12 SERPL-MCNC: 1609 PG/ML (ref 232–1245)

## 2018-10-16 ASSESSMENT — PATIENT HEALTH QUESTIONNAIRE - PHQ9: SUM OF ALL RESPONSES TO PHQ QUESTIONS 1-9: 3

## 2018-10-16 ASSESSMENT — ANXIETY QUESTIONNAIRES: GAD7 TOTAL SCORE: 0

## 2018-12-19 ENCOUNTER — TELEPHONE (OUTPATIENT)
Dept: FAMILY MEDICINE | Facility: CLINIC | Age: 48
End: 2018-12-19

## 2018-12-19 DIAGNOSIS — Z76.0 ENCOUNTER FOR MEDICATION REFILL: ICD-10-CM

## 2018-12-19 DIAGNOSIS — M45.9 AS (ANKYLOSING SPONDYLITIS) (H): Primary | ICD-10-CM

## 2018-12-19 RX ORDER — CYCLOBENZAPRINE HCL 10 MG
TABLET ORAL
Qty: 60 TABLET | Refills: 0 | Status: SHIPPED | OUTPATIENT
Start: 2018-12-19 | End: 2019-05-13

## 2018-12-19 RX ORDER — MELOXICAM 15 MG/1
15 TABLET ORAL DAILY
Qty: 60 TABLET | Refills: 1 | Status: SHIPPED | OUTPATIENT
Start: 2018-12-19 | End: 2020-07-24

## 2018-12-19 NOTE — TELEPHONE ENCOUNTER
"Pharmacy calls reporting indomethacin 50mg is unavailable. On back order with no resolution date. Pharmacist suspects could be 1-3 months for it to be available. Alternatives suggested by pharm include mobic or celebrex.   Patient has been on indomethacin 50mg QD for years for his Ankylosing Spondylitis. Dr. Forrest has been prescriber of this.   Plan: per Dr. Forrest - patient may try meloxicam (Mobic) 15mg QD.   Called patient with this information. He is willing to try the meloxicam. Rx sent.   He also requests refill on cyclobenzaprine 10mg which he states takes BID. Advised will send this request to Dr. Forrest.     Also asked patient if he has scheduled pain clinic consult yet. States has \"not gotten around to it yet and lost the forms he was to fill out\". Advised him to call pain clinic to schedule and get forms resent. Patient agrees.     Patient does have follow up appt with Dr. Forrest 1/14/19.   Heena Haro RN    "

## 2019-01-14 ENCOUNTER — OFFICE VISIT (OUTPATIENT)
Dept: FAMILY MEDICINE | Facility: CLINIC | Age: 49
End: 2019-01-14

## 2019-01-14 VITALS
RESPIRATION RATE: 16 BRPM | BODY MASS INDEX: 31.56 KG/M2 | HEART RATE: 71 BPM | DIASTOLIC BLOOD PRESSURE: 68 MMHG | SYSTOLIC BLOOD PRESSURE: 108 MMHG | WEIGHT: 151 LBS | OXYGEN SATURATION: 98 %

## 2019-01-14 DIAGNOSIS — E29.1 HYPOGONADISM MALE: ICD-10-CM

## 2019-01-14 DIAGNOSIS — G89.4 CHRONIC PAIN SYNDROME: Primary | ICD-10-CM

## 2019-01-14 DIAGNOSIS — Z76.0 ENCOUNTER FOR MEDICATION REFILL: ICD-10-CM

## 2019-01-14 DIAGNOSIS — M45.0 ANKYLOSING SPONDYLITIS OF MULTIPLE SITES IN SPINE (H): ICD-10-CM

## 2019-01-14 PROCEDURE — 99214 OFFICE O/P EST MOD 30 MIN: CPT | Performed by: FAMILY MEDICINE

## 2019-01-14 PROCEDURE — 36415 COLL VENOUS BLD VENIPUNCTURE: CPT | Performed by: FAMILY MEDICINE

## 2019-01-14 RX ORDER — CYCLOBENZAPRINE HCL 5 MG
5 TABLET ORAL
COMMUNITY
Start: 2013-01-09 | End: 2019-10-28

## 2019-01-14 RX ORDER — OXYCODONE HYDROCHLORIDE 30 MG/1
30 TABLET, FILM COATED, EXTENDED RELEASE ORAL EVERY 12 HOURS
Qty: 60 TABLET | Refills: 0 | Status: SHIPPED | OUTPATIENT
Start: 2019-03-14 | End: 2019-02-18

## 2019-01-14 RX ORDER — INDOMETHACIN 50 MG/1
50 CAPSULE ORAL
COMMUNITY
Start: 2012-08-13 | End: 2019-05-13

## 2019-01-14 RX ORDER — TESTOSTERONE 12.5 MG/1.25G
50 GEL TOPICAL DAILY
Qty: 90 PACKET | Refills: 3 | Status: SHIPPED | OUTPATIENT
Start: 2019-01-14 | End: 2021-08-26

## 2019-01-14 RX ORDER — CYCLOBENZAPRINE HCL 10 MG
TABLET ORAL
Qty: 180 TABLET | Refills: 3 | Status: SHIPPED | OUTPATIENT
Start: 2019-01-14 | End: 2020-03-22

## 2019-01-14 RX ORDER — OXYCODONE HYDROCHLORIDE 30 MG/1
30 TABLET, FILM COATED, EXTENDED RELEASE ORAL EVERY 12 HOURS
Qty: 60 TABLET | Refills: 0 | Status: SHIPPED | OUTPATIENT
Start: 2019-02-14 | End: 2019-01-14

## 2019-01-14 RX ORDER — CYANOCOBALAMIN 1000 UG/ML
1000 INJECTION, SOLUTION INTRAMUSCULAR; SUBCUTANEOUS
COMMUNITY
Start: 2017-11-01 | End: 2020-10-26

## 2019-01-14 RX ORDER — INDOMETHACIN 50 MG/1
50 CAPSULE ORAL 2 TIMES DAILY WITH MEALS
Qty: 90 CAPSULE | Refills: 3 | Status: SHIPPED | OUTPATIENT
Start: 2019-01-14 | End: 2020-07-24

## 2019-01-14 RX ORDER — CYCLOBENZAPRINE HCL 10 MG
TABLET ORAL
Qty: 90 TABLET | Refills: 3 | Status: SHIPPED | OUTPATIENT
Start: 2019-01-14 | End: 2019-01-14

## 2019-01-14 RX ORDER — OXYCODONE HYDROCHLORIDE 30 MG/1
30 TABLET, FILM COATED, EXTENDED RELEASE ORAL EVERY 12 HOURS
Qty: 60 TABLET | Refills: 0 | Status: SHIPPED | OUTPATIENT
Start: 2019-01-14 | End: 2019-01-14

## 2019-01-14 NOTE — PROGRESS NOTES
Problem(s) Oriented visit        SUBJECTIVE:                                                    Oseas Edwards is a 48 year old male who presents to clinic today for the following health issues :        1. Ankylosing spondylitis of multiple sites in spine (H)  Starting to take Indocin again at bedtime, finding it hard to find at pharmacies.    - naloxone (NARCAN) 1 mg/mL for intranasal kit (2 syringes with 2 mucosal atomizer device); In opioid overdose put cone in nostril and push 1/2 of contents into each nostril.  Repeat every 3 min if no response until help arrives.  Dispense: 1 kit; Refill: 1  - oxyCODONE (OXYCONTIN) 30 MG 12 hr tablet; Take 1 tablet (30 mg) by mouth every 12 hours  Dispense: 60 tablet; Refill: 0    2. Chronic pain syndrome  He has not had narcan in the past,  Due for Urine tox in April  - naloxone (NARCAN) 1 mg/mL for intranasal kit (2 syringes with 2 mucosal atomizer device); In opioid overdose put cone in nostril and push 1/2 of contents into each nostril.  Repeat every 3 min if no response until help arrives.  Dispense: 1 kit; Refill: 1  - oxyCODONE (OXYCONTIN) 30 MG 12 hr tablet; Take 1 tablet (30 mg) by mouth every 12 hours  Dispense: 60 tablet; Refill: 0         Problem list, Medication list, Allergies, and Medical/Social/Surgical histories reviewed in [facility] and updated as appropriate.   Additional history: as documented    ROS:  General and Resp. completed and negative except as noted above    Histories:   Patient Active Problem List   Diagnosis     AS (ankylosing spondylitis) (H)     PA (pernicious anemia)     Low serum testosterone level     Health Care Home     Chronic narcotic dependence (H)     Rectus diastasis     Hx of gastric ulcer     Chronic pain syndrome     Keloid scar, mostly just dark     Failed total hip arthroplasty with dislocation, initial encounter (H)     Past Surgical History:   Procedure Laterality Date     ARTHROPLASTY REVISION HIP Left 2/14/2017    Procedure:  ARTHROPLASTY REVISION HIP;  Surgeon: Saravanan Alcaraz MD;  Location:  OR     HERNIORRHAPHY VENTRAL  11/11/2013    Procedure: HERNIORRHAPHY VENTRAL;  UMBILICAL HERNIA REPAIR WITH MESH, RECTUS DIASTASIS REPAIR WITH MESH;  Surgeon: Jason Dodson MD;  Location: [facility]     JOINT REPLACEMENT, HIP RT/LT  1994    Left     JOINT REPLACEMENT, HIP RT/LT  2000ish    Right     OPEN SEPARATION COMPONENT HERNIORRHAPHY  11/11/2013    Procedure: OPEN SEPARATION COMPONENT HERNIORRHAPHY;;  Surgeon: Jason Dodson MD;  Location: [facility]       Social History     Tobacco Use     Smoking status: Former Smoker     Last attempt to quit: 1/1/2009     Years since quitting: 10.0     Smokeless tobacco: Never Used     Tobacco comment: quit 2 1/2 yrs   Substance Use Topics     Alcohol use: Yes     Comment: 1 to 2 per week     No family history on file.        OBJECTIVE:                                                    /68   Pulse 71   Resp 16   Wt 68.5 kg (151 lb)   SpO2 98%   BMI 31.56 kg/m    Body mass index is 31.56 kg/m .   APPEARANCE: = healthy, alert, mild distress and back is mildly deformed  Conj/Eyelids = noninjected and lids and lashes are without inflammation  PERRLA/Irises = Pupils Round Reactive to Light and Irisis without inflammation  Neck = No anterior or posterior adenopathy appreciated.  Thyroid = Not enlarged and no masses felt  Resp effort = Calm regular breathing  Breath Sounds = Good air movement with no rales or rhonchi in any lung fields  Heart Rate, Rhythm, & sounds (no Murm)  = Regular rate and rhythm with no S3, S4, or murmur appreciated.  Carotid Art's = Pulses full and equal and no bruits appreciated  Abdomen = Soft, nontender, no masses, & bowel sounds in all quadrants  Liver/Spleen = Normal span and no splenomegaly noted  Digits and Nails = FROM in all finger joints, no nail dystrophy  Ext (edema) = No pretibial edema noted or elsewhere  Musculsktl =  Strength and ROM of major  joints are within normal limits  SKIN = absent significant rashes or lesions   Recent/Remote Memory = Alert and Oriented x 3  Mood/Affect = Cooperative and interested     ASSESSMENT/PLAN:                                                        Osaes was seen today for recheck medication.    Diagnoses and all orders for this visit:    Chronic pain syndrome  -     Discontinue: oxyCODONE (OXYCONTIN) 30 MG 12 hr tablet; Take 1 tablet (30 mg) by mouth every 12 hours  -     naloxone (NARCAN) 1 mg/mL for intranasal kit (2 syringes with 2 mucosal atomizer device); In opioid overdose put cone in nostril and push 1/2 of contents into each nostril.  Repeat every 3 min if no response until help arrives.  -     Discontinue: oxyCODONE (OXYCONTIN) 30 MG 12 hr tablet; Take 1 tablet (30 mg) by mouth every 12 hours  -     oxyCODONE (OXYCONTIN) 30 MG 12 hr tablet; Take 1 tablet (30 mg) by mouth every 12 hours  -     indomethacin (INDOCIN) 50 MG capsule; Take 1 capsule (50 mg) by mouth 2 times daily (with meals)    Ankylosing spondylitis of multiple sites in spine (H)  -     Discontinue: oxyCODONE (OXYCONTIN) 30 MG 12 hr tablet; Take 1 tablet (30 mg) by mouth every 12 hours  -     naloxone (NARCAN) 1 mg/mL for intranasal kit (2 syringes with 2 mucosal atomizer device); In opioid overdose put cone in nostril and push 1/2 of contents into each nostril.  Repeat every 3 min if no response until help arrives.  -     Discontinue: oxyCODONE (OXYCONTIN) 30 MG 12 hr tablet; Take 1 tablet (30 mg) by mouth every 12 hours  -     oxyCODONE (OXYCONTIN) 30 MG 12 hr tablet; Take 1 tablet (30 mg) by mouth every 12 hours  -     indomethacin (INDOCIN) 50 MG capsule; Take 1 capsule (50 mg) by mouth 2 times daily (with meals)    Encounter for medication refill  -     Discontinue: cyclobenzaprine (FLEXERIL) 10 MG tablet; TAKE ONE TABLET BY MOUTH TWICE DAILY AS NEEDED FOR MUSCLE SPASM  -     cyclobenzaprine (FLEXERIL) 10 MG tablet; TAKE ONE TABLET BY MOUTH  TWICE DAILY AS NEEDED FOR MUSCLE SPASM        >25 min spent with patient, greater than 50% spent on discussion/education/planning, etc. About The primary encounter diagnosis was Chronic pain syndrome. Diagnoses of Ankylosing spondylitis of multiple sites in spine (H) and Encounter for medication refill were also pertinent to this visit.      See Patient Instructions    The following health maintenance items are reviewed in Epic and correct as of today:  Health Maintenance   Topic Date Due     ZOSTER IMMUNIZATION (1 of 2) 04/04/2019     URINE DRUG SCREEN Q1 YR  04/06/2019     JOSÉ QUESTIONNAIRE 1 YEAR  10/15/2019     PHQ-9 Q1YR  10/15/2019     DTAP/TDAP/TD IMMUNIZATION (2 - Td) 08/26/2020     LIPID SCREEN Q5 YR MALE (SYSTEM ASSIGNED)  04/16/2023     HIV SCREEN (SYSTEM ASSIGNED)  Completed     INFLUENZA VACCINE  Completed     IPV IMMUNIZATION  Aged Out     MENINGITIS IMMUNIZATION  Aged Out       Willy Forrest MD  [facility]  Family Practice  [facility]  [phone]    For any issues my office # is [phone]

## 2019-01-15 LAB — TESTOST SERPL-MCNC: 1000 NG/DL (ref 264–916)

## 2019-02-18 ENCOUNTER — OFFICE VISIT (OUTPATIENT)
Dept: FAMILY MEDICINE | Facility: CLINIC | Age: 49
End: 2019-02-18

## 2019-02-18 VITALS
BODY MASS INDEX: 32.02 KG/M2 | HEART RATE: 87 BPM | OXYGEN SATURATION: 98 % | SYSTOLIC BLOOD PRESSURE: 110 MMHG | DIASTOLIC BLOOD PRESSURE: 72 MMHG | RESPIRATION RATE: 16 BRPM | WEIGHT: 153.2 LBS

## 2019-02-18 DIAGNOSIS — K21.00 GASTROESOPHAGEAL REFLUX DISEASE WITH ESOPHAGITIS: ICD-10-CM

## 2019-02-18 DIAGNOSIS — N52.9 ERECTILE DYSFUNCTION, UNSPECIFIED ERECTILE DYSFUNCTION TYPE: Primary | ICD-10-CM

## 2019-02-18 DIAGNOSIS — F11.20 CHRONIC NARCOTIC DEPENDENCE (H): ICD-10-CM

## 2019-02-18 PROCEDURE — 99214 OFFICE O/P EST MOD 30 MIN: CPT | Performed by: FAMILY MEDICINE

## 2019-02-18 RX ORDER — PANTOPRAZOLE SODIUM 20 MG/1
20 TABLET, DELAYED RELEASE ORAL DAILY
Qty: 90 TABLET | Refills: 3 | Status: SHIPPED | OUTPATIENT
Start: 2019-02-18 | End: 2020-03-22

## 2019-02-18 RX ORDER — OXYCODONE HYDROCHLORIDE 10 MG/1
10 TABLET ORAL EVERY 4 HOURS PRN
Qty: 180 TABLET | Refills: 0 | Status: SHIPPED | OUTPATIENT
Start: 2019-02-18 | End: 2019-02-18

## 2019-02-18 RX ORDER — OXYCODONE HYDROCHLORIDE 10 MG/1
10 TABLET ORAL EVERY 4 HOURS PRN
Qty: 180 TABLET | Refills: 0 | Status: SHIPPED | OUTPATIENT
Start: 2019-03-18 | End: 2019-02-18

## 2019-02-18 RX ORDER — OXYCODONE HYDROCHLORIDE 10 MG/1
10 TABLET ORAL EVERY 4 HOURS PRN
Qty: 180 TABLET | Refills: 0 | Status: SHIPPED | OUTPATIENT
Start: 2019-04-18 | End: 2019-05-13

## 2019-02-18 NOTE — PROGRESS NOTES
Problem(s) Oriented visit        SUBJECTIVE:                                                    Oseas Edwards is a 48 year old male who presents to clinic today for the following health issues :    1. Chronic narcotic dependence (H)  Unable to afford the oxycontin due to the very high co payment  - oxyCODONE IR (ROXICODONE) 10 MG tablet; Take 1 tablet (10 mg) by mouth every 4 hours as needed for pain  Dispense: 180 tablet; Refill: 0     2. ED  Has tried sildenafil in the past, interested in further work up.    3.   The patient complains of typical reflux symptoms: burning sensation after heavy meals, especially when lying down, sometimes with true waterbrash. Denies dysphagia, black or bloody stools or abdominal pain.    Problem list, Medication list, Allergies, and Medical/Social/Surgical histories reviewed in EPIC and updated as appropriate.   Additional history: as documented    ROS:  General and CV completed and negative except as noted above    Histories:   Patient Active Problem List   Diagnosis     AS (ankylosing spondylitis) (H)     PA (pernicious anemia)     Low serum testosterone level     Health Care Home     Chronic narcotic dependence (H)     Rectus diastasis     Hx of gastric ulcer     Chronic pain syndrome     Keloid scar, mostly just dark     Failed total hip arthroplasty with dislocation, initial encounter (H)     Past Surgical History:   Procedure Laterality Date     ARTHROPLASTY REVISION HIP Left 2/14/2017    Procedure: ARTHROPLASTY REVISION HIP;  Surgeon: Saravanan Alcaraz MD;  Location:  OR     HERNIORRHAPHY VENTRAL  11/11/2013    Procedure: HERNIORRHAPHY VENTRAL;  UMBILICAL HERNIA REPAIR WITH MESH, RECTUS DIASTASIS REPAIR WITH MESH;  Surgeon: Jason Dodson MD;  [facility]     JOINT REPLACEMENT, HIP RT/LT  1994    Left     JOINT REPLACEMENT, HIP RT/LT  2000ish    Right     OPEN SEPARATION COMPONENT HERNIORRHAPHY  11/11/2013    Procedure: OPEN SEPARATION COMPONENT HERNIORRHAPHY;;   Surgeon: Jason Dodson MD;  Location: [facility]       Social History     Tobacco Use     Smoking status: Former Smoker     Last attempt to quit: 1/1/2009     Years since quitting: 10.1     Smokeless tobacco: Never Used     Tobacco comment: quit 2 1/2 yrs   Substance Use Topics     Alcohol use: Yes     Comment: 1 to 2 per week     No family history on file.        OBJECTIVE:                                                    /72   Pulse 87   Resp 16   Wt 69.5 kg (153 lb 3.2 oz)   SpO2 98%   BMI 32.02 kg/m    Body mass index is 32.02 kg/m .   APPEARANCE: = Relaxed and in no distress  Conj/Eyelids = noninjected and lids and lashes are without inflammation  PERRLA/Irises = Pupils Round Reactive to Light and Irisis without inflammation  Ears/Nose = External structures and Nares have usual shape and form  Ear canals and TM's = Canals are not inflammed and have none or little wax and the drums are not injected and have a light reflex   Lips/Teeth/Gums = No lesions seen, good dentition, and gums seem healthy  Oropharynx = No leukoplakia, No injection to the tissues, Normal Uvula  Neck = No anterior or posterior adenopathy appreciated.  Resp effort = Calm regular breathing  Breath Sounds = Good air movement with no rales or rhonchi in any lung fields  Musculsktl: decreased range of motion multiplejoints with decreased rom  Mood/Affect = Cooperative and interested     ASSESSMENT/PLAN:                                                        Oseas was seen today for gastrointestinal problem and recheck medication.    Diagnoses and all orders for this visit:    Erectile dysfunction, unspecified erectile dysfunction type  -     Referral to [facility]    Chronic narcotic dependence (H)  -     Discontinue: oxyCODONE (ROXICODONE) 10 MG tablet; Take 1 tablet (10 mg) by mouth every 4 hours as needed for pain  -     Discontinue: oxyCODONE IR (ROXICODONE) 10 MG tablet; Take 1 tablet (10 mg) by mouth every 4  hours as needed for pain  -     oxyCODONE IR (ROXICODONE) 10 MG tablet; Take 1 tablet (10 mg) by mouth every 4 hours as needed for pain    Gastroesophageal reflux disease with esophagitis  -     pantoprazole (PROTONIX) 20 MG EC tablet; Take 1 tablet (20 mg) by mouth daily        Regular exercise  See Patient Instructions    The following health maintenance items are reviewed in Epic and correct as of today:  Health Maintenance   Topic Date Due     ZOSTER IMMUNIZATION (1 of 2) 04/04/2019     URINE DRUG SCREEN Q1 YR  04/06/2019     JOSÉ QUESTIONNAIRE 1 YEAR  10/15/2019     PHQ-9 Q1YR  10/15/2019     DTAP/TDAP/TD IMMUNIZATION (2 - Td) 08/26/2020     LIPID SCREEN Q5 YR MALE (SYSTEM ASSIGNED)  04/16/2023     HIV SCREEN (SYSTEM ASSIGNED)  Completed     INFLUENZA VACCINE  Completed     IPV IMMUNIZATION  Aged Out     MENINGITIS IMMUNIZATION  Aged Out       Willy Forrest MD  [facility]  Family Practice  [facility]  [phone]    For any issues my office # is [phone]

## 2019-04-01 ENCOUNTER — TRANSFERRED RECORDS (OUTPATIENT)
Dept: FAMILY MEDICINE | Facility: CLINIC | Age: 49
End: 2019-04-01

## 2019-05-13 ENCOUNTER — OFFICE VISIT (OUTPATIENT)
Dept: FAMILY MEDICINE | Facility: CLINIC | Age: 49
End: 2019-05-13

## 2019-05-13 VITALS
RESPIRATION RATE: 16 BRPM | DIASTOLIC BLOOD PRESSURE: 70 MMHG | WEIGHT: 151.4 LBS | OXYGEN SATURATION: 99 % | HEART RATE: 75 BPM | SYSTOLIC BLOOD PRESSURE: 112 MMHG | BODY MASS INDEX: 31.64 KG/M2

## 2019-05-13 DIAGNOSIS — M45.9 ANKYLOSING SPONDYLITIS, UNSPECIFIED SITE OF SPINE (H): Primary | ICD-10-CM

## 2019-05-13 DIAGNOSIS — L30.9 ECZEMA, UNSPECIFIED TYPE: ICD-10-CM

## 2019-05-13 DIAGNOSIS — F11.20 CHRONIC NARCOTIC DEPENDENCE (H): ICD-10-CM

## 2019-05-13 DIAGNOSIS — Z87.11 HX OF GASTRIC ULCER: ICD-10-CM

## 2019-05-13 PROCEDURE — 99214 OFFICE O/P EST MOD 30 MIN: CPT | Performed by: FAMILY MEDICINE

## 2019-05-13 RX ORDER — OXYCODONE HYDROCHLORIDE 10 MG/1
10 TABLET ORAL EVERY 4 HOURS PRN
Qty: 180 TABLET | Refills: 0 | Status: SHIPPED | OUTPATIENT
Start: 2019-07-13 | End: 2019-07-30

## 2019-05-13 RX ORDER — OXYCODONE HYDROCHLORIDE 10 MG/1
10 TABLET ORAL EVERY 4 HOURS PRN
Qty: 180 TABLET | Refills: 0 | Status: SHIPPED | OUTPATIENT
Start: 2019-05-13 | End: 2019-05-13

## 2019-05-13 RX ORDER — OXYCODONE HYDROCHLORIDE 10 MG/1
10 TABLET ORAL EVERY 4 HOURS PRN
Qty: 180 TABLET | Refills: 0 | Status: SHIPPED | OUTPATIENT
Start: 2019-06-13 | End: 2019-05-13

## 2019-05-13 NOTE — LETTER
[facility]  05/13/19    Patient: Oseas Edwards  YOB: 1970  Medical Record Number: [id number]  CSN: [id number]                                                                              Non-opioid Controlled Substance Agreement    I understand that my care provider has prescribed a controlled substance to help manage my condition(s). I am taking this medicine to help me function or work. I know this is strong medicine, and that it can cause serious side effects. Controlled substances can be sedating, addicting and may cause a dependency on the drug. They can affect my ability to drive or think, and cause depression. They need to be taken exactly as prescribed. Combining controlled substances with certain medicines or chemicals (such as cocaine, sedatives and tranquilizers, sleeping pills, meth) can be dangerous or even fatal. Also, if I stop controlled substances suddenly, I may have severe withdrawal symptoms.  If not helpful, I may be asked to stop them.    The risks, benefits, and side effects of these medicine(s) were explained to me. I agree that:    1. I will take part in other treatments as advised by my care team. This may be psychiatry or counseling, physical therapy, behavioral therapy, group treatment or a referral to a pain clinic. I will reduce or stop my medicine when my care team tells me to do so.  2. I will take my medicines as prescribed. I will not change the dose or schedule unless my care team tells me to. There will be no refills if I  run out early.   I may be contactedwithout warning and asked to complete a urine drug test or pill count at any time.   3. I will keep all my appointments, and understand this is part of the monitoring of controlled substances. My care team may require an office visit for EVERY controlled substance refill. If I miss appointments or don t follow instructions, my care team may stop my medicine.  4. I will not ask other providers to prescribe  controlled substances, and I will not accept controlled substances from other people. If I need another prescribed controlled substance for a new reason, I will tell my care team within 1 business day.  5. I will use one pharmacy to fill all of my controlled substance prescriptions, and it is up to me to make sure that I do not run out of my medicines on weekends or holidays. If my care team is willing to refill my controlled substance prescription without a visit, I must request refills only during office hours, refills may take up to 3 days to process, and it may take up to 5 to 7 days for my medicine to be mailed and ready at my pharmacy. Prescriptions will not be mailed anywhere except my pharmacy.    6. I am responsible for my prescriptions. If the medicine/prescription is lost or stolen, it will not be replaced. I also agree not to share controlled substance medicines with anyone.              [facility]  05/13/19  Patient:  Oseas Edwards  YOB: 1970  Medical Record Number: [id number]  CSN: [id number]    7. I agree to not use ANY illegal or recreational drugs. This includes marijuana, cocaine, bath salts or other drugs. I agree not to use alcohol unless my care team says I may. I agree to give urine samples whenever asked. If I don t give a urine sample, the care team may stop my medicine.    8. If I enroll in the [place] Medical Marijuana program, I will tell my care team. I will also sign an agreement to share my medical records with my care team.    9. I will bring in my list of medicines (or my medicine bottles) each time I come to the clinic.   10. I will tell my care team right away if I become pregnant or have a new medical problem treated outside of my regular clinic.  11. I understand that this medicine can affect my thinking and judgment. It may be unsafe for me to drive, use machinery and do dangerous tasks. I will not do any of these things until I know how the medicine affects  me. If my dose changes, I will wait to see how it affects me. I will contact my care team if I have concerns about medicine side effects.    I understand that if I do not follow any of the conditions above, my prescriptions or treatment may be stopped.      I agree that my provider, clinic care team, and pharmacy may work with any city, state or federal law enforcement agency that investigates the misuse, sale, or other diversion of my controlled medicine. I will allow my provider to discuss my care with or share a copy of this agreement with any other treating provider, pharmacy or emergency room where I receive care. I agree to give up (waive) any right of privacy or confidentiality with respect to these consents.   I have read this agreement and have asked questions about anything I did not understand.    ____________________________________________________    ____________  ________  Patient signature                                                         Date      Time    ____________________________________________________     ____________  ________  Witness                                                          Date      Time    ____________________________________________________  Provider signature

## 2019-05-13 NOTE — PROGRESS NOTES
Problem(s) Oriented visit        SUBJECTIVE:                                                    Oseas Edwards is a 49 year old male who presents to clinic today for the following health issues :    1. Chronic narcotic dependence (H)  Pain still finds help with the current high dose regimine.  Has tried indocin high dose in the past and got an ulcer is not surgically curable    - oxyCODONE IR (ROXICODONE) 10 MG tablet; Take 1 tablet (10 mg) by mouth every 4 hours as needed for pain  Dispense: 180 tablet; Refill: 0    2. Ankylosing spondylitis, unspecified site of spine (H)  The main issue    3. Hx of gastric ulcer  No signs of bleeding or of pain in the abdomen at this regimine.     4. Hands are not improved with previous treatments    Problem list, Medication list, Allergies, and Medical/Social/Surgical histories reviewed in EPIC and updated as appropriate.   Additional history: as documented    ROS:  General and CV completed and negative except as noted above    Histories:   Patient Active Problem List   Diagnosis     AS (ankylosing spondylitis) (H)     PA (pernicious anemia)     Low serum testosterone level     Health Care Home     Chronic narcotic dependence (H)     Rectus diastasis     Hx of gastric ulcer     Chronic pain syndrome     Keloid scar, mostly just dark     Failed total hip arthroplasty with dislocation, initial encounter (H)     Past Surgical History:   Procedure Laterality Date     ARTHROPLASTY REVISION HIP Left 2/14/2017    Procedure: ARTHROPLASTY REVISION HIP;  Surgeon: Saravanan Alcaraz MD;  Location:  OR     HERNIORRHAPHY VENTRAL  11/11/2013    Procedure: HERNIORRHAPHY VENTRAL;  UMBILICAL HERNIA REPAIR WITH MESH, RECTUS DIASTASIS REPAIR WITH MESH;  Surgeon: Jason Dodson MD;  [facility]:  [facility]     JOINT REPLACEMENT, HIP RT/LT  1994    Left     JOINT REPLACEMENT, HIP RT/LT  2000ish    Right     OPEN SEPARATION COMPONENT HERNIORRHAPHY  11/11/2013    Procedure: OPEN SEPARATION COMPONENT  HERNIORRHAPHY;;  Surgeon: Jason Dodson MD;  Location: [facility]       Social History     Tobacco Use     Smoking status: Former Smoker     Last attempt to quit: 1/1/2009     Years since quitting: 10.3     Smokeless tobacco: Never Used     Tobacco comment: quit 2 1/2 yrs   Substance Use Topics     Alcohol use: Yes     Comment: 1 to 2 per week     No family history on file.        OBJECTIVE:                                                    /70   Pulse 75   Resp 16   Wt 68.7 kg (151 lb 6.4 oz)   SpO2 99%   BMI 31.64 kg/m    Body mass index is 31.64 kg/m .   APPEARANCE: = Relaxed and in no distress  Conj/Eyelids = noninjected and lids and lashes are without inflammation  PERRLA/Irises = Pupils Round Reactive to Light and Irisis without inflammation  Neck = No anterior or posterior adenopathy appreciated.  Thyroid = Not enlarged and no masses felt  Resp effort = Calm regular breathing  Breath Sounds = Good air movement with no rales or rhonchi in any lung fields  Heart Rate, Rhythm, & sounds (no Murm)  = Regular rate and rhythm with no S3, S4, or murmur appreciated.  Carotid Art's = Pulses full and equal and no bruits appreciated  Abdomen = Soft, nontender, no masses, & bowel sounds in all quadrants  Liver/Spleen = Normal span and no splenomegaly noted  Digits and Nails = FROM in all finger joints, no nail dystrophy  Ext (edema) = No pretibial edema noted or elsewhere  Musculsktl =  Strength and ROM of major joints are within normal limits  SKIN = absent significant rashes or lesions   Recent/Remote Memory = Alert and Oriented x 3  Mood/Affect = Cooperative and interested     ASSESSMENT/PLAN:                                                        Farooq was seen today for other.    Diagnoses and all orders for this visit:    Ankylosing spondylitis, unspecified site of spine (H)    Chronic narcotic dependence (H)  -     Discontinue: oxyCODONE IR (ROXICODONE) 10 MG tablet; Take 1 tablet (10 mg) by mouth  every 4 hours as needed for pain  -     Discontinue: oxyCODONE IR (ROXICODONE) 10 MG tablet; Take 1 tablet (10 mg) by mouth every 4 hours as needed for pain  -     oxyCODONE IR (ROXICODONE) 10 MG tablet; Take 1 tablet (10 mg) by mouth every 4 hours as needed for pain    Hx of gastric ulcer    Eczema, unspecified type  -     Referral to [facility]          See Patient Instructions    The following health maintenance items are reviewed in Epic and correct as of today:  Health Maintenance   Topic Date Due     MEDICARE ANNUAL WELLNESS VISIT  06/06/2017     ZOSTER IMMUNIZATION (1 of 2) 04/04/2019     URINE DRUG SCREEN Q1 YR  04/06/2019     JOSÉ QUESTIONNAIRE 1 YEAR  10/15/2019     PHQ-9 Q1YR  10/15/2019     DTAP/TDAP/TD IMMUNIZATION (2 - Td) 08/26/2020     COLON CANCER SCREEN (SYSTEM ASSIGNED)  06/20/2021     LIPID SCREEN Q5 YR MALE (SYSTEM ASSIGNED)  04/16/2023     HIV SCREEN (SYSTEM ASSIGNED)  Completed     INFLUENZA VACCINE  Completed     IPV IMMUNIZATION  Completed     MENINGITIS IMMUNIZATION  Aged Out       Willy Forrest MD  [facility]  Family Practice  [facility]  [phone]    For any issues my office # is [phone]

## 2019-06-03 ENCOUNTER — OFFICE VISIT (OUTPATIENT)
Dept: FAMILY MEDICINE | Facility: CLINIC | Age: 49
End: 2019-06-03

## 2019-06-03 VITALS
SYSTOLIC BLOOD PRESSURE: 114 MMHG | OXYGEN SATURATION: 98 % | BODY MASS INDEX: 29.68 KG/M2 | HEART RATE: 75 BPM | WEIGHT: 151.2 LBS | DIASTOLIC BLOOD PRESSURE: 66 MMHG | HEIGHT: 60 IN

## 2019-06-03 DIAGNOSIS — G89.4 CHRONIC PAIN SYNDROME: ICD-10-CM

## 2019-06-03 DIAGNOSIS — F11.20 CHRONIC NARCOTIC DEPENDENCE (H): ICD-10-CM

## 2019-06-03 DIAGNOSIS — R79.89 LOW SERUM TESTOSTERONE LEVEL: ICD-10-CM

## 2019-06-03 DIAGNOSIS — M45.9 ANKYLOSING SPONDYLITIS, UNSPECIFIED SITE OF SPINE (H): ICD-10-CM

## 2019-06-03 DIAGNOSIS — Z01.818 PREOP GENERAL PHYSICAL EXAM: Primary | ICD-10-CM

## 2019-06-03 DIAGNOSIS — M62.08 RECTUS DIASTASIS: ICD-10-CM

## 2019-06-03 DIAGNOSIS — D51.0 PA (PERNICIOUS ANEMIA): Chronic | ICD-10-CM

## 2019-06-03 LAB
% GRANULOCYTES: 59.8 % (ref 42.2–75.2)
HCT VFR BLD AUTO: 46.6 % (ref 39–51)
HEMOGLOBIN: 15.2 G/DL (ref 13.4–17.5)
LYMPHOCYTES NFR BLD AUTO: 32.8 % (ref 20.5–51.1)
MCH RBC QN AUTO: 30.3 PG (ref 27–31)
MCHC RBC AUTO-ENTMCNC: 32.7 G/DL (ref 33–37)
MCV RBC AUTO: 92.6 FL (ref 80–100)
MONOCYTES NFR BLD AUTO: 7.4 % (ref 1.7–9.3)
PLATELET # BLD AUTO: 188 K/UL (ref 140–450)
RBC # BLD AUTO: 5.03 X10/CMM (ref 4.2–5.9)
WBC # BLD AUTO: 5.1 X10/CMM (ref 3.8–11)

## 2019-06-03 PROCEDURE — 85025 COMPLETE CBC W/AUTO DIFF WBC: CPT | Performed by: FAMILY MEDICINE

## 2019-06-03 PROCEDURE — 99215 OFFICE O/P EST HI 40 MIN: CPT | Performed by: FAMILY MEDICINE

## 2019-06-03 PROCEDURE — 93000 ELECTROCARDIOGRAM COMPLETE: CPT | Performed by: FAMILY MEDICINE

## 2019-06-03 PROCEDURE — 36415 COLL VENOUS BLD VENIPUNCTURE: CPT | Performed by: FAMILY MEDICINE

## 2019-06-03 ASSESSMENT — MIFFLIN-ST. JEOR: SCORE: 1377.47

## 2019-06-03 NOTE — PROGRESS NOTES
[facility]  [address] [place] [id number] [phone]  Dept: [phone]    PRE-OP EVALUATION:  Today's date: 6/3/2019    Oseas Edwards (: 1970) presents for pre-operative evaluation assessment as requested by Dr. Garland.  He requires evaluation and anesthesia risk assessment prior to undergoing surgery/procedure for treatment of vasectomy .    Proposed Surgery/ Procedure: vasectomy  Date of Surgery/ Procedure: 2019  Time of Surgery/ Procedure:   Hospital/Surgical Facility: [facility]  Fax number for surgical facility:   Primary Physician: Willy Forrest  Type of Anesthesia Anticipated: to be determined    Patient has a Health Care Directive or Living Will:  NO    1. NO - Do you have a history of heart attack, stroke, stent, bypass or surgery on an artery in the head, neck, heart or legs?  2. NO - Do you ever have any pain or discomfort in your chest?  3. NO - Do you have a history of  Heart Failure?  4. NO - Are you troubled by shortness of breath when: walking on the level, up a slight hill or at night?  5. NO - Do you currently have a cold, bronchitis or other respiratory infection?  6. NO - Do you have a cough, shortness of breath or wheezing?  7. NO - Do you sometimes get pains in the calves of your legs when you walk?  8. NO - Do you or anyone in your family have previous history of blood clots?  9. NO - Do you or does anyone in your family have a serious bleeding problem such as prolonged bleeding following surgeries or cuts?  10. NO - Have you ever had problems with anemia or been told to take iron pills?  11. YES - Have you had any abnormal blood loss such as black, tarry or bloody stools, or abnormal vaginal bleeding?  12. YES - Have you ever had a blood transfusion? 3 YEARS AGO/REDO HIP REPLACEMENT  13. NO - Have you or any of your relatives ever had problems with anesthesia?  14. NO - Do you have sleep apnea, excessive snoring or daytime drowsiness?  15. NO - Do you  have any prosthetic heart valves?  16. YES - Do you have prosthetic joints?  17. NO - Is there any chance that you may be pregnant?      HPI:     HPI related to upcoming procedure: Needs a vasectomy due to all done with children.  He has a difficult to palpate Vas and will need to have procedure to be in the OR.  He is also on long term chronic narcotic for ankylosing spondylitis.       See problem list for active medical problems.  Problems all longstanding and stable, except as noted/documented.  See ROS for pertinent symptoms related to these conditions.                                                                                                                                                          .    MEDICAL HISTORY:     Patient Active Problem List    Diagnosis Date Noted     Keloid scar, mostly just dark 02/07/2017     Priority: Medium     Chronic pain syndrome 06/20/2016     Priority: Medium     Hx of gastric ulcer 06/06/2016     Priority: Medium     Rectus diastasis 11/11/2013     Priority: Medium     Chronic narcotic dependence (H) 11/04/2013     Priority: Medium     Due to long term pain management.  Patient is followed by FARHAT ALBARADO for ongoing prescription of pain medication.  All refills should be approved by this provider, or covering partner.    Medication(s): oxycodone IR around 3 per day and Tylenol #3 30/300 max of 4 per day.     Clinic visit frequency required: Q 2 month     Controlled substance agreement on file: Yes       Date(s): 10/15/15    Pain Clinic evaluation in the past: but now referring to [facility] pain clinic    DIRE Total Score(s):  No flowsheet data found.    [facility] website verification:  none   [email or web address]         Health Care Home 10/07/2013     Priority: Medium     Low serum testosterone level 01/30/2012     Priority: Medium     PA (pernicious anemia) 06/16/2011     Priority: Medium     AS (ankylosing spondylitis) (H) 04/28/2011     Priority:  Medium      Past Medical History:   Diagnosis Date     Ankylosing spondylitis (H)      Arthritis      AS (ankylosing spondylitis) (H) 1985     Cellulitis of leg 2011     Chronic narcotic dependence (H) 11/4/2013    Due to long term pain management.      Hypertension      Keloid scar, mostly just dark 2/7/2017     Low serum testosterone level 1/30/2012     Optic neuritis 3/20/2012     PA (pernicious anemia) 6/16/2011     Past Surgical History:   Procedure Laterality Date     ARTHROPLASTY REVISION HIP Left 2/14/2017    Procedure: ARTHROPLASTY REVISION HIP;  Surgeon: Saravanan Alcaraz MD;  Location:  OR     HERNIORRHAPHY VENTRAL  11/11/2013    Procedure: HERNIORRHAPHY VENTRAL;  UMBILICAL HERNIA REPAIR WITH MESH, RECTUS DIASTASIS REPAIR WITH MESH;  Surgeon: Jason Dodson MD;  [facility]:  [facility]     JOINT REPLACEMENT, HIP RT/LT  1994    Left     JOINT REPLACEMENT, HIP RT/LT  2000ish    Right     OPEN SEPARATION COMPONENT HERNIORRHAPHY  11/11/2013    Procedure: OPEN SEPARATION COMPONENT HERNIORRHAPHY;;  Surgeon: Jason Dodson MD;  Location: [facility]     Current Outpatient Medications   Medication Sig Dispense Refill     Calcium carb-Vitamin D 500 mg [place]-200 units (OSCAL WITH D;OYSTER SHELL CALCIUM) 500-200 MG-UNIT per tablet Take 1 tablet by mouth daily        cyanocobalamin (VITAMIN B12) 1000 MCG/ML injection INJECT 1 ML INTRAMUSCULARLY ONCE PER MONTH. PATIENT NEEDS B12 BLOOD LEVELS CHECKED BEFORE NEXT REFILL 30 mL 11     cyclobenzaprine (FLEXERIL) 10 MG tablet TAKE ONE TABLET BY MOUTH TWICE DAILY AS NEEDED FOR MUSCLE SPASM 180 tablet 3     diclofenac (VOLTAREN) 1 % GEL topical gel Apply topically daily        docusate sodium (COLACE) 100 MG tablet Take 100 mg by mouth daily as needed  60 tablet 1     etanercept (ENBREL) 50 MG/ML injection Inject 50 mg Subcutaneous       finasteride (PROSCAR) 5 MG tablet TAKE ONE TABLET BY MOUTH ONCE DAILY 90 tablet 3     Hypromellose (ARTIFICIAL TEARS OP) Place 1  drop into both eyes daily as needed       indomethacin (INDOCIN) 50 MG capsule Take 1 capsule (50 mg) by mouth 2 times daily (with meals) 90 capsule 3     lisinopril-hydrochlorothiazide (PRINZIDE/ZESTORETIC) 10-12.5 MG per tablet TAKE ONE TABLET BY MOUTH ONCE DAILY 90 tablet 3     mometasone (ELOCON) 0.1 % ointment Apply sparingly to affected area twice daily as needed.  Do not apply to face. 45 g 1     Multiple Vitamins-Iron (MULTIVITAMIN/IRON PO) Take 1 tablet by mouth daily       [START ON 7/13/2019] oxyCODONE IR (ROXICODONE) 10 MG tablet Take 1 tablet (10 mg) by mouth every 4 hours as needed for pain 180 tablet 0     testosterone (ANDROGEL/TESTIM) 50 MG/5GM (1%) topical gel Place 1 packet (50 mg) onto the skin daily 90 packet 3     vitamin B-12 (CYANOCOBALAMIN) 1000 MCG/ML injection Inject 1,000 mcg into the muscle       cyclobenzaprine (FLEXERIL) 5 MG tablet Take 5 mg by mouth       meloxicam (MOBIC) 15 MG tablet Take 1 tablet (15 mg) by mouth daily (Patient not taking: Reported on 6/3/2019) 60 tablet 1     naloxone (NARCAN) 1 mg/mL for intranasal kit (2 syringes with 2 mucosal atomizer device) In opioid overdose put cone in nostril and push 1/2 of contents into each nostril.  Repeat every 3 min if no response until help arrives. (Patient not taking: Reported on 6/3/2019) 1 kit 1     ondansetron (ZOFRAN-ODT) 4 MG ODT tab Take 1 tablet (4 mg) by mouth every 6 hours as needed for nausea (Patient not taking: Reported on 6/3/2019) 96 tablet 0     order for DME Equipment being ordered: Tony ()  Treatment Diagnosis: impaired gait. (Patient not taking: Reported on 6/3/2019) 1 each 0     pantoprazole (PROTONIX) 20 MG EC tablet Take 1 tablet (20 mg) by mouth daily (Patient not taking: Reported on 6/3/2019) 90 tablet 3     OTC products: None, except as noted above    Allergies   Allergen Reactions     Infliximab      Other reaction(s): *Unknown  Caused LT eye blindness     No Clinical Screening - See Comments   "    methotrexate- mood alterating     Remicade [Infliximab Injection]      Blindness in one eye      Latex Allergy: NO    Social History     Tobacco Use     Smoking status: Former Smoker     Last attempt to quit: 1/1/2009     Years since quitting: 10.4     Smokeless tobacco: Never Used     Tobacco comment: quit 2 1/2 yrs   Substance Use Topics     Alcohol use: Yes     Comment: 1 to 2 per week     History   Drug Use No       REVIEW OF SYSTEMS:   Constitutional, neuro, ENT, endocrine, pulmonary, cardiac, gastrointestinal, genitourinary, musculoskeletal, integument and psychiatric systems are negative, except as otherwise noted.    EXAM:   /66 (BP Location: Right arm, Patient Position: Sitting, Cuff Size: Adult Regular)   Pulse 75   Ht 1.499 m (4' 11\")   Wt 68.6 kg (151 lb 3.2 oz)   SpO2 98%   BMI 30.54 kg/m      GENERAL APPEARANCE: healthy, alert and no distress     EYES: EOMI,  PERRL     HENT: ear canals and TM's normal and nose and mouth without ulcers or lesions     NECK: no adenopathy, no asymmetry, masses, or scars and thyroid normal to palpation     RESP: lungs clear to auscultation - no rales, rhonchi or wheezes     CV: regular rates and rhythm, normal S1 S2, no S3 or S4 and no murmur, click or rub     ABDOMEN:  soft, nontender, no HSM or masses and bowel sounds normal     MS: decreased range of motion of the spine.     SKIN: no suspicious lesions or rashes     NEURO: Normal strength and tone, sensory exam grossly normal, mentation intact and speech normal     PSYCH: mentation appears normal. and affect normal/bright     LYMPHATICS: No cervical adenopathy    DIAGNOSTICS:     EKG: appears normal, NSR, normal axis, normal intervals, no acute ST/T changes c/w ischemia, no LVH by voltage criteria, unchanged from previous tracings  Labs Resulted Today:   Results for orders placed or performed in visit on 06/03/19   CBC with Diff/Plt ([facility])   Result Value Ref Range    WBC x10/cmm 5.1 3.8 - 11.0 x10/cmm    % " Lymphocytes 32.8 20.5 - 51.1 %    % Monocytes 7.4 1.7 - 9.3 %    % Granulocytes 59.8 42.2 - 75.2 %    RBC x10/cmm 5.03 4.2 - 5.9 x10/cmm    Hemoglobin 15.2 13.4 - 17.5 g/dl    Hematocrit 46.6 39 - 51 %    MCV 92.6 80 - 100 fL    MCH 30.3 27.0 - 31.0 pg    MCHC 32.7 (A) 33.0 - 37.0 g/dL    Platelet Count 188 140 - 450 K/uL       Recent Labs   Lab Test 04/16/18  1127 04/16/18  1119 02/16/17  0713 02/15/17  0650 02/14/17  0707   HGB 14.8  --  11.1* 11.6* 16.3     --   --   --  185   NA  --  142  --  140  --    POTASSIUM  --  4.6  --  3.9 4.2   CR  --  0.91  --  0.96 1.09        IMPRESSION:   Reason for surgery/procedure: desire to no longer be fertile.      The proposed surgical procedure is considered LOW risk.    REVISED CARDIAC RISK INDEX  The patient has the following serious cardiovascular risks for perioperative complications such as (MI, PE, VFib and 3  AV Block):  No serious cardiac risks  INTERPRETATION: 0 risks: Class I (very low risk - 0.4% complication rate)    The patient has the following additional risks for perioperative complications:  No identified additional risks      ICD-10-CM    1. Preop general physical exam Z01.818 CBC with Diff/Plt ([facility])     EKG 12-lead complete w/read - Clinics   2. Chronic narcotic dependence (H) F11.20    3. Chronic pain syndrome G89.4    4. Ankylosing spondylitis, unspecified site of spine (H) M45.9    5. Rectus diastasis M62.08    6. PA (pernicious anemia) D51.0    7. Low serum testosterone level R79.89        RECOMMENDATIONS:         --Patient is to take all scheduled medications on the day of surgery EXCEPT for modifications listed below.    APPROVAL GIVEN to proceed with proposed procedure, without further diagnostic evaluation       Signed Electronically by: Willy Forrest MD    Copy of this evaluation report is provided to requesting physician.    [facility] Preop Guidelines    Revised Cardiac Risk Index

## 2019-07-30 ENCOUNTER — OFFICE VISIT (OUTPATIENT)
Dept: FAMILY MEDICINE | Facility: CLINIC | Age: 49
End: 2019-07-30

## 2019-07-30 VITALS
DIASTOLIC BLOOD PRESSURE: 68 MMHG | WEIGHT: 154 LBS | RESPIRATION RATE: 16 BRPM | BODY MASS INDEX: 31.1 KG/M2 | HEART RATE: 91 BPM | OXYGEN SATURATION: 98 % | SYSTOLIC BLOOD PRESSURE: 112 MMHG

## 2019-07-30 DIAGNOSIS — F11.20 CHRONIC NARCOTIC DEPENDENCE (H): ICD-10-CM

## 2019-07-30 PROCEDURE — 99213 OFFICE O/P EST LOW 20 MIN: CPT | Performed by: FAMILY MEDICINE

## 2019-07-30 RX ORDER — OXYCODONE HYDROCHLORIDE 10 MG/1
10 TABLET ORAL EVERY 4 HOURS PRN
Qty: 180 TABLET | Refills: 0 | Status: SHIPPED | OUTPATIENT
Start: 2019-07-30 | End: 2019-08-27

## 2019-07-30 NOTE — PROGRESS NOTES
Problem(s) Oriented visit        SUBJECTIVE:                                                    Oseas Edwards is a 49 year old male who presents to clinic today for the following health issues :      1. Chronic narcotic dependence (H)  Usual situation, no other good option has been on t   - oxyCODONE IR (ROXICODONE) 10 MG tablet; Take 1 tablet (10 mg) by mouth every 4 hours as needed for pain  Dispense: 180 tablet; Refill: 0         Problem list, Medication list, Allergies, and Medical/Social/Surgical histories reviewed in [facility] and updated as appropriate.   Additional history: as documented    ROS:  General and Resp. completed and negative except as noted above    Histories:   Patient Active Problem List   Diagnosis     AS (ankylosing spondylitis) (H)     PA (pernicious anemia)     Low serum testosterone level     Health Care Home     Chronic narcotic dependence (H)     Rectus diastasis     Hx of gastric ulcer     Chronic pain syndrome     Keloid scar, mostly just dark     Past Surgical History:   Procedure Laterality Date     ARTHROPLASTY REVISION HIP Left 2/14/2017    Procedure: ARTHROPLASTY REVISION HIP;  Surgeon: Saravanan Alcaraz MD;  Location:  OR     HERNIORRHAPHY VENTRAL  11/11/2013    Procedure: HERNIORRHAPHY VENTRAL;  UMBILICAL HERNIA REPAIR WITH MESH, RECTUS DIASTASIS REPAIR WITH MESH;  Surgeon: Jason Dodson MD;  Location: [facility]     JOINT REPLACEMENT, HIP RT/LT  1994    Left     JOINT REPLACEMENT, HIP RT/LT  2000ish    Right     OPEN SEPARATION COMPONENT HERNIORRHAPHY  11/11/2013    Procedure: OPEN SEPARATION COMPONENT HERNIORRHAPHY;;  Surgeon: Jason Dodson MD;  Location: [facility]       Social History     Tobacco Use     Smoking status: Former Smoker     Last attempt to quit: 1/1/2009     Years since quitting: 10.5     Smokeless tobacco: Never Used     Tobacco comment: quit 2 1/2 yrs   Substance Use Topics     Alcohol use: Yes     Comment: 1 to 2 per week     No family history on file.         OBJECTIVE:                                                    /68   Pulse 91   Resp 16   Wt 69.9 kg (154 lb)   SpO2 98%   BMI 31.10 kg/m    Body mass index is 31.1 kg/m .   APPEARANCE: = Relaxed and in no distress     ASSESSMENT/PLAN:                                                        Oseas was seen today for recheck medication.    Diagnoses and all orders for this visit:    Chronic narcotic dependence (H)  -     oxyCODONE IR (ROXICODONE) 10 MG tablet; Take 1 tablet (10 mg) by mouth every 4 hours as needed for pain        See Patient Instructions    The following health maintenance items are reviewed in Epic and correct as of today:  Health Maintenance   Topic Date Due     MEDICARE ANNUAL WELLNESS VISIT  06/06/2017     URINE DRUG SCREEN  04/06/2019     ZOSTER IMMUNIZATION (1 of 2) 04/04/2019     INFLUENZA VACCINE (1) 09/01/2019     JOSÉ ASSESSMENT  10/15/2019     PHQ-9  10/15/2019     DTAP/TDAP/TD IMMUNIZATION (2 - Td) 08/26/2020     COLONOSCOPY  06/20/2021     LIPID  04/16/2023     HIV SCREENING  Completed     IPV IMMUNIZATION  Completed     MENINGITIS IMMUNIZATION  Aged Out       Willy Forrest MD  [facility]  Family Practice  [facility]  [phone]    For any issues my office # is [phone]

## 2019-08-27 ENCOUNTER — TELEPHONE (OUTPATIENT)
Dept: FAMILY MEDICINE | Facility: CLINIC | Age: 49
End: 2019-08-27

## 2019-08-27 DIAGNOSIS — F11.20 CHRONIC NARCOTIC DEPENDENCE (H): ICD-10-CM

## 2019-08-27 NOTE — TELEPHONE ENCOUNTER
Received fax from [employer] with approval for Oxycodone IR 10 mg tablets.  (Not sure who submitted this)  Approval dates 06/30/2019-07/29/2020.

## 2019-08-29 RX ORDER — OXYCODONE HYDROCHLORIDE 10 MG/1
10 TABLET ORAL EVERY 4 HOURS PRN
Qty: 180 TABLET | Refills: 0 | Status: SHIPPED | OUTPATIENT
Start: 2019-08-30 | End: 2019-09-26

## 2019-09-19 DIAGNOSIS — R79.89 LOW SERUM TESTOSTERONE LEVEL: ICD-10-CM

## 2019-09-19 DIAGNOSIS — M45.0 ANKYLOSING SPONDYLITIS OF MULTIPLE SITES IN SPINE (H): ICD-10-CM

## 2019-09-19 RX ORDER — FINASTERIDE 5 MG/1
TABLET, FILM COATED ORAL
Qty: 90 TABLET | Refills: 3 | Status: SHIPPED | OUTPATIENT
Start: 2019-09-19 | End: 2020-09-23

## 2019-09-20 RX ORDER — INDOMETHACIN 50 MG/1
CAPSULE ORAL
Qty: 90 CAPSULE | Refills: 3 | Status: SHIPPED | OUTPATIENT
Start: 2019-09-20 | End: 2020-09-23

## 2019-09-26 DIAGNOSIS — F11.20 CHRONIC NARCOTIC DEPENDENCE (H): ICD-10-CM

## 2019-09-26 RX ORDER — OXYCODONE HYDROCHLORIDE 10 MG/1
10 TABLET ORAL EVERY 4 HOURS PRN
Qty: 180 TABLET | Refills: 0 | Status: SHIPPED | OUTPATIENT
Start: 2019-09-26 | End: 2019-10-28

## 2019-10-02 DIAGNOSIS — Z79.899 ENCOUNTER FOR LONG-TERM (CURRENT) USE OF MEDICATIONS: ICD-10-CM

## 2019-10-02 NOTE — TELEPHONE ENCOUNTER
Refill medication lisinopril-hctz  LOV 07/30/2019 appt scheduled 10/28/2019  Labs 04/16/2018  Sarai Jacskon MA on 10/2/2019 at 1:11 PM

## 2019-10-03 ENCOUNTER — HEALTH MAINTENANCE LETTER (OUTPATIENT)
Age: 49
End: 2019-10-03

## 2019-10-04 RX ORDER — LISINOPRIL/HYDROCHLOROTHIAZIDE 10-12.5 MG
TABLET ORAL
Qty: 90 TABLET | Refills: 3 | Status: SHIPPED | OUTPATIENT
Start: 2019-10-04 | End: 2020-09-23

## 2019-10-28 ENCOUNTER — OFFICE VISIT (OUTPATIENT)
Dept: FAMILY MEDICINE | Facility: CLINIC | Age: 49
End: 2019-10-28

## 2019-10-28 VITALS
BODY MASS INDEX: 30.9 KG/M2 | OXYGEN SATURATION: 99 % | WEIGHT: 153 LBS | SYSTOLIC BLOOD PRESSURE: 114 MMHG | DIASTOLIC BLOOD PRESSURE: 72 MMHG | HEART RATE: 72 BPM | RESPIRATION RATE: 16 BRPM

## 2019-10-28 DIAGNOSIS — Z23 NEED FOR VACCINATION: Primary | ICD-10-CM

## 2019-10-28 DIAGNOSIS — N52.9 ERECTILE DYSFUNCTION, UNSPECIFIED ERECTILE DYSFUNCTION TYPE: ICD-10-CM

## 2019-10-28 DIAGNOSIS — F11.20 CHRONIC NARCOTIC DEPENDENCE (H): ICD-10-CM

## 2019-10-28 PROCEDURE — 90471 IMMUNIZATION ADMIN: CPT | Performed by: FAMILY MEDICINE

## 2019-10-28 PROCEDURE — 99214 OFFICE O/P EST MOD 30 MIN: CPT | Mod: 25 | Performed by: FAMILY MEDICINE

## 2019-10-28 PROCEDURE — 90686 IIV4 VACC NO PRSV 0.5 ML IM: CPT | Performed by: FAMILY MEDICINE

## 2019-10-28 RX ORDER — OXYCODONE HYDROCHLORIDE 10 MG/1
10 TABLET ORAL EVERY 4 HOURS PRN
Qty: 180 TABLET | Refills: 0 | Status: SHIPPED | OUTPATIENT
Start: 2019-10-28 | End: 2019-11-25

## 2019-10-28 NOTE — PROGRESS NOTES
Problem(s) Oriented visit      ROS:  General and Resp. completed and negative except as noted above     HISTORY:   reports current alcohol use.   reports that he quit smoking about 10 years ago. He has never used smokeless tobacco.    Past Medical History:   Diagnosis Date     Ankylosing spondylitis (H)      Arthritis      AS (ankylosing spondylitis) (H) 1985     Cellulitis of leg 2011     Chronic narcotic dependence (H) 11/4/2013     Hypertension      Keloid scar, mostly just dark 2/7/2017     Low serum testosterone level 1/30/2012     Optic neuritis 3/20/2012     PA (pernicious anemia) 6/16/2011     Past Surgical History:   Procedure Laterality Date     ARTHROPLASTY REVISION HIP Left 2/14/2017    Procedure: ARTHROPLASTY REVISION HIP;  Surgeon: Saravanan Alcaraz MD;  Location:  OR     HERNIORRHAPHY VENTRAL  11/11/2013    Procedure: HERNIORRHAPHY VENTRAL;  UMBILICAL HERNIA REPAIR WITH MESH, RECTUS DIASTASIS REPAIR WITH MESH;  Surgeon: Jason Dodson MD;  [facility]     JOINT REPLACEMENT, HIP RT/LT  1994    Left     JOINT REPLACEMENT, HIP RT/LT  2000ish    Right     OPEN SEPARATION COMPONENT HERNIORRHAPHY  11/11/2013    Procedure: OPEN SEPARATION COMPONENT HERNIORRHAPHY;;  Surgeon: Jason Dodson MD;  Location: [facility]       EXAM:  BP: 114/72   Pulse: 72    Temp: Data Unavailable    Wt Readings from Last 2 Encounters:   10/28/19 69.4 kg (153 lb)   07/30/19 69.9 kg (154 lb)       BMI= Body mass index is 30.9 kg/m .    EXAM:  APPEARANCE: = Relaxed and in no distress      Assessment/Plan:  Farooq was seen today for refill request.    Diagnoses and all orders for this visit:    Need for vaccination  -     INFLUENZA VACCINE IM 4YRS+ 4 VALENT CCIIV4    Chronic narcotic dependence (H)  -     oxyCODONE IR (ROXICODONE) 10 MG tablet; Take 1 tablet (10 mg) by mouth every 4 hours as needed for pain    Erectile dysfunction, unspecified erectile dysfunction type  -     Referral to [facility]        COUNSELING:   reports that he quit smoking about 10 years ago. He has never used smokeless tobacco.    Estimated body mass index is 30.9 kg/m  as calculated from the following:    Height as of 6/3/19: 1.499 m (4' 11\").    Weight as of this encounter: 69.4 kg (153 lb).   >25 min spent with patient, greater than 50% spent on discussion/education/planning, etc. About The primary encounter diagnosis was Need for vaccination. Diagnoses of Chronic narcotic dependence (H) and Erectile dysfunction, unspecified erectile dysfunction type were also pertinent to this visit.        Appropriate preventive services were discussed with this patient, including applicable screening as appropriate for cardiovascular disease, diabetes, osteopenia/osteoporosis, and glaucoma.  As appropriate for age/gender, discussed screening for colorectal cancer, prostate cancer, breast cancer, and cervical cancer. Checklist reviewing preventive services available has been given to the patient.    Reviewed patients plan of care and provided an AVS. The Basic Care Plan (routine screening as documented in Health Maintenance) for Farooq meets the Care Plan requirement. This Care Plan has been established and reviewed with the  Patient.      The following health maintenance items are reviewed in Epic and correct as of today:  Health Maintenance   Topic Date Due     MEDICARE ANNUAL WELLNESS VISIT  06/06/2017     URINE DRUG SCREEN  04/06/2019     INFLUENZA VACCINE (1) 09/01/2019     JOSÉ ASSESSMENT  10/15/2019     ZOSTER IMMUNIZATION (1 of 2) 04/04/2019     PHQ-9  10/15/2019     DTAP/TDAP/TD IMMUNIZATION (2 - Td) 08/26/2020     COLONOSCOPY  06/20/2021     LIPID  04/16/2023     HIV SCREENING  Completed     IPV IMMUNIZATION  Completed     MENINGITIS IMMUNIZATION  Aged Out       Willy Forrest  [facility]  For any issues my office # is [phone]            "

## 2019-10-31 ENCOUNTER — HEALTH MAINTENANCE LETTER (OUTPATIENT)
Age: 49
End: 2019-10-31

## 2019-11-25 DIAGNOSIS — F11.20 CHRONIC NARCOTIC DEPENDENCE (H): ICD-10-CM

## 2019-11-25 RX ORDER — OXYCODONE HYDROCHLORIDE 10 MG/1
10 TABLET ORAL EVERY 4 HOURS PRN
Qty: 180 TABLET | Refills: 0 | Status: SHIPPED | OUTPATIENT
Start: 2019-11-25 | End: 2019-12-23

## 2019-12-16 ENCOUNTER — HEALTH MAINTENANCE LETTER (OUTPATIENT)
Age: 49
End: 2019-12-16

## 2019-12-23 DIAGNOSIS — D51.0 PA (PERNICIOUS ANEMIA): Chronic | ICD-10-CM

## 2019-12-23 DIAGNOSIS — F11.20 CHRONIC NARCOTIC DEPENDENCE (H): ICD-10-CM

## 2019-12-23 RX ORDER — OXYCODONE HYDROCHLORIDE 10 MG/1
10 TABLET ORAL EVERY 4 HOURS PRN
Qty: 180 TABLET | Refills: 0 | Status: SHIPPED | OUTPATIENT
Start: 2019-12-23 | End: 2020-01-20

## 2019-12-23 RX ORDER — CYANOCOBALAMIN 1000 UG/ML
INJECTION, SOLUTION INTRAMUSCULAR; SUBCUTANEOUS
Qty: 3 ML | Refills: 0 | Status: SHIPPED | OUTPATIENT
Start: 2019-12-23 | End: 2020-03-22

## 2020-01-20 ENCOUNTER — OFFICE VISIT (OUTPATIENT)
Dept: FAMILY MEDICINE | Facility: CLINIC | Age: 50
End: 2020-01-20

## 2020-01-20 VITALS
OXYGEN SATURATION: 96 % | BODY MASS INDEX: 31.71 KG/M2 | WEIGHT: 157 LBS | HEART RATE: 78 BPM | DIASTOLIC BLOOD PRESSURE: 78 MMHG | SYSTOLIC BLOOD PRESSURE: 118 MMHG | RESPIRATION RATE: 16 BRPM

## 2020-01-20 DIAGNOSIS — F11.20 CHRONIC NARCOTIC DEPENDENCE (H): ICD-10-CM

## 2020-01-20 DIAGNOSIS — M45.9 ANKYLOSING SPONDYLITIS, UNSPECIFIED SITE OF SPINE (H): ICD-10-CM

## 2020-01-20 PROCEDURE — 99214 OFFICE O/P EST MOD 30 MIN: CPT | Performed by: FAMILY MEDICINE

## 2020-01-20 RX ORDER — OXYCODONE HYDROCHLORIDE 10 MG/1
10 TABLET ORAL EVERY 4 HOURS PRN
Qty: 180 TABLET | Refills: 0 | Status: SHIPPED | OUTPATIENT
Start: 2020-01-20 | End: 2020-02-25

## 2020-01-20 NOTE — NURSING NOTE
Completed & scanned [facility] urine drug screen  Graciela Mccabe MA January 20, 2020 11:57 AM

## 2020-01-20 NOTE — PROGRESS NOTES
Problem(s) Oriented visit        SUBJECTIVE:                                                    Oseas Edwards is a 49 year old male who presents to clinic today for the following health issues :      1. Ankylosing spondylitis, unspecified site of spine (H)  The last Urine Tox report was : due today  The last pain contract date was:  The last visit date was: 01/20/20  The last  querry was: PRESCRIPTIONS  Total Prescriptions: 12   Total Private Pay: 0     01/20/20      90 mg MEDD    - oxyCODONE IR (ROXICODONE) 10 MG tablet; Take 1 tablet (10 mg) by mouth every 4 hours as needed for pain  Dispense: 180 tablet; Refill: 0    2. Chronic narcotic dependence (H)  Due   - ToxASSURE Urine Drug Screen ([facility])         Problem list, Medication list, Allergies, and Medical/Social/Surgical histories reviewed in [facility] and updated as appropriate.   Additional history: as documented    ROS:  5 point ROS completed and negative except noted above, including Gen, CV, Resp, GI, MS    Histories:   Patient Active Problem List   Diagnosis     AS (ankylosing spondylitis) (H)     PA (pernicious anemia)     Low serum testosterone level     Health Care Home     Chronic narcotic dependence (H)     Rectus diastasis     Hx of gastric ulcer     Chronic pain syndrome     Keloid scar, mostly just dark     Past Surgical History:   Procedure Laterality Date     ARTHROPLASTY REVISION HIP Left 2/14/2017    Procedure: ARTHROPLASTY REVISION HIP;  Surgeon: Saravanan Alcaraz MD;  Location:  OR     HERNIORRHAPHY VENTRAL  11/11/2013    Procedure: HERNIORRHAPHY VENTRAL;  UMBILICAL HERNIA REPAIR WITH MESH, RECTUS DIASTASIS REPAIR WITH MESH;  Surgeon: Jason Dodson MD;  Location: [facility]     JOINT REPLACEMENT, HIP RT/LT  1994    Left     JOINT REPLACEMENT, HIP RT/LT  2000ish    Right     OPEN SEPARATION COMPONENT HERNIORRHAPHY  11/11/2013    Procedure: OPEN SEPARATION COMPONENT HERNIORRHAPHY;;  Surgeon: Jason Dodson MD;  Location: [facility]        Social History     Tobacco Use     Smoking status: Former Smoker     Last attempt to quit: 2009     Years since quittin.0     Smokeless tobacco: Never Used     Tobacco comment: quit 2 1/2 yrs   Substance Use Topics     Alcohol use: Yes     Comment: 1 to 2 per week     No family history on file.        OBJECTIVE:                                                    /78   Pulse 78   Resp 16   Wt 71.2 kg (157 lb)   SpO2 96%   BMI 31.71 kg/m    Body mass index is 31.71 kg/m .   APPEARANCE: = alert and mild distress  Conj/Eyelids = noninjected and lids and lashes are without inflammation  PERRLA/Irises = Pupils Round Reactive to Light and Irisis without inflammation  Neck = No anterior or posterior adenopathy appreciated.  Thyroid = Not enlarged and no masses felt  Resp effort = Calm regular breathing  Breath Sounds = Good air movement with no rales or rhonchi in any lung fields  Heart Rate, Rhythm, & sounds (no Murm)  = Regular rate and rhythm with no S3, S4, or murmur appreciated.  Carotid Art's = Pulses full and equal and no bruits appreciated  Abdomen = Soft, nontender, no masses, & bowel sounds in all quadrants  Liver/Spleen = Normal span and no splenomegaly noted  Digits and Nails = FROM in all finger joints, no nail dystrophy  Ext (edema) = No pretibial edema noted or elsewhere  Musculsktl: decreased range of motion in the back and legs  SKIN = absent significant rashes or lesions   Recent/Remote Memory = Alert and Oriented x 3  Mood/Affect = Cooperative and interested     ASSESSMENT/PLAN:                                                        Oseas was seen today for other.    Diagnoses and all orders for this visit:    Ankylosing spondylitis, unspecified site of spine (H)  -     oxyCODONE IR (ROXICODONE) 10 MG tablet; Take 1 tablet (10 mg) by mouth every 4 hours as needed for pain    Chronic narcotic dependence (H)  -     ToxASSURE Urine Drug Screen ([facility])      .  Regular exercise  See  Patient Instructions    The following health maintenance items are reviewed in Epic and correct as of today:  Health Maintenance   Topic Date Due     MEDICARE ANNUAL WELLNESS VISIT  06/06/2017     URINE DRUG SCREEN  04/06/2019     PHQ-2  01/01/2020     ZOSTER IMMUNIZATION (1 of 2) 04/04/2019     DTAP/TDAP/TD IMMUNIZATION (2 - Td) 08/26/2020     COLONOSCOPY  06/20/2021     LIPID  04/16/2023     HIV SCREENING  Completed     INFLUENZA VACCINE  Completed     IPV IMMUNIZATION  Completed     MENINGITIS IMMUNIZATION  Aged Out       Willy Forrest MD  [facility]  Family Practice  [facility]  [phone]    For any issues my office # is [phone]        Subjective     Oseas Edwards is a 49 year old male who presents to clinic today for the following health issues:    HPI             Reviewed and updated as needed this visit by Provider         Review of Systems         Objective    /78   Pulse 78   Resp 16   Wt 71.2 kg (157 lb)   SpO2 96%   BMI 31.71 kg/m    Body mass index is 31.71 kg/m .  Physical Exam

## 2020-02-25 DIAGNOSIS — M45.9 ANKYLOSING SPONDYLITIS, UNSPECIFIED SITE OF SPINE (H): ICD-10-CM

## 2020-02-25 RX ORDER — OXYCODONE HYDROCHLORIDE 10 MG/1
10 TABLET ORAL EVERY 4 HOURS PRN
Qty: 180 TABLET | Refills: 0 | Status: SHIPPED | OUTPATIENT
Start: 2020-02-25 | End: 2020-03-23

## 2020-03-22 DIAGNOSIS — K21.00 GASTROESOPHAGEAL REFLUX DISEASE WITH ESOPHAGITIS: ICD-10-CM

## 2020-03-22 DIAGNOSIS — Z76.0 ENCOUNTER FOR MEDICATION REFILL: ICD-10-CM

## 2020-03-22 DIAGNOSIS — D51.0 PA (PERNICIOUS ANEMIA): Chronic | ICD-10-CM

## 2020-03-22 RX ORDER — CYCLOBENZAPRINE HCL 10 MG
TABLET ORAL
Qty: 180 TABLET | Refills: 0 | Status: SHIPPED | OUTPATIENT
Start: 2020-03-22 | End: 2020-06-16

## 2020-03-22 RX ORDER — CYANOCOBALAMIN 1000 UG/ML
INJECTION, SOLUTION INTRAMUSCULAR; SUBCUTANEOUS
Qty: 3 ML | Refills: 0 | Status: SHIPPED | OUTPATIENT
Start: 2020-03-22 | End: 2020-06-16

## 2020-03-22 RX ORDER — PANTOPRAZOLE SODIUM 20 MG/1
TABLET, DELAYED RELEASE ORAL
Qty: 90 TABLET | Refills: 0 | Status: SHIPPED | OUTPATIENT
Start: 2020-03-22 | End: 2020-07-24

## 2020-03-23 ENCOUNTER — TELEPHONE (OUTPATIENT)
Dept: FAMILY MEDICINE | Facility: CLINIC | Age: 50
End: 2020-03-23

## 2020-03-23 DIAGNOSIS — M45.9 ANKYLOSING SPONDYLITIS, UNSPECIFIED SITE OF SPINE (H): ICD-10-CM

## 2020-03-23 RX ORDER — OXYCODONE HYDROCHLORIDE 10 MG/1
10 TABLET ORAL EVERY 4 HOURS PRN
Qty: 180 TABLET | Refills: 0 | Status: SHIPPED | OUTPATIENT
Start: 2020-03-23 | End: 2020-04-23

## 2020-03-23 NOTE — TELEPHONE ENCOUNTER
Received fax from [facility] [employer] requesting PA for Pantoprazole.  Tried to submit through covermymeds.  Came up with answer-mediation not covered for this age of patient.  Called [facility] and they also said that this medication is not covered for patients over the age of 13.  Called patient to inform him. He will try to use [employer] to find a coupon.

## 2020-04-23 ENCOUNTER — OFFICE VISIT (OUTPATIENT)
Dept: FAMILY MEDICINE | Facility: CLINIC | Age: 50
End: 2020-04-23

## 2020-04-23 VITALS
HEART RATE: 88 BPM | HEIGHT: 60 IN | SYSTOLIC BLOOD PRESSURE: 108 MMHG | TEMPERATURE: 98.1 F | DIASTOLIC BLOOD PRESSURE: 80 MMHG | BODY MASS INDEX: 30.51 KG/M2 | RESPIRATION RATE: 16 BRPM | WEIGHT: 155.4 LBS

## 2020-04-23 DIAGNOSIS — M45.9 ANKYLOSING SPONDYLITIS, UNSPECIFIED SITE OF SPINE (H): ICD-10-CM

## 2020-04-23 PROCEDURE — 99214 OFFICE O/P EST MOD 30 MIN: CPT | Performed by: FAMILY MEDICINE

## 2020-04-23 RX ORDER — OXYCODONE HYDROCHLORIDE 10 MG/1
10 TABLET ORAL EVERY 4 HOURS PRN
Qty: 180 TABLET | Refills: 0 | Status: SHIPPED | OUTPATIENT
Start: 2020-04-23 | End: 2020-05-28

## 2020-04-23 ASSESSMENT — PAIN SCALES - GENERAL: PAINLEVEL: MODERATE PAIN (5)

## 2020-04-23 ASSESSMENT — MIFFLIN-ST. JEOR: SCORE: 1379.61

## 2020-04-23 NOTE — PROGRESS NOTES
Problem(s) Oriented visit        SUBJECTIVE:                                                    Oseas Edwards is a 50 year old male who presents to clinic today for the following health issues :    Chronic Pain Follow-Up    Where in your body do you have pain? Neck and lower back  How has your pain affected your ability to work? Unable to work  Which of these pain treatments have you tried since your last clinic visit? Heat and Other: through a sauna, accupuncture not helpful  How well are you sleeping? Poor  How has your mood been since your last visit? About the same  Have you had a significant life event? No  Other aggravating factors: none  Taking medication as directed? Yes    PHQ-9 SCORE 6/6/2016 8/29/2017 10/15/2018   PHQ-9 Total Score 3 8 3     JOSÉ-7 SCORE 8/29/2017 10/15/2018   Total Score 0 0     No flowsheet data found.  Encounter-Level CSA - 08/29/2017:    Controlled Substance Agreement - Scan on 8/29/2017  4:39 PM: CONTROLLED SUBSTANCE AGREEMENT 8/29/17     Encounter-Level CSA - 09/09/2016:    Controlled Substance Agreement - Scan on 9/12/2016 10:10 AM: Controlled Substance Agreement 9/9/16     Encounter-Level CSA - 10/15/2015:    Controlled Substance Agreement - Scan on 10/15/2015 12:54 PM: CONTROLLED MED AGREEMENT 10/15/15     Patient-Level CSA:    Controlled Substance Agreement - Non - Opioid - Scan on 5/13/2019 10:45 AM: CONTROLLED SUBSTANCE AGREEMENT 5/13/19                How many days per week do you exercise enough to make your heart beat faster? 3 or less    How many minutes a day do you exercise enough to make your heart beat faster? 9 or less    How many days per week do you miss taking your medication? 0      There are no diagnoses linked to this encounter.     Problem list, Medication list, Allergies, and Medical/Social/Surgical histories reviewed in EPIC and updated as appropriate.   Additional history: as documented    ROS:  General and Resp. completed and negative except as noted  "above    Histories:   Patient Active Problem List   Diagnosis     AS (ankylosing spondylitis) (H)     PA (pernicious anemia)     Low serum testosterone level     Health Care Home     Chronic narcotic dependence (H)     Rectus diastasis     Hx of gastric ulcer     Chronic pain syndrome     Keloid scar, mostly just dark     Past Surgical History:   Procedure Laterality Date     ARTHROPLASTY REVISION HIP Left 2017    Procedure: ARTHROPLASTY REVISION HIP;  Surgeon: Saravanan Alcaraz MD;  Location:  OR     HERNIORRHAPHY VENTRAL  2013    Procedure: HERNIORRHAPHY VENTRAL;  UMBILICAL HERNIA REPAIR WITH MESH, RECTUS DIASTASIS REPAIR WITH MESH;  Surgeon: Jason Dodson MD;  [facility]     JOINT REPLACEMENT, HIP RT/LT      Left     JOINT REPLACEMENT, HIP RT/LT      Right     OPEN SEPARATION COMPONENT HERNIORRHAPHY  2013    Procedure: OPEN SEPARATION COMPONENT HERNIORRHAPHY;;  Surgeon: Jason Dodson MD;  Location: [facility]       Social History     Tobacco Use     Smoking status: Former Smoker     Last attempt to quit: 2009     Years since quittin.3     Smokeless tobacco: Never Used     Tobacco comment: quit 2 1/2 yrs   Substance Use Topics     Alcohol use: Yes     Comment: 1 to 2 per week     No family history on file.        OBJECTIVE:                                                    /80   Pulse 88   Temp 98.1  F (36.7  C) (Oral)   Resp 16   Ht 1.48 m (4' 10.25\")   Wt 70.5 kg (155 lb 6.4 oz)   BMI 32.20 kg/m    Body mass index is 32.2 kg/m .   APPEARANCE: = Relaxed and in no distress  Conj/Eyelids = noninjected and lids and lashes are without inflammation  PERRLA/Irises = Pupils Round Reactive to Light and Irisis without inflammation  Neck = No anterior or posterior adenopathy appreciated.  Thyroid = Not enlarged and no masses felt  Resp effort = Calm regular breathing  Breath Sounds = Good air movement with no rales or rhonchi in any lung " fields  Heart Rate, Rhythm, & sounds (no Murm)  = Regular rate and rhythm with no S3, S4, or murmur appreciated.  Carotid Art's = Pulses full and equal and no bruits appreciated  Abdomen = Soft, nontender, no masses, & bowel sounds in all quadrants  Liver/Spleen = Normal span and no splenomegaly noted  Digits and Nails = FROM in all finger joints, no nail dystrophy  Ext (edema) = No pretibial edema noted or elsewhere  Musculsktl: neck and back have significant lask of mobility  SKIN = absent significant rashes or lesions   Recent/Remote Memory = Alert and Oriented x 3  Mood/Affect = Cooperative and interested     ASSESSMENT/PLAN:                                                        There are no diagnoses linked to this encounter.    Regular exercise  There are no Patient Instructions on file for this visit.    The following health maintenance items are reviewed in Epic and correct as of today:  Health Maintenance   Topic Date Due     MEDICARE ANNUAL WELLNESS VISIT  06/06/2017     ZOSTER IMMUNIZATION (1 of 2) 04/04/2019     URINE DRUG SCREEN  04/06/2019     PHQ-2  01/01/2020     DTAP/TDAP/TD IMMUNIZATION (2 - Td) 08/26/2020     COLORECTAL CANCER SCREENING  06/20/2021     LIPID  04/16/2023     HIV SCREENING  Completed     INFLUENZA VACCINE  Completed     IPV IMMUNIZATION  Completed     MENINGITIS IMMUNIZATION  Aged Out       Willy Forrest MD  [facility]  Family Practice  [facility]  [phone]    For any issues my office # is [phone]

## 2020-05-27 DIAGNOSIS — M45.9 ANKYLOSING SPONDYLITIS, UNSPECIFIED SITE OF SPINE (H): ICD-10-CM

## 2020-05-28 RX ORDER — OXYCODONE HYDROCHLORIDE 10 MG/1
10 TABLET ORAL EVERY 4 HOURS PRN
Qty: 180 TABLET | Refills: 0 | Status: SHIPPED | OUTPATIENT
Start: 2020-05-28 | End: 2020-06-25

## 2020-06-16 DIAGNOSIS — Z76.0 ENCOUNTER FOR MEDICATION REFILL: ICD-10-CM

## 2020-06-16 DIAGNOSIS — D51.0 PA (PERNICIOUS ANEMIA): Chronic | ICD-10-CM

## 2020-06-16 RX ORDER — CYANOCOBALAMIN 1000 UG/ML
INJECTION, SOLUTION INTRAMUSCULAR; SUBCUTANEOUS
Qty: 3 ML | Refills: 0 | Status: SHIPPED | OUTPATIENT
Start: 2020-06-16 | End: 2021-08-26

## 2020-06-16 RX ORDER — CYCLOBENZAPRINE HCL 10 MG
TABLET ORAL
Qty: 180 TABLET | Refills: 0 | Status: SHIPPED | OUTPATIENT
Start: 2020-06-16 | End: 2020-09-23

## 2020-06-25 DIAGNOSIS — M45.9 ANKYLOSING SPONDYLITIS, UNSPECIFIED SITE OF SPINE (H): ICD-10-CM

## 2020-06-25 RX ORDER — OXYCODONE HYDROCHLORIDE 10 MG/1
10 TABLET ORAL EVERY 4 HOURS PRN
Qty: 180 TABLET | Refills: 0 | Status: SHIPPED | OUTPATIENT
Start: 2020-06-25 | End: 2020-07-24

## 2020-07-24 ENCOUNTER — VIRTUAL VISIT (OUTPATIENT)
Dept: FAMILY MEDICINE | Facility: CLINIC | Age: 50
End: 2020-07-24

## 2020-07-24 DIAGNOSIS — M45.9 ANKYLOSING SPONDYLITIS, UNSPECIFIED SITE OF SPINE (H): ICD-10-CM

## 2020-07-24 PROCEDURE — 99214 OFFICE O/P EST MOD 30 MIN: CPT | Mod: 95 | Performed by: FAMILY MEDICINE

## 2020-07-24 RX ORDER — OXYCODONE HYDROCHLORIDE 10 MG/1
10 TABLET ORAL EVERY 4 HOURS PRN
Qty: 180 TABLET | Refills: 0 | Status: SHIPPED | OUTPATIENT
Start: 2020-07-24 | End: 2020-08-27

## 2020-07-24 NOTE — PROGRESS NOTES
"    Video Problem(s) Oriented visit      Video Start Time: 10:32 AM    The patient has been notified of following:     \"This video visit will be conducted via a call between you and your physician/provider. We have found that certain health care needs can be provided without the need for an in-person physical exam.  This service lets us provide the care you need with a video conversation.  If a prescription is necessary we can send it directly to your pharmacy.  If lab work is needed we can place an order for that and you can then stop by our lab to have the test done at a later time.    Video visits are billed at different rates depending on your insurance coverage.  Please reach out to your insurance provider with any questions.    If during the course of the call the physician/provider feels a video visit is not appropriate, you will not be charged for this service.\"    Patient has given verbal consent for Video visit? Yes    How would you like to obtain your AVS? MyChart    Will anyone else be joining your video visit? No  SUBJECTIVE:                                                    Oseas Edwards is a 50 year old male who presents to clinic today for the following health issues :        1. Ankylosing spondylitis, unspecified site of spine (H)  Chronic Pain Follow-Up    Where in your body do you have pain? lwoer back and neck  How has your pain affected your ability to work? Unable to work  Which of these pain treatments have you tried since your last clinic visit? Other: nothing  How well are you sleeping? Fair  How has your mood been since your last visit? Better  Have you had a significant life event? Other: covid  Other aggravating factors: none  Taking medication as directed? Yes    PHQ-9 SCORE 6/6/2016 8/29/2017 10/15/2018   PHQ-9 Total Score 3 8 3     JOSÉ-7 SCORE 8/29/2017 10/15/2018   Total Score 0 0     No flowsheet data found.  Encounter-Level CSA - 08/29/2017:    Controlled Substance Agreement - Scan on " 8/29/2017  4:39 PM: CONTROLLED SUBSTANCE AGREEMENT 8/29/17     Encounter-Level CSA - 09/09/2016:    Controlled Substance Agreement - Scan on 9/12/2016 10:10 AM: Controlled Substance Agreement 9/9/16     Encounter-Level CSA - 10/15/2015:    Controlled Substance Agreement - Scan on 10/15/2015 12:54 PM: CONTROLLED MED AGREEMENT 10/15/15     Patient-Level CSA:    Controlled Substance Agreement - Non - Opioid - Scan on 5/13/2019 10:45 AM: CONTROLLED SUBSTANCE AGREEMENT 5/13/19       Have you had any exacerbations with this pain .No    How many days per week do you miss taking your medication? 0    Smoking:  no  Passive smoke exposure:  no  Alcohol:  no  Drugs:  no    How many days per week do you exercise enough to make your heart beat faster? 6  How many minutes a day do you exercise enough to make your heart beat faster? 10 - 19      Problem list, Medication list, Allergies, and Medical/Social/Surgical histories reviewed in [facility] and updated as appropriate.   Additional history: as documented    ROS:  General and Resp. completed and negative except as noted above    Histories:   Patient Active Problem List   Diagnosis     AS (ankylosing spondylitis) (H)     PA (pernicious anemia)     Low serum testosterone level     Health Care Home     Chronic narcotic dependence (H)     Rectus diastasis     Hx of gastric ulcer     Chronic pain syndrome     Keloid scar, mostly just dark     Past Surgical History:   Procedure Laterality Date     ARTHROPLASTY REVISION HIP Left 2/14/2017    Procedure: ARTHROPLASTY REVISION HIP;  Surgeon: Saravanan Alcaraz MD;  Location:  OR     HERNIORRHAPHY VENTRAL  11/11/2013    Procedure: HERNIORRHAPHY VENTRAL;  UMBILICAL HERNIA REPAIR WITH MESH, RECTUS DIASTASIS REPAIR WITH MESH;  Surgeon: Jason Dodson MD;  Location:  [facility]     JOINT REPLACEMENT, HIP RT/LT  1994    Left     JOINT REPLACEMENT, HIP RT/LT  2000ish    Right     OPEN SEPARATION COMPONENT HERNIORRHAPHY  11/11/2013     Procedure: OPEN SEPARATION COMPONENT HERNIORRHAPHY;;  Surgeon: Jason Dodson MD;  Location: [facility]       Social History     Tobacco Use     Smoking status: Former Smoker     Last attempt to quit: 2009     Years since quittin.5     Smokeless tobacco: Never Used     Tobacco comment: quit 2 1/2 yrs   Substance Use Topics     Alcohol use: Yes     Comment: 1 to 2 per week     No family history on file.        OBJECTIVE:                                                    There were no vitals taken for this visit.  There is no height or weight on file to calculate BMI.   APPEARANCE: = Relaxed and in no distress  Recent/Remote Memory = Alert and Oriented x 3  Mood/Affect = Cooperative and interested     ASSESSMENT/PLAN:                                                        Farooq was seen today for refill request.    Diagnoses and all orders for this visit:    Ankylosing spondylitis, unspecified site of spine (H)        See Patient Instructions  There are no Patient Instructions on file for this visit.    The following health maintenance items are reviewed in Epic and correct as of today:  Health Maintenance   Topic Date Due     HEPATITIS B IMMUNIZATION (1 of 3 - Risk 3-dose series) 1988     MEDICARE ANNUAL WELLNESS VISIT  2017     ZOSTER IMMUNIZATION (1 of 2) 2019     URINE DRUG SCREEN  2019     DTAP/TDAP/TD IMMUNIZATION (2 - Td) 2020     INFLUENZA VACCINE (1) 2020     COLORECTAL CANCER SCREENING  2021     LIPID  2023     HIV SCREENING  Completed     PHQ-2  Completed     IPV IMMUNIZATION  Completed     MENINGITIS IMMUNIZATION  Aged Out       Video-Visit Details    This visit is being conducted as a virtual visit due to the emphasis on mitigation of the COVID-19 virus pandemic.   Type of service:  Video Visit    Originating Location (pt. Location): Home    Distant Location (provider location):  [facility]     Platform used for Video Visit: [employer]  Hangouts    Willy Forrest MD  [facility]  [phone]    Video End Time:10:40 AM  For any issues my office # is [phone]

## 2020-08-25 DIAGNOSIS — M45.9 ANKYLOSING SPONDYLITIS, UNSPECIFIED SITE OF SPINE (H): ICD-10-CM

## 2020-08-27 RX ORDER — OXYCODONE HYDROCHLORIDE 10 MG/1
10 TABLET ORAL EVERY 4 HOURS PRN
Qty: 180 TABLET | Refills: 0 | Status: SHIPPED | OUTPATIENT
Start: 2020-08-27 | End: 2020-09-24

## 2020-09-23 DIAGNOSIS — Z76.0 ENCOUNTER FOR MEDICATION REFILL: ICD-10-CM

## 2020-09-23 DIAGNOSIS — Z79.899 ENCOUNTER FOR LONG-TERM (CURRENT) USE OF MEDICATIONS: ICD-10-CM

## 2020-09-23 DIAGNOSIS — M45.0 ANKYLOSING SPONDYLITIS OF MULTIPLE SITES IN SPINE (H): ICD-10-CM

## 2020-09-23 DIAGNOSIS — R79.89 LOW SERUM TESTOSTERONE LEVEL: ICD-10-CM

## 2020-09-23 RX ORDER — FINASTERIDE 5 MG/1
TABLET, FILM COATED ORAL
Qty: 90 TABLET | Refills: 0 | Status: SHIPPED | OUTPATIENT
Start: 2020-09-23 | End: 2020-12-28

## 2020-09-23 RX ORDER — CYCLOBENZAPRINE HCL 10 MG
TABLET ORAL
Qty: 180 TABLET | Refills: 0 | Status: SHIPPED | OUTPATIENT
Start: 2020-09-23 | End: 2020-12-29

## 2020-09-23 RX ORDER — LISINOPRIL/HYDROCHLOROTHIAZIDE 10-12.5 MG
TABLET ORAL
Qty: 90 TABLET | Refills: 0 | Status: SHIPPED | OUTPATIENT
Start: 2020-09-23 | End: 2021-01-25

## 2020-09-23 RX ORDER — INDOMETHACIN 50 MG/1
CAPSULE ORAL
Qty: 90 CAPSULE | Refills: 0 | Status: SHIPPED | OUTPATIENT
Start: 2020-09-23 | End: 2020-12-28

## 2020-09-24 DIAGNOSIS — M45.9 ANKYLOSING SPONDYLITIS, UNSPECIFIED SITE OF SPINE (H): ICD-10-CM

## 2020-09-24 RX ORDER — OXYCODONE HYDROCHLORIDE 10 MG/1
10 TABLET ORAL EVERY 4 HOURS PRN
Qty: 180 TABLET | Refills: 0 | Status: SHIPPED | OUTPATIENT
Start: 2020-09-24 | End: 2020-10-26

## 2020-10-26 ENCOUNTER — OFFICE VISIT (OUTPATIENT)
Dept: FAMILY MEDICINE | Facility: CLINIC | Age: 50
End: 2020-10-26

## 2020-10-26 VITALS
BODY MASS INDEX: 33.36 KG/M2 | WEIGHT: 161 LBS | TEMPERATURE: 98.3 F | DIASTOLIC BLOOD PRESSURE: 92 MMHG | HEART RATE: 90 BPM | RESPIRATION RATE: 17 BRPM | SYSTOLIC BLOOD PRESSURE: 130 MMHG | OXYGEN SATURATION: 96 %

## 2020-10-26 DIAGNOSIS — D51.0 PA (PERNICIOUS ANEMIA): ICD-10-CM

## 2020-10-26 DIAGNOSIS — M45.9 ANKYLOSING SPONDYLITIS, UNSPECIFIED SITE OF SPINE (H): ICD-10-CM

## 2020-10-26 DIAGNOSIS — I10 ESSENTIAL HYPERTENSION: Primary | ICD-10-CM

## 2020-10-26 DIAGNOSIS — F11.20 CHRONIC NARCOTIC DEPENDENCE (H): ICD-10-CM

## 2020-10-26 DIAGNOSIS — G56.03 BILATERAL CARPAL TUNNEL SYNDROME: ICD-10-CM

## 2020-10-26 PROCEDURE — 90756 CCIIV4 VACC ABX FREE IM: CPT | Performed by: FAMILY MEDICINE

## 2020-10-26 PROCEDURE — 99214 OFFICE O/P EST MOD 30 MIN: CPT | Mod: 25 | Performed by: FAMILY MEDICINE

## 2020-10-26 PROCEDURE — 90472 IMMUNIZATION ADMIN EACH ADD: CPT | Mod: 59 | Performed by: FAMILY MEDICINE

## 2020-10-26 PROCEDURE — 90714 TD VACC NO PRESV 7 YRS+ IM: CPT | Performed by: FAMILY MEDICINE

## 2020-10-26 PROCEDURE — 90471 IMMUNIZATION ADMIN: CPT | Mod: 59 | Performed by: FAMILY MEDICINE

## 2020-10-26 RX ORDER — CYANOCOBALAMIN 1000 UG/ML
1000 INJECTION, SOLUTION INTRAMUSCULAR; SUBCUTANEOUS
Status: DISCONTINUED | OUTPATIENT
Start: 2020-10-26 | End: 2021-01-25

## 2020-10-26 RX ORDER — OXYCODONE HYDROCHLORIDE 10 MG/1
10 TABLET ORAL EVERY 4 HOURS PRN
Qty: 180 TABLET | Refills: 0 | Status: SHIPPED | OUTPATIENT
Start: 2020-10-26 | End: 2020-11-23

## 2020-10-26 NOTE — PROGRESS NOTES
Numbness in the left hand in the for the past 3 weeks.  Tried wearing a splint and helped at first and now is getting worse.  Had been doing push ups but stopped that.    Arms are more tired lately    Chronic Pain Follow-Up  Taking medication as directed? Yes  Where in your body do you have pain? back - bilateral, lower, no radiation and neck - bilateral aching, unbearable and tender    How does your pain affect your ability to work? Unable to work  How much does your current medication allow you to function in your daily life? 26 - 50%  How many days per week do you miss taking your medication? 0  When did we last try and wean your current dosage? two years ago and it didn't work  How did that weaning go? Not well    Besides pain medication, how else are you managing your pain? Heat, Massage, Rest, Stretching and Other:    How well are you sleeping? Poor  How has your mood been since your last visit? About the same  Have you had a significant life event? No  Other aggravating factors: weather, rainy days hurt more    How many days per week do you exercise enough to make your heart beat faster? 3 or less  How many minutes a day do you exercise enough to make your heart beat faster? 20 - 29      PHQ-9 SCORE 6/6/2016 8/29/2017 10/15/2018   PHQ-9 Total Score 3 8 3     JOSÉ-7 SCORE 8/29/2017 10/15/2018   Total Score 0 0     No flowsheet data found.  Encounter-Level CSA - 08/29/2017:    Controlled Substance Agreement - Scan on 8/29/2017  4:39 PM: CONTROLLED SUBSTANCE AGREEMENT 8/29/17     Encounter-Level CSA - 09/09/2016:    Controlled Substance Agreement - Scan on 9/12/2016 10:10 AM: Controlled Substance Agreement 9/9/16     Encounter-Level CSA - 10/15/2015:    Controlled Substance Agreement - Scan on 10/15/2015 12:54 PM: CONTROLLED MED AGREEMENT 10/15/15     Patient-Level CSA:    Controlled Substance Agreement - Non - Opioid - Scan on 5/13/2019 10:45 AM: CONTROLLED SUBSTANCE AGREEMENT 5/13/19           Smoking:   no  Passive smoke exposure:  no  Alcohol:  YES: Occassional  Drugs:  no    he has Hypertension which is currently not well controlled. he has been compliant with his medications and is here today to follow up on the this issue and see if we can't work on better control of the blood pressure.    Reviewed last 6 BP readings in chart:  BP Readings from Last 6 Encounters:   10/26/20 (!) 130/92   04/23/20 108/80   01/20/20 118/78   10/28/19 114/72   07/30/19 112/68   06/03/19 114/66     he  has not experienced any significant side effects from current medications for hypertension.    NO active cardiac complaints or symptoms with exercise.    Problem(s) Oriented visit      ROS:  General and Resp. completed and negative except as noted above     HISTORY:   reports current alcohol use.   reports that he quit smoking about 11 years ago. He has never used smokeless tobacco.    Past Medical History:   Diagnosis Date     Ankylosing spondylitis (H)      Arthritis      AS (ankylosing spondylitis) (H) 1985     Cellulitis of leg 2011     Chronic narcotic dependence (H) 11/4/2013     Hypertension      Keloid scar, mostly just dark 2/7/2017     Low serum testosterone level 1/30/2012     Optic neuritis 3/20/2012     PA (pernicious anemia) 6/16/2011     Past Surgical History:   Procedure Laterality Date     ARTHROPLASTY REVISION HIP Left 2/14/2017    Procedure: ARTHROPLASTY REVISION HIP;  Surgeon: Saravanan Alcaraz MD;  Location:  OR     HERNIORRHAPHY VENTRAL  11/11/2013    Procedure: HERNIORRHAPHY VENTRAL;  UMBILICAL HERNIA REPAIR WITH MESH, RECTUS DIASTASIS REPAIR WITH MESH;  Surgeon: Jason Dodson MD;  Location: [facility]     JOINT REPLACEMENT, HIP RT/LT  1994    Left     JOINT REPLACEMENT, HIP RT/LT  2000ish    Right     OPEN SEPARATION COMPONENT HERNIORRHAPHY  11/11/2013    Procedure: OPEN SEPARATION COMPONENT HERNIORRHAPHY;;  Surgeon: Jason Dodson MD;  Location: [facility]       EXAM:  BP: 130/92   Pulse: 90     Temp: 98.3    Wt Readings from Last 2 Encounters:   10/26/20 73 kg (161 lb)   04/23/20 70.5 kg (155 lb 6.4 oz)       BMI= Body mass index is 33.36 kg/m .    EXAM:  APPEARANCE: = alert and mild distress  Conj/Eyelids = noninjected and lids and lashes are without inflammation  PERRLA/Irises = Pupils Round Reactive to Light and Irisis without inflammation  Neck = No anterior or posterior adenopathy appreciated.  Thyroid = Not enlarged and no masses felt  Resp effort = Calm regular breathing  Breath Sounds = Good air movement with no rales or rhonchi in any lung fields  Heart Rate, Rythym, & sounds (no Murm)  = Regular rate and rythym with no S3, S4, or murmer appreciated.  Carotid Art's = Pulses full and equal and no bruits appreciated  Abdomen = Soft, nontender, no masses, & bowel sounds in all quadrants  Liver/Spleen = Normal span and no splenomegaly noted  Digits and Nails = FROM in all finger joints, no nail dystrophy  Ext (edema) = No pretibial edema noted or elsewhere  Musculsktl: decreased range of motion in the neck back  SKIN = absent significant rashes or lesions   Recent/Remote Memory = Alert and Oriented x 3  Mood/Affect = Cooperative and interested      Assessment/Plan:  Farooq was seen today for refill request, numbness, consult and fatigue.    Diagnoses and all orders for this visit:    Chronic narcotic dependence (H)  Is on up to 6 10 mg oxycodone per day.  Has seen pain clinic in the past and they recommended continuence of the same current dosage  Patient is interested in better pain control  I will not increase the current dose and we talked long about that.  He is going to explore [facility] with his rheumatologist Dr. Slade.  I offered the [facility].  He will consider these options.    Ankylosing spondylitis, unspecified site of spine (H)  -     ToxASSURE Urine Drug Screen ([facility])  -     ADMIN 1st VACCINE  -     ADMIN 1st VACCINE  -     oxyCODONE IR (ROXICODONE) 10 MG tablet; Take 1  "tablet (10 mg) by mouth every 4 hours as needed for pain    PA (pernicious anemia)    Bilateral carpal tunnel syndrome    Other orders  -     TD PRESERV FREE, IM (7+ YRS)  -     INFLUENZA VACCINE IM 4YRS+ 4 VALENT CCIIV4    >25 min spent with patient, greater than 50% spent on discussion/education/planning, etc. About The primary encounter diagnosis was Essential hypertension. Diagnoses of Ankylosing spondylitis, unspecified site of spine (H), Chronic narcotic dependence (H), PA (pernicious anemia), and Bilateral carpal tunnel syndrome were also pertinent to this visit.        COUNSELING:   reports that he quit smoking about 11 years ago. He has never used smokeless tobacco.    Estimated body mass index is 33.36 kg/m  as calculated from the following:    Height as of 4/23/20: 1.48 m (4' 10.25\").    Weight as of this encounter: 73 kg (161 lb).       Appropriate preventive services were discussed with this patient, including applicable screening as appropriate for cardiovascular disease, diabetes, osteopenia/osteoporosis, and glaucoma.  As appropriate for age/gender, discussed screening for colorectal cancer, prostate cancer, breast cancer, and cervical cancer. Checklist reviewing preventive services available has been given to the patient.    Reviewed patients plan of care and provided an AVS. The Basic Care Plan (routine screening as documented in Health Maintenance) for Farooq meets the Care Plan requirement. This Care Plan has been established and reviewed with the  Patient.      The following health maintenance items are reviewed in Epic and correct as of today:  Health Maintenance   Topic Date Due     HEPATITIS B IMMUNIZATION (1 of 3 - Risk 3-dose series) 04/04/1988     MEDICARE ANNUAL WELLNESS VISIT  06/06/2017     ZOSTER IMMUNIZATION (1 of 2) 04/04/2019     URINE DRUG SCREEN  04/06/2019     DTAP/TDAP/TD IMMUNIZATION (2 - Td) 08/26/2020     INFLUENZA VACCINE (1) 09/01/2020     COLORECTAL CANCER SCREENING  " 06/20/2021     LIPID  04/16/2023     HIV SCREENING  Completed     PHQ-2  Completed     IPV IMMUNIZATION  Completed     Pneumococcal Vaccine: Pediatrics (0 to 5 Years) and At-Risk Patients (6 to 64 Years)  Aged Out     MENINGITIS IMMUNIZATION  Aged Out       Willy Forrest  [facility]  For any issues my office # is [phone]

## 2020-10-26 NOTE — PATIENT INSTRUCTIONS
Hard surgeon for carpal tunnel    Address: [address] [place] [id number]  Hours:     Phone: [phone]

## 2020-10-26 NOTE — LETTER
[facility]  10/26/20    Patient: Oseas Edwards  YOB: 1970  Medical Record Number: [id number]                                                                  Opioid / Opioid Plus Controlled Substance Agreement    I understand that my care provider has prescribed an opioid (narcotic) controlled substance to help manage my condition(s). I am taking this medicine to help me function or work. I know this is strong medicine, and that it can cause serious side effects. Opioid medicine can be sedating, addicting and may cause a dependency on the drug. They can affect my ability to drive or think, and cause depression. They need to be taken exactly as prescribed. Combining opioids with certain medicines or chemicals (such as cocaine, sedatives and tranquilizers, sleeping pills, meth) can be dangerous or even fatal. Also, if I stop opioids suddenly, I may have severe withdrawal symptoms. Last, I understand that opioids do not work for all types of pain nor for all patients. If not helpful, I may be asked to stop them.    The risks, benefits, and side effects of these medicine(s) were explained to me. I agree that:    1. I will take part in other treatments as advised by my care team. This may be psychiatry or counseling, physical therapy, behavioral therapy, group treatment or a referral to a pain clinic. I will reduce or stop my medicine when my care team tells me to do so.  2. I will take my medicines as prescribed. I will not change the dose or schedule unless my care team tells me to. There will be no refills if I  run out early.   I may be contactedwithout warning and asked to complete a urine drug test or pill count at any time.   3. I will keep all my appointments, and understand this is part of the monitoring of opioids. My care team may require an office visit for EVERY opioid/controlled substance refill. If I miss appointments or don t follow instructions, my care team may stop my  medicine.  4. I will not ask other providers to prescribe controlled substances, and I will not accept controlled substances from other people. If I need another prescribed controlled substance for a new reason, I will tell my care team within 1 business day.  5. I will use one pharmacy to fill all of my controlled substance prescriptions, and it is up to me to make sure that I do not run out of my medicines on weekends or holidays. If my care team is willing to refill my opioid prescription without a visit, I must request refills only during office hours, refills may take up to 3 days to process, and it may take up to 5 to 7 days for my medicine to be mailed and ready at my pharmacy. Prescriptions will not be mailed anywhere except my pharmacy.        [id number]  Rev 12/18         Registration to scan to EHR                             Page 1 of 2               Controlled Substance Agreement Opioid        [facility]  10/26/20  Patient: Oseas Edwards  YOB: 1970  Medical Record Number: [id number]                                                                  6. I am responsible for my prescriptions. If the medicine/prescription is lost or stolen, it will not be replaced. I also agree not to share controlled substance medicines with anyone.  7. I agree to not use ANY illegal or recreational drugs. This includes marijuana, cocaine, bath salts or other drugs. I agree not to use alcohol unless my care team says I may.          I agree to give urine samples whenever asked. If I don t give a urine sample, the care team may stop my medicine.    8. If I enroll in the [place] Medical Marijuana program, I will tell my care team. I will also sign an agreement to share my medical records with my care team.   9. I will bring in my list of medicines (or my medicine bottles) each time I come to the clinic.   10. I will tell my care team right away if I become pregnant or have a new medical problem treated  outside of my regular clinic.  11. I understand that this medicine can affect my thinking and judgment. It may be unsafe for me to drive, use machinery and do dangerous tasks. I will not do any of these things until I know how the medicine affects me. If my dose changes, I will wait to see how it affects me. I will contact my care team if I have concerns about medicine side effects.    I understand that if I do not follow any of the conditions above, my prescriptions or treatment may be stopped.      I agree that my provider, clinic care team, and pharmacy may work with any city, state or federal law enforcement agency that investigates the misuse, sale, or other diversion of my controlled medicine. I will allow my provider to discuss my care with or share a copy of this agreement with any other treating provider, pharmacy or emergency room where I receive care. I agree to give up (waive) any right of privacy or confidentiality with respect to these consents.     I have read this agreement and have asked questions about anything I did not understand.      ________________________________________________________________________  Patient signature - Date/Time -  Oseas Edwards                                      ________________________________________________________________________  Witness signature                                                            ________________________________________________________________________  Provider signature - Willy Forrest MD      [id number]  Rev 12/18         Registration to scan to EHR                         Page 2 of 2                   Controlled Substance Agreement Opioid           Page 1 of 2  Opioid Pain Medicines (also known as Narcotics)  What You Need to Know    What are opioids?   Opioids are pain medicines that must be prescribed by a doctor.  They are also known as narcotics.    Examples are:     morphine (MS Contin, Bre)    oxycodone (Oxycontin)    oxycodone  and acetaminophen (Percocet)    hydrocodone and acetaminophen (Vicodin, Norco)     fentanyl patch (Duragesic)     hydromorphone (Dilaudid)     methadone     What do opioids do well?   Opioids are best for short-term pain after a surgery or injury. They also work well for cancer pain. Unlike other pain medicines, they do not cause liver or kidney failure or ulcers. They may help some people with long-lasting (chronic) pain.     What do opioids NOT do well?   Opioids never get rid of pain entirely, and they do not work well for most patients with chronic pain. Opioids do not reduce swelling, one of the causes of pain. They also don t work well for nerve pain.                           For informational purposes only.  Not to replace the advice of your care provider.  Copyright [address] [facility]. All right reserved. Olery [id number] 02/18.      Page 2 of 2    Risks and side effects   Talk to your doctor before you start or decide to keep taking one of these medicines. Side effects include:    Lowering your breathing rate enough to cause death    Overdose, including death, especially if taking higher than prescribed doses    Long-term opioid use    Worse depression symptoms; less pleasure in things you usually enjoy    Feeling tired or sluggish    Slower thoughts or cloudy thinking    Being more sensitive to pain over time; pain is harder to control    Trouble sleeping or restless sleep    Changes in hormone levels (for example, less testosterone)    Changes in sex drive or ability to have sex    Constipation    Unsafe driving    Itching and sweating    Feeling dizzy    Nausea, vomiting and dry mouth    What else should I know about opioids?  When someone takes opioids for too long or too often, they become dependent. This means that if you stop or reduce the medicine too quickly, you will have withdrawal symptoms.    Dependence is not the same as addiction. Addiction is when people keep using a  substance that harms their body, their mind or their relations with others. If you have a history of drug or alcohol abuse, taking opioids can cause a relapse.    Over time, opioids don t work as well. Most people will need higher and higher doses. The higher the dose, the more serious the side effects. We don t know the long-term effects of opioids.      Prescribed opioids aren't the best way to manage chronic pain    Other ways to manage pain include:      Ibuprofen or acetaminophen.  You should always try this first.      Treat health problems that may be causing pain.      acupuncture or massage, deep breathing, meditation, visual imagery, aromatherapy.      Use heat or ice at the pain site      Physical therapy and exercise      Stop smoking      See a counselor or therapist                                                  People who have used opioids for a long time may have a lower quality of life, worse depression, higher levels of pain and more visits to doctors.    Never share your opioids with others. Be sure to store opioids in a secure place, locked if possible.Young children can easily swallow them and overdose.     You can overdose on opioids.  Signs of overdose include decrease or loss of consciousness, slowed breathing, trouble waking and blue lips.  If someone is worried about overdose, they should call 911.    If you are at risk for overdose, you may get naloxone (Narcan, a medicine that reverses the effects of opioids.  If you overdose, a friend or family member can give you Narcan while waiting for the ambulance.  They need to know the signs of overdose and how to give Narcan.    While you're taking opioids:    Don't use alcohol or street drugs. Taking them together can cause death.    Don't take any of these medicines unless your doctor says its okay.  Taking these with opioids can cause death.    Benzodiazepines (such as lorazepam         or diazepam)    Muscle relaxers (such as  cyclobenzaprine)    sleeping pills    other opioids    Safe disposal of opioids  Find your area drug take-back program, your pharmacy mail-back program, buy a special disposal bag (such as Deterra) from your pharmacy or flush them down the toilet.  Use the guidelines at:  www.fda.gov/drugs/resourcesforyou

## 2020-10-27 LAB — VIT B12 SERPL-MCNC: 733 PG/ML (ref 232–1245)

## 2020-10-28 ENCOUNTER — TELEPHONE (OUTPATIENT)
Dept: OTHER | Facility: CLINIC | Age: 50
End: 2020-10-28

## 2020-10-28 NOTE — TELEPHONE ENCOUNTER
Pt referred to [facility] by Willy Forrest MD  for hypertension.     Patient has no recent vascular imaging.     Pt needs to be scheduled for in-person consult with vascular medicine.  Will route to scheduling to coordinate an appointment at next available.    Alicia MARTINEZ, RN    [facility]  [facility]ascular [facility]  Office: [phone]  Fax: [phone]

## 2020-11-02 ENCOUNTER — OFFICE VISIT (OUTPATIENT)
Dept: OTHER | Facility: CLINIC | Age: 50
End: 2020-11-02
Attending: FAMILY MEDICINE
Payer: COMMERCIAL

## 2020-11-02 VITALS
WEIGHT: 160 LBS | RESPIRATION RATE: 18 BRPM | HEART RATE: 100 BPM | BODY MASS INDEX: 31.41 KG/M2 | DIASTOLIC BLOOD PRESSURE: 91 MMHG | SYSTOLIC BLOOD PRESSURE: 143 MMHG | HEIGHT: 60 IN | OXYGEN SATURATION: 96 %

## 2020-11-02 DIAGNOSIS — M48.061 SPINAL STENOSIS OF LUMBAR REGION, UNSPECIFIED WHETHER NEUROGENIC CLAUDICATION PRESENT: ICD-10-CM

## 2020-11-02 DIAGNOSIS — R20.0 ARM NUMBNESS: ICD-10-CM

## 2020-11-02 DIAGNOSIS — I10 ESSENTIAL HYPERTENSION: ICD-10-CM

## 2020-11-02 DIAGNOSIS — M79.669 PAIN OF LOWER LEG, UNSPECIFIED LATERALITY: ICD-10-CM

## 2020-11-02 DIAGNOSIS — I73.9 CLAUDICATION (H): ICD-10-CM

## 2020-11-02 DIAGNOSIS — I35.9 AVD (AORTIC VALVE DISEASE): Primary | ICD-10-CM

## 2020-11-02 PROCEDURE — G0463 HOSPITAL OUTPT CLINIC VISIT: HCPCS

## 2020-11-02 PROCEDURE — 99203 OFFICE O/P NEW LOW 30 MIN: CPT | Performed by: INTERNAL MEDICINE

## 2020-11-02 RX ORDER — LISINOPRIL AND HYDROCHLOROTHIAZIDE 20; 25 MG/1; MG/1
1 TABLET ORAL DAILY
Qty: 30 TABLET | Refills: 3 | Status: SHIPPED | OUTPATIENT
Start: 2020-11-02 | End: 2021-03-24

## 2020-11-02 ASSESSMENT — MIFFLIN-ST. JEOR: SCORE: 1396.51

## 2020-11-02 NOTE — PROGRESS NOTES
"Oseas Edwards is a 50 year old male who presents for:  Chief Complaint   Patient presents with     RECHECK     [facility]10/30/20 [facility] - Pt referred by Willy Forrest MD  for hypertension. Pt has no recent vascular imaging. *[facility] 10/28/20        Vitals:    Vitals:    11/02/20 0837 11/02/20 0838   BP: 120/80 (!) 143/91   BP Location: Right arm Left arm   Patient Position: Chair Chair   Cuff Size: Adult Regular Adult Regular   Pulse: 100    Resp: 18    SpO2: 96%    Weight: 160 lb (72.6 kg)    Height: 4' 10\" (1.473 m)        BMI:  Estimated body mass index is 33.44 kg/m  as calculated from the following:    Height as of this encounter: 4' 10\" (1.473 m).    Weight as of this encounter: 160 lb (72.6 kg).    Pain Score:  Data Unavailable        Isabelle Castillo CMA    "

## 2020-11-02 NOTE — PROGRESS NOTES
Vascular Medicine Progress Note     Oseas Edwards is a 50 year old male who was seen here today, patient is here today for numbness affecting his left arm and discrepancies in blood pressure    Interval History   Patient is a 50-year-old male, previously diagnosed with ankylosing spondylitis, with loss of 1/2 inches of his height secondary to ankylosing spondylitis/osteoporosis, complaining of numbness in the median distribution on the left arm mainly affecting C5 and C6 dermatomes in addition his blood pressure on the left arm is 143/91 right arm is 120/81, patient is having numbness, weakness both upper extremities more pronounced on the left side  Patient has a very short neck with very limited mobility and no bruits could be heard  Patient does not exhibit any signs or symptoms of active disease right now, no signs or symptoms suggestive of vasculitis, no history of shortness of breath or chest pain or loss of consciousness, and no easy fatigue    Patient is having problems with pain control  Patient's pulses are not equal on both upper extremities very weak on the right side barely palpable and on the left side the volume changes in strength with maneuvers in addition his arm becomes weak and and numbness increases in intensity    Physical Exam       BP: (!) 143/91 Pulse: 100   Resp: 18 SpO2: 96 %      Vitals:    11/02/20 0837   Weight: 160 lb     Vital Signs with Ranges  Pulse:  [100] 100  Resp:  [18] 18  BP: (120-143)/(80-91) 143/91  SpO2:  [96 %] 96 %  [unfilled]    Constitutional: awake, alert, cooperative, no apparent distress, and appears stated age  Eyes: Lids and lashes normal, pupils equal, round and reactive to light, extra ocular muscles intact, sclera clear, conjunctiva normal  ENT: normocepalic, without obvious abnormality, oropharynx pink and moist  Hematologic / Lymphatic: no lymphadenopathy  Respiratory: No increased work of breathing, good air exchange, clear to auscultation bilaterally,  no crackles or wheezing  Cardiovascular: regular rate and rhythm, normal S1 and S2 and no murmur noted  GI: Normal bowel sounds, soft, non-distended, non-tender  Skin: no redness, warmth, or swelling, no rashes and no lesions  Musculoskeletal: There is no redness, warmth, or swelling of the joints.  Full range of motion noted.  Motor strength is 5 out of 5 all extremities bilaterally.  Tone is normal.  Neurologic: Awake, alert, oriented to name, place and time.  Cranial nerves II-XII are grossly intact.  Motor is 5 out of 5 bilaterally.    Neuropsychiatric:  Normal affect, memory, insight.  Pulses: Pulses are not equal bilaterally  . No carotid bruits appreciated.     Medications       cyanocobalamin  1,000 mcg Intramuscular Q30 Days       Data   No results found for this or any previous visit (from the past 24 hour(s)).    Assessment & Plan   (I10) Essential hypertension  Comment: Blood pressure is not well controlled, increase his lisinopril to 20 mg daily    Plan:  1-check ESR, C-reactive protein, CBC with differential count  2-upper extremity Doppler to rule out TOS although I think mainly the numbness is secondary to radiculopathy resulting from ankylosing spondylitis/osteoporosis giving rise to numbness in C5 and C6 dermatomes mainly on the left side  3-2D echocardiogram to rule out aortic valve disease which is very common in ankylosing spondylitis patients  4-patient might need EMG          Summary: We will see the patient once test results are available    Gian Mann MD

## 2020-11-10 ENCOUNTER — HOSPITAL ENCOUNTER (OUTPATIENT)
Dept: CARDIOLOGY | Facility: CLINIC | Age: 50
Discharge: HOME | End: 2020-11-10
Attending: INTERNAL MEDICINE | Admitting: INTERNAL MEDICINE
Payer: COMMERCIAL

## 2020-11-10 DIAGNOSIS — I35.9 AVD (AORTIC VALVE DISEASE): ICD-10-CM

## 2020-11-10 PROCEDURE — 93306 TTE W/DOPPLER COMPLETE: CPT

## 2020-11-10 PROCEDURE — 93306 TTE W/DOPPLER COMPLETE: CPT | Mod: 26 | Performed by: INTERNAL MEDICINE

## 2020-11-17 ENCOUNTER — HOSPITAL ENCOUNTER (OUTPATIENT)
Dept: ULTRASOUND IMAGING | Facility: CLINIC | Age: 50
End: 2020-11-17
Attending: INTERNAL MEDICINE
Payer: COMMERCIAL

## 2020-11-17 VITALS — TEMPERATURE: 98.3 F

## 2020-11-17 DIAGNOSIS — R20.0 NUMBNESS OF ARM: Primary | ICD-10-CM

## 2020-11-17 DIAGNOSIS — R20.0 ARM NUMBNESS: ICD-10-CM

## 2020-11-17 LAB
% GRANULOCYTES: 62.5 % (ref 42.2–75.2)
ERYTHROCYTE [SEDIMENTATION RATE] IN BLOOD: NORMAL MM/HR (ref 0–15)
HCT VFR BLD AUTO: 46.3 % (ref 39–51)
HEMOGLOBIN: 15 G/DL (ref 13.4–17.5)
LYMPHOCYTES NFR BLD AUTO: 29.5 % (ref 20.5–51.1)
MCH RBC QN AUTO: 31 PG (ref 27–31)
MCHC RBC AUTO-ENTMCNC: 32.4 G/DL (ref 33–37)
MCV RBC AUTO: 95.5 FL (ref 80–100)
MONOCYTES NFR BLD AUTO: 8 % (ref 1.7–9.3)
PLATELET # BLD AUTO: 125 K/UL (ref 140–450)
RBC # BLD AUTO: 4.84 X10/CMM (ref 4.2–5.9)
WBC # BLD AUTO: 4 X10/CMM (ref 3.8–11)

## 2020-11-17 PROCEDURE — 93970 EXTREMITY STUDY: CPT | Mod: 26 | Performed by: SURGERY

## 2020-11-17 PROCEDURE — 85025 COMPLETE CBC W/AUTO DIFF WBC: CPT | Performed by: FAMILY MEDICINE

## 2020-11-17 PROCEDURE — 93930 UPPER EXTREMITY STUDY: CPT | Mod: 26 | Performed by: SURGERY

## 2020-11-17 PROCEDURE — 85651 RBC SED RATE NONAUTOMATED: CPT | Performed by: FAMILY MEDICINE

## 2020-11-17 PROCEDURE — 93970 EXTREMITY STUDY: CPT | Mod: GZ

## 2020-11-17 PROCEDURE — 93930 UPPER EXTREMITY STUDY: CPT

## 2020-11-18 LAB — CRP SERPL-MCNC: 32 MG/L (ref 0–10)

## 2020-11-23 ENCOUNTER — OFFICE VISIT (OUTPATIENT)
Dept: OTHER | Facility: CLINIC | Age: 50
End: 2020-11-23
Attending: INTERNAL MEDICINE
Payer: COMMERCIAL

## 2020-11-23 VITALS
HEART RATE: 97 BPM | SYSTOLIC BLOOD PRESSURE: 111 MMHG | HEIGHT: 60 IN | DIASTOLIC BLOOD PRESSURE: 73 MMHG | WEIGHT: 156 LBS | BODY MASS INDEX: 30.63 KG/M2 | OXYGEN SATURATION: 98 % | RESPIRATION RATE: 16 BRPM

## 2020-11-23 DIAGNOSIS — M45.9 ANKYLOSING SPONDYLITIS, UNSPECIFIED SITE OF SPINE (H): ICD-10-CM

## 2020-11-23 DIAGNOSIS — I35.9 AVD (AORTIC VALVE DISEASE): ICD-10-CM

## 2020-11-23 DIAGNOSIS — I10 ESSENTIAL HYPERTENSION: ICD-10-CM

## 2020-11-23 DIAGNOSIS — R20.0 ARM NUMBNESS: ICD-10-CM

## 2020-11-23 PROCEDURE — G0463 HOSPITAL OUTPT CLINIC VISIT: HCPCS

## 2020-11-23 PROCEDURE — 99213 OFFICE O/P EST LOW 20 MIN: CPT | Performed by: INTERNAL MEDICINE

## 2020-11-23 RX ORDER — OXYCODONE HYDROCHLORIDE 10 MG/1
10 TABLET ORAL EVERY 4 HOURS PRN
Qty: 180 TABLET | Refills: 0 | Status: SHIPPED | OUTPATIENT
Start: 2020-11-23 | End: 2020-12-22

## 2020-11-23 ASSESSMENT — MIFFLIN-ST. JEOR: SCORE: 1378.36

## 2020-11-23 NOTE — PROGRESS NOTES
Vascular Medicine Progress Note     Oseas Edwards is a 50 year old male who was seen here today for follow-up on TOS arterial and venous study of the upper extremities    Interval History   Patient TOS study showed evidence of narrowing and compression with increased velocities in both subclavian veins worse on the left side at 180 degrees also arterial side showed evidence of increased diastolic pressures at 180 degrees more prominent on the right side as also evidenced by discrepancy in blood pressure being lowered on the right side    We referred the patient for PT for 6 to 8 weeks then will see the patient again    In addition we will check CBC with differential count as the patient had thrombocytopenia previously his platelet range was 180 K to 190 9K    Physical Exam       BP: 111/73 Pulse: 97   Resp: 16 SpO2: 98 %      Vitals:    11/23/20 0949   Weight: 156 lb     Vital Signs with Ranges  Pulse:  [97] 97  Resp:  [16] 16  BP: ()/(68-73) 111/73  SpO2:  [98 %] 98 %  [unfilled]    Constitutional: awake, alert, cooperative, no apparent distress, and appears stated age  Eyes: Lids and lashes normal, pupils equal, round and reactive to light, extra ocular muscles intact, sclera clear, conjunctiva normal  ENT: normocepalic, without obvious abnormality, oropharynx pink and moist  Hematologic / Lymphatic: no lymphadenopathy  Respiratory: No increased work of breathing, good air exchange, clear to auscultation bilaterally, no crackles or wheezing  Cardiovascular: regular rate and rhythm, normal S1 and S2 and no murmur noted  GI: Normal bowel sounds, soft, non-distended, non-tender  Skin: no redness, warmth, or swelling, no rashes and no lesions  Musculoskeletal: There is no redness, warmth, or swelling of the joints.  Full range of motion noted.  Motor strength is 5 out of 5 all extremities bilaterally.  Tone is normal.  Neurologic: Awake, alert, oriented to name, place and time.  Cranial nerves II-XII are  grossly intact.  Motor is 5 out of 5 bilaterally.    Neuropsychiatric:  Normal affect, memory, insight.  Pulses: . No carotid bruits appreciated.     Medications       cyanocobalamin  1,000 mcg Intramuscular Q30 Days       Data   No results found for this or any previous visit (from the past 24 hour(s)).    Assessment & Plan   (I10) Essential hypertension  Comment: Better controlled on current dose of lisinopril 20 mg  Plan: Continue the same dose    (I35.9) AVD (aortic valve disease)  Comment: No evidence of AVD      (R20.0) Arm numbness  Comment: Evidence of TOS venous more than arterial  Plan: PT for 6 to 8 weeks    Check CBC with differential count  Follow-up with the patient in 8 weeks        Gian Mann MD

## 2020-11-23 NOTE — PROGRESS NOTES
"Oseas Edwards is a 50 year old male who presents for:  Chief Complaint   Patient presents with     RECHECK     [facility] 11/20/20 [facility] - Echo done 11/10/20 & Ultrasounds done 11/17/20 - Pt scheduling Labs @ [facility] prior to 11/16/20 - follow up to 11/02/20 visit *[facility] 11/04/20        Vitals:    Vitals:    11/23/20 0949 11/23/20 0950   BP: 97/68 111/73   BP Location: Right arm Left arm   Patient Position: Chair Chair   Cuff Size: Adult Regular Adult Regular   Pulse: 97    Resp: 16    SpO2: 98%    Weight: 156 lb (70.8 kg)    Height: 4' 10\" (1.473 m)        BMI:  Estimated body mass index is 32.6 kg/m  as calculated from the following:    Height as of this encounter: 4' 10\" (1.473 m).    Weight as of this encounter: 156 lb (70.8 kg).    Pain Score:  Data Unavailable        Isabelle Castillo CMA    "

## 2020-11-24 ENCOUNTER — TELEPHONE (OUTPATIENT)
Dept: OTHER | Facility: CLINIC | Age: 50
End: 2020-11-24

## 2020-11-24 NOTE — TELEPHONE ENCOUNTER
"Per Dr. Bingham,   \"Please set up in my clinic to see in 6 weeks for neuro TOS\".     Routing to  to coordinate in person OV 6 weeks from 11/23/20 with Dr. Bingham.     JUAN Moe, RN    [facility]  Office:  [phone] Fax: [phone]    "

## 2020-11-25 NOTE — TELEPHONE ENCOUNTER
Farooq is scheduled for 1/11/2021. No in person visits on 1/4/2021 which would actually be the 6 week reza.    Mmii CRESPO

## 2020-12-08 ENCOUNTER — THERAPY VISIT (OUTPATIENT)
Dept: PHYSICAL THERAPY | Facility: CLINIC | Age: 50
End: 2020-12-08
Payer: COMMERCIAL

## 2020-12-08 DIAGNOSIS — M79.641 BILATERAL HAND PAIN: ICD-10-CM

## 2020-12-08 DIAGNOSIS — M79.642 BILATERAL HAND PAIN: ICD-10-CM

## 2020-12-08 PROCEDURE — 97161 PT EVAL LOW COMPLEX 20 MIN: CPT | Mod: GP | Performed by: PHYSICAL THERAPIST

## 2020-12-08 PROCEDURE — 97110 THERAPEUTIC EXERCISES: CPT | Mod: GP | Performed by: PHYSICAL THERAPIST

## 2020-12-08 NOTE — PROGRESS NOTES
[facility] Initial Evaluation  Subjective:  The history is provided by the patient. No  was used.   Therapist Generated HPI Evaluation  Problem details: Patient has been referred to physical therapy on 11/23/2020 with a diagnosis of thoracic outlet syndrome.  Patient reports left greater than right hand numbness.  Symptoms came on gradually beginning about 2 months ago.  Numbness is mostly in the first three fingers.  Patient has ankylosing spondylitis.  Patient c/o difficulty gripping, and sometimes he drops items.  Using phone is difficult.         Type of problem:  Cervical spine and thoracic spine.    This is a recurrent (had similar symptoms 4-5 years ago, went away with use of wrist braces) condition.  Condition occurred with:  Insidious onset.  Where condition occurred: for unknown reasons.  Site of Pain: B Hands.  Pain quality: pinching. and is constant.  Pain is the same all the time (less in the morning).  Since onset symptoms are gradually worsening.  Associated symptoms:  Numbness. Symptoms are exacerbated by carrying (gripping)  and relieved by rest.  Special tests included:  Other (arterial and venous study).  Past treatment: none.   Barriers include:  None as reported by patient.    Patient Health History  Farooq T Van being seen for Therapy.     Date of Onset: 2 months.   Problem occurred: don't know   Pain is reported as 4/10 on pain scale.  General health as reported by patient is good.  Pertinent medical history includes: high blood pressure, numbness/tingling and rheumatoid arthritis.   Red flags:  None as reported by patient.     Surgeries include:  Orthopedic surgery. Other surgery history details: B Hip Replacements.    Current medications:  Anti-depressants, hormone replacement therapy, muscle relaxants, pain medication and other. Other medications details: Enbriel, Oxycodone.    Current occupation is Retired.                                        Objective:  Standing Alignment:    Cervical/Thoracic:  Forward head and thoracic kyphosis increased  Shoulder/UE:  Rounded shoulders, protracted scapula L and protracted scapula R                  Flexibility/Screens:     Upper Extremity:    Decreased left upper extremity flexibility at:  Pectoralis Major and Pectoralis Minor    Decreased right upper extremity flexibility present at:  Pectoralis Major and Pectoralis Minor                      Cervical/Thoracic Evaluation    AROM:  AROM Cervical:    Flexion:            Major Loss  Extension:       Major Loss  Rotation:         Left: Major/Mod Loss     Right: Major/Mod Loss  Side Bend:      Left: Major Loss     Right:  Major Loss  AROM Thoracic:    Flexion:               Major Loss  Extension:          Major Loss  Rotation:            Left: Major Loss     Right: Major Loss        Cervical Myotomes:  normal                              Spinal Segmental Conclusions:  Spine is rigid                                                  General     ROS    Assessment/Plan:    Patient is a 50 year old male with cervical and thoracic complaints.    Patient has the following significant findings with corresponding treatment plan.                Diagnosis 1:  B Hand numbness  Pain -  self management, education and home program  Decreased ROM/flexibility - therapeutic exercise, therapeutic activity and home program  Decreased joint mobility - manual therapy, therapeutic exercise, therapeutic activity and home program  Decreased strength - therapeutic exercise, therapeutic activities and home program  Impaired muscle performance - neuro re-education and home program  Decreased function - therapeutic activities and home program  Impaired posture - neuro re-education, therapeutic activities and home program    Therapy Evaluation Codes:   1) History comprised of:   Personal factors that impact the plan of care:      None.    Comorbidity factors that impact the plan of care are:       High blood pressure, Numbness/tingling, Rheumatoid arthritis and Ankolosing Spondylitis.     Medications impacting care: Anti-inflammatory, High blood pressure, Muscle relaxant and Pain.  2) Examination of Body Systems comprised of:   Body structures and functions that impact the plan of care:      Cervical spine and Thoracic Spine.   Activity limitations that impact the plan of care are:      Grasping and Reading/Computer work.  3) Clinical presentation characteristics are:   Stable/Uncomplicated.  4) Decision-Making    Low complexity using standardized patient assessment instrument and/or measureable assessment of functional outcome.  Cumulative Therapy Evaluation is: Low complexity.    Previous and current functional limitations:  (See Goal Flow Sheet for this information)    Short term and Long term goals: (See Goal Flow Sheet for this information)     Communication ability:  Patient appears to be able to clearly communicate and understand verbal and written communication and follow directions correctly.  Treatment Explanation - The following has been discussed with the patient:   RX ordered/plan of care  Anticipated outcomes  Possible risks and side effects  This patient would benefit from PT intervention to resume normal activities.   Rehab potential is good.    Frequency:  1 X week, once daily  Duration:  for 8 weeks  Discharge Plan:  Achieve all LTG.  Independent in home treatment program.  Reach maximal therapeutic benefit.    Please refer to the daily flowsheet for treatment today, total treatment time and time spent performing 1:1 timed codes.

## 2020-12-08 NOTE — LETTER
DEPARTMENT OF HEALTH AND HUMAN SERVICES  [facility] FOR MEDICARE & MEDICAID SERVICES    PLAN/UPDATED PLAN OF PROGRESS FOR OUTPATIENT REHABILITATION          PATIENTS NAME:  Oseas Edwards   : 1970  PROVIDER NUMBER:    [id number]  HICN:  [id number]   PROVIDER NAME: [employer] [facility]  MEDICAL RECORD NUMBER: [id number]   START OF CARE DATE:  SOC Date: 20   TYPE:  PT  PRIMARY/TREATMENT DIAGNOSIS: (Pertinent Medical Diagnosis)  Bilateral hand pain  VISITS FROM START OF CARE: 1 Rxs Used: 1     [facility] Initial Evaluation    Subjective:  The history is provided by the patient. No  was used.   Therapist Generated HPI Evaluation  Problem details: Patient has been referred to physical therapy on 2020 with a diagnosis of thoracic outlet syndrome.  Patient reports left greater than right hand numbness.  Symptoms came on gradually beginning about 2 months ago.  Numbness is mostly in the first three fingers.  Patient has ankylosing spondylitis.  Patient c/o difficulty gripping, and sometimes he drops items.  Using phone is difficult.         Type of problem:  Cervical spine and thoracic spine.  This is a recurrent (had similar symptoms 4-5 years ago, went away with use of wrist braces) condition.  Condition occurred with:  Insidious onset.  Where condition occurred: for unknown reasons.  Site of Pain: B Hands.  Pain quality: pinching. and is constant.  Pain is the same all the time (less in the morning).  Since onset symptoms are gradually worsening.  Associated symptoms:  Numbness. Symptoms are exacerbated by carrying (gripping)  and relieved by rest.  Special tests included:  Other (arterial and venous study).  Past treatment: none.   Barriers include:  None as reported by patient.  Patient Health History  Oseas Edwards being seen for Therapy.   Date of Onset: 2 months.   Problem occurred: don't know   Pain is reported as 4/10 on pain scale.  General health  as reported by patient is good.  Pertinent medical history includes: high blood pressure, numbness/tingling and rheumatoid arthritis.   Red flags:  None as reported by patient.  Surgeries include:  Orthopedic surgery. Other surgery history details: B Hip Replacements.    Current medications:  Anti-depressants, hormone replacement therapy, muscle  PATIENTS NAME:  Oseas Edwards   : 1970    relaxants, pain medication and other. Other medications details: Enbriel, Oxycodone.    Current occupation is Retired.   Objective:  Standing Alignment:    Cervical/Thoracic:  Forward head and thoracic kyphosis increased  Shoulder/UE:  Rounded shoulders, protracted scapula L and protracted scapula R  Flexibility/Screens:   Upper Extremity:    Decreased left upper extremity flexibility at:  Pectoralis Major and Pectoralis Minor  Decreased right upper extremity flexibility present at:  Pectoralis Major and Pectoralis Minor     Cervical/Thoracic Evaluation  AROM:  AROM Cervical:  Flexion:            Major Loss  Extension:       Major Loss  Rotation:         Left: Major/Mod Loss     Right: Major/Mod Loss  Side Bend:      Left: Major Loss     Right:  Major Loss  AROM Thoracic:  Flexion:               Major Loss  Extension:          Major Loss  Rotation:            Left: Major Loss     Right: Major Loss    Cervical Myotomes:  normal  Spinal Segmental Conclusions:  Spine is rigid  Assessment/Plan:    Patient is a 50 year old male with cervical and thoracic complaints.    Patient has the following significant findings with corresponding treatment plan.                Diagnosis 1:  B Hand numbness  Pain -  self management, education and home program  Decreased ROM/flexibility - therapeutic exercise, therapeutic activity and home program  Decreased joint mobility - manual therapy, therapeutic exercise, therapeutic activity and home program  Decreased strength - therapeutic exercise, therapeutic activities and home program  Impaired muscle  performance - neuro re-education and home program  Decreased function - therapeutic activities and home program  Impaired posture - neuro re-education, therapeutic activities and home program  Therapy Evaluation Codes:   1) History comprised of:   Personal factors that impact the plan of care:      None.    Comorbidity factors that impact the plan of care are:      High blood pressure, Numbness/tingling, Rheumatoid arthritis and Ankolosing Spondylitis.     Medications impacting care: Anti-inflammatory, High blood pressure, Muscle relaxant and Pain.  2) Examination of Body Systems comprised of:   Body structures and functions that impact the plan of care:      Cervical spine and Thoracic Spine.   Activity limitations that impact the plan of care are:      Grasping and Reading/Computer work.  3) Clinical presentation characteristics are:   Stable/Uncomplicated.  PATIENTS NAME:  Oseas Edwards   : 1970    4) Decision-Making    Low complexity using standardized patient assessment instrument and/or measureable assessment of functional outcome.  Cumulative Therapy Evaluation is: Low complexity.  Previous and current functional limitations:  (See Goal Flow Sheet for this information)    Short term and Long term goals: (See Goal Flow Sheet for this information)   Communication ability:  Patient appears to be able to clearly communicate and understand verbal and written communication and follow directions correctly.  Treatment Explanation - The following has been discussed with the patient:   RX ordered/plan of care  Anticipated outcomes  Possible risks and side effects  This patient would benefit from PT intervention to resume normal activities.   Rehab potential is good.  Frequency:  1 X week, once daily  Duration:  for 8 weeks  Discharge Plan:  Achieve all LTG.  Independent in home treatment program.  Reach maximal therapeutic benefit.        Caregiver Signature/Credentials _____________________________ Date  "________        Rich Gely PT     I have reviewed and certified the need for these services and plan of treatment while under my care.        PHYSICIAN'S SIGNATURE:   ______________________________________ Date___________                           Gian Mann MD    Certification period:  Beginning of Cert date period: 12/08/20 to  End of Cert period date: 03/08/21   Functional Level Progress Report: Please see attached \"Goal Flow sheet for Functional level.\"  ____X____ Continue Services or       ________ DC Services              Service dates: From  SOC Date: 12/08/20 date to present                         "

## 2020-12-22 DIAGNOSIS — M45.9 ANKYLOSING SPONDYLITIS, UNSPECIFIED SITE OF SPINE (H): ICD-10-CM

## 2020-12-22 RX ORDER — OXYCODONE HYDROCHLORIDE 10 MG/1
10 TABLET ORAL EVERY 4 HOURS PRN
Qty: 180 TABLET | Refills: 0 | Status: SHIPPED | OUTPATIENT
Start: 2020-12-22 | End: 2021-01-25

## 2020-12-28 DIAGNOSIS — R79.89 LOW SERUM TESTOSTERONE LEVEL: ICD-10-CM

## 2020-12-28 DIAGNOSIS — M45.0 ANKYLOSING SPONDYLITIS OF MULTIPLE SITES IN SPINE (H): ICD-10-CM

## 2020-12-29 DIAGNOSIS — Z76.0 ENCOUNTER FOR MEDICATION REFILL: ICD-10-CM

## 2020-12-29 RX ORDER — INDOMETHACIN 50 MG/1
CAPSULE ORAL
Qty: 90 CAPSULE | Refills: 0 | Status: ON HOLD | OUTPATIENT
Start: 2020-12-29 | End: 2021-04-28

## 2020-12-29 RX ORDER — CYCLOBENZAPRINE HCL 10 MG
TABLET ORAL
Qty: 180 TABLET | Refills: 0 | Status: ON HOLD | OUTPATIENT
Start: 2020-12-29 | End: 2021-03-27

## 2020-12-29 RX ORDER — FINASTERIDE 5 MG/1
1 TABLET, FILM COATED ORAL DAILY
Qty: 90 TABLET | Refills: 0 | Status: SHIPPED | OUTPATIENT
Start: 2020-12-29 | End: 2021-07-26

## 2021-01-05 ENCOUNTER — THERAPY VISIT (OUTPATIENT)
Dept: PHYSICAL THERAPY | Facility: CLINIC | Age: 51
End: 2021-01-05
Payer: COMMERCIAL

## 2021-01-05 DIAGNOSIS — M79.641 BILATERAL HAND PAIN: ICD-10-CM

## 2021-01-05 DIAGNOSIS — M79.642 BILATERAL HAND PAIN: ICD-10-CM

## 2021-01-05 PROCEDURE — 97110 THERAPEUTIC EXERCISES: CPT | Mod: GP | Performed by: PHYSICAL THERAPIST

## 2021-01-05 PROCEDURE — 97112 NEUROMUSCULAR REEDUCATION: CPT | Mod: GP | Performed by: PHYSICAL THERAPIST

## 2021-01-15 ENCOUNTER — HEALTH MAINTENANCE LETTER (OUTPATIENT)
Age: 51
End: 2021-01-15

## 2021-01-25 ENCOUNTER — OFFICE VISIT (OUTPATIENT)
Dept: FAMILY MEDICINE | Facility: CLINIC | Age: 51
End: 2021-01-25

## 2021-01-25 VITALS
HEART RATE: 95 BPM | SYSTOLIC BLOOD PRESSURE: 128 MMHG | DIASTOLIC BLOOD PRESSURE: 72 MMHG | TEMPERATURE: 97.9 F | RESPIRATION RATE: 16 BRPM | BODY MASS INDEX: 32 KG/M2 | OXYGEN SATURATION: 98 % | WEIGHT: 163 LBS | HEIGHT: 60 IN

## 2021-01-25 DIAGNOSIS — Z13.6 SCREENING FOR HEART DISEASE: ICD-10-CM

## 2021-01-25 DIAGNOSIS — Z79.899 ENCOUNTER FOR LONG-TERM (CURRENT) USE OF MEDICATIONS: ICD-10-CM

## 2021-01-25 DIAGNOSIS — I10 ESSENTIAL HYPERTENSION: ICD-10-CM

## 2021-01-25 DIAGNOSIS — F11.20 CHRONIC NARCOTIC DEPENDENCE (H): Primary | ICD-10-CM

## 2021-01-25 DIAGNOSIS — M45.9 ANKYLOSING SPONDYLITIS, UNSPECIFIED SITE OF SPINE (H): ICD-10-CM

## 2021-01-25 LAB
% GRANULOCYTES: 58.2 % (ref 42.2–75.2)
AMPHETAMINES (AMP) UR: NEGATIVE
BARBITURATES (BAR) UR: NEGATIVE
BENZODIAZEPINES (BZO) UR: NEGATIVE
BUPRENORPHINE (BUP) UR: NEGATIVE
CANNABINOIDS (THC) UR: NEGATIVE
COCAINE (COC) UR: NEGATIVE
ERYTHROCYTE [SEDIMENTATION RATE] IN BLOOD: 3 MM/HR (ref 0–15)
HCT VFR BLD AUTO: 46.7 % (ref 39–51)
HEMOGLOBIN: 15.3 G/DL (ref 13.4–17.5)
LYMPHOCYTES NFR BLD AUTO: 33.4 % (ref 20.5–51.1)
MCH RBC QN AUTO: 31.3 PG (ref 27–31)
MCHC RBC AUTO-ENTMCNC: 32.8 G/DL (ref 33–37)
MCV RBC AUTO: 95.2 FL (ref 80–100)
MDMA UR: NEGATIVE
METHADONE (MTD) UR: NEGATIVE
METHAMPHETAMINE (METHA) UR: NEGATIVE
MONOCYTES NFR BLD AUTO: 8.4 % (ref 1.7–9.3)
OPIATES (OPI) UR: NEGATIVE
OXYCODONE (OXYCO) UR: NORMAL
PCP UR: NEGATIVE
PLATELET # BLD AUTO: 173 K/UL (ref 140–450)
RBC # BLD AUTO: 4.91 X10/CMM (ref 4.2–5.9)
SPECIMEN VALIDITY INTERNAL QC UR: NORMAL
SPECIMEN VALIDITY TEMPERATURE UR: NORMAL
SPECIMEN VALIDITY UR CREATININE: NORMAL MG/DL (ref 20–200)
SPECIMEN VALIDITY UR PH: 5 (ref 4–9)
SPECIMEN VALIDITY UR SPECIFIC GRAVITY: 1 (ref 1–1.02)
WBC # BLD AUTO: 6.2 X10/CMM (ref 3.8–11)

## 2021-01-25 PROCEDURE — 80306 DRUG TEST PRSMV INSTRMNT: CPT | Performed by: FAMILY MEDICINE

## 2021-01-25 PROCEDURE — 36415 COLL VENOUS BLD VENIPUNCTURE: CPT | Performed by: FAMILY MEDICINE

## 2021-01-25 PROCEDURE — 99214 OFFICE O/P EST MOD 30 MIN: CPT | Performed by: FAMILY MEDICINE

## 2021-01-25 PROCEDURE — 85651 RBC SED RATE NONAUTOMATED: CPT | Performed by: FAMILY MEDICINE

## 2021-01-25 PROCEDURE — 85025 COMPLETE CBC W/AUTO DIFF WBC: CPT | Performed by: FAMILY MEDICINE

## 2021-01-25 RX ORDER — LISINOPRIL AND HYDROCHLOROTHIAZIDE 20; 25 MG/1; MG/1
1 TABLET ORAL DAILY
Qty: 30 TABLET | Refills: 3 | Status: CANCELLED | OUTPATIENT
Start: 2021-01-25

## 2021-01-25 RX ORDER — LISINOPRIL 5 MG/1
5 TABLET ORAL DAILY
Qty: 90 TABLET | Refills: 3 | Status: SHIPPED | OUTPATIENT
Start: 2021-01-25 | End: 2021-07-26

## 2021-01-25 RX ORDER — LISINOPRIL/HYDROCHLOROTHIAZIDE 10-12.5 MG
1 TABLET ORAL DAILY
Qty: 90 TABLET | Refills: 0 | Status: SHIPPED | OUTPATIENT
Start: 2021-01-25 | End: 2021-02-19

## 2021-01-25 RX ORDER — OXYCODONE HYDROCHLORIDE 10 MG/1
10 TABLET ORAL EVERY 4 HOURS PRN
Qty: 180 TABLET | Refills: 0 | Status: SHIPPED | OUTPATIENT
Start: 2021-01-25 | End: 2021-02-22

## 2021-01-25 ASSESSMENT — MIFFLIN-ST. JEOR: SCORE: 1425.99

## 2021-01-25 NOTE — PROGRESS NOTES
Problem(s) Oriented visit        SUBJECTIVE:                                                    Oseas Edwards is a 50 year old male who presents to clinic today for the following health issues :        1. Essential hypertension  Essential Hypertension   Remains well controlled when checked out of clinic.   he has not experienced any significant side effects from medications for hypertension.    NO active cardiac complaints or symptoms with exercise.  Current medications for treatment:  ACE inhibitors (Lisinopril, Ramipril,Captopril,Benazepril) and Diuretic thiazide (chlorthalidone, hydrochlorothiazide, indapamide    Reviewed last 6 BP readings in chart:  BP Readings from Last 6 Encounters:   01/25/21 128/72   11/23/20 111/73   11/02/20 (!) 143/91   10/26/20 (!) 130/92   04/23/20 108/80   01/20/20 118/78         - Comp. Metabolic Panel (14) ([facility])  - lisinopril (ZESTRIL) 5 MG tablet; Take 1 tablet (5 mg) by mouth daily  Dispense: 90 tablet; Refill: 3    2. Ankylosing spondylitis, unspecified site of spine (H)  Pain continues unabated.  - Comp. Metabolic Panel (14) ([facility])  - CBC with Diff/Plt ([facility])  - Sed Rate Westergren ([facility])  - C-Reactive Protein  Quant ([facility])  - oxyCODONE IR (ROXICODONE) 10 MG tablet; Take 1 tablet (10 mg) by mouth every 4 hours as needed for pain  Dispense: 180 tablet; Refill: 0    3.. Chronic narcotic dependence (H)  Ongoing and not changing  - ToxASSURE Urine Drug Screen ([facility])    4. Screening for heart disease    - Lipid Panel ([facility])       Problem list, Medication list, Allergies, and Medical/Social/Surgical histories reviewed in EPIC and updated as appropriate.   Additional history: as documented    ROS:  General and CV completed and negative except as noted above    Histories:   Patient Active Problem List   Diagnosis     AS (ankylosing spondylitis) (H)     PA (pernicious anemia)     Low serum testosterone level     Health Care Home     Chronic narcotic dependence (H)     Rectus  "diastasis     Hx of gastric ulcer     Chronic pain syndrome     Keloid scar, mostly just dark     Bilateral hand pain     Past Surgical History:   Procedure Laterality Date     ARTHROPLASTY REVISION HIP Left 2017    Procedure: ARTHROPLASTY REVISION HIP;  Surgeon: Saravanan Alcaraz MD;  Location:  OR     HERNIORRHAPHY VENTRAL  2013    Procedure: HERNIORRHAPHY VENTRAL;  UMBILICAL HERNIA REPAIR WITH MESH, RECTUS DIASTASIS REPAIR WITH MESH;  Surgeon: Jason Dodson MD;  Location:  [facility]     JOINT REPLACEMENT, HIP RT/LT      Left     JOINT REPLACEMENT, HIP RT/LT  2000ish    Right     OPEN SEPARATION COMPONENT HERNIORRHAPHY  2013    Procedure: OPEN SEPARATION COMPONENT HERNIORRHAPHY;;  Surgeon: Jason Dodson MD;  Location: [facility]       Social History     Tobacco Use     Smoking status: Former Smoker     Quit date: 2009     Years since quittin.0     Smokeless tobacco: Never Used     Tobacco comment: quit 2 1/2 yrs   Substance Use Topics     Alcohol use: Yes     Comment: 1 to 2 per week     No family history on file.        OBJECTIVE:                                                    /72   Pulse 95   Temp 97.9  F (36.6  C) (Skin)   Resp 16   Ht 1.499 m (4' 11\")   Wt 73.9 kg (163 lb)   SpO2 98%   BMI 32.92 kg/m    Body mass index is 32.92 kg/m .   APPEARANCE: = healthy, alert and mild distress  Resp effort = Calm regular breathing  Recent/Remote Memory = Alert and Oriented x 3  Mood/Affect = Cooperative and interested     ASSESSMENT/PLAN:                                                        Oseas was seen today for recheck medication, blood draw and refill request.    Diagnoses and all orders for this visit:    Chronic narcotic dependence (H)  Discussed side effects associated with narcotic pain meds including GI disturbances (such as diarrhea, nausea, vomiting, constipation, etc.), drowsiness, decreased cognition, narcotic withdrawal, and narcotic " "addiction/dependence.  Told the patient never to drink alcohol while taking this medication and not to drive or operate dangerous machinery while taking it.  Also discussed in specific detail my expectation regarding responsible use of narcotic and the implied \"contract\" with the patient that this type of medication will be used properly and exactly as instructed, and that any abnormal behaviors associated with the use of this medication will cause me to stop prescribing these medications at any time.      -     ToxASSURE Urine Drug Screen ([facility])    Essential hypertension  Reviewed his current HTN management. Discussed our goal for him is a systolic pressure at or below 128 and diastolic pressure at or below 83.  We today managed his prescriptions with refills ensured to ensure availabilty of current medications.  Discussed the importance for aggressive management of HTN to prevent vascular complications later.  Recommended lower fat, lower carbohydrate, and lower sodium (<2000 mg)diet. Required intervals for follow up on HTN, lab studies reviewed.    Strongly recommened he follow his blood pressures outside the clinic to ensure that BPs are remaining within guidelines,.  Instructed to contact me if the readings are not within guidelines on a regular basis so we can adjust treatment as needed.    -     Comp. Metabolic Panel (14) ([facility])  -     lisinopril (ZESTRIL) 5 MG tablet; Take 1 tablet (5 mg) by mouth daily    Ankylosing spondylitis, unspecified site of spine (H)  See note from vasc.  -     Comp. Metabolic Panel (14) ([facility])  -     CBC with Diff/Plt ([facility])  -     Sed Rate Westergren ([facility])  -     C-Reactive Protein  Quant ([facility])  -     oxyCODONE IR (ROXICODONE) 10 MG tablet; Take 1 tablet (10 mg) by mouth every 4 hours as needed for pain    Encounter for long-term (current) use of medications  -     lisinopril-hydrochlorothiazide (ZESTORETIC) 10-12.5 MG tablet; Take 1 tablet by mouth daily    Screening " for heart disease  -     Lipid Panel ([facility])        Regular exercise  There are no Patient Instructions on file for this visit.    The following health maintenance items are reviewed in Epic and correct as of today:  Health Maintenance   Topic Date Due     HEPATITIS B IMMUNIZATION (1 of 3 - Risk 3-dose series) 04/04/1988     MEDICARE ANNUAL WELLNESS VISIT  06/06/2017     ZOSTER IMMUNIZATION (1 of 2) 04/04/2019     URINE DRUG SCREEN  04/06/2019     COLORECTAL CANCER SCREENING  06/20/2021     LIPID  04/16/2023     DTAP/TDAP/TD IMMUNIZATION (3 - Td) 10/26/2030     HEPATITIS C SCREENING  Completed     HIV SCREENING  Completed     PHQ-2  Completed     INFLUENZA VACCINE  Completed     IPV IMMUNIZATION  Completed     Pneumococcal Vaccine: Pediatrics (0 to 5 Years) and At-Risk Patients (6 to 64 Years)  Aged Out     MENINGITIS IMMUNIZATION  Aged Out       Willy Forrest MD  [facility]  Family Practice  [facility]  [phone]    For any issues my office # is [phone]

## 2021-01-26 LAB
ALBUMIN SERPL-MCNC: 4.2 G/DL (ref 3.8–4.9)
ALBUMIN/GLOB SERPL: 1.6 {RATIO} (ref 1.2–2.2)
ALP SERPL-CCNC: 77 IU/L (ref 39–117)
ALT SERPL-CCNC: 18 IU/L (ref 0–44)
AST SERPL-CCNC: 23 IU/L (ref 0–40)
BILIRUB SERPL-MCNC: <0.2 MG/DL (ref 0–1.2)
BUN SERPL-MCNC: 21 MG/DL (ref 6–24)
BUN/CREATININE RATIO: 25 (ref 9–20)
CALCIUM SERPL-MCNC: 9.3 MG/DL (ref 8.7–10.2)
CHLORIDE SERPLBLD-SCNC: 106 MMOL/L (ref 96–106)
CHOLEST SERPL-MCNC: 172 MG/DL (ref 100–199)
CREAT SERPL-MCNC: 0.85 MG/DL (ref 0.76–1.27)
CRP SERPL-MCNC: 6 MG/L (ref 0–10)
EGFR IF AFRICN AM: 117 ML/MIN/1.73
EGFR IF NONAFRICN AM: 101 ML/MIN/1.73
GLOBULIN, TOTAL: 2.6 G/DL (ref 1.5–4.5)
GLUCOSE SERPL-MCNC: 113 MG/DL (ref 65–99)
HDLC SERPL-MCNC: 51 MG/DL
LDL/HDL RATIO: 2 RATIO (ref 0–3.6)
LDLC SERPL CALC-MCNC: 100 MG/DL (ref 0–99)
POTASSIUM SERPL-SCNC: 4.1 MMOL/L (ref 3.5–5.2)
PROT SERPL-MCNC: 6.8 G/DL (ref 6–8.5)
SODIUM SERPL-SCNC: 139 MMOL/L (ref 134–144)
TOTAL CO2: 21 MMOL/L (ref 20–29)
TRIGL SERPL-MCNC: 118 MG/DL (ref 0–149)
VLDLC SERPL CALC-MCNC: 21 MG/DL (ref 5–40)

## 2021-02-01 ENCOUNTER — OFFICE VISIT (OUTPATIENT)
Dept: OTHER | Facility: CLINIC | Age: 51
End: 2021-02-01
Attending: SURGERY
Payer: COMMERCIAL

## 2021-02-01 VITALS — SYSTOLIC BLOOD PRESSURE: 100 MMHG | DIASTOLIC BLOOD PRESSURE: 67 MMHG | HEART RATE: 90 BPM | TEMPERATURE: 97.7 F

## 2021-02-01 DIAGNOSIS — M45.0 ANKYLOSING SPONDYLITIS OF MULTIPLE SITES IN SPINE (H): Primary | ICD-10-CM

## 2021-02-01 PROCEDURE — 99204 OFFICE O/P NEW MOD 45 MIN: CPT | Performed by: SURGERY

## 2021-02-01 PROCEDURE — G0463 HOSPITAL OUTPT CLINIC VISIT: HCPCS

## 2021-02-01 NOTE — PROGRESS NOTES
I had the pleasure of seeing Mr. Oseas Edwards in the vascular surgery clinic today.  This is a kind consultation from Dr. Mann for assessment of possible bilateral upper extremity neurogenic thoracic outlet syndrome.  Patient is a 50-year-old male who is right-hand dominant.  He explains that he has been experiencing pain and tingling as well as numbness in both upper extremities over the past 3 to 4 months.  The tingling is worse in the morning it actually gets better over the next ensuing hours or so as he starts moving his arms and hands.  His left hand in particular gets more tired and tingly when he has been holding his phone.  He also reports that progressively his cervical spine mobility has decreased secondary to his ankylosing spondylitis.  He also feels that he has been getting more kyphosis due to the same.  He does not recall any trauma or repetitive stress injury in either of his upper extremities.    Past medical history is notable for bilateral hand pain, chronic narcotic dependence, chronic pain syndrome, ankylosing spondylitis, pernicious anemia, low testosterone level and history of gastric ulcer.     Past surgical history is notable for bilateral hip arthroplasties and ventral hernia repair.    He does not smoke, he quit smoking in 2009.  He occasionally consumes alcohol.     Review of systems is as noted above.    On examination: He appears comfortable and he is in no acute distress.  Vital signs are reviewed.  HEENT: Head is atraumatic and normocephalic, mucosa are pink.  Mental: Alert and oriented x4, judgment and insight are good.  Eyes: Extraocular motions are intact, sclerae are anicteric.  Cardiac: Regular rate and rhythm, S1 plus S2 +0.  Chest: Clear to auscultation bilaterally.  Abdomen: Soft, obese and nontender.  There periumbilical surgical scar from previous surgery.  Vascular: No carotid bruits, 3+ bilateral radial pulses.  Musculoskeletal: He quite visibly appears to have a kyphotic  deformity.  He has minimal neck mobility.  Extended arm stress test was positive in 30 seconds and he could not continue the maneuvers for more than 1 minute.  McElvey's upper limb tension test could not be performed because he could not tilt his neck at all.      Diagnosis: Bilateral upper extremity radiculopathy.    Plan: At this point I am not really convinced that his upper extremity radiculopathy is due to thoracic outlet syndrome.  He has quite advanced ankylosing spondylitis.  That should be evaluated first.  We will obtain an MRI of the cervical spine first.  When we have those results available we will make further decisions.  Of note the patient has been under physical therapy for at least 3 weeks and does not note any significant improvement.

## 2021-02-11 ENCOUNTER — HOSPITAL ENCOUNTER (OUTPATIENT)
Dept: MRI IMAGING | Facility: CLINIC | Age: 51
Discharge: HOME | End: 2021-02-11
Attending: SURGERY | Admitting: SURGERY
Payer: COMMERCIAL

## 2021-02-11 DIAGNOSIS — M45.0 ANKYLOSING SPONDYLITIS OF MULTIPLE SITES IN SPINE (H): ICD-10-CM

## 2021-02-11 PROCEDURE — 72141 MRI NECK SPINE W/O DYE: CPT

## 2021-02-15 ENCOUNTER — TELEPHONE (OUTPATIENT)
Dept: OTHER | Facility: CLINIC | Age: 51
End: 2021-02-15

## 2021-02-15 DIAGNOSIS — M54.12 CERVICAL RADICULOPATHY: Primary | ICD-10-CM

## 2021-02-15 NOTE — TELEPHONE ENCOUNTER
"Pt scheduled 2/18/21 for in person OV.     Per Dr. Bingham,   \"This can be a video or phone visit, do not need an in person visit.\"    \"Please refer to neurosurgery for cervical radiculopathy\".    I called pt and change to video or phone visit and provide referral info. Pt agreed to video visit and notes understanding.     JUAN Moe, RN  [facility]  Office:  [phone] Fax: [phone]            "

## 2021-02-18 ENCOUNTER — VIRTUAL VISIT (OUTPATIENT)
Dept: SURGERY | Facility: CLINIC | Age: 51
End: 2021-02-18
Payer: MEDICARE

## 2021-02-18 VITALS — BODY MASS INDEX: 32 KG/M2 | HEIGHT: 60 IN | WEIGHT: 163 LBS

## 2021-02-18 DIAGNOSIS — R20.0 NUMBNESS AND TINGLING OF BOTH UPPER EXTREMITIES: Primary | ICD-10-CM

## 2021-02-18 DIAGNOSIS — R20.2 NUMBNESS AND TINGLING OF BOTH UPPER EXTREMITIES: Primary | ICD-10-CM

## 2021-02-18 PROCEDURE — 99207 PR NO CHARGE LOS: CPT | Mod: 95 | Performed by: SURGERY

## 2021-02-18 ASSESSMENT — MIFFLIN-ST. JEOR: SCORE: 1425.99

## 2021-02-18 NOTE — PROGRESS NOTES
Oseas Edwards is a 49 year old who is being evaluated via a billable video visit.        If the video visit is dropped, the invitation should be resent by: Text to cell phone: [phone]        I could not connect with a video visit and therefore I called the patient for a billable phone visit.  I had seen him previously with concerns for bilateral upper extremity thoracic outlet syndrome.  At that time I was unconvinced that his symptoms are due to nerve compression at the thoracic outlet.  He has significant ankylosing spondylitis which involved his right lower extremity as well.  We got an MRI of the cervical spine which shows diffuse ankylosing spondylitis of the cervical spine.  He is scheduled to meet Trang Elkins in neurosurgery tomorrow.  We will see what input that the neurosurgery team has to provide on this.  In passing he also mentioned that he is starting to have weakness and numbness of the right lower extremity.  I have advised him to bring it up with Trang when he sees her tomorrow.    Again I am not convinced that his symptoms of the upper extremities are due to nerve compression at the thoracic outlet.  I do not think a first rib and scalenectomy would offer any additional benefit.    Phone call time: 3 minutes.

## 2021-02-19 ENCOUNTER — OFFICE VISIT (OUTPATIENT)
Dept: NEUROSURGERY | Facility: CLINIC | Age: 51
End: 2021-02-19
Attending: SURGERY
Payer: COMMERCIAL

## 2021-02-19 VITALS
OXYGEN SATURATION: 96 % | WEIGHT: 163 LBS | BODY MASS INDEX: 32.92 KG/M2 | HEART RATE: 95 BPM | DIASTOLIC BLOOD PRESSURE: 68 MMHG | SYSTOLIC BLOOD PRESSURE: 101 MMHG

## 2021-02-19 DIAGNOSIS — M54.12 CERVICAL RADICULOPATHY: Primary | ICD-10-CM

## 2021-02-19 DIAGNOSIS — M54.16 LUMBAR RADICULOPATHY: ICD-10-CM

## 2021-02-19 DIAGNOSIS — M45.2 ANKYLOSING SPONDYLITIS OF CERVICAL REGION (H): ICD-10-CM

## 2021-02-19 PROCEDURE — 99204 OFFICE O/P NEW MOD 45 MIN: CPT | Performed by: NURSE PRACTITIONER

## 2021-02-19 NOTE — PROGRESS NOTES
Dr. Yobany Craig  [facility] Neurosurgery Clinic Visit      CC: neck pain, numbness and tingling in arms and hands, low back pain, right leg pain    Primary care Provider: Willy Forrest    Reason For Visit:   I was asked by Dr. Luciano Bingham to consult on the patient for: cervical radiculopathy    HPI: Oseas Edwards is a 50 year old male with history of ankylosing spondylitis who presents for evaluation of BUE paresthesias that started about 4 months ago. Denies any precipitating trauma or injuries. Pain is located in the neck with paresthesias in BUE and numbness in bilateral hands. He notes dexterity issues as well. He also reports chronic back pain and 1 week of burning sensation in the inner right thigh and calf. Neck pain is worsened with any range of motion. His symptoms worsen throughout the day. He takes Oxycodone for pain management. He tried PT but this did not provide lasting relief. No past spine surgeries or injections. Denies any falls, foot drop, coordination/gait changes, or bladder/bowel incontinence. Patient was also evaluated by vascular surgery; they do not believe his symptoms are due to thoracic outlet syndrome.     Current pain: 4/10   At worst: 9/10    Past Medical History:   Diagnosis Date     Ankylosing spondylitis (H)      Arthritis      AS (ankylosing spondylitis) (H) 1985     Cellulitis of leg 2011     Chronic narcotic dependence (H) 11/4/2013    Due to long term pain management.      Hypertension      Keloid scar, mostly just dark 2/7/2017     Low serum testosterone level 1/30/2012     Optic neuritis 3/20/2012     PA (pernicious anemia) 6/16/2011       Past Medical History reviewed with patient during visit.    Past Surgical History:   Procedure Laterality Date     ARTHROPLASTY REVISION HIP Left 2/14/2017    Procedure: ARTHROPLASTY REVISION HIP;  Surgeon: Saravanan Alcaraz MD;  Location: [facility] OR     HERNIORRHAPHY VENTRAL  11/11/2013    Procedure: HERNIORRHAPHY VENTRAL;   UMBILICAL HERNIA REPAIR WITH MESH, RECTUS DIASTASIS REPAIR WITH MESH;  Surgeon: Jason Dodson MD;  Location: [facility]     JOINT REPLACEMENT, HIP RT/LT  1994    Left     JOINT REPLACEMENT, HIP RT/LT  2000ish    Right     OPEN SEPARATION COMPONENT HERNIORRHAPHY  11/11/2013    Procedure: OPEN SEPARATION COMPONENT HERNIORRHAPHY;;  Surgeon: Jason Dodson MD;  Location: [facility]     Past Surgical History reviewed with patient during visit.    Current Outpatient Medications   Medication     Calcium carb-Vitamin D 500 mg [place]-200 units (OSCAL WITH D;OYSTER SHELL CALCIUM) 500-200 MG-UNIT per tablet     cyanocobalamin (CYANOCOBALAMIN) 1000 MCG/ML injection     cyclobenzaprine (FLEXERIL) 10 MG tablet     diclofenac (VOLTAREN) 1 % GEL topical gel     docusate sodium (COLACE) 100 MG tablet     etanercept (ENBREL) 50 MG/ML injection     finasteride (PROSCAR) 5 MG tablet     Hypromellose (ARTIFICIAL TEARS OP)     indomethacin (INDOCIN) 50 MG capsule     lisinopril (ZESTRIL) 5 MG tablet     lisinopril-hydrochlorothiazide (ZESTORETIC) 20-25 MG tablet     Multiple Vitamins-Iron (MULTIVITAMIN/IRON PO)     omeprazole (PRILOSEC) 20 MG DR capsule     oxyCODONE IR (ROXICODONE) 10 MG tablet     testosterone (ANDROGEL/TESTIM) 50 MG/5GM (1%) topical gel     naloxone (NARCAN) 1 mg/mL for intranasal kit (2 syringes with 2 mucosal atomizer device)     No current facility-administered medications for this visit.        Allergies   Allergen Reactions     Infliximab      Other reaction(s): *Unknown  Caused LT eye blindness     No Clinical Screening - See Comments      methotrexate- mood alterating     Remicade [Infliximab Injection]      Blindness in one eye       Social History     Socioeconomic History     Marital status:      Spouse name: Manish Aparicio     Number of children: 4     Years of education: Not on file     Highest education level: Not on file   Occupational History     Employer: [employer]   Social Needs      Financial resource strain: Not on file     Food insecurity     Worry: Not on file     Inability: Not on file     Transportation needs     Medical: Not on file     Non-medical: Not on file   Tobacco Use     Smoking status: Former Smoker     Quit date: 2009     Years since quittin.1     Smokeless tobacco: Never Used     Tobacco comment: quit 2 1/2 yrs   Substance and Sexual Activity     Alcohol use: Yes     Comment: 1 to 2 per week     Drug use: No     Sexual activity: Yes     Partners: Female   Lifestyle     Physical activity     Days per week: Not on file     Minutes per session: Not on file     Stress: Not on file   Relationships     Social connections     Talks on phone: Not on file     Gets together: Not on file     Attends Jew service: Not on file     Active member of club or organization: Not on file     Attends meetings of clubs or organizations: Not on file     Relationship status: Not on file     Intimate partner violence     Fear of current or ex partner: Not on file     Emotionally abused: Not on file     Physically abused: Not on file     Forced sexual activity: Not on file   Other Topics Concern     Not on file   Social History Narrative     Not on file     ROS: 10 point ROS neg other than the symptoms noted above in the HPI.    Vital Signs: /68 (BP Location: Right arm)   Pulse 95   Wt 163 lb (73.9 kg)   SpO2 96%   BMI 32.92 kg/m      Examination:  Constitutional:  Alert, well nourished, NAD.  HEENT: Normocephalic, atraumatic.   Pulmonary:  Without shortness of breath, normal effort.   Lymph: No lymphadenopathy to low back or LE.   Integumentary: Skin is free of rashes or lesions.   Cardiovascular:  No pitting edema of BLE.      Neurological:  Awake  Alert  Oriented x 3  Speech clear  Cranial nerves II - XII grossly intact  PERRL  EOMI  Face symmetric  Tongue midline  Motor exam   Shoulder Abduction:  Right:  5/5   Left:  5/5  Biceps:                      Right:  5/5   Left:   5/5  Triceps:                     Right:  5/5   Left:  5/5  Wrist Extensors:        Right:  5/5   Left:  5/5  Wrist Flexors:            Right:  5/5   Left:  5/5  Intrinsics:                   Right:  5/5   Left:  5/5  Hip Flexor:                 Right: 5/5  Left:  5/5  Quadriceps:               Right:  5/5  Left:  5/5  Hamstrings:               Right:  5/5  Left:  5/5  Gastroc Soleus:         Right:  5/5  Left:  5/5  Tib/Ant:                      Right:  5/5  Left:  5/5  EHL:                          Right:  5/5  Left:  5/5         Sensation normal to bilateral upper and lower extremities.    Reflexes are 2+ in the patellar and Achilles. There is no clonus. Downgoing Babinski.    Reflexes are 2+ in the brachial radialis and triceps. Negative Theresa sign bilaterally.    Musculoskeletal:  Gait: Able to stand from a seated position. Ambulates with a cane. Normal gait.     Cervical examination reveals limited range of motion.    No tenderness to palpation of the cervical or lumbar spine.    Imaging:   MRI of the cervical spine from 2/11/21 was reviewed in the office today. Reveals ankylosing spondylitis throughout the cervical and upper thoracic spine, abnormal hyperintensity and impingement of the cervical spinal cord at C1-2.                                                            Assessment/Plan:   50 year old male with history of ankylosing spondylitis who presents for evaluation of BUE paresthesias that started about 4 months ago. Denies any precipitating trauma or injuries. Pain is located in the neck with paresthesias in BUE and numbness in bilateral hands. He notes dexterity issues as well. He also reports chronic back pain and 1 week of burning sensation in the inner right thigh and calf. Imaging reviewed with patient today. Discussed with Dr. Craig.     Plan:  - Cervical spine CT  - Cervical spine AP/lateral/flex/ext XR  - Lumbar spine MRI  - Orthotics referral for cervical collar  - Follow up with   Rafa in clinic to discuss imaging results and possible surgical options    Advised patient to call our clinic with any questions or concerns. Discussed red flag symptoms and advised to seek medical attention if these develop. Patient voiced understanding and agreement.        Trang Elkins [facility] Neurosurgery  [address] [address]  Tel [phone]  Pager [phone]

## 2021-02-19 NOTE — LETTER
2/19/2021         RE: Oseas Edwards  [address]  [place] [address]        Dear Colleague,    Thank you for referring your patient, Oseas Edwards, to the [facility] NEUROSURGERY [facility]INIC [place]. Please see a copy of my visit note below.    Dr. Yobany Craig  [facility] Neurosurgery Clinic Visit      CC: neck pain, numbness and tingling in arms and hands, low back pain, right leg pain    Primary care Provider: Willy Forrest    Reason For Visit:   I was asked by Dr. Luciano Bingham to consult on the patient for: cervical radiculopathy    HPI: Oseas Edwards is a 50 year old male with history of ankylosing spondylitis who presents for evaluation of BUE paresthesias that started about 4 months ago. Denies any precipitating trauma or injuries. Pain is located in the neck with paresthesias in BUE and numbness in bilateral hands. He notes dexterity issues as well. He also reports chronic back pain and 1 week of burning sensation in the inner right thigh and calf. Neck pain is worsened with any range of motion. His symptoms worsen throughout the day. He takes Oxycodone for pain management. He tried PT but this did not provide lasting relief. No past spine surgeries or injections. Denies any falls, foot drop, coordination/gait changes, or bladder/bowel incontinence. Patient was also evaluated by vascular surgery; they do not believe his symptoms are due to thoracic outlet syndrome.     Current pain: 4/10   At worst: 9/10    Past Medical History:   Diagnosis Date     Ankylosing spondylitis (H)      Arthritis      AS (ankylosing spondylitis) (H) 1985     Cellulitis of leg 2011     Chronic narcotic dependence (H) 11/4/2013    Due to long term pain management.      Hypertension      Keloid scar, mostly just dark 2/7/2017     Low serum testosterone level 1/30/2012     Optic neuritis 3/20/2012     PA (pernicious anemia) 6/16/2011       Past Medical History reviewed with patient during visit.    Past Surgical  History:   Procedure Laterality Date     ARTHROPLASTY REVISION HIP Left 2/14/2017    Procedure: ARTHROPLASTY REVISION HIP;  Surgeon: Saravanan Alcaraz MD;  Location:  OR     HERNIORRHAPHY VENTRAL  11/11/2013    Procedure: HERNIORRHAPHY VENTRAL;  UMBILICAL HERNIA REPAIR WITH MESH, RECTUS DIASTASIS REPAIR WITH MESH;  Surgeon: Jason Dodson MD;  Location:  [facility]     JOINT REPLACEMENT, HIP RT/LT  1994    Left     JOINT REPLACEMENT, HIP RT/LT  2000ish    Right     OPEN SEPARATION COMPONENT HERNIORRHAPHY  11/11/2013    Procedure: OPEN SEPARATION COMPONENT HERNIORRHAPHY;;  Surgeon: Jason Dodson MD;  Location: [facility]     Past Surgical History reviewed with patient during visit.    Current Outpatient Medications   Medication     Calcium carb-Vitamin D 500 mg [place]-200 units (OSCAL WITH D;OYSTER SHELL CALCIUM) 500-200 MG-UNIT per tablet     cyanocobalamin (CYANOCOBALAMIN) 1000 MCG/ML injection     cyclobenzaprine (FLEXERIL) 10 MG tablet     diclofenac (VOLTAREN) 1 % GEL topical gel     docusate sodium (COLACE) 100 MG tablet     etanercept (ENBREL) 50 MG/ML injection     finasteride (PROSCAR) 5 MG tablet     Hypromellose (ARTIFICIAL TEARS OP)     indomethacin (INDOCIN) 50 MG capsule     lisinopril (ZESTRIL) 5 MG tablet     lisinopril-hydrochlorothiazide (ZESTORETIC) 20-25 MG tablet     Multiple Vitamins-Iron (MULTIVITAMIN/IRON PO)     omeprazole (PRILOSEC) 20 MG DR capsule     oxyCODONE IR (ROXICODONE) 10 MG tablet     testosterone (ANDROGEL/TESTIM) 50 MG/5GM (1%) topical gel     naloxone (NARCAN) 1 mg/mL for intranasal kit (2 syringes with 2 mucosal atomizer device)     No current facility-administered medications for this visit.        Allergies   Allergen Reactions     Infliximab      Other reaction(s): *Unknown  Caused LT eye blindness     No Clinical Screening - See Comments      methotrexate- mood alterating     Remicade [Infliximab Injection]      Blindness in one eye       Social History      Socioeconomic History     Marital status:      Spouse name: Manish Aparicio     Number of children: 4     Years of education: Not on file     Highest education level: Not on file   Occupational History     Employer: [employer]   Social Needs     Financial resource strain: Not on file     Food insecurity     Worry: Not on file     Inability: Not on file     Transportation needs     Medical: Not on file     Non-medical: Not on file   Tobacco Use     Smoking status: Former Smoker     Quit date: 2009     Years since quittin.1     Smokeless tobacco: Never Used     Tobacco comment: quit 2 1/2 yrs   Substance and Sexual Activity     Alcohol use: Yes     Comment: 1 to 2 per week     Drug use: No     Sexual activity: Yes     Partners: Female   Lifestyle     Physical activity     Days per week: Not on file     Minutes per session: Not on file     Stress: Not on file   Relationships     Social connections     Talks on phone: Not on file     Gets together: Not on file     Attends Rastafarian service: Not on file     Active member of club or organization: Not on file     Attends meetings of clubs or organizations: Not on file     Relationship status: Not on file     Intimate partner violence     Fear of current or ex partner: Not on file     Emotionally abused: Not on file     Physically abused: Not on file     Forced sexual activity: Not on file   Other Topics Concern     Not on file   Social History Narrative     Not on file     ROS: 10 point ROS neg other than the symptoms noted above in the HPI.    Vital Signs: /68 (BP Location: Right arm)   Pulse 95   Wt 163 lb (73.9 kg)   SpO2 96%   BMI 32.92 kg/m      Examination:  Constitutional:  Alert, well nourished, NAD.  HEENT: Normocephalic, atraumatic.   Pulmonary:  Without shortness of breath, normal effort.   Lymph: No lymphadenopathy to low back or LE.   Integumentary: Skin is free of rashes or lesions.   Cardiovascular:  No pitting edema of BLE.       Neurological:  Awake  Alert  Oriented x 3  Speech clear  Cranial nerves II - XII grossly intact  PERRL  EOMI  Face symmetric  Tongue midline  Motor exam   Shoulder Abduction:  Right:  5/5   Left:  5/5  Biceps:                      Right:  5/5   Left:  5/5  Triceps:                     Right:  5/5   Left:  5/5  Wrist Extensors:        Right:  5/5   Left:  5/5  Wrist Flexors:            Right:  5/5   Left:  5/5  Intrinsics:                   Right:  5/5   Left:  5/5  Hip Flexor:                 Right: 5/5  Left:  5/5  Quadriceps:               Right:  5/5  Left:  5/5  Hamstrings:               Right:  5/5  Left:  5/5  Gastroc Soleus:         Right:  5/5  Left:  5/5  Tib/Ant:                      Right:  5/5  Left:  5/5  EHL:                          Right:  5/5  Left:  5/5         Sensation normal to bilateral upper and lower extremities.    Reflexes are 2+ in the patellar and Achilles. There is no clonus. Downgoing Babinski.    Reflexes are 2+ in the brachial radialis and triceps. Negative Theresa sign bilaterally.    Musculoskeletal:  Gait: Able to stand from a seated position. Ambulates with a cane. Normal gait.     Cervical examination reveals limited range of motion.    No tenderness to palpation of the cervical or lumbar spine.    Imaging:   MRI of the cervical spine from 2/11/21 was reviewed in the office today. Reveals ankylosing spondylitis throughout the cervical and upper thoracic spine, abnormal hyperintensity and impingement of the cervical spinal cord at C1-2.                                                            Assessment/Plan:   50 year old male with history of ankylosing spondylitis who presents for evaluation of BUE paresthesias that started about 4 months ago. Denies any precipitating trauma or injuries. Pain is located in the neck with paresthesias in BUE and numbness in bilateral hands. He notes dexterity issues as well. He also reports chronic back pain and 1 week of burning sensation  in the inner right thigh and calf. Imaging reviewed with patient today. Discussed with Dr. Craig.     Plan:  - Cervical spine CT  - Cervical spine AP/lateral/flex/ext XR  - Lumbar spine MRI  - Orthotics referral for cervical collar  - Follow up with Dr. Craig in clinic to discuss imaging results and possible surgical options    Advised patient to call our clinic with any questions or concerns. Discussed red flag symptoms and advised to seek medical attention if these develop. Patient voiced understanding and agreement.        Trang Elkins CNP  [facility] [place] [address]  Tel [phone]  Pager [phone]        Again, thank you for allowing me to participate in the care of your patient.        Sincerely,        Trang Elkins, FLORIAN

## 2021-02-19 NOTE — PATIENT INSTRUCTIONS
Orders for cervical spine CT, cervical spine XR, and lumbar spine MRI.   Orthotics referral for cervical collar.     Follow-up with Dr. Craig in clinic after imaging has been completed.     Please call our clinic if symptoms persist, change, or worsen at any time.    Trang Elkins [facility] Neurosurgery  [address] [address]  Tel [phone]  Pager [phone]

## 2021-02-19 NOTE — Clinical Note
Hi Dr. Bingham,     I saw Mr. Edwards today and reviewed imaging with Dr. Craig as well. Plan for cervical CT and flex/ext XR, then follow-up with Dr. Craig to discuss possible surgical options. Thank you for the communication.    Trang

## 2021-02-19 NOTE — NURSING NOTE
"February 19, 2021 11:51 AM   Oseas Edwards is a 50 year old male who presents for:    Chief Complaint   Patient presents with     Consult     Cervical     Referral     Luciano Bingham MD     Initial Vitals: /68 (BP Location: Right arm)   Pulse 95   Wt 163 lb (73.9 kg)   SpO2 96%   BMI 32.92 kg/m   Estimated body mass index is 32.92 kg/m  as calculated from the following:    Height as of 2/18/21: 4' 11\" (1.499 m).    Weight as of this encounter: 163 lb (73.9 kg). Body surface area is 1.75 meters squared.  Data Unavailable Comment: Data Unavailable       Clinical concerns: Oseas Edwards is here today for Cervical Pain  Zayra Garner MA  "

## 2021-02-22 ENCOUNTER — OFFICE VISIT (OUTPATIENT)
Dept: NEUROSURGERY | Facility: CLINIC | Age: 51
End: 2021-02-22
Attending: NEUROLOGICAL SURGERY
Payer: COMMERCIAL

## 2021-02-22 ENCOUNTER — HOSPITAL ENCOUNTER (OUTPATIENT)
Dept: MRI IMAGING | Facility: CLINIC | Age: 51
End: 2021-02-22
Attending: NURSE PRACTITIONER
Payer: COMMERCIAL

## 2021-02-22 ENCOUNTER — HOSPITAL ENCOUNTER (OUTPATIENT)
Dept: CT IMAGING | Facility: CLINIC | Age: 51
End: 2021-02-22
Attending: NURSE PRACTITIONER
Payer: COMMERCIAL

## 2021-02-22 ENCOUNTER — HOSPITAL ENCOUNTER (OUTPATIENT)
Dept: GENERAL RADIOLOGY | Facility: CLINIC | Age: 51
End: 2021-02-22
Attending: NURSE PRACTITIONER
Payer: COMMERCIAL

## 2021-02-22 VITALS
DIASTOLIC BLOOD PRESSURE: 65 MMHG | TEMPERATURE: 98.2 F | OXYGEN SATURATION: 98 % | WEIGHT: 160 LBS | BODY MASS INDEX: 24.25 KG/M2 | HEIGHT: 68 IN | HEART RATE: 102 BPM | SYSTOLIC BLOOD PRESSURE: 96 MMHG

## 2021-02-22 DIAGNOSIS — M48.02 STENOSIS OF CERVICAL SPINE WITH MYELOPATHY (H): ICD-10-CM

## 2021-02-22 DIAGNOSIS — M53.2X2 SPINAL INSTABILITY OF CERVICAL REGION: ICD-10-CM

## 2021-02-22 DIAGNOSIS — M54.12 CERVICAL RADICULOPATHY: ICD-10-CM

## 2021-02-22 DIAGNOSIS — M54.16 LUMBAR RADICULOPATHY: ICD-10-CM

## 2021-02-22 DIAGNOSIS — M45.2 ANKYLOSING SPONDYLITIS OF CERVICAL REGION (H): Primary | ICD-10-CM

## 2021-02-22 DIAGNOSIS — M45.9 ANKYLOSING SPONDYLITIS, UNSPECIFIED SITE OF SPINE (H): ICD-10-CM

## 2021-02-22 DIAGNOSIS — G99.2 STENOSIS OF CERVICAL SPINE WITH MYELOPATHY (H): ICD-10-CM

## 2021-02-22 DIAGNOSIS — Z01.818 PRE-OP TESTING: ICD-10-CM

## 2021-02-22 PROCEDURE — 99214 OFFICE O/P EST MOD 30 MIN: CPT | Performed by: NEUROLOGICAL SURGERY

## 2021-02-22 PROCEDURE — 72148 MRI LUMBAR SPINE W/O DYE: CPT

## 2021-02-22 PROCEDURE — 72125 CT NECK SPINE W/O DYE: CPT

## 2021-02-22 PROCEDURE — 72050 X-RAY EXAM NECK SPINE 4/5VWS: CPT

## 2021-02-22 RX ORDER — OXYCODONE HYDROCHLORIDE 10 MG/1
10 TABLET ORAL EVERY 4 HOURS PRN
Qty: 180 TABLET | Refills: 0 | Status: SHIPPED | OUTPATIENT
Start: 2021-02-22 | End: 2021-03-22

## 2021-02-22 ASSESSMENT — MIFFLIN-ST. JEOR: SCORE: 1555.26

## 2021-02-22 ASSESSMENT — PAIN SCALES - GENERAL: PAINLEVEL: MODERATE PAIN (4)

## 2021-02-22 NOTE — LETTER
2/22/2021         RE: Oseas Edwards  [address]  [place] [address]        Dear Colleague,    Thank you for referring your patient, Oseas Edwards, to the [facility] [place]. Please see a copy of my visit note below.    It was a pleasure to see Oseas Edwards today in Neurosurgery Clinic. He is a 50 year old male who was recently seen by Trang Elkins NP in our clinic.  Please see her notes for the full details of his illness.    Briefly the patient has had worsening symptoms of numbness and tingling in the hands as well as some pain radiating down the arms.  He was initially seen by vascular surgery and work-up for thoracic outlet syndrome.  However cervical MRI demonstrates significant stenosis of the upper cervical spine likely related to his ankylosing spondylitis.    The patient describes worsening symptoms over the last 4 months that initially began with the first 3 fingers of his hand but have worsened to include all the fingers of the hands bilaterally.  The left arm is worse than the right arm.  He takes oxycodone for neck and low back pain he also describes some symptoms of burning down into the right lower extremity.    Past Medical History:   Diagnosis Date     Ankylosing spondylitis (H)      Arthritis      AS (ankylosing spondylitis) (H) 1985     Cellulitis of leg 2011     Chronic narcotic dependence (H) 11/4/2013    Due to long term pain management.      Hypertension      Keloid scar, mostly just dark 2/7/2017     Low serum testosterone level 1/30/2012     Optic neuritis 3/20/2012     PA (pernicious anemia) 6/16/2011     Past Medical History reviewed with patient during visit.  Past Surgical History:   Procedure Laterality Date     ARTHROPLASTY REVISION HIP Left 2/14/2017    Procedure: ARTHROPLASTY REVISION HIP;  Surgeon: Saravanan Alcaraz MD;  Location: [facility] OR     HERNIORRHAPHY VENTRAL  11/11/2013    Procedure: HERNIORRHAPHY VENTRAL;  UMBILICAL HERNIA REPAIR  WITH MESH, RECTUS DIASTASIS REPAIR WITH MESH;  Surgeon: Jason Dodson MD;  Location: [facility]     JOINT REPLACEMENT, HIP RT/LT  1994    Left     JOINT REPLACEMENT, HIP RT/LT  2000ish    Right     OPEN SEPARATION COMPONENT HERNIORRHAPHY  11/11/2013    Procedure: OPEN SEPARATION COMPONENT HERNIORRHAPHY;;  Surgeon: Jason Dodson MD;  Location: [facility]     Past Surgical History reviewed with patient during visit.  Allergies   Allergen Reactions     Infliximab      Other reaction(s): *Unknown  Caused LT eye blindness     No Clinical Screening - See Comments      methotrexate- mood alterating     Remicade [Infliximab Injection]      Blindness in one eye       Current Outpatient Medications:      Calcium carb-Vitamin D 500 mg [place]-200 units (OSCAL WITH D;OYSTER SHELL CALCIUM) 500-200 MG-UNIT per tablet, Take 1 tablet by mouth daily , Disp: , Rfl:      cyanocobalamin (CYANOCOBALAMIN) 1000 MCG/ML injection, INJECT 1 ML (CC) ONCE EVERY MONTH. NEED B12 LEVELS CHECKED BEFORE NEXT REFILL, Disp: 3 mL, Rfl: 0     cyclobenzaprine (FLEXERIL) 10 MG tablet, Take 1 tablet by mouth twice daily as needed for muscle spasms, Disp: 180 tablet, Rfl: 0     diclofenac (VOLTAREN) 1 % GEL topical gel, Apply topically daily , Disp: , Rfl:      docusate sodium (COLACE) 100 MG tablet, Take 100 mg by mouth daily as needed , Disp: 60 tablet, Rfl: 1     etanercept (ENBREL) 50 MG/ML injection, Inject 50 mg Subcutaneous once a week , Disp: , Rfl:      finasteride (PROSCAR) 5 MG tablet, Take 1 tablet (5 mg) by mouth daily, Disp: 90 tablet, Rfl: 0     Hypromellose (ARTIFICIAL TEARS OP), Place 1 drop into both eyes daily as needed, Disp: , Rfl:      indomethacin (INDOCIN) 50 MG capsule, Take 1 capsule by mouth once daily, Disp: 90 capsule, Rfl: 0     lisinopril (ZESTRIL) 5 MG tablet, Take 1 tablet (5 mg) by mouth daily, Disp: 90 tablet, Rfl: 3     lisinopril-hydrochlorothiazide (ZESTORETIC) 20-25 MG tablet, Take 1 tablet by mouth daily,  Disp: 30 tablet, Rfl: 3     Multiple Vitamins-Iron (MULTIVITAMIN/IRON PO), Take 1 tablet by mouth daily, Disp: , Rfl:      omeprazole (PRILOSEC) 20 MG DR capsule, Take 1 capsule by mouth every 24 hours, Disp: , Rfl:      oxyCODONE IR (ROXICODONE) 10 MG tablet, Take 1 tablet (10 mg) by mouth every 4 hours as needed for pain, Disp: 180 tablet, Rfl: 0     testosterone (ANDROGEL/TESTIM) 50 MG/5GM (1%) topical gel, Place 1 packet (50 mg) onto the skin daily, Disp: 90 packet, Rfl: 3     naloxone (NARCAN) 1 mg/mL for intranasal kit (2 syringes with 2 mucosal atomizer device), In opioid overdose put cone in nostril and push 1/2 of contents into each nostril.  Repeat every 3 min if no response until help arrives., Disp: 1 kit, Rfl: 1  Social History     Socioeconomic History     Marital status:      Spouse name: Manish Aparicio     Number of children: 4     Years of education: None     Highest education level: None   Occupational History     Employer: [employer]   Social Needs     Financial resource strain: None     Food insecurity     Worry: None     Inability: None     Transportation needs     Medical: None     Non-medical: None   Tobacco Use     Smoking status: Former Smoker     Quit date: 2009     Years since quittin.1     Smokeless tobacco: Never Used     Tobacco comment: quit 2 1/2 yrs   Substance and Sexual Activity     Alcohol use: Yes     Comment: 1 to 2 per week     Drug use: No     Sexual activity: Yes     Partners: Female   Lifestyle     Physical activity     Days per week: None     Minutes per session: None     Stress: None   Relationships     Social connections     Talks on phone: None     Gets together: None     Attends Religion service: None     Active member of club or organization: None     Attends meetings of clubs or organizations: None     Relationship status: None     Intimate partner violence     Fear of current or ex partner: None     Emotionally abused: None     Physically abused: None      "Forced sexual activity: None   Other Topics Concern     Parent/sibling w/ CABG, MI or angioplasty before 65F 55M? Not Asked   Social History Narrative     None     Family History   Problem Relation Age of Onset     Diabetes Mother      Coronary Artery Disease Mother         ROS: 10 point ROS neg other than the symptoms noted above in the HPI.    Vitals:    02/22/21 1446   BP: 96/65   Pulse: 102   Temp: 98.2  F (36.8  C)   SpO2: 98%   Weight: 72.6 kg (160 lb)   Height: 1.727 m (5' 8\")     Body mass index is 24.33 kg/m .  Moderate Pain (4)    Neck Disability Index  No flowsheet data found.    Visual Analog Scale (VAS) Questionnaire    No flowsheet data found.       Awake and alert.  Limited range of motion of cervical spine but patient has approximately 20 degrees of flexion extension and lateral rotation.    Bilateral upper extremity and lower extremity strength is 5 out of 5 in all muscle groups.    Bilateral upper extremity reflexes are pathologically brisk at the biceps and triceps.    Posture erect with deformity consistent with ankylosing spondylitis.    Imaging: MRI of the cervical spine shows significant stenosis at C1-2 with spinal cord signal change at this level.  There are other noted sequelae of ankylosing spondylitis.    CT scan of the cervical spine shows fusion from C2 down into the thoracic spine as well as fusion of the occiput to C1.  The only mobile segment remaining is C1-2.    Flexion-extension x-rays of the cervical spine show mobility across the C1-2 segment but no obvious translational instability.  Imaging was reviewed with the patient shown to the patient in clinic today.    MRI of the lumbar spine also shows advanced degenerative changes at T12-L1 related to long segment fusions of the lumbar spine below and the thoracic spine above with significant stenosis at these levels.    Assessment: 1.  Ankylosing spondylitis 2.  Cervical stenosis and instability at C1-2 with myelopathy.  3.  " Thoracolumbar stenosis with likely radiculopathy.    Plan: Given the patient's myelopathy with spinal cord signal change on his MRI as well as his ankylosing spondylitis I have recommended occipitocervical fusion and decompression.  I do not think he would benefit from decompression alone as I think his problem would rapidly recur given that this is the only mobile segment in his spine.  We discussed the risk benefits and alternatives to this the patient understands and wishes to proceed with surgery we may eventually also need to address the area of thoracolumbar stenosis but this seems to be only mildly symptomatic at this point.  I will work on scheduling his surgery in the near future at [facility].     Review of the result(s) of each unique test - MRI Cervical, CT Cervical, Cervical Xrays, MRI Lumbar  Independent interpretation of a test performed by another physician/other qualified health care professional (not separately reported) - MRI Cervical, CT Cervical, Cervical Xrays, MRI Lumbar            Again, thank you for allowing me to participate in the care of your patient.        Sincerely,        Yobany Craig MD

## 2021-02-22 NOTE — NURSING NOTE
Reviewed pre- and post-operative instructions as outlined in the After Visit Summary/Patient Instructions with patient.   Surgery folder was given to patient    Patient Education Topic: Procedure with Dr. Craig     Learner(s): Patient    Knowledge Level: Basic    Readiness to Learn: Ready    Method:  Verbal explanation    Outcome: Able to verbalize instructions    Barriers to Learning: No barrier    Covid Testing: patient educated they will need to have a test for the novel Coronavirus, COVID-19.The test needs to be completed within 4 days (96) hours of surgery. Order Placed.    Scheduling Number: [phone]    Patient had the opportunity for questions to be answered. Advised Patient to call clinic with any questions/concerns. Gave patient antibacterial soap for pre-surgery skin preparation.

## 2021-02-22 NOTE — PATIENT INSTRUCTIONS
Patient Instructions    Surgery scheduled at [facility]-Cervical 4 posterior instrumentation and fusion. Right iliac crest bone graft harvest. Tin Lyons placement  with Dr. Craig    Pre-Operative    Surgical risks: blood clots in the leg or lung, problems urinating, nerve damage, drainage from the incision, infection,stiffness    Pre-operative physical with primary care physician within 30 days of surgical date.     2-4 night hospitalization stay    Shower procedure  o Please shower with antimicrobial soap the night before surgery and morning of surgery. Please refer to showering instruction sheet in folder.    Eating/Drinking  o Stop all solid foods 8 hours before surgery.  o Keep drinking clear liquids until 4 hours before surgery  - Clear liquids include water, clear juice, black coffee, or clear tea without milk, Gatorade, clear soda.     Medications  o Hold Aspirin, NSAIDs (Advil/Ibuprofen, Indocin, Naproxen,Nuprin,Relafen/Nabumetone, Diclofenac,Meloxicam, Aleve, Celebrex) x 7 days prior to surgical date  o You can take Tylenol (Acetaminophen) for pain, 1000 mg  - Do not exceed 3,000 mg per day   o Any other medications prescribed, please discuss with your primary care provider at your pre-operative physical     As part of preparation for your upcoming procedure you are required to have a test for the novel Coronavirus, COVID-19.  o Please call the drive-up testing number to schedule your appointment. The test needs to be completed within 4 days (96) hours of surgery.   o Scheduling Number: [phone]    Pain Management    Dealing with pain  o As your body heals, you might feel a stabbing, burning, or aching pain. You may also have some numbness.  o Everyone feels pain differently, we may ask you to rate your pain using a pain scale. This will let us know how much pain you feel.   o Keep in mind that medicine won't take away all of your pain. It helps to try other ways to  relax and ease pain.     Things to help with pain  o After surgery, we will give you medicine for your pain. These medications work well, but they can make you drowsy, itchy, or sick to your stomach. If we give you narcotics for pain, try to take the pills with food.   o For mild to moderate pain, you can take medication such as Tylenol. These can be used with narcotics, but make sure that your narcotic does not contain Tylenol.   - Do NOT drive while taking narcotic pain medication  - Do NOT drink alcohol while using any pain medication  o You can utilize ice as needed (no longer than 20 minutes at one time)    You may resume taking NSAIDs (ex. Ibuprofen, aleve, naproxen) 12 weeks after surgery as it may cause bleeding and interfere with bone healing     Incision Care    No submerging incision in water such as pools, hot tubs, baths for at least 8 weeks or until incision is healed    You may get your incision wet in the shower. Allow water to run over incision, and gently pat dry.     Remove dressing as instructed upon discharge    Watch for signs of infection  o Redness, swelling, warmth, drainage, and fever of 101 degrees or higher  o Notify clinic [phone].    Activity Restrictions    For the first 6-8 weeks, no lifting > 10 pounds, no bending, twisting, or overhead reaching.    Take stairs in moderation     Ok to walk as tolerated, take short frequent walks. You may gradually increase the distance as tolerated.     Avoid bed rest and prolonged sitting for longer than 30 minutes (change positions frequently while awake)    No contact sports until after follow up visit    No high impact activities such as; running/jogging, snowmobile or [place] riding or any other recreational vehicles    Please call the clinic if you develop any of the following symptoms:  o Swelling and/or warmth in one or both legs  o Pain or tenderness in your leg, ankle, foot, or arm   o Red or discolored skin     Post-Op Follow Up  Appointments    2 week staple/suture removal with RN    6 week post op with xray prior     3 months post op with xray prior     1 year post op with xray prior    Please call to schedule follow up and xray appointments at [phone]    Resources    If you are currently employed, you will need to be off work for 4-6 weeks for post op recovery and healing.    Please fax any FMLA/short term disability paperwork to [phone]    You may call our clinic when you'd like to return to work and we can provide a work letter    To allow staff adequate time to complete paperwork, please send as soon as possible

## 2021-02-22 NOTE — PROGRESS NOTES
It was a pleasure to see Oseas Edwards today in Neurosurgery Clinic. He is a 50 year old male who was recently seen by Trang Elkins NP in our clinic.  Please see her notes for the full details of his illness.    Briefly the patient has had worsening symptoms of numbness and tingling in the hands as well as some pain radiating down the arms.  He was initially seen by vascular surgery and work-up for thoracic outlet syndrome.  However cervical MRI demonstrates significant stenosis of the upper cervical spine likely related to his ankylosing spondylitis.    The patient describes worsening symptoms over the last 4 months that initially began with the first 3 fingers of his hand but have worsened to include all the fingers of the hands bilaterally.  The left arm is worse than the right arm.  He takes oxycodone for neck and low back pain he also describes some symptoms of burning down into the right lower extremity.    Past Medical History:   Diagnosis Date     Ankylosing spondylitis (H)      Arthritis      AS (ankylosing spondylitis) (H) 1985     Cellulitis of leg 2011     Chronic narcotic dependence (H) 11/4/2013    Due to long term pain management.      Hypertension      Keloid scar, mostly just dark 2/7/2017     Low serum testosterone level 1/30/2012     Optic neuritis 3/20/2012     PA (pernicious anemia) 6/16/2011     Past Medical History reviewed with patient during visit.  Past Surgical History:   Procedure Laterality Date     ARTHROPLASTY REVISION HIP Left 2/14/2017    Procedure: ARTHROPLASTY REVISION HIP;  Surgeon: Saravanan Alcaraz MD;  Location:  OR     HERNIORRHAPHY VENTRAL  11/11/2013    Procedure: HERNIORRHAPHY VENTRAL;  UMBILICAL HERNIA REPAIR WITH MESH, RECTUS DIASTASIS REPAIR WITH MESH;  Surgeon: Jason Dodson MD;  Location:  [facility]     JOINT REPLACEMENT, HIP RT/LT  1994    Left     JOINT REPLACEMENT, HIP RT/LT  2000ish    Right     OPEN SEPARATION COMPONENT HERNIORRHAPHY  11/11/2013     Procedure: OPEN SEPARATION COMPONENT HERNIORRHAPHY;;  Surgeon: Jason Dodson MD;  Location: [facility]     Past Surgical History reviewed with patient during visit.  Allergies   Allergen Reactions     Infliximab      Other reaction(s): *Unknown  Caused LT eye blindness     No Clinical Screening - See Comments      methotrexate- mood alterating     Remicade [Infliximab Injection]      Blindness in one eye       Current Outpatient Medications:      Calcium carb-Vitamin D 500 mg [place]-200 units (OSCAL WITH D;OYSTER SHELL CALCIUM) 500-200 MG-UNIT per tablet, Take 1 tablet by mouth daily , Disp: , Rfl:      cyanocobalamin (CYANOCOBALAMIN) 1000 MCG/ML injection, INJECT 1 ML (CC) ONCE EVERY MONTH. NEED B12 LEVELS CHECKED BEFORE NEXT REFILL, Disp: 3 mL, Rfl: 0     cyclobenzaprine (FLEXERIL) 10 MG tablet, Take 1 tablet by mouth twice daily as needed for muscle spasms, Disp: 180 tablet, Rfl: 0     diclofenac (VOLTAREN) 1 % GEL topical gel, Apply topically daily , Disp: , Rfl:      docusate sodium (COLACE) 100 MG tablet, Take 100 mg by mouth daily as needed , Disp: 60 tablet, Rfl: 1     etanercept (ENBREL) 50 MG/ML injection, Inject 50 mg Subcutaneous once a week , Disp: , Rfl:      finasteride (PROSCAR) 5 MG tablet, Take 1 tablet (5 mg) by mouth daily, Disp: 90 tablet, Rfl: 0     Hypromellose (ARTIFICIAL TEARS OP), Place 1 drop into both eyes daily as needed, Disp: , Rfl:      indomethacin (INDOCIN) 50 MG capsule, Take 1 capsule by mouth once daily, Disp: 90 capsule, Rfl: 0     lisinopril (ZESTRIL) 5 MG tablet, Take 1 tablet (5 mg) by mouth daily, Disp: 90 tablet, Rfl: 3     lisinopril-hydrochlorothiazide (ZESTORETIC) 20-25 MG tablet, Take 1 tablet by mouth daily, Disp: 30 tablet, Rfl: 3     Multiple Vitamins-Iron (MULTIVITAMIN/IRON PO), Take 1 tablet by mouth daily, Disp: , Rfl:      omeprazole (PRILOSEC) 20 MG DR capsule, Take 1 capsule by mouth every 24 hours, Disp: , Rfl:      oxyCODONE IR (ROXICODONE) 10 MG tablet,  Take 1 tablet (10 mg) by mouth every 4 hours as needed for pain, Disp: 180 tablet, Rfl: 0     testosterone (ANDROGEL/TESTIM) 50 MG/5GM (1%) topical gel, Place 1 packet (50 mg) onto the skin daily, Disp: 90 packet, Rfl: 3     naloxone (NARCAN) 1 mg/mL for intranasal kit (2 syringes with 2 mucosal atomizer device), In opioid overdose put cone in nostril and push 1/2 of contents into each nostril.  Repeat every 3 min if no response until help arrives., Disp: 1 kit, Rfl: 1  Social History     Socioeconomic History     Marital status:      Spouse name: Manish Aparicio     Number of children: 4     Years of education: None     Highest education level: None   Occupational History     Employer: [employer]   Social Needs     Financial resource strain: None     Food insecurity     Worry: None     Inability: None     Transportation needs     Medical: None     Non-medical: None   Tobacco Use     Smoking status: Former Smoker     Quit date: 2009     Years since quittin.1     Smokeless tobacco: Never Used     Tobacco comment: quit 2 1/2 yrs   Substance and Sexual Activity     Alcohol use: Yes     Comment: 1 to 2 per week     Drug use: No     Sexual activity: Yes     Partners: Female   Lifestyle     Physical activity     Days per week: None     Minutes per session: None     Stress: None   Relationships     Social connections     Talks on phone: None     Gets together: None     Attends Zoroastrian service: None     Active member of club or organization: None     Attends meetings of clubs or organizations: None     Relationship status: None     Intimate partner violence     Fear of current or ex partner: None     Emotionally abused: None     Physically abused: None     Forced sexual activity: None   Other Topics Concern     Parent/sibling w/ CABG, MI or angioplasty before 65F 55M? Not Asked   Social History Narrative     None     Family History   Problem Relation Age of Onset     Diabetes Mother      Coronary Artery Disease  "Mother         ROS: 10 point ROS neg other than the symptoms noted above in the HPI.    Vitals:    02/22/21 1446   BP: 96/65   Pulse: 102   Temp: 98.2  F (36.8  C)   SpO2: 98%   Weight: 72.6 kg (160 lb)   Height: 1.727 m (5' 8\")     Body mass index is 24.33 kg/m .  Moderate Pain (4)    Neck Disability Index  No flowsheet data found.    Visual Analog Scale (VAS) Questionnaire    No flowsheet data found.       Awake and alert.  Limited range of motion of cervical spine but patient has approximately 20 degrees of flexion extension and lateral rotation.    Bilateral upper extremity and lower extremity strength is 5 out of 5 in all muscle groups.    Bilateral upper extremity reflexes are pathologically brisk at the biceps and triceps.    Posture erect with deformity consistent with ankylosing spondylitis.    Imaging: MRI of the cervical spine shows significant stenosis at C1-2 with spinal cord signal change at this level.  There are other noted sequelae of ankylosing spondylitis.    CT scan of the cervical spine shows fusion from C2 down into the thoracic spine as well as fusion of the occiput to C1.  The only mobile segment remaining is C1-2.    Flexion-extension x-rays of the cervical spine show mobility across the C1-2 segment but no obvious translational instability.  Imaging was reviewed with the patient shown to the patient in clinic today.    MRI of the lumbar spine also shows advanced degenerative changes at T12-L1 related to long segment fusions of the lumbar spine below and the thoracic spine above with significant stenosis at these levels.    Assessment: 1.  Ankylosing spondylitis 2.  Cervical stenosis and instability at C1-2 with myelopathy.  3.  Thoracolumbar stenosis with likely radiculopathy.    Plan: Given the patient's myelopathy with spinal cord signal change on his MRI as well as his ankylosing spondylitis I have recommended occipitocervical fusion and decompression.  I do not think he would benefit " from decompression alone as I think his problem would rapidly recur given that this is the only mobile segment in his spine.  We discussed the risk benefits and alternatives to this the patient understands and wishes to proceed with surgery we may eventually also need to address the area of thoracolumbar stenosis but this seems to be only mildly symptomatic at this point.  I will work on scheduling his surgery in the near future at [facility].     Review of the result(s) of each unique test - MRI Cervical, CT Cervical, Cervical Xrays, MRI Lumbar  Independent interpretation of a test performed by another physician/other qualified health care professional (not separately reported) - MRI Cervical, CT Cervical, Cervical Xrays, MRI Lumbar

## 2021-02-22 NOTE — NURSING NOTE
"Oseas Edwards is a 50 year old male who presents for:  Chief Complaint   Patient presents with     Consult     Cervical radiculopathy. MRI, CT and XR CERV SPINE prior to appt.        Initial Vitals:  BP 96/65   Pulse 102   Temp 98.2  F (36.8  C)   Ht 5' 8\" (1.727 m)   Wt 160 lb (72.6 kg)   SpO2 98%   BMI 24.33 kg/m   Estimated body mass index is 24.33 kg/m  as calculated from the following:    Height as of this encounter: 5' 8\" (1.727 m).    Weight as of this encounter: 160 lb (72.6 kg).. Body surface area is 1.87 meters squared. BP completed using cuff size: regular  Moderate Pain (4)    Nursing Comments: Patient presents for Cervical radiculopathy. MRI, CT and XR CERV SPINE prior to appt.    Eleuterio Perry MA  "

## 2021-02-23 PROBLEM — M53.2X2 SPINAL INSTABILITY OF CERVICAL REGION: Status: ACTIVE | Noted: 2021-02-23

## 2021-02-23 PROBLEM — M45.2 ANKYLOSING SPONDYLITIS OF CERVICAL REGION (H): Status: ACTIVE | Noted: 2021-02-23

## 2021-03-10 DIAGNOSIS — Z11.59 ENCOUNTER FOR SCREENING FOR OTHER VIRAL DISEASES: Primary | ICD-10-CM

## 2021-03-17 ENCOUNTER — TELEPHONE (OUTPATIENT)
Dept: OTHER | Facility: CLINIC | Age: 51
End: 2021-03-17

## 2021-03-17 NOTE — TELEPHONE ENCOUNTER
Patient requesting for lab orders to be emailed to him at [email or web address]. He will bring orders in at his PCP office to have it drawn. Patient was informed and given fax number [phone] to have his PCP fax results to us and to have lab appt prior to 4/30/2021. Patient scheduled with Dr. Mann on 5/7/2021.     Informed patient will send message to nurse to advise.       Eugenia WKOK

## 2021-03-18 ENCOUNTER — MYC MEDICAL ADVICE (OUTPATIENT)
Dept: OTHER | Facility: CLINIC | Age: 51
End: 2021-03-18

## 2021-03-18 NOTE — TELEPHONE ENCOUNTER
See untaptt message to patient.    Alicia MARTINEZ, RN    [facility]  [facility]  Office: [phone]  Fax: [phone]

## 2021-03-19 ENCOUNTER — AMBULATORY - HEALTHEAST (OUTPATIENT)
Dept: LAB | Facility: CLINIC | Age: 51
End: 2021-03-19

## 2021-03-19 DIAGNOSIS — Z11.59 ENCOUNTER FOR SCREENING FOR OTHER VIRAL DISEASES: ICD-10-CM

## 2021-03-22 ENCOUNTER — AMBULATORY - HEALTHEAST (OUTPATIENT)
Dept: LAB | Facility: CLINIC | Age: 51
End: 2021-03-22

## 2021-03-22 DIAGNOSIS — M45.9 ANKYLOSING SPONDYLITIS, UNSPECIFIED SITE OF SPINE (H): ICD-10-CM

## 2021-03-22 DIAGNOSIS — Z11.59 ENCOUNTER FOR SCREENING FOR OTHER VIRAL DISEASES: ICD-10-CM

## 2021-03-22 RX ORDER — OXYCODONE HYDROCHLORIDE 10 MG/1
10 TABLET ORAL EVERY 4 HOURS PRN
Qty: 180 TABLET | Refills: 0 | Status: SHIPPED | OUTPATIENT
Start: 2021-03-22 | End: 2021-04-27

## 2021-03-23 ENCOUNTER — OFFICE VISIT (OUTPATIENT)
Dept: FAMILY MEDICINE | Facility: CLINIC | Age: 51
End: 2021-03-23

## 2021-03-23 ENCOUNTER — ANESTHESIA EVENT (OUTPATIENT)
Dept: SURGERY | Facility: CLINIC | Age: 51
DRG: 472 | End: 2021-03-23
Payer: COMMERCIAL

## 2021-03-23 VITALS
HEIGHT: 68 IN | HEART RATE: 83 BPM | RESPIRATION RATE: 16 BRPM | TEMPERATURE: 98.1 F | SYSTOLIC BLOOD PRESSURE: 114 MMHG | DIASTOLIC BLOOD PRESSURE: 69 MMHG | BODY MASS INDEX: 24.71 KG/M2 | WEIGHT: 163 LBS | OXYGEN SATURATION: 98 %

## 2021-03-23 DIAGNOSIS — M79.642 BILATERAL HAND PAIN: ICD-10-CM

## 2021-03-23 DIAGNOSIS — M45.2 ANKYLOSING SPONDYLITIS OF CERVICAL REGION (H): ICD-10-CM

## 2021-03-23 DIAGNOSIS — M79.641 BILATERAL HAND PAIN: ICD-10-CM

## 2021-03-23 DIAGNOSIS — I73.9 CLAUDICATION (H): ICD-10-CM

## 2021-03-23 DIAGNOSIS — I10 ESSENTIAL HYPERTENSION: ICD-10-CM

## 2021-03-23 DIAGNOSIS — Z87.11 HX OF GASTRIC ULCER: ICD-10-CM

## 2021-03-23 DIAGNOSIS — F11.20 CHRONIC NARCOTIC DEPENDENCE (H): ICD-10-CM

## 2021-03-23 DIAGNOSIS — R79.89 LOW SERUM TESTOSTERONE LEVEL: ICD-10-CM

## 2021-03-23 DIAGNOSIS — D51.0 PA (PERNICIOUS ANEMIA): Chronic | ICD-10-CM

## 2021-03-23 DIAGNOSIS — Z01.818 PREOP GENERAL PHYSICAL EXAM: Primary | ICD-10-CM

## 2021-03-23 LAB
% GRANULOCYTES: 45.8 % (ref 42.2–75.2)
HCT VFR BLD AUTO: 44.7 % (ref 39–51)
HEMOGLOBIN: 14.8 G/DL (ref 13.4–17.5)
LYMPHOCYTES NFR BLD AUTO: 44.3 % (ref 20.5–51.1)
MCH RBC QN AUTO: 30.9 PG (ref 27–31)
MCHC RBC AUTO-ENTMCNC: 33.2 G/DL (ref 33–37)
MCV RBC AUTO: 92.9 FL (ref 80–100)
MONOCYTES NFR BLD AUTO: 9.9 % (ref 1.7–9.3)
PLATELET # BLD AUTO: 182 K/UL (ref 140–450)
RBC # BLD AUTO: 4.81 X10/CMM (ref 4.2–5.9)
SARS-COV-2 PCR COMMENT: NORMAL
SARS-COV-2 RNA SPEC QL NAA+PROBE: NEGATIVE
SARS-COV-2 VIRUS SPECIMEN SOURCE: NORMAL
WBC # BLD AUTO: 5.3 X10/CMM (ref 3.8–11)

## 2021-03-23 PROCEDURE — 36415 COLL VENOUS BLD VENIPUNCTURE: CPT | Performed by: FAMILY MEDICINE

## 2021-03-23 PROCEDURE — 93050 ART PRESSURE WAVEFORM ANALYS: CPT | Performed by: FAMILY MEDICINE

## 2021-03-23 PROCEDURE — 99215 OFFICE O/P EST HI 40 MIN: CPT | Performed by: FAMILY MEDICINE

## 2021-03-23 PROCEDURE — 93000 ELECTROCARDIOGRAM COMPLETE: CPT | Mod: 59 | Performed by: FAMILY MEDICINE

## 2021-03-23 PROCEDURE — 85025 COMPLETE CBC W/AUTO DIFF WBC: CPT | Performed by: FAMILY MEDICINE

## 2021-03-23 ASSESSMENT — MIFFLIN-ST. JEOR: SCORE: 1568.86

## 2021-03-23 NOTE — H&P (VIEW-ONLY)
[facility] [address] [place] [address]  Phone: [phone]  Fax: [phone]  Primary Provider: Farhat Forrest  Pre-op Performing Provider: FARHAT FORREST        Oseas Edwards is a 50 year old male who presents for a preoperative evaluation.    Surgical Information:  Surgery/Procedure: Cervical Spine decompression and Fusion  Surgery Location: [facility]  Surgeon: Dr. Craig  Surgery Date: 3/25/2021  Time of Surgery: 7:30 am  Where patient plans to recover: At home with family  Fax number for surgical facility: Note does not need to be faxed, will be available electronically in Epic.    Type of Anesthesia Anticipated: General    Assessment & Plan     The proposed surgical procedure is considered INTERMEDIATE risk.    Preop general physical exam    Essential Hypertension   Remains well controlled when checked out of clinic.   he has not experienced any significant side effects from medications for hypertension.    NO active cardiac complaints or symptoms with exercise.  Current medications for treatment:  See list    Reviewed last 6 BP readings in chart:  BP Readings from Last 6 Encounters:   03/23/21 114/69   02/22/21 96/65   02/19/21 101/68   02/01/21 100/67   01/25/21 128/72   11/23/20 111/73         - [place] ART PRESS WAVEFORM ANALYS CENTRAL ART PRESSURE    Claudication (H)  Saw vascular providers and working through the cervical spine issues first    Chronic narcotic dependence (H)  Has been on chronic narcotic management for his back pain for decades, has tried to wean and has been unsuccessful, hope is this surgery and other management interventions will allow to decrease narcotics    Bilateral hand pain  observe    Ankylosing spondylitis of cervical region (H)  As above    PA (pernicious anemia)  On B12    Hx of gastric ulcer  Noted     Low serum testosterone level  Known issue that I take into account for his medical decisions, no current exacerbations or new concerns.              Risks and Recommendations:  The patient has the following additional risks and recommendations for perioperative complications:  Social and Substance:    - High tolerance to opioid analgesics due to patient is taking medications for chronic pain    Medication Instructions:  Patient is to take all scheduled medications on the day of surgery    RECOMMENDATION:  APPROVAL GIVEN to proceed with proposed procedure, without further diagnostic evaluation.    Review of external notes as documented above             Subjective     HPI related to upcoming procedure:  Oseas has a long history of Ankylosing spondylitis with cervical stenosis and instability at C1-2 with myelopathy. Dr. Craig is ready to decompress and fuse the cervical spine.  He has had intolerance of NSAIDS and has been managed with narcotics for many years due to his AS.  He also has Thoracolumbar stenosis with likely radiculopathy mild at this point.      PREOPERATIVE EVALUATION:  Today's date: 3/23/2021  Preoperative Questionnaire:   No - Have you ever had a heart attack or stroke?  No - Have you ever had surgery on your heart or blood vessels, such as a stent, coronary (heart) bypass, or surgery on an artery in the head, neck, heart, or legs?  No - Do you have chest pain when you are physically active?  No - Do you have a history of heart failure?  No - Do you currently have a cold, bronchitis, or symptoms of other respiratory (head and chest) infections?  No - Do you have a cough, shortness of breath, or wheezing?  No - Do you or anyone in your family have a history of blood clots?  No - Do you or anyone in your family have a serious bleeding problem, such as long-lasting bleeding after surgeries or cuts?  No - Have you ever had anemia or been told to take iron pills?  No - Have you had any abnormal blood loss such as black, tarry or bloody stools, or abnormal vaginal bleeding?  No - Have you ever had a blood transfusion?  Yes - Are you willing to  have a blood transfusion if it is medically needed before, during, or after your surgery?  No - Have you or anyone in your family ever had problems with anesthesia (sedation for surgery)?  No - Do you have sleep apnea, excessive snoring, or daytime drowsiness?   No - Do you have any artifical heart valves or other implanted medical devices, such as a pacemaker, defibrillator, or continuous glucose monitor?  No - Do you have any artifical joints?  No - Are you allergic to latex?  No - Is there any chance that you may be pregnant?      Health Care Directive:  Patient does not have a Health Care Directive or Living Will:     Preoperative Review of :   reviewed - controlled substances reflected in medication list.      Status of Chronic Conditions:  See problem list for active medical problems.  Problems all longstanding and stable, except as noted/documented.  See ROS for pertinent symptoms related to these conditions.      Review of Systems  Constitutional, neuro, ENT, endocrine, pulmonary, cardiac, gastrointestinal, genitourinary, musculoskeletal, integument and psychiatric systems are negative, except as otherwise noted.    Patient Active Problem List    Diagnosis Date Noted     Claudication (H) 03/23/2021     Priority: Medium     Ankylosing spondylitis of cervical region (H) 02/23/2021     Priority: Medium     Added automatically from request for surgery [id number]       Spinal instability of cervical region 02/23/2021     Priority: Medium     Added automatically from request for surgery [id number]       Stenosis of cervical spine with myelopathy (H) 02/22/2021     Priority: Medium     Bilateral hand pain 12/08/2020     Priority: Medium     Keloid scar, mostly just dark 02/07/2017     Priority: Medium     Chronic pain syndrome 06/20/2016     Priority: Medium     Hx of gastric ulcer 06/06/2016     Priority: Medium     Rectus diastasis 11/11/2013     Priority: Medium     Chronic narcotic dependence (H) 11/04/2013      Priority: Medium     Due to long term pain management.  Patient is followed by FARHAT ALBARADO for ongoing prescription of pain medication.  All refills should be approved by this provider, or covering partner.    Medication(s): oxycodone IR around 3 per day and Tylenol #3 30/300 max of 4 per day.     Clinic visit frequency required: Q 2 month     Controlled substance agreement on file: Yes       Date(s): 10/15/15    Pain Clinic evaluation in the past: but now referring to [facility] pain clinic    DIRE Total Score(s):  No flowsheet data found.    Last [facility] website verification:  none   [email or web address]         Health Care Home 10/07/2013     Priority: Medium     Low serum testosterone level 01/30/2012     Priority: Medium     PA (pernicious anemia) 06/16/2011     Priority: Medium      Past Medical History:   Diagnosis Date     Ankylosing spondylitis (H)      Arthritis      AS (ankylosing spondylitis) (H) 1985     Cellulitis of leg 2011     Chronic narcotic dependence (H) 11/4/2013    Due to long term pain management.      Hypertension      Keloid scar, mostly just dark 2/7/2017     Low serum testosterone level 1/30/2012     Optic neuritis 3/20/2012     PA (pernicious anemia) 6/16/2011     Past Surgical History:   Procedure Laterality Date     ARTHROPLASTY REVISION HIP Left 2/14/2017    Procedure: ARTHROPLASTY REVISION HIP;  Surgeon: Saravanan Alcaraz MD;  Location:  OR     HERNIORRHAPHY VENTRAL  11/11/2013    Procedure: HERNIORRHAPHY VENTRAL;  UMBILICAL HERNIA REPAIR WITH MESH, RECTUS DIASTASIS REPAIR WITH MESH;  Surgeon: Jason Dodson MD;  Location:  [facility]     JOINT REPLACEMENT, HIP RT/LT  1994    Left     JOINT REPLACEMENT, HIP RT/LT  2000ish    Right     OPEN SEPARATION COMPONENT HERNIORRHAPHY  11/11/2013    Procedure: OPEN SEPARATION COMPONENT HERNIORRHAPHY;;  Surgeon: Jason Dodson MD;  Location: [facility]     Current Outpatient Medications   Medication Sig Dispense Refill      Calcium carb-Vitamin D 500 mg [place]-200 units (OSCAL WITH D;OYSTER SHELL CALCIUM) 500-200 MG-UNIT per tablet Take 1 tablet by mouth daily        cyanocobalamin (CYANOCOBALAMIN) 1000 MCG/ML injection INJECT 1 ML (CC) ONCE EVERY MONTH. NEED B12 LEVELS CHECKED BEFORE NEXT REFILL 3 mL 0     cyclobenzaprine (FLEXERIL) 10 MG tablet Take 1 tablet by mouth twice daily as needed for muscle spasms 180 tablet 0     diclofenac (VOLTAREN) 1 % GEL topical gel Apply topically daily        docusate sodium (COLACE) 100 MG tablet Take 100 mg by mouth daily as needed  60 tablet 1     etanercept (ENBREL) 50 MG/ML injection Inject 50 mg Subcutaneous once a week        finasteride (PROSCAR) 5 MG tablet Take 1 tablet (5 mg) by mouth daily 90 tablet 0     Hypromellose (ARTIFICIAL TEARS OP) Place 1 drop into both eyes daily as needed       indomethacin (INDOCIN) 50 MG capsule Take 1 capsule by mouth once daily 90 capsule 0     lisinopril (ZESTRIL) 5 MG tablet Take 1 tablet (5 mg) by mouth daily 90 tablet 3     lisinopril-hydrochlorothiazide (ZESTORETIC) 20-25 MG tablet Take 1 tablet by mouth daily 30 tablet 3     Multiple Vitamins-Iron (MULTIVITAMIN/IRON PO) Take 1 tablet by mouth daily       omeprazole (PRILOSEC) 20 MG DR capsule Take 1 capsule by mouth every 24 hours       oxyCODONE IR (ROXICODONE) 10 MG tablet Take 1 tablet (10 mg) by mouth every 4 hours as needed for pain 180 tablet 0     testosterone (ANDROGEL/TESTIM) 50 MG/5GM (1%) topical gel Place 1 packet (50 mg) onto the skin daily 90 packet 3     naloxone (NARCAN) 1 mg/mL for intranasal kit (2 syringes with 2 mucosal atomizer device) In opioid overdose put cone in nostril and push 1/2 of contents into each nostril.  Repeat every 3 min if no response until help arrives. 1 kit 1       Allergies   Allergen Reactions     Infliximab      Other reaction(s): *Unknown  Caused LT eye blindness     No Clinical Screening - See Comments      methotrexate- mood alterating     Remicade  "[Infliximab Injection]      Blindness in one eye        Social History     Tobacco Use     Smoking status: Former Smoker     Quit date: 2009     Years since quittin.2     Smokeless tobacco: Never Used     Tobacco comment: quit 2 1/2 yrs   Substance Use Topics     Alcohol use: Yes     Comment: 1 to 2 per week        History   Drug Use No         Objective     /69   Pulse 83   Temp 98.1  F (36.7  C)   Resp 16   Ht 1.727 m (5' 8\")   Wt 73.9 kg (163 lb)   SpO2 98%   BMI 24.78 kg/m      Physical Exam    GENERAL APPEARANCE: healthy, alert and no distress     EYES: EOMI,  PERRL     HENT: ear canals and TM's normal and nose and mouth without ulcers or lesions     NECK: no adenopathy, no asymmetry, masses, or scars and thyroid normal to palpation     RESP: lungs clear to auscultation - no rales, rhonchi or wheezes     CV: regular rates and rhythm, normal S1 S2, no S3 or S4 and no murmur, click or rub     ABDOMEN:  soft, nontender, no HSM or masses and bowel sounds normal     MS: decreased range of motion throughout the spine     SKIN: no suspicious lesions or rashes     NEURO: Normal strength and tone, sensory exam grossly normal, mentation intact and speech normal     PSYCH: mentation appears normal. and affect normal/bright     LYMPHATICS: No cervical adenopathy    Recent Labs   Lab Test 21  1000 20  1049   HGB 15.3 15.0    125*     --    POTASSIUM 4.1  --    CR 0.85  --         Diagnostics:  Labs pending at this time.  Results will be reviewed when available.   EKG: appears normal, NSR, normal axis, normal intervals, no acute ST/T changes c/w ischemia, no LVH by voltage criteria, unchanged from previous tracings    Revised Cardiac Risk Index (RCRI):  The patient has the following serious cardiovascular risks for perioperative complications:   - No serious cardiac risks = 0 points     RCRI Interpretation: 0 points: Class I (very low risk - 0.4% complication rate)       "     Signed Electronically by: Willy Forrest MD  Copy of this evaluation report is provided to requesting physician.

## 2021-03-23 NOTE — PATIENT INSTRUCTIONS

## 2021-03-23 NOTE — PROGRESS NOTES
[facility] [address] [place] [address]  Phone: [phone]  Fax: [phone]  Primary Provider: Farhat Forrest  Pre-op Performing Provider: FARHAT FORREST        Oseas Edwards is a 50 year old male who presents for a preoperative evaluation.    Surgical Information:  Surgery/Procedure: Cervical Spine decompression and Fusion  Surgery Location: [facility]  Surgeon: Dr. Craig  Surgery Date: 3/25/2021  Time of Surgery: 7:30 am  Where patient plans to recover: At home with family  Fax number for surgical facility: Note does not need to be faxed, will be available electronically in Epic.    Type of Anesthesia Anticipated: General    Assessment & Plan     The proposed surgical procedure is considered INTERMEDIATE risk.    Preop general physical exam    Essential Hypertension   Remains well controlled when checked out of clinic.   he has not experienced any significant side effects from medications for hypertension.    NO active cardiac complaints or symptoms with exercise.  Current medications for treatment:  See list    Reviewed last 6 BP readings in chart:  BP Readings from Last 6 Encounters:   03/23/21 114/69   02/22/21 96/65   02/19/21 101/68   02/01/21 100/67   01/25/21 128/72   11/23/20 111/73         - OR ART PRESS WAVEFORM ANALYS CENTRAL ART PRESSURE    Claudication (H)  Saw vascular providers and working through the cervical spine issues first    Chronic narcotic dependence (H)  Has been on chronic narcotic management for his back pain for decades, has tried to wean and has been unsuccessful, hope is this surgery and other management interventions will allow to decrease narcotics    Bilateral hand pain  observe    Ankylosing spondylitis of cervical region (H)  As above    PA (pernicious anemia)  On B12    Hx of gastric ulcer  Noted     Low serum testosterone level  Known issue that I take into account for his medical decisions, no current exacerbations or new concerns.              Risks and Recommendations:  The patient has the following additional risks and recommendations for perioperative complications:  Social and Substance:    - High tolerance to opioid analgesics due to patient is taking medications for chronic pain    Medication Instructions:  Patient is to take all scheduled medications on the day of surgery    RECOMMENDATION:  APPROVAL GIVEN to proceed with proposed procedure, without further diagnostic evaluation.    Review of external notes as documented above             Subjective     HPI related to upcoming procedure:  Oseas has a long history of Ankylosing spondylitis with cervical stenosis and instability at C1-2 with myelopathy. Dr. Craig is ready to decompress and fuse the cervical spine.  He has had intolerance of NSAIDS and has been managed with narcotics for many years due to his AS.  He also has Thoracolumbar stenosis with likely radiculopathy mild at this point.      PREOPERATIVE EVALUATION:  Today's date: 3/23/2021  Preoperative Questionnaire:   No - Have you ever had a heart attack or stroke?  No - Have you ever had surgery on your heart or blood vessels, such as a stent, coronary (heart) bypass, or surgery on an artery in the head, neck, heart, or legs?  No - Do you have chest pain when you are physically active?  No - Do you have a history of heart failure?  No - Do you currently have a cold, bronchitis, or symptoms of other respiratory (head and chest) infections?  No - Do you have a cough, shortness of breath, or wheezing?  No - Do you or anyone in your family have a history of blood clots?  No - Do you or anyone in your family have a serious bleeding problem, such as long-lasting bleeding after surgeries or cuts?  No - Have you ever had anemia or been told to take iron pills?  No - Have you had any abnormal blood loss such as black, tarry or bloody stools, or abnormal vaginal bleeding?  No - Have you ever had a blood transfusion?  Yes - Are you willing to  have a blood transfusion if it is medically needed before, during, or after your surgery?  No - Have you or anyone in your family ever had problems with anesthesia (sedation for surgery)?  No - Do you have sleep apnea, excessive snoring, or daytime drowsiness?   No - Do you have any artifical heart valves or other implanted medical devices, such as a pacemaker, defibrillator, or continuous glucose monitor?  No - Do you have any artifical joints?  No - Are you allergic to latex?  No - Is there any chance that you may be pregnant?      Health Care Directive:  Patient does not have a Health Care Directive or Living Will:     Preoperative Review of :   reviewed - controlled substances reflected in medication list.      Status of Chronic Conditions:  See problem list for active medical problems.  Problems all longstanding and stable, except as noted/documented.  See ROS for pertinent symptoms related to these conditions.      Review of Systems  Constitutional, neuro, ENT, endocrine, pulmonary, cardiac, gastrointestinal, genitourinary, musculoskeletal, integument and psychiatric systems are negative, except as otherwise noted.    Patient Active Problem List    Diagnosis Date Noted     Claudication (H) 03/23/2021     Priority: Medium     Ankylosing spondylitis of cervical region (H) 02/23/2021     Priority: Medium     Added automatically from request for surgery [id number]       Spinal instability of cervical region 02/23/2021     Priority: Medium     Added automatically from request for surgery [id number]       Stenosis of cervical spine with myelopathy (H) 02/22/2021     Priority: Medium     Bilateral hand pain 12/08/2020     Priority: Medium     Keloid scar, mostly just dark 02/07/2017     Priority: Medium     Chronic pain syndrome 06/20/2016     Priority: Medium     Hx of gastric ulcer 06/06/2016     Priority: Medium     Rectus diastasis 11/11/2013     Priority: Medium     Chronic narcotic dependence (H) 11/04/2013      Priority: Medium     Due to long term pain management.  Patient is followed by FARHAT ALBARADO for ongoing prescription of pain medication.  All refills should be approved by this provider, or covering partner.    Medication(s): oxycodone IR around 3 per day and Tylenol #3 30/300 max of 4 per day.     Clinic visit frequency required: Q 2 month     Controlled substance agreement on file: Yes       Date(s): 10/15/15    Pain Clinic evaluation in the past: but now referring to [facility] pain clinic    DIRE Total Score(s):  No flowsheet data found.    Last [facility] website verification:  none   [email or web address]         Health Care Home 10/07/2013     Priority: Medium     Low serum testosterone level 01/30/2012     Priority: Medium     PA (pernicious anemia) 06/16/2011     Priority: Medium      Past Medical History:   Diagnosis Date     Ankylosing spondylitis (H)      Arthritis      AS (ankylosing spondylitis) (H) 1985     Cellulitis of leg 2011     Chronic narcotic dependence (H) 11/4/2013    Due to long term pain management.      Hypertension      Keloid scar, mostly just dark 2/7/2017     Low serum testosterone level 1/30/2012     Optic neuritis 3/20/2012     PA (pernicious anemia) 6/16/2011     Past Surgical History:   Procedure Laterality Date     ARTHROPLASTY REVISION HIP Left 2/14/2017    Procedure: ARTHROPLASTY REVISION HIP;  Surgeon: Saravanan Alcaraz MD;  Location:  OR     HERNIORRHAPHY VENTRAL  11/11/2013    Procedure: HERNIORRHAPHY VENTRAL;  UMBILICAL HERNIA REPAIR WITH MESH, RECTUS DIASTASIS REPAIR WITH MESH;  Surgeon: Jason Dodson MD;  Location:  [facility]     JOINT REPLACEMENT, HIP RT/LT  1994    Left     JOINT REPLACEMENT, HIP RT/LT  2000ish    Right     OPEN SEPARATION COMPONENT HERNIORRHAPHY  11/11/2013    Procedure: OPEN SEPARATION COMPONENT HERNIORRHAPHY;;  Surgeon: Jason Dodson MD;  Location: [facility] [place]     Current Outpatient Medications   Medication Sig Dispense Refill      Calcium carb-Vitamin D 500 mg [place]-200 units (OSCAL WITH D;OYSTER SHELL CALCIUM) 500-200 MG-UNIT per tablet Take 1 tablet by mouth daily        cyanocobalamin (CYANOCOBALAMIN) 1000 MCG/ML injection INJECT 1 ML (CC) ONCE EVERY MONTH. NEED B12 LEVELS CHECKED BEFORE NEXT REFILL 3 mL 0     cyclobenzaprine (FLEXERIL) 10 MG tablet Take 1 tablet by mouth twice daily as needed for muscle spasms 180 tablet 0     diclofenac (VOLTAREN) 1 % GEL topical gel Apply topically daily        docusate sodium (COLACE) 100 MG tablet Take 100 mg by mouth daily as needed  60 tablet 1     etanercept (ENBREL) 50 MG/ML injection Inject 50 mg Subcutaneous once a week        finasteride (PROSCAR) 5 MG tablet Take 1 tablet (5 mg) by mouth daily 90 tablet 0     Hypromellose (ARTIFICIAL TEARS OP) Place 1 drop into both eyes daily as needed       indomethacin (INDOCIN) 50 MG capsule Take 1 capsule by mouth once daily 90 capsule 0     lisinopril (ZESTRIL) 5 MG tablet Take 1 tablet (5 mg) by mouth daily 90 tablet 3     lisinopril-hydrochlorothiazide (ZESTORETIC) 20-25 MG tablet Take 1 tablet by mouth daily 30 tablet 3     Multiple Vitamins-Iron (MULTIVITAMIN/IRON PO) Take 1 tablet by mouth daily       omeprazole (PRILOSEC) 20 MG DR capsule Take 1 capsule by mouth every 24 hours       oxyCODONE IR (ROXICODONE) 10 MG tablet Take 1 tablet (10 mg) by mouth every 4 hours as needed for pain 180 tablet 0     testosterone (ANDROGEL/TESTIM) 50 MG/5GM (1%) topical gel Place 1 packet (50 mg) onto the skin daily 90 packet 3     naloxone (NARCAN) 1 mg/mL for intranasal kit (2 syringes with 2 mucosal atomizer device) In opioid overdose put cone in nostril and push 1/2 of contents into each nostril.  Repeat every 3 min if no response until help arrives. 1 kit 1       Allergies   Allergen Reactions     Infliximab      Other reaction(s): *Unknown  Caused LT eye blindness     No Clinical Screening - See Comments      methotrexate- mood alterating     Remicade  "[Infliximab Injection]      Blindness in one eye        Social History     Tobacco Use     Smoking status: Former Smoker     Quit date: 2009     Years since quittin.2     Smokeless tobacco: Never Used     Tobacco comment: quit 2 1/2 yrs   Substance Use Topics     Alcohol use: Yes     Comment: 1 to 2 per week        History   Drug Use No         Objective     /69   Pulse 83   Temp 98.1  F (36.7  C)   Resp 16   Ht 1.727 m (5' 8\")   Wt 73.9 kg (163 lb)   SpO2 98%   BMI 24.78 kg/m      Physical Exam    GENERAL APPEARANCE: healthy, alert and no distress     EYES: EOMI,  PERRL     HENT: ear canals and TM's normal and nose and mouth without ulcers or lesions     NECK: no adenopathy, no asymmetry, masses, or scars and thyroid normal to palpation     RESP: lungs clear to auscultation - no rales, rhonchi or wheezes     CV: regular rates and rhythm, normal S1 S2, no S3 or S4 and no murmur, click or rub     ABDOMEN:  soft, nontender, no HSM or masses and bowel sounds normal     MS: decreased range of motion throughout the spine     SKIN: no suspicious lesions or rashes     NEURO: Normal strength and tone, sensory exam grossly normal, mentation intact and speech normal     PSYCH: mentation appears normal. and affect normal/bright     LYMPHATICS: No cervical adenopathy    Recent Labs   Lab Test 21  1000 20  1049   HGB 15.3 15.0    125*     --    POTASSIUM 4.1  --    CR 0.85  --         Diagnostics:  Labs pending at this time.  Results will be reviewed when available.   EKG: appears normal, NSR, normal axis, normal intervals, no acute ST/T changes c/w ischemia, no LVH by voltage criteria, unchanged from previous tracings    Revised Cardiac Risk Index (RCRI):  The patient has the following serious cardiovascular risks for perioperative complications:   - No serious cardiac risks = 0 points     RCRI Interpretation: 0 points: Class I (very low risk - 0.4% complication rate)       "     Signed Electronically by: Willy Forrest MD  Copy of this evaluation report is provided to requesting physician.

## 2021-03-24 ENCOUNTER — COMMUNICATION - HEALTHEAST (OUTPATIENT)
Dept: SCHEDULING | Facility: CLINIC | Age: 51
End: 2021-03-24

## 2021-03-24 RX ORDER — LISINOPRIL/HYDROCHLOROTHIAZIDE 10-12.5 MG
1 TABLET ORAL DAILY
COMMUNITY
End: 2021-03-24

## 2021-03-24 RX ORDER — LISINOPRIL/HYDROCHLOROTHIAZIDE 10-12.5 MG
1 TABLET ORAL EVERY MORNING
COMMUNITY
End: 2021-04-25

## 2021-03-24 RX ORDER — CYCLOBENZAPRINE HCL 5 MG
5 TABLET ORAL DAILY PRN
Status: ON HOLD | COMMUNITY
End: 2021-03-27

## 2021-03-24 NOTE — PROGRESS NOTES
PTA medications updated by Medication Scribe prior to surgery via phone call with patient        Comments:    Medication history sources: Patient, Margaret, H&P and Pharmacy ([facility])  Medication history source reliability: Good  Adherence assessment: N/A Not Observed    Significant changes made to the medication list:  Patient taking meds differently than prescribed; See PTA entries for: testosterone gel      Additional medication history information:   Verified medications with Pharmacy : Lisinopril 5 mg, Lisinopril-Hydrochlorothiazide 10-12.5 mg        Prior to Admission medications    Medication Sig Last Dose Taking? Auth Provider   Calcium carb-Vitamin D 500 mg [place]-200 units (OSCAL WITH D;OYSTER SHELL CALCIUM) 500-200 MG-UNIT per tablet Take 1 tablet by mouth daily  over 1 week ago at AM Yes Reported, Patient   cyanocobalamin (CYANOCOBALAMIN) 1000 MCG/ML injection INJECT 1 ML (CC) ONCE EVERY MONTH. NEED B12 LEVELS CHECKED BEFORE NEXT REFILL over 1 month ago at AM Yes Willy Forrest MD   cyclobenzaprine (FLEXERIL) 10 MG tablet Take 1 tablet by mouth twice daily as needed for muscle spasms  Patient taking differently: Take 10 mg by mouth 2 times daily as needed for muscle spasms Take 1 tablet by mouth twice daily as needed for muscle spasms (in addition to  5 mg as needed dose)  at HS Yes Willy Forrest MD   cyclobenzaprine (FLEXERIL) 5 MG tablet Take 5 mg by mouth daily as needed for muscle spasms (in addition to twice daily dose)  at PRN Yes Reported, Patient   diclofenac (VOLTAREN) 1 % GEL topical gel Apply topically daily as needed for moderate pain  over 1 week ago at PRN Yes Reported, Patient   docusate sodium (COLACE) 100 MG tablet Take 100 mg by mouth daily as needed for constipation  over 1 week ago at PRN Yes    etanercept (ENBREL) 50 MG/ML injection Inject 50 mg Subcutaneous once a week (on Saturday) 3/20/2021 at AM Yes Reported, Patient   finasteride (PROSCAR) 5 MG tablet Take 1 tablet  (5 mg) by mouth daily  Patient taking differently: Take 1 tablet by mouth At Bedtime  over 1 week ago at HS Yes Willy Forrest MD   Hypromellose (ARTIFICIAL TEARS OP) Place 1 drop into both eyes daily as needed over 1 week ago at PRN Yes Reported, Patient   indomethacin (INDOCIN) 50 MG capsule Take 1 capsule by mouth once daily  Patient taking differently: Take 50 mg by mouth At Bedtime Take 1 capsule by mouth once daily 3/21/2021 at HS Yes Willy Forrest MD   lisinopril (ZESTRIL) 5 MG tablet Take 1 tablet (5 mg) by mouth daily  Patient taking differently: Take 5 mg by mouth daily (in addition to lisinopril-hydrochlorothiazide 10-12.5 mg morning dose)  at AM Yes Willy Forrest MD   lisinopril-hydrochlorothiazide (ZESTORETIC) 10-12.5 MG tablet Take 1 tablet by mouth every morning (in addition to the 5 mg lisinopril dose)  at AM Yes Reported, Patient   Multiple Vitamins-Iron (MULTIVITAMIN/IRON PO) Take 1 tablet by mouth daily over 1 week ago at AM Yes Reported, Patient   naloxone (NARCAN) 1 mg/mL for intranasal kit (2 syringes with 2 mucosal atomizer device) In opioid overdose put cone in nostril and push 1/2 of contents into each nostril.  Repeat every 3 min if no response until help arrives.  Yes Willy Forrest MD   omeprazole (PRILOSEC) 20 MG DR capsule Take 1 capsule by mouth daily as needed  ove 1 week ago at PRN Yes Reported, Patient   oxyCODONE IR (ROXICODONE) 10 MG tablet Take 1 tablet (10 mg) by mouth every 4 hours as needed for pain  at AM Yes Willy Forrest MD   testosterone (ANDROGEL/TESTIM) 50 MG/5GM (1%) topical gel Place 1 packet (50 mg) onto the skin daily  Patient taking differently: Place 50 mg onto the skin every 3 days (after showering) 3/22/2021 at AM Yes Willy Forrest MD

## 2021-03-25 ENCOUNTER — HOSPITAL ENCOUNTER (INPATIENT)
Facility: CLINIC | Age: 51
LOS: 2 days | Discharge: HOME | DRG: 472 | End: 2021-03-27
Attending: NEUROLOGICAL SURGERY | Admitting: NEUROLOGICAL SURGERY
Payer: COMMERCIAL

## 2021-03-25 ENCOUNTER — APPOINTMENT (OUTPATIENT)
Dept: GENERAL RADIOLOGY | Facility: CLINIC | Age: 51
DRG: 472 | End: 2021-03-25
Attending: NEUROLOGICAL SURGERY
Payer: COMMERCIAL

## 2021-03-25 ENCOUNTER — ANESTHESIA (OUTPATIENT)
Dept: SURGERY | Facility: CLINIC | Age: 51
DRG: 472 | End: 2021-03-25
Payer: COMMERCIAL

## 2021-03-25 DIAGNOSIS — G99.2 STENOSIS OF CERVICAL SPINE WITH MYELOPATHY (H): Primary | ICD-10-CM

## 2021-03-25 DIAGNOSIS — M53.2X2 SPINAL INSTABILITY OF CERVICAL REGION: ICD-10-CM

## 2021-03-25 DIAGNOSIS — M48.02 STENOSIS OF CERVICAL SPINE WITH MYELOPATHY (H): Primary | ICD-10-CM

## 2021-03-25 DIAGNOSIS — M45.2 ANKYLOSING SPONDYLITIS OF CERVICAL REGION (H): ICD-10-CM

## 2021-03-25 LAB
ABO + RH BLD: NORMAL
ABO + RH BLD: NORMAL
BLD GP AB SCN SERPL QL: NORMAL
BLOOD BANK CMNT PATIENT-IMP: NORMAL
CREAT SERPL-MCNC: 1.08 MG/DL (ref 0.66–1.25)
GFR SERPL CREATININE-BSD FRML MDRD: 79 ML/MIN/{1.73_M2}
POTASSIUM SERPL-SCNC: 3.9 MMOL/L (ref 3.4–5.3)
SPECIMEN EXP DATE BLD: NORMAL

## 2021-03-25 PROCEDURE — 20931 SP BONE ALGRFT STRUCT ADD-ON: CPT | Performed by: NEUROLOGICAL SURGERY

## 2021-03-25 PROCEDURE — 99207 PR CDG-CODE CATEGORY CHANGED: CPT | Performed by: PHYSICIAN ASSISTANT

## 2021-03-25 PROCEDURE — 86901 BLOOD TYPING SEROLOGIC RH(D): CPT | Performed by: ANESTHESIOLOGY

## 2021-03-25 PROCEDURE — P9041 ALBUMIN (HUMAN),5%, 50ML: HCPCS | Performed by: NURSE ANESTHETIST, CERTIFIED REGISTERED

## 2021-03-25 PROCEDURE — 258N000003 HC RX IP 258 OP 636: Performed by: NEUROLOGICAL SURGERY

## 2021-03-25 PROCEDURE — 250N000011 HC RX IP 250 OP 636: Performed by: NURSE PRACTITIONER

## 2021-03-25 PROCEDURE — 360N000085 HC SURGERY LEVEL 5 W/ FLUORO, PER MIN: Performed by: NEUROLOGICAL SURGERY

## 2021-03-25 PROCEDURE — 22842 INSERT SPINE FIXATION DEVICE: CPT | Performed by: NEUROLOGICAL SURGERY

## 2021-03-25 PROCEDURE — 922N000001 HC NEURO MONITORING SERVICE, UP TO 7 HOURS (T1FEE): Performed by: NEUROLOGICAL SURGERY

## 2021-03-25 PROCEDURE — 86891 AUTOLOGOUS BLOOD OP SALVAGE: CPT | Performed by: NEUROLOGICAL SURGERY

## 2021-03-25 PROCEDURE — 22600 ARTHRD PST TQ 1NTRSPC CRV: CPT | Performed by: NEUROLOGICAL SURGERY

## 2021-03-25 PROCEDURE — 272N000002 HC OR SUPPLY OTHER OPNP: Performed by: NEUROLOGICAL SURGERY

## 2021-03-25 PROCEDURE — 99222 1ST HOSP IP/OBS MODERATE 55: CPT | Performed by: PHYSICIAN ASSISTANT

## 2021-03-25 PROCEDURE — 250N000006 HC OR RX SURGIFLO W/THROMBIN KIT 2ML 1991 OPNP: Performed by: NEUROLOGICAL SURGERY

## 2021-03-25 PROCEDURE — 36415 COLL VENOUS BLD VENIPUNCTURE: CPT | Performed by: ANESTHESIOLOGY

## 2021-03-25 PROCEDURE — 250N000011 HC RX IP 250 OP 636: Performed by: NURSE ANESTHETIST, CERTIFIED REGISTERED

## 2021-03-25 PROCEDURE — 250N000013 HC RX MED GY IP 250 OP 250 PS 637: Performed by: NURSE PRACTITIONER

## 2021-03-25 PROCEDURE — 258N000003 HC RX IP 258 OP 636: Performed by: NURSE ANESTHETIST, CERTIFIED REGISTERED

## 2021-03-25 PROCEDURE — 999N000179 XR SURGERY CARM FLUORO LESS THAN 5 MIN W STILLS

## 2021-03-25 PROCEDURE — 0RG0071 FUSION OF OCCIPITAL-CERVICAL JOINT WITH AUTOLOGOUS TISSUE SUBSTITUTE, POSTERIOR APPROACH, POSTERIOR COLUMN, OPEN APPROACH: ICD-10-PCS | Performed by: NEUROLOGICAL SURGERY

## 2021-03-25 PROCEDURE — 250N000011 HC RX IP 250 OP 636: Performed by: SURGERY

## 2021-03-25 PROCEDURE — 0QB20ZZ EXCISION OF RIGHT PELVIC BONE, OPEN APPROACH: ICD-10-PCS | Performed by: NEUROLOGICAL SURGERY

## 2021-03-25 PROCEDURE — 15769 GRFG AUTOL SOFT TISS DIR EXC: CPT | Mod: XS | Performed by: NEUROLOGICAL SURGERY

## 2021-03-25 PROCEDURE — 258N000003 HC RX IP 258 OP 636: Performed by: ANESTHESIOLOGY

## 2021-03-25 PROCEDURE — 250N000009 HC RX 250: Performed by: NURSE ANESTHETIST, CERTIFIED REGISTERED

## 2021-03-25 PROCEDURE — 22614 ARTHRD PST TQ 1NTRSPC EA ADD: CPT | Performed by: NEUROLOGICAL SURGERY

## 2021-03-25 PROCEDURE — 63001 REMOVE SPINE LAMINA 1/2 CRVL: CPT | Performed by: NEUROLOGICAL SURGERY

## 2021-03-25 PROCEDURE — 82565 ASSAY OF CREATININE: CPT | Performed by: ANESTHESIOLOGY

## 2021-03-25 PROCEDURE — 00NW0ZZ RELEASE CERVICAL SPINAL CORD, OPEN APPROACH: ICD-10-PCS | Performed by: NEUROLOGICAL SURGERY

## 2021-03-25 PROCEDURE — 258N000003 HC RX IP 258 OP 636: Performed by: NURSE PRACTITIONER

## 2021-03-25 PROCEDURE — 86850 RBC ANTIBODY SCREEN: CPT | Performed by: ANESTHESIOLOGY

## 2021-03-25 PROCEDURE — 250N000011 HC RX IP 250 OP 636: Performed by: NEUROLOGICAL SURGERY

## 2021-03-25 PROCEDURE — C1713 ANCHOR/SCREW BN/BN,TIS/BN: HCPCS | Performed by: NEUROLOGICAL SURGERY

## 2021-03-25 PROCEDURE — 0RG2071 FUSION OF 2 OR MORE CERVICAL VERTEBRAL JOINTS WITH AUTOLOGOUS TISSUE SUBSTITUTE, POSTERIOR APPROACH, POSTERIOR COLUMN, OPEN APPROACH: ICD-10-PCS | Performed by: NEUROLOGICAL SURGERY

## 2021-03-25 PROCEDURE — 710N000009 HC RECOVERY PHASE 1, LEVEL 1, PER MIN: Performed by: NEUROLOGICAL SURGERY

## 2021-03-25 PROCEDURE — 272N000528 HC OR RESEVOIR 3L 150 MICRON FILTER CELLSAVER HARDSHELL 00205-00: Performed by: NEUROLOGICAL SURGERY

## 2021-03-25 PROCEDURE — 120N000001 HC R&B MED SURG/OB

## 2021-03-25 PROCEDURE — 272N000001 HC OR GENERAL SUPPLY STERILE: Performed by: NEUROLOGICAL SURGERY

## 2021-03-25 PROCEDURE — 370N000017 HC ANESTHESIA TECHNICAL FEE, PER MIN: Performed by: NEUROLOGICAL SURGERY

## 2021-03-25 PROCEDURE — 999N000141 HC STATISTIC PRE-PROCEDURE NURSING ASSESSMENT: Performed by: NEUROLOGICAL SURGERY

## 2021-03-25 PROCEDURE — 86900 BLOOD TYPING SEROLOGIC ABO: CPT | Performed by: ANESTHESIOLOGY

## 2021-03-25 PROCEDURE — 22590 ARTHRD PST TQ CRANIOCERVICAL: CPT | Performed by: NEUROLOGICAL SURGERY

## 2021-03-25 PROCEDURE — 272N000282 HC OR IOM SUPPLIES OPNP: Performed by: NEUROLOGICAL SURGERY

## 2021-03-25 PROCEDURE — 250N000025 HC SEVOFLURANE, PER MIN: Performed by: NEUROLOGICAL SURGERY

## 2021-03-25 PROCEDURE — 4A10X4Z MONITORING OF CENTRAL NERVOUS ELECTRICAL ACTIVITY, EXTERNAL APPROACH: ICD-10-PCS | Performed by: NEUROLOGICAL SURGERY

## 2021-03-25 PROCEDURE — 250N000009 HC RX 250: Performed by: NEUROLOGICAL SURGERY

## 2021-03-25 PROCEDURE — 272N000520 HC OR ASSEMBLY BASIC A&A LINE 00208-00: Performed by: NEUROLOGICAL SURGERY

## 2021-03-25 PROCEDURE — 250N000013 HC RX MED GY IP 250 OP 250 PS 637: Performed by: NEUROLOGICAL SURGERY

## 2021-03-25 PROCEDURE — 84132 ASSAY OF SERUM POTASSIUM: CPT | Performed by: ANESTHESIOLOGY

## 2021-03-25 DEVICE — IMPLANTABLE DEVICE: Type: IMPLANTABLE DEVICE | Site: SPINE CERVICAL | Status: FUNCTIONAL

## 2021-03-25 RX ORDER — LIDOCAINE HYDROCHLORIDE 20 MG/ML
INJECTION, SOLUTION INFILTRATION; PERINEURAL PRN
Status: DISCONTINUED | OUTPATIENT
Start: 2021-03-25 | End: 2021-03-25

## 2021-03-25 RX ORDER — KETAMINE HYDROCHLORIDE 10 MG/ML
INJECTION INTRAMUSCULAR; INTRAVENOUS PRN
Status: DISCONTINUED | OUTPATIENT
Start: 2021-03-25 | End: 2021-03-25

## 2021-03-25 RX ORDER — ONDANSETRON 2 MG/ML
4 INJECTION INTRAMUSCULAR; INTRAVENOUS EVERY 6 HOURS PRN
Status: DISCONTINUED | OUTPATIENT
Start: 2021-03-25 | End: 2021-03-27 | Stop reason: HOSPADM

## 2021-03-25 RX ORDER — ACETAMINOPHEN 325 MG/1
975 TABLET ORAL ONCE
Status: COMPLETED | OUTPATIENT
Start: 2021-03-25 | End: 2021-03-25

## 2021-03-25 RX ORDER — NALOXONE HYDROCHLORIDE 0.4 MG/ML
0.2 INJECTION, SOLUTION INTRAMUSCULAR; INTRAVENOUS; SUBCUTANEOUS
Status: DISCONTINUED | OUTPATIENT
Start: 2021-03-25 | End: 2021-03-27 | Stop reason: HOSPADM

## 2021-03-25 RX ORDER — CALCIUM CARBONATE 500 MG/1
500 TABLET, CHEWABLE ORAL 4 TIMES DAILY PRN
Status: DISCONTINUED | OUTPATIENT
Start: 2021-03-25 | End: 2021-03-27 | Stop reason: HOSPADM

## 2021-03-25 RX ORDER — NALOXONE HYDROCHLORIDE 0.4 MG/ML
0.4 INJECTION, SOLUTION INTRAMUSCULAR; INTRAVENOUS; SUBCUTANEOUS
Status: DISCONTINUED | OUTPATIENT
Start: 2021-03-25 | End: 2021-03-27 | Stop reason: HOSPADM

## 2021-03-25 RX ORDER — ACETAMINOPHEN 325 MG/1
650 TABLET ORAL EVERY 4 HOURS PRN
Status: DISCONTINUED | OUTPATIENT
Start: 2021-03-28 | End: 2021-03-27 | Stop reason: HOSPADM

## 2021-03-25 RX ORDER — SODIUM CHLORIDE, SODIUM LACTATE, POTASSIUM CHLORIDE, CALCIUM CHLORIDE 600; 310; 30; 20 MG/100ML; MG/100ML; MG/100ML; MG/100ML
INJECTION, SOLUTION INTRAVENOUS CONTINUOUS
Status: DISCONTINUED | OUTPATIENT
Start: 2021-03-25 | End: 2021-03-25

## 2021-03-25 RX ORDER — HYDROMORPHONE HCL IN WATER/PF 6 MG/30 ML
0.2 PATIENT CONTROLLED ANALGESIA SYRINGE INTRAVENOUS
Status: DISCONTINUED | OUTPATIENT
Start: 2021-03-25 | End: 2021-03-27 | Stop reason: HOSPADM

## 2021-03-25 RX ORDER — AMOXICILLIN 250 MG
1 CAPSULE ORAL 2 TIMES DAILY
Status: DISCONTINUED | OUTPATIENT
Start: 2021-03-25 | End: 2021-03-27 | Stop reason: HOSPADM

## 2021-03-25 RX ORDER — FINASTERIDE 5 MG/1
5 TABLET, FILM COATED ORAL AT BEDTIME
Status: DISCONTINUED | OUTPATIENT
Start: 2021-03-25 | End: 2021-03-27 | Stop reason: HOSPADM

## 2021-03-25 RX ORDER — CEFAZOLIN SODIUM 2 G/100ML
2 INJECTION, SOLUTION INTRAVENOUS
Status: COMPLETED | OUTPATIENT
Start: 2021-03-25 | End: 2021-03-25

## 2021-03-25 RX ORDER — ONDANSETRON 4 MG/1
4 TABLET, ORALLY DISINTEGRATING ORAL EVERY 30 MIN PRN
Status: DISCONTINUED | OUTPATIENT
Start: 2021-03-25 | End: 2021-03-25

## 2021-03-25 RX ORDER — PROPOFOL 10 MG/ML
INJECTION, EMULSION INTRAVENOUS CONTINUOUS PRN
Status: DISCONTINUED | OUTPATIENT
Start: 2021-03-25 | End: 2021-03-25

## 2021-03-25 RX ORDER — PROPOFOL 10 MG/ML
INJECTION, EMULSION INTRAVENOUS PRN
Status: DISCONTINUED | OUTPATIENT
Start: 2021-03-25 | End: 2021-03-25

## 2021-03-25 RX ORDER — HYDROXYZINE HYDROCHLORIDE 25 MG/1
25 TABLET, FILM COATED ORAL EVERY 6 HOURS PRN
Status: DISCONTINUED | OUTPATIENT
Start: 2021-03-25 | End: 2021-03-27 | Stop reason: HOSPADM

## 2021-03-25 RX ORDER — ACETAMINOPHEN 325 MG/1
975 TABLET ORAL EVERY 8 HOURS
Status: DISCONTINUED | OUTPATIENT
Start: 2021-03-25 | End: 2021-03-27 | Stop reason: HOSPADM

## 2021-03-25 RX ORDER — FENTANYL CITRATE 50 UG/ML
25-50 INJECTION, SOLUTION INTRAMUSCULAR; INTRAVENOUS
Status: DISCONTINUED | OUTPATIENT
Start: 2021-03-25 | End: 2021-03-25

## 2021-03-25 RX ORDER — SODIUM CHLORIDE, SODIUM LACTATE, POTASSIUM CHLORIDE, CALCIUM CHLORIDE 600; 310; 30; 20 MG/100ML; MG/100ML; MG/100ML; MG/100ML
INJECTION, SOLUTION INTRAVENOUS CONTINUOUS PRN
Status: DISCONTINUED | OUTPATIENT
Start: 2021-03-25 | End: 2021-03-25

## 2021-03-25 RX ORDER — ALBUMIN, HUMAN INJ 5% 5 %
SOLUTION INTRAVENOUS CONTINUOUS PRN
Status: DISCONTINUED | OUTPATIENT
Start: 2021-03-25 | End: 2021-03-25

## 2021-03-25 RX ORDER — PROCHLORPERAZINE MALEATE 10 MG
10 TABLET ORAL EVERY 6 HOURS PRN
Status: DISCONTINUED | OUTPATIENT
Start: 2021-03-25 | End: 2021-03-27 | Stop reason: HOSPADM

## 2021-03-25 RX ORDER — LIDOCAINE 40 MG/G
CREAM TOPICAL
Status: DISCONTINUED | OUTPATIENT
Start: 2021-03-25 | End: 2021-03-25 | Stop reason: HOSPADM

## 2021-03-25 RX ORDER — ONDANSETRON 2 MG/ML
4 INJECTION INTRAMUSCULAR; INTRAVENOUS EVERY 30 MIN PRN
Status: DISCONTINUED | OUTPATIENT
Start: 2021-03-25 | End: 2021-03-25

## 2021-03-25 RX ORDER — ONDANSETRON 4 MG/1
4 TABLET, ORALLY DISINTEGRATING ORAL EVERY 6 HOURS PRN
Status: DISCONTINUED | OUTPATIENT
Start: 2021-03-25 | End: 2021-03-27 | Stop reason: HOSPADM

## 2021-03-25 RX ORDER — BACITRACIN ZINC 500 [USP'U]/G
OINTMENT TOPICAL PRN
Status: DISCONTINUED | OUTPATIENT
Start: 2021-03-25 | End: 2021-03-25 | Stop reason: HOSPADM

## 2021-03-25 RX ORDER — HYDROMORPHONE HYDROCHLORIDE 1 MG/ML
0.4 INJECTION, SOLUTION INTRAMUSCULAR; INTRAVENOUS; SUBCUTANEOUS
Status: DISCONTINUED | OUTPATIENT
Start: 2021-03-25 | End: 2021-03-27 | Stop reason: HOSPADM

## 2021-03-25 RX ORDER — DEXAMETHASONE SODIUM PHOSPHATE 4 MG/ML
INJECTION, SOLUTION INTRA-ARTICULAR; INTRALESIONAL; INTRAMUSCULAR; INTRAVENOUS; SOFT TISSUE PRN
Status: DISCONTINUED | OUTPATIENT
Start: 2021-03-25 | End: 2021-03-25

## 2021-03-25 RX ORDER — LIDOCAINE 40 MG/G
CREAM TOPICAL
Status: DISCONTINUED | OUTPATIENT
Start: 2021-03-25 | End: 2021-03-27 | Stop reason: HOSPADM

## 2021-03-25 RX ORDER — BISACODYL 10 MG
10 SUPPOSITORY, RECTAL RECTAL DAILY PRN
Status: DISCONTINUED | OUTPATIENT
Start: 2021-03-25 | End: 2021-03-27 | Stop reason: HOSPADM

## 2021-03-25 RX ORDER — OXYCODONE HYDROCHLORIDE 5 MG/1
5 TABLET ORAL EVERY 4 HOURS PRN
Status: DISCONTINUED | OUTPATIENT
Start: 2021-03-25 | End: 2021-03-27

## 2021-03-25 RX ORDER — CYCLOBENZAPRINE HCL 10 MG
10 TABLET ORAL 3 TIMES DAILY PRN
Status: DISCONTINUED | OUTPATIENT
Start: 2021-03-25 | End: 2021-03-26

## 2021-03-25 RX ORDER — CEFAZOLIN SODIUM 2 G/100ML
2 INJECTION, SOLUTION INTRAVENOUS SEE ADMIN INSTRUCTIONS
Status: DISCONTINUED | OUTPATIENT
Start: 2021-03-25 | End: 2021-03-25 | Stop reason: HOSPADM

## 2021-03-25 RX ORDER — DOCUSATE SODIUM 100 MG/1
100 CAPSULE, LIQUID FILLED ORAL 2 TIMES DAILY
Status: DISCONTINUED | OUTPATIENT
Start: 2021-03-25 | End: 2021-03-27 | Stop reason: HOSPADM

## 2021-03-25 RX ORDER — ACETAMINOPHEN 325 MG/1
975 TABLET ORAL ONCE
Status: DISCONTINUED | OUTPATIENT
Start: 2021-03-25 | End: 2021-03-25 | Stop reason: HOSPADM

## 2021-03-25 RX ORDER — LISINOPRIL 5 MG/1
5 TABLET ORAL DAILY
Status: DISCONTINUED | OUTPATIENT
Start: 2021-03-25 | End: 2021-03-25

## 2021-03-25 RX ORDER — DOCUSATE SODIUM 100 MG/1
100 CAPSULE, LIQUID FILLED ORAL DAILY PRN
Status: DISCONTINUED | OUTPATIENT
Start: 2021-03-25 | End: 2021-03-27 | Stop reason: HOSPADM

## 2021-03-25 RX ORDER — OXYCODONE HYDROCHLORIDE 5 MG/1
10 TABLET ORAL EVERY 4 HOURS PRN
Status: DISCONTINUED | OUTPATIENT
Start: 2021-03-25 | End: 2021-03-27

## 2021-03-25 RX ORDER — HYDRALAZINE HYDROCHLORIDE 20 MG/ML
10 INJECTION INTRAMUSCULAR; INTRAVENOUS EVERY 4 HOURS PRN
Status: DISCONTINUED | OUTPATIENT
Start: 2021-03-25 | End: 2021-03-27 | Stop reason: HOSPADM

## 2021-03-25 RX ORDER — POLYETHYLENE GLYCOL 3350 17 G/17G
17 POWDER, FOR SOLUTION ORAL DAILY
Status: DISCONTINUED | OUTPATIENT
Start: 2021-03-26 | End: 2021-03-27 | Stop reason: HOSPADM

## 2021-03-25 RX ORDER — FENTANYL CITRATE 50 UG/ML
INJECTION, SOLUTION INTRAMUSCULAR; INTRAVENOUS PRN
Status: DISCONTINUED | OUTPATIENT
Start: 2021-03-25 | End: 2021-03-25

## 2021-03-25 RX ORDER — HYDROMORPHONE HYDROCHLORIDE 1 MG/ML
.3-.5 INJECTION, SOLUTION INTRAMUSCULAR; INTRAVENOUS; SUBCUTANEOUS EVERY 5 MIN PRN
Status: DISCONTINUED | OUTPATIENT
Start: 2021-03-25 | End: 2021-03-25

## 2021-03-25 RX ORDER — LISINOPRIL 5 MG/1
5 TABLET ORAL DAILY
Status: DISCONTINUED | OUTPATIENT
Start: 2021-03-26 | End: 2021-03-27 | Stop reason: HOSPADM

## 2021-03-25 RX ORDER — ONDANSETRON 2 MG/ML
INJECTION INTRAMUSCULAR; INTRAVENOUS PRN
Status: DISCONTINUED | OUTPATIENT
Start: 2021-03-25 | End: 2021-03-25

## 2021-03-25 RX ORDER — SODIUM CHLORIDE, SODIUM LACTATE, POTASSIUM CHLORIDE, CALCIUM CHLORIDE 600; 310; 30; 20 MG/100ML; MG/100ML; MG/100ML; MG/100ML
INJECTION, SOLUTION INTRAVENOUS CONTINUOUS
Status: DISCONTINUED | OUTPATIENT
Start: 2021-03-25 | End: 2021-03-25 | Stop reason: HOSPADM

## 2021-03-25 RX ADMIN — PHENYLEPHRINE HYDROCHLORIDE 50 MCG: 10 INJECTION INTRAVENOUS at 09:37

## 2021-03-25 RX ADMIN — PHENYLEPHRINE HYDROCHLORIDE 0.25 MCG/KG/MIN: 10 INJECTION INTRAVENOUS at 09:55

## 2021-03-25 RX ADMIN — PROPOFOL 60 MG: 10 INJECTION, EMULSION INTRAVENOUS at 09:03

## 2021-03-25 RX ADMIN — PHENYLEPHRINE HYDROCHLORIDE 100 MCG: 10 INJECTION INTRAVENOUS at 09:11

## 2021-03-25 RX ADMIN — CEFAZOLIN SODIUM 2 G: 2 INJECTION, SOLUTION INTRAVENOUS at 09:26

## 2021-03-25 RX ADMIN — PHENYLEPHRINE HYDROCHLORIDE 100 MCG: 10 INJECTION INTRAVENOUS at 08:54

## 2021-03-25 RX ADMIN — LIDOCAINE HYDROCHLORIDE 50 MG: 20 INJECTION, SOLUTION INFILTRATION; PERINEURAL at 08:42

## 2021-03-25 RX ADMIN — DEXAMETHASONE SODIUM PHOSPHATE 4 MG: 4 INJECTION, SOLUTION INTRA-ARTICULAR; INTRALESIONAL; INTRAMUSCULAR; INTRAVENOUS; SOFT TISSUE at 11:41

## 2021-03-25 RX ADMIN — HYDROMORPHONE HYDROCHLORIDE 0.5 MG: 1 INJECTION, SOLUTION INTRAMUSCULAR; INTRAVENOUS; SUBCUTANEOUS at 12:41

## 2021-03-25 RX ADMIN — SODIUM CHLORIDE, POTASSIUM CHLORIDE, SODIUM LACTATE AND CALCIUM CHLORIDE: 600; 310; 30; 20 INJECTION, SOLUTION INTRAVENOUS at 11:34

## 2021-03-25 RX ADMIN — PHENYLEPHRINE HYDROCHLORIDE 100 MCG: 10 INJECTION INTRAVENOUS at 11:52

## 2021-03-25 RX ADMIN — HYDROMORPHONE HYDROCHLORIDE 0.5 MG: 1 INJECTION, SOLUTION INTRAMUSCULAR; INTRAVENOUS; SUBCUTANEOUS at 13:08

## 2021-03-25 RX ADMIN — DOCUSATE SODIUM 100 MG: 100 CAPSULE, LIQUID FILLED ORAL at 20:33

## 2021-03-25 RX ADMIN — PHENYLEPHRINE HYDROCHLORIDE 100 MCG: 10 INJECTION INTRAVENOUS at 10:21

## 2021-03-25 RX ADMIN — PHENYLEPHRINE HYDROCHLORIDE 150 MCG: 10 INJECTION INTRAVENOUS at 09:02

## 2021-03-25 RX ADMIN — ONDANSETRON 4 MG: 2 INJECTION INTRAMUSCULAR; INTRAVENOUS at 11:41

## 2021-03-25 RX ADMIN — PHENYLEPHRINE HYDROCHLORIDE 100 MCG: 10 INJECTION INTRAVENOUS at 10:31

## 2021-03-25 RX ADMIN — ALBUMIN HUMAN: 0.05 INJECTION, SOLUTION INTRAVENOUS at 10:17

## 2021-03-25 RX ADMIN — HYDROMORPHONE HYDROCHLORIDE 0.4 MG: 1 INJECTION, SOLUTION INTRAMUSCULAR; INTRAVENOUS; SUBCUTANEOUS at 15:39

## 2021-03-25 RX ADMIN — SUCCINYLCHOLINE CHLORIDE 100 MG: 20 INJECTION, SOLUTION INTRAMUSCULAR; INTRAVENOUS; PARENTERAL at 08:42

## 2021-03-25 RX ADMIN — PROPOFOL 100 MG: 10 INJECTION, EMULSION INTRAVENOUS at 08:42

## 2021-03-25 RX ADMIN — PHENYLEPHRINE HYDROCHLORIDE 100 MCG: 10 INJECTION INTRAVENOUS at 09:53

## 2021-03-25 RX ADMIN — OXYCODONE HYDROCHLORIDE 10 MG: 5 TABLET ORAL at 20:32

## 2021-03-25 RX ADMIN — PHENYLEPHRINE HYDROCHLORIDE 100 MCG: 10 INJECTION INTRAVENOUS at 11:44

## 2021-03-25 RX ADMIN — FENTANYL CITRATE 25 MCG: 0.05 INJECTION, SOLUTION INTRAMUSCULAR; INTRAVENOUS at 13:08

## 2021-03-25 RX ADMIN — HYDROMORPHONE HYDROCHLORIDE 0.5 MG: 1 INJECTION, SOLUTION INTRAMUSCULAR; INTRAVENOUS; SUBCUTANEOUS at 09:26

## 2021-03-25 RX ADMIN — SODIUM CHLORIDE, POTASSIUM CHLORIDE, SODIUM LACTATE AND CALCIUM CHLORIDE: 600; 310; 30; 20 INJECTION, SOLUTION INTRAVENOUS at 09:10

## 2021-03-25 RX ADMIN — PHENYLEPHRINE HYDROCHLORIDE 100 MCG: 10 INJECTION INTRAVENOUS at 11:43

## 2021-03-25 RX ADMIN — PHENYLEPHRINE HYDROCHLORIDE 100 MCG: 10 INJECTION INTRAVENOUS at 10:07

## 2021-03-25 RX ADMIN — FINASTERIDE 5 MG: 5 TABLET, FILM COATED ORAL at 21:45

## 2021-03-25 RX ADMIN — FENTANYL CITRATE 50 MCG: 50 INJECTION, SOLUTION INTRAMUSCULAR; INTRAVENOUS at 08:42

## 2021-03-25 RX ADMIN — ACETAMINOPHEN 975 MG: 325 TABLET, FILM COATED ORAL at 06:10

## 2021-03-25 RX ADMIN — Medication 20 MG: at 09:27

## 2021-03-25 RX ADMIN — FENTANYL CITRATE 50 MCG: 50 INJECTION, SOLUTION INTRAMUSCULAR; INTRAVENOUS at 09:03

## 2021-03-25 RX ADMIN — SODIUM CHLORIDE, POTASSIUM CHLORIDE, SODIUM LACTATE AND CALCIUM CHLORIDE: 600; 310; 30; 20 INJECTION, SOLUTION INTRAVENOUS at 08:32

## 2021-03-25 RX ADMIN — Medication 10 MG: at 11:25

## 2021-03-25 RX ADMIN — PHENYLEPHRINE HYDROCHLORIDE 100 MCG: 10 INJECTION INTRAVENOUS at 09:15

## 2021-03-25 RX ADMIN — ALBUMIN HUMAN: 0.05 INJECTION, SOLUTION INTRAVENOUS at 10:35

## 2021-03-25 RX ADMIN — REMIFENTANIL HYDROCHLORIDE 0.1 MCG/KG/MIN: 1 INJECTION, POWDER, LYOPHILIZED, FOR SOLUTION INTRAVENOUS at 09:23

## 2021-03-25 RX ADMIN — PHENYLEPHRINE HYDROCHLORIDE 50 MCG: 10 INJECTION INTRAVENOUS at 09:36

## 2021-03-25 RX ADMIN — MIDAZOLAM 2 MG: 1 INJECTION INTRAMUSCULAR; INTRAVENOUS at 08:31

## 2021-03-25 RX ADMIN — OXYCODONE HYDROCHLORIDE 10 MG: 5 TABLET ORAL at 16:25

## 2021-03-25 RX ADMIN — PROPOFOL 125 MCG/KG/MIN: 10 INJECTION, EMULSION INTRAVENOUS at 09:16

## 2021-03-25 RX ADMIN — SODIUM CHLORIDE, POTASSIUM CHLORIDE, SODIUM LACTATE AND CALCIUM CHLORIDE: 600; 310; 30; 20 INJECTION, SOLUTION INTRAVENOUS at 15:12

## 2021-03-25 RX ADMIN — HYDROMORPHONE HYDROCHLORIDE 0.4 MG: 1 INJECTION, SOLUTION INTRAMUSCULAR; INTRAVENOUS; SUBCUTANEOUS at 23:59

## 2021-03-25 RX ADMIN — HYDROMORPHONE HYDROCHLORIDE 0.4 MG: 1 INJECTION, SOLUTION INTRAMUSCULAR; INTRAVENOUS; SUBCUTANEOUS at 21:45

## 2021-03-25 RX ADMIN — PHENYLEPHRINE HYDROCHLORIDE 100 MCG: 10 INJECTION INTRAVENOUS at 12:13

## 2021-03-25 RX ADMIN — PHENYLEPHRINE HYDROCHLORIDE 100 MCG: 10 INJECTION INTRAVENOUS at 10:02

## 2021-03-25 RX ADMIN — DEXMEDETOMIDINE HYDROCHLORIDE 0.4 MCG/KG/HR: 100 INJECTION, SOLUTION INTRAVENOUS at 09:16

## 2021-03-25 RX ADMIN — SENNOSIDES AND DOCUSATE SODIUM 1 TABLET: 8.6; 5 TABLET ORAL at 20:33

## 2021-03-25 RX ADMIN — ACETAMINOPHEN 975 MG: 325 TABLET, FILM COATED ORAL at 16:25

## 2021-03-25 RX ADMIN — HYDROMORPHONE HYDROCHLORIDE 0.4 MG: 1 INJECTION, SOLUTION INTRAMUSCULAR; INTRAVENOUS; SUBCUTANEOUS at 18:39

## 2021-03-25 ASSESSMENT — ACTIVITIES OF DAILY LIVING (ADL)
ADLS_ACUITY_SCORE: 10
ADLS_ACUITY_SCORE: 10

## 2021-03-25 ASSESSMENT — MIFFLIN-ST. JEOR: SCORE: 1417.82

## 2021-03-25 ASSESSMENT — ENCOUNTER SYMPTOMS: DYSRHYTHMIAS: 0

## 2021-03-25 NOTE — ANESTHESIA POSTPROCEDURE EVALUATION
Patient: Oseas Edwards    Procedure(s):  Occiput-Cervical 4 posterior instrumentation and fusion and C1 and C2 laminectomy  RIGHT ILLIAC CREST BONE GRAFT HARVEST    Diagnosis:Ankylosing spondylitis of cervical region (H) [M45.2]  Stenosis of cervical spine with myelopathy (H) [M48.02, G99.2]  Spinal instability of cervical region [M53.2X2]  Diagnosis Additional Information: No value filed.    Anesthesia Type:  General    Note:  Disposition: Inpatient   Postop Pain Control: Uneventful            Sign Out: Well controlled pain   PONV:    Neuro/Psych: Uneventful            Sign Out: Acceptable/Baseline neuro status   Airway/Respiratory: Uneventful            Sign Out: Acceptable/Baseline resp. status   CV/Hemodynamics: Uneventful            Sign Out: Acceptable CV status   Other NRE: NONE   DID A NON-ROUTINE EVENT OCCUR? No         Last vitals:  Vitals:    03/25/21 1420 03/25/21 1430 03/25/21 1453   BP: 106/81 108/77    Pulse: 79 84    Resp: 14 21 12   Temp:   36.6  C (97.9  F)   SpO2: 99% 99% 99%       Last vitals prior to Anesthesia Care Transfer:  CRNA VITALS  3/25/2021 1205 - 3/25/2021 1305      3/25/2021             ART BP:  190/58    ART Mean:  108    Resp Rate (set):  10          Electronically Signed By: Stanton Jacome MD  March 25, 2021  3:09 PM

## 2021-03-25 NOTE — ANESTHESIA PROCEDURE NOTES
Airway       Patient location during procedure: OR  Staff -        Anesthesiologist:  Stanton Jacome MD       CRNA: Lori Cuevas       Performed By: CRNA  Consent for Airway        Urgency: elective  Indications and Patient Condition       Indications for airway management: tk-procedural       Induction type:intravenous       Mask difficulty assessment: 2 - vent by mask + OA or adjuvant +/- NMBA    Final Airway Details       Final airway type: endotracheal airway       Successful airway: ETT - single and Oral  Endotracheal Airway Details        ETT size (mm): 7.5       Cuffed: yes       Successful intubation technique: direct laryngoscopy and video laryngoscopy       VL Blade Size: Glidescope 3       Grade View of Cords: 1       Adjucts: stylet       Position: Right       Measured from: gums/teeth       Secured at (cm): 20       Bite block used: Soft    Post intubation assessment        Placement verified by: capnometry, equal breath sounds and chest rise        Number of attempts at approach: 1       Secured with: pink tape       Ease of procedure: easy       Dentition: Unchanged and Intact    Medication(s) Administered   Medication Administration Time: 3/25/2021 8:45 AM

## 2021-03-25 NOTE — OP NOTE
Procedure Date: 03/25/2021      PREOPERATIVE DIAGNOSES:     1.  C1 to C2 instability with myelopathy.   2.  Ankylosing spondylitis.      POSTOPERATIVE DIAGNOSIS:     1.  C1 to C2 instability with myelopathy.   2.  Ankylosing spondylitis.      PROCEDURES:   1.  Occiput to C4 posterior segmental instrumentation.   2.  C1 to C2 laminectomy.   3.  Occiput to C4 arthrodesis using local autograft, as well as right iliac crest bone graft harvest for cancellous and structural autograft.      SURGEON:  Yobany Craig MD      ANESTHESIA:  General endotracheal anesthesia.      ESTIMATED BLOOD LOSS:  175 mL      INDICATIONS:  The patient is a 50-year-old male with ankylosing spondylitis and progressive neurologic deficits from instability and myelopathy at C1 to C2.  The rest of his spine is fused from ankylosing spondylitis.  He was brought for an occipital to C4 fusion.      DESCRIPTION OF PROCEDURE:  The patient was brought to the operating room, and general endotracheal anesthesia was induced.  The patient was placed in the [facility] tongs.  Neuromonitoring was begun.  The patient was then rolled into the prone position on the 4-poster table with a C-flex head campbell.  The back of the neck and right iliac crest area were prepped and draped in sterile fashion.  Midline exposure was performed from the occiput down to approximately C7, and the cervical spine was exposed.  Once this was done, the navigation reference frame was placed.  The O-arm was sterilely draped and brought into the field, and 3-dimensional imaging was obtained for neuronavigation.  Using navigation guidance, bilateral screws were placed from C2 to C4, using the [employer] Synapse system.  Once these were in place, they were confirmed to be in good position with another 3-dimensional imaging.  Once this was done, then the occipital plate was secured using 4 screws.  Then, prior to this, the lamina of C1 and C2 were resected, decompressing the spinal  canal.  This bone was used for local autograft after being run through the bone mill.  Once this was done, then rods were seated bilaterally, good position confirmed with x-ray, and all screws final tightened.  Once this was done, then the right iliac crest area was opened over the posterior superior iliac spine and structural and cancellous autograft harvested.  The structural autograft was secured into place using a 0-Vicryl suture, and the remainder of the autograft was placed over the posterior elements from the occiput down to C4.  Once this was done and hemostasis was achieved, the fascia was closed with 0 interrupted Vicryl; 2-0 inverted interrupted Vicryl was used for subcutaneous layer; 3-0 running nylon was used for the cervical incision; and a 4-0 subcuticular Monocryl used for the right iliac crest incision.  Exofin was placed as dressing.  The patient was then rolled back in supine position, awakened, extubated and taken to the recovery room in good condition.         APARNA MATIAS MD             D: 2021   T: 2021   MT:       Name:     JUANPABLO RODRIGUEZ   MRN:      [id number]        Account:        [id number]   :      1970           Procedure Date: 2021      Document: [id number]

## 2021-03-25 NOTE — OR NURSING
A-line discontinured.  Pressure held x 10 minutes.  Light pressure dressing applied.  Dr. Stanton Jacome to bedside.  Approved for transfer to .

## 2021-03-25 NOTE — CONSULTS
[facility]  Consult Note - Hospitalist Service     Date of Admission:  3/25/2021  Consult Requested by: Teresa Garnett CNP  Reason for Consult: Post-op medical management of HTN, BPH, GERD   PRIMARY CARE PROVIDER:    Willy Forrest    Assessment & Plan   Oseas Edwards is a 50 year old male admitted on 3/25/2021.    Past medical history significant for Ankylosing spondylitis of the cervical spine (C1-2) with stenosis and instability with myelopathy, Chronic pain on chronic opioids, HTN, BPH, Pernicious anemia, Low testosterone level, GERD, History of gastric ulceration who underwent an elective occiput-cervical 4 posterior instrumentation and fusion and C1-C2 laminectomy.  Hospitalist were consulted to assist with post-op medical management of HTN, BPH, GERD.    Ankylosing spondylitis of the cervical spine (C1-2) with stenosis and instability with myelopathy s/p elective occiput-cervical 4 posterior instrumentation and fusion and C1-C2 laminectomy  POD# 0.   - Neurosurgery Service is managing.   --Defer analgesic management, DVT prophylaxis, PT/OT.     --Belcher catheter management per Neurosurgery.    - HGB check in the morning.    - Encourage utilization of incentive spirometer.   - Hold PTA Enbrel subcutaneous injection weekly (Saturday), resume at discharge.      Chronic pain on chronic opioids  PTA pain regiment includes oxycodone 10 mg every 4 hours PRN, indomethacin 50 mg at bedtime, Voltaren gel daily PRN, Flexeril 10 mg BID and 5 mg PRN daily.  - Analgesic management deferred to Neurosurgery.      HTN  - Resume PTA Zestoretic 10-12.5 mg and lisinopril 5 mg/d.  Hold parameters in place (SBP < 115 and/or creatinine > 1.2).    - Check BMP in the morning.   - PRN IV hydralazine for SBP > 180.       BPH  - Resume PTA finasteride 5 mg at bedtime.      Pernicious anemia  - Hold PTA monthly cyanocobalamin subcutaneous injection.  Resume at discharge.      Low testosterone level  -  Hold PTA testosterone gel every 3 days.  Resume at discharge.      GERD  History of gastric ulceration  - Resume PTA omeprazole 20 mg/d PRN.      Diet: Advance Diet as Tolerated: Regular Diet Adult   DVT Prophylaxis: Defer to primary service (PCD'S)   Belcher Catheter: in place, indication: (P) Other (Comment)(d/c POD  # 1)  Code Status: Full Code confirmed with patient.     Disposition Plan    Expected discharge: Per Neurosurgery.    Entered: Giovanni Rivas PA-C 03/25/2021, 3:09 PM        The patient's care was discussed with the Bedside Nurse and Patient.    The patient has been discussed with Dr. Epps, who agrees with the assessment and plan at this time. Dr. Epps will evaluate the patient independently.     At this time, I'd like to thank Teresa Garnett CNP for consulting the Hospitalist service.  We will continue to follow.        Giovanni Rivas PA-C  [facility]    ______________________________________________________________________    Chief Complaint   Elective occiput-cervical 4 posterior instrumentation and fusion and C1-C2 laminectomy.    History is obtained from the patient and EMR.      History of Present Illness   Oseas Edwards is a 50 year old male with a past medical history significant for Ankylosing spondylitis of the cervical spine (C1-2) with stenosis and instability with myelopathy, Chronic pain on chronic opioids, HTN, BPH, Pernicious anemia, Low testosterone level, GERD, History of gastric ulceration who underwent an elective occiput-cervical 4 posterior instrumentation and fusion and C1-C2 laminectomy.  Hospitalist were consulted to assist with post-op medical management of HTN, BPH, GERD.    Surgery was performed under general anesthesia.  EBL was documented at 175 ml.     Patient was seen in his hospital room. He had just arrived from the PACU and was getting settled.  Patient had minimal complaints.  He said he has occasional issues with  constipation.  He has chronic neck pain and low back pain.  His neck pain does tend to radiate into his shoulder and with bilateral hand numbness.  He indicated that his hand numbness has improved since surgery today.  He also has occasional lightheadedness and feels off-balance and thinks it's due to oxycodone.      Reviewed plans with patient in regards to management of blood pressure (hold parameters) and BPH (continuation of finasteride).  Patient understood and agreed with this plan.      Review of Systems   The 10 point Review of Systems is negative other than noted in the HPI.    Past Medical History    I have reviewed this patient's medical history and updated it with pertinent information if needed.   Past Medical History:   Diagnosis Date     Ankylosing spondylitis (H)      Arthritis      AS (ankylosing spondylitis) (H) 1985     Cellulitis of leg 2011     Chronic narcotic dependence (H) 11/4/2013    Due to long term pain management.      Hypertension      Keloid scar, mostly just dark 2/7/2017     Low serum testosterone level 1/30/2012     Optic neuritis 3/20/2012     PA (pernicious anemia) 6/16/2011   Ankylosing spondylitis of the cervical spine (C1-2) with stenosis and instability with myelopathy, Chronic pain on chronic opioids, HTN, BPH, Pernicious anemia, Low testosterone level, GERD, History of gastric ulceratio    Past Surgical History   I have reviewed this patient's surgical history and updated it with pertinent information if needed.  Past Surgical History:   Procedure Laterality Date     ARTHROPLASTY REVISION HIP Left 2/14/2017    Procedure: ARTHROPLASTY REVISION HIP;  Surgeon: Saravanan Alcaraz MD;  Location:  OR     HERNIORRHAPHY VENTRAL  11/11/2013    Procedure: HERNIORRHAPHY VENTRAL;  UMBILICAL HERNIA REPAIR WITH MESH, RECTUS DIASTASIS REPAIR WITH MESH;  Surgeon: Jason Dodson MD;  Location:  [facility]     JOINT REPLACEMENT, HIP RT/LT  1994    Left     JOINT REPLACEMENT, HIP RT/LT   [address] [place] [address]    Right     OPEN SEPARATION COMPONENT HERNIORRHAPHY  2013    Procedure: OPEN SEPARATION COMPONENT HERNIORRHAPHY;;  Surgeon: Jason Dodson MD;  Location: [facility]       Social History   I have reviewed this patient's social history and updated it with pertinent information if needed.  Patient resides in a house in [place], [place] with his wife and 2 kids.  He is a former smoker who quit in .  He consumes alcohol once or twice per week.  He does not use illicit drugs.  He ambulates with the assistance of a cane.    Social History     Tobacco Use     Smoking status: Former Smoker     Quit date: 2009     Years since quittin.2     Smokeless tobacco: Never Used     Tobacco comment: quit 2 1/2 yrs   Substance Use Topics     Alcohol use: Yes     Comment: 1 to 2 per week     Drug use: No       Family History   I have reviewed this patient's family history and updated it with pertinent information if needed.   Family History   Problem Relation Age of Onset     Diabetes Mother      Coronary Artery Disease Mother    Mother: DM2 and pacemaker placement    Medications   Prior to Admission Medications   Prescriptions Last Dose Informant Patient Reported? Taking?   Calcium carb-Vitamin D 500 mg [place]-200 units (OSCAL WITH D;OYSTER SHELL CALCIUM) 500-200 MG-UNIT per tablet Past Week at AM Self Yes Yes   Sig: Take 1 tablet by mouth daily    Hypromellose (ARTIFICIAL TEARS OP) Past Week at PRN Self Yes Yes   Sig: Place 1 drop into both eyes daily as needed   Multiple Vitamins-Iron (MULTIVITAMIN/IRON PO) Past Week at AM Self Yes Yes   Sig: Take 1 tablet by mouth daily   cyanocobalamin (CYANOCOBALAMIN) 1000 MCG/ML injection Past Month at AM Self No Yes   Sig: INJECT 1 ML (CC) ONCE EVERY MONTH. NEED B12 LEVELS CHECKED BEFORE NEXT REFILL   cyclobenzaprine (FLEXERIL) 10 MG tablet Past Week at HS Self No Yes   Sig: Take 1 tablet by mouth twice daily as needed for muscle spasms   Patient taking differently:  Take 10 mg by mouth 2 times daily as needed for muscle spasms Take 1 tablet by mouth twice daily as needed for muscle spasms (in addition to  5 mg as needed dose)   cyclobenzaprine (FLEXERIL) 5 MG tablet Past Week at PRN Self Yes Yes   Sig: Take 5 mg by mouth daily as needed for muscle spasms (in addition to twice daily dose)   diclofenac (VOLTAREN) 1 % GEL topical gel Past Week at PRN Self Yes Yes   Sig: Apply topically daily as needed for moderate pain    docusate sodium (COLACE) 100 MG tablet Past Week at PRN Self Yes Yes   Sig: Take 100 mg by mouth daily as needed for constipation    etanercept (ENBREL) 50 MG/ML injection Past Week at AM Self Yes Yes   Sig: Inject 50 mg Subcutaneous once a week (on Saturday)   finasteride (PROSCAR) 5 MG tablet Past Week at HS Self No Yes   Sig: Take 1 tablet (5 mg) by mouth daily   Patient taking differently: Take 1 tablet by mouth At Bedtime    indomethacin (INDOCIN) 50 MG capsule Past Week at HS Self No Yes   Sig: Take 1 capsule by mouth once daily   Patient taking differently: Take 50 mg by mouth At Bedtime Take 1 capsule by mouth once daily   lisinopril (ZESTRIL) 5 MG tablet 3/25/2021 at AM Self No Yes   Sig: Take 1 tablet (5 mg) by mouth daily   Patient taking differently: Take 5 mg by mouth daily (in addition to lisinopril-hydrochlorothiazide 10-12.5 mg morning dose)   lisinopril-hydrochlorothiazide (ZESTORETIC) 10-12.5 MG tablet 3/25/2021 at AM Self Yes Yes   Sig: Take 1 tablet by mouth every morning (in addition to the 5 mg lisinopril dose)   naloxone (NARCAN) 1 mg/mL for intranasal kit (2 syringes with 2 mucosal atomizer device)  Self No Yes   Sig: In opioid overdose put cone in nostril and push 1/2 of contents into each nostril.  Repeat every 3 min if no response until help arrives.   omeprazole (PRILOSEC) 20 MG DR capsule Past Week at PRN Self Yes Yes   Sig: Take 1 capsule by mouth daily as needed    oxyCODONE IR (ROXICODONE) 10 MG tablet 3/25/2021 at AM Self No Yes    Sig: Take 1 tablet (10 mg) by mouth every 4 hours as needed for pain   testosterone (ANDROGEL/TESTIM) 50 MG/5GM (1%) topical gel Past Week at AM Self No Yes   Sig: Place 1 packet (50 mg) onto the skin daily   Patient taking differently: Place 50 mg onto the skin every 3 days (after showering)      Facility-Administered Medications: None     Allergies   Allergies   Allergen Reactions     Infliximab      Other reaction(s): *Unknown  Caused LT eye blindness     No Clinical Screening - See Comments      methotrexate- mood alterating     Remicade [Infliximab Injection]      Blindness in one eye       Physical Exam   Temp: 97.9  F (36.6  C) Temp src: Oral BP: 112/82 Pulse: 81   Resp: 12 SpO2: 99 % O2 Device: None (Room air) Oxygen Delivery: 3 LPM  Weight: 157 lbs 11.2 oz    Constitutional: Awake, alert, cooperative, no apparent distress.    ENT: Normocephalic, without obvious abnormality, atraumatic, oral pharynx with moist mucus membranes, tonsils without erythema or exudates.  Eyes pupils are equal, round and reactive to light; extra occular movements intact.  Normal sclera.    Neck: Supple, symmetrical, trachea midline, no adenopathy.  Pulmonary: No increased work of breathing, good air exchange, clear to auscultation bilaterally, no crackles or wheezing.  Cardiovascular: Regular rate and rhythm, normal S1 and S2, no S3 or S4, and no murmur noted.  GI: Normal bowel sounds, soft, non-distended, non-tender.    Skin/Integumen: Clear.  Neuro: CN II-XII grossly intact.  Upper and lower extremities strength, coordination and sensation intact bilaterally.    Psych:  Alert and oriented x 3. Normal affect.  Extremities: No lower extremity edema noted, and calves are non-tender to palpation bilaterally.   : Belcher catheter in place with clear yellow urine in the bag.       Data   I personally reviewed no images or EKG's today.  Results for orders placed or performed during the hospital encounter of 03/25/21 (from the past 24  hour(s))   Creatinine   Result Value Ref Range    Creatinine 1.08 0.66 - 1.25 mg/dL    GFR Estimate 79 >60 mL/min/[1.73_m2]    GFR Estimate If Black >90 >60 mL/min/[1.73_m2]   Potassium   Result Value Ref Range    Potassium 3.9 3.4 - 5.3 mmol/L   ABO/Rh type and screen   Result Value Ref Range    ABO AB     RH(D) Pos     Antibody Screen Neg     Test Valid Only At [facility]        Specimen Expires 03/28/2021    XR Surgery CLAU Fluoro L/T 5 Min w Stills    Narrative    SURGERY C-ARM FLUORO LESS THAN 5 MIN W STILLS March 25, 2021 11:25 AM     HISTORY: Occiput-C4 posterior spinal fusion.    COMPARISON: None.    NUMBER OF IMAGES ACQUIRED: Ten fluoroscopic spot images of the occiput  and cervical spine and two spin CT acquisitions through the same area.    VIEWS: AP and lateral views of the occipital and cervical region and  axial spin CT acquisitions.    FLUOROSCOPY TIME: .25 minutes.      Impression    IMPRESSION: No intraoperative consultation of radiology was requested  for this exam. The images demonstrate various stages of occiput-C4  posterior instrumented fusion. Please see the operative report from  neurosurgery for additional details.

## 2021-03-25 NOTE — BRIEF OP NOTE
[facility]    Brief Operative Note    Pre-operative diagnosis: Ankylosing spondylitis of cervical region (H) [M45.2]  Stenosis of cervical spine with myelopathy (H) [M48.02, G99.2]  Spinal instability of cervical region [M53.2X2]  Post-operative diagnosis Same as pre-operative diagnosis    Procedure: Procedure(s):  Occiput-Cervical 4 posterior instrumentation and fusion and C1 and C2 laminectomy  RIGHT ILLIAC CREST BONE GRAFT HARVEST  Surgeon: Surgeon(s) and Role:  Panel 1:     * Yobany Craig MD - Primary  Panel 2:     * Yobany Craig MD - Primary  Anesthesia: General   Estimated blood loss: 175 mL  Drains: None  Specimens:   ID Type Source Tests Collected by Time Destination   1 : BONE / TISSUE OCCIPUT-C4 Other (specify in comments) Other OR DOCUMENTATION ONLY Yobany Craig MD 3/25/2021  8:58 AM      Findings:   None.  Complications: None.  Implants:   Implant Name Type Inv. Item Serial No.  Lot No. LRB No. Used Action   DEPUY SYNTYHES 3.5MM POLYAXIAL SCREWS / 4.0 PLY SCREW 3.5 X 14MM    Depuy [id number] N/A 6 Implanted   DEPUY SYNTHES OCCIPITAL SCREW 4.5 X 6MM    Depuy [id number] N/A 3 Implanted   DEPUY SYNTHES OCCIPITAL SCREW 4.5 X 8MM    Depuy [id number] N/A 1 Implanted   DEPUY SYNTHES OCCIPITAL SET SCREW    Depuy [id number] N/A 2 Implanted   DEPUY SYNTHES OCCIPITAL LATERAL PLATE 50MM    Depuy [id number] N/A 1 Implanted   IMP SET SCREW     41 03, [id number] N/A 6 Implanted   IMP PRE-CONTOURED 3.5 OCCIPITAL BRANDT 110 DEGREE     41 03, [id number] N/A 1 Implanted     [id number]

## 2021-03-25 NOTE — PROVIDER NOTIFICATION
Left voicemail for surgeon regarding copious drainage on dressing. Pt is concerned about not having any cervical collar. Awaiting for call back.

## 2021-03-25 NOTE — PLAN OF CARE
Patient transferred from PACU @ 1450. A&O x4. On RA. Rating pain 8-9/10, managed with IV dilaudid and oxycodone. Denies chest pain or SOB. Large dried drainage on dressing on arrival, reinforced dressing, left message for surgeon. No active drainage noticed by writer. Simi patent with adequate output. Pt has not been out of the bed yet, waiting to hear from surgeon regarding cervical collar.  Will continue to monitor.

## 2021-03-25 NOTE — ANESTHESIA PREPROCEDURE EVALUATION
Anesthesia Pre-Procedure Evaluation    Patient: Oseas Edwards   MRN: [id number] : 1970        Preoperative Diagnosis: Ankylosing spondylitis of cervical region (H) [M45.2]  Stenosis of cervical spine with myelopathy (H) [M48.02, G99.2]  Spinal instability of cervical region [M53.2X2]   Procedure : Procedure(s):  Occiput-Cervical 4 posterior instrumentation and fusion  Right iliac crest bone graft harvest     Past Medical History:   Diagnosis Date     Ankylosing spondylitis (H)      Arthritis      AS (ankylosing spondylitis) (H) 1985     Cellulitis of leg      Chronic narcotic dependence (H) 2013    Due to long term pain management.      Hypertension      Keloid scar, mostly just dark 2017     Low serum testosterone level 2012     Optic neuritis 3/20/2012     PA (pernicious anemia) 2011      Past Surgical History:   Procedure Laterality Date     ARTHROPLASTY REVISION HIP Left 2017    Procedure: ARTHROPLASTY REVISION HIP;  Surgeon: aSravanan Alcaraz MD;  Location:  OR     HERNIORRHAPHY VENTRAL  2013    Procedure: HERNIORRHAPHY VENTRAL;  UMBILICAL HERNIA REPAIR WITH MESH, RECTUS DIASTASIS REPAIR WITH MESH;  Surgeon: Jason Dodson MD;  Location:  [facility]     JOINT REPLACEMENT, HIP RT/LT      Left     JOINT REPLACEMENT, HIP RT/LT      Right     OPEN SEPARATION COMPONENT HERNIORRHAPHY  2013    Procedure: OPEN SEPARATION COMPONENT HERNIORRHAPHY;;  Surgeon: Jason Dodson MD;  Location:  [facility]      Allergies   Allergen Reactions     Infliximab      Other reaction(s): *Unknown  Caused LT eye blindness     No Clinical Screening - See Comments      methotrexate- mood alterating     Remicade [Infliximab Injection]      Blindness in one eye      Social History     Tobacco Use     Smoking status: Former Smoker     Quit date: 2009     Years since quittin.2     Smokeless tobacco: Never Used     Tobacco comment: quit 2 1/2 yrs   Substance Use Topics      Alcohol use: Yes     Comment: 1 to 2 per week      Wt Readings from Last 1 Encounters:   03/23/21 73.9 kg (163 lb)        Anesthesia Evaluation            ROS/MED HX  ENT/Pulmonary:  - neg pulmonary ROS     Neurologic: Comment: Cervical stenosis and cord impingement from ankylosing spondylitis, pain and numbness/radiculopathy in BUE, Left more than right      Cardiovascular:  - neg cardiovascular ROS   (+) hypertension----- (-) CHF, DOMINGO and arrhythmias   METS/Exercise Tolerance: 1 - Eating, dressing    Hematologic:     (+) anemia,     Musculoskeletal: Comment: ankylosing spondylitis   (+) arthritis,     GI/Hepatic: Comment: H/o gastric Ulcer      Renal/Genitourinary:  - neg Renal ROS     Endo:  - neg endo ROS     Psychiatric/Substance Use:     (+) H/O chronic opiod use .     Infectious Disease:  - neg infectious disease ROS     Malignancy:       Other:            Physical Exam    Airway      Comment: Extremely limited neck motion both extension and flexion, good mouth opening    Mallampati: I   TM distance: > 3 FB   Neck ROM: limited   Mouth opening: > 3 cm    Respiratory Devices and Support         Dental  no notable dental history         Cardiovascular   cardiovascular exam normal          Pulmonary   pulmonary exam normal                OUTSIDE LABS:  CBC:   Lab Results   Component Value Date    WBC 5.3 03/23/2021    WBC 6.2 01/25/2021    HGB 14.8 03/23/2021    HGB 15.3 01/25/2021    HCT 44.7 03/23/2021    HCT 46.7 01/25/2021     03/23/2021     01/25/2021     BMP:   Lab Results   Component Value Date     01/25/2021     04/16/2018    POTASSIUM 4.1 01/25/2021    POTASSIUM 4.6 04/16/2018    CHLORIDE 106 01/25/2021    CHLORIDE 104 04/16/2018    CO2 26 02/15/2017    CO2 28 11/02/2009    BUN 21 01/25/2021    BUN 25 (H) 01/25/2021    CR 0.85 01/25/2021    CR 0.91 04/16/2018     (H) 01/25/2021     (H) 04/16/2018     COAGS: No results found for: PTT, INR, FIBR  POC:   Lab  Results   Component Value Date     (H) 11/12/2013     HEPATIC:   Lab Results   Component Value Date    ALBUMIN 4.2 01/25/2021    PROTTOTAL 6.8 01/25/2021    ALT 18 01/25/2021    AST 23 01/25/2021    ALKPHOS 77 01/25/2021    BILITOTAL <0.2 01/25/2021     OTHER:   Lab Results   Component Value Date    ANDREW 9.3 01/25/2021    TSH 1.32 04/10/2009    CRP 6 01/25/2021    SED 3 01/25/2021       Anesthesia Plan    ASA Status:  3   NPO Status:  NPO Appropriate    Anesthesia Type: General.     - Airway: ETT   Induction: Intravenous, Propofol.   Maintenance: TIVA.   Techniques and Equipment:     - Airway: Video-Laryngoscope, Fiberoptic Bronchoscope     - Lines/Monitors: 2nd IV, Arterial Line     - Blood: T&S, Cell Saver     - Drips/Meds: Dexmed. infusion, Ketamine, Remifentanil, Phenylephrine     Consents    Anesthesia Plan(s) and associated risks, benefits, and realistic alternatives discussed. Questions answered and patient/representative(s) expressed understanding.     - Discussed with:  Patient         Postoperative Care    Pain management: IV analgesics, Multi-modal analgesia.   PONV prophylaxis: Ondansetron (or other 5HT-3), Dexamethasone or Solumedrol, Background Propofol Infusion     Comments:    Nitroglycerin bolus prn            Colt Hill MD

## 2021-03-25 NOTE — ANESTHESIA CARE TRANSFER NOTE
Patient: Oseas Edwards    Procedure(s):  Occiput-Cervical 4 posterior instrumentation and fusion and C1 and C2 laminectomy  RIGHT ILLIAC CREST BONE GRAFT HARVEST    Diagnosis: Ankylosing spondylitis of cervical region (H) [M45.2]  Stenosis of cervical spine with myelopathy (H) [M48.02, G99.2]  Spinal instability of cervical region [M53.2X2]  Diagnosis Additional Information: No value filed.    Anesthesia Type:   General     Note:    Oropharynx: oropharynx clear of all foreign objects and spontaneously breathing  Level of Consciousness: awake and drowsy  Oxygen Supplementation: face mask  Level of Supplemental Oxygen (L/min / FiO2): 6  Independent Airway: airway patency satisfactory and stable  Dentition: dentition unchanged  Vital Signs Stable: post-procedure vital signs reviewed and stable  Report to RN Given: handoff report given  Patient transferred to: PACU  Comments: Neuromuscular blockade not used after succinylcholine for intubation, spontaneous return of TOF 4/4 with sustained tetany, spontaneous respirations, adequate tidal volumes, followed commands to voice, oropharynx suctioned with soft flexible catheter, extubated atraumatically, extubated with suction, airway patent after extubation.  Oxygen via facemask at 6 liters per minute to PACU. Oxygen tubing connected to wall O2 in PACU, SpO2, NiBP, and EKG monitors and alarms on and functioning, Josie Hugger warmer connected to patient gown, report on patient's clinical status given to PACU RN, RN questions answered.     Handoff Report: Identifed the Patient, Identified the Reponsible Provider, Reviewed the pertinent medical history, Discussed the surgical course, Reviewed Intra-OP anesthesia mangement and issues during anesthesia, Set expectations for post-procedure period and Allowed opportunity for questions and acknowledgement of understanding      Vitals: (Last set prior to Anesthesia Care Transfer)  CRNA VITALS  3/25/2021 1205 - 3/25/2021 1245      3/25/2021              ART BP:  190/58    ART Mean:  108    Resp Rate (set):  10        Electronically Signed By: Lori Cuevas  March 25, 2021  12:45 PM

## 2021-03-26 ENCOUNTER — APPOINTMENT (OUTPATIENT)
Dept: GENERAL RADIOLOGY | Facility: CLINIC | Age: 51
DRG: 472 | End: 2021-03-26
Attending: NURSE PRACTITIONER
Payer: COMMERCIAL

## 2021-03-26 ENCOUNTER — APPOINTMENT (OUTPATIENT)
Dept: PHYSICAL THERAPY | Facility: CLINIC | Age: 51
DRG: 472 | End: 2021-03-26
Attending: NURSE PRACTITIONER
Payer: COMMERCIAL

## 2021-03-26 LAB
ANION GAP SERPL CALCULATED.3IONS-SCNC: 6 MMOL/L (ref 3–14)
BUN SERPL-MCNC: 20 MG/DL (ref 7–30)
CALCIUM SERPL-MCNC: 8.9 MG/DL (ref 8.5–10.1)
CHLORIDE SERPL-SCNC: 105 MMOL/L (ref 94–109)
CO2 SERPL-SCNC: 25 MMOL/L (ref 20–32)
CREAT SERPL-MCNC: 1.01 MG/DL (ref 0.66–1.25)
GFR SERPL CREATININE-BSD FRML MDRD: 85 ML/MIN/{1.73_M2}
GLUCOSE BLDC GLUCOMTR-MCNC: 120 MG/DL (ref 70–99)
GLUCOSE SERPL-MCNC: 142 MG/DL (ref 70–99)
HGB BLD-MCNC: 14.3 G/DL (ref 13.3–17.7)
POTASSIUM SERPL-SCNC: 3.5 MMOL/L (ref 3.4–5.3)
SODIUM SERPL-SCNC: 136 MMOL/L (ref 133–144)

## 2021-03-26 PROCEDURE — 250N000013 HC RX MED GY IP 250 OP 250 PS 637: Performed by: STUDENT IN AN ORGANIZED HEALTH CARE EDUCATION/TRAINING PROGRAM

## 2021-03-26 PROCEDURE — 999N001017 HC STATISTIC GLUCOSE BY METER IP

## 2021-03-26 PROCEDURE — 97161 PT EVAL LOW COMPLEX 20 MIN: CPT | Mod: GP

## 2021-03-26 PROCEDURE — 36415 COLL VENOUS BLD VENIPUNCTURE: CPT | Performed by: NURSE PRACTITIONER

## 2021-03-26 PROCEDURE — 999N000065 XR CERVICAL SPINE 2/3 VWS

## 2021-03-26 PROCEDURE — 120N000001 HC R&B MED SURG/OB

## 2021-03-26 PROCEDURE — 250N000013 HC RX MED GY IP 250 OP 250 PS 637: Performed by: PHYSICIAN ASSISTANT

## 2021-03-26 PROCEDURE — 250N000013 HC RX MED GY IP 250 OP 250 PS 637: Performed by: NURSE PRACTITIONER

## 2021-03-26 PROCEDURE — 99232 SBSQ HOSP IP/OBS MODERATE 35: CPT | Performed by: STUDENT IN AN ORGANIZED HEALTH CARE EDUCATION/TRAINING PROGRAM

## 2021-03-26 PROCEDURE — 250N000011 HC RX IP 250 OP 636: Performed by: NURSE PRACTITIONER

## 2021-03-26 PROCEDURE — 85018 HEMOGLOBIN: CPT | Performed by: NURSE PRACTITIONER

## 2021-03-26 PROCEDURE — 97530 THERAPEUTIC ACTIVITIES: CPT | Mod: GP

## 2021-03-26 PROCEDURE — 80048 BASIC METABOLIC PNL TOTAL CA: CPT | Performed by: NURSE PRACTITIONER

## 2021-03-26 RX ORDER — TRAZODONE HYDROCHLORIDE 50 MG/1
50 TABLET, FILM COATED ORAL
Status: DISCONTINUED | OUTPATIENT
Start: 2021-03-26 | End: 2021-03-27 | Stop reason: HOSPADM

## 2021-03-26 RX ORDER — METHOCARBAMOL 750 MG/1
750 TABLET, FILM COATED ORAL 4 TIMES DAILY PRN
Status: DISCONTINUED | OUTPATIENT
Start: 2021-03-26 | End: 2021-03-27 | Stop reason: HOSPADM

## 2021-03-26 RX ADMIN — OXYCODONE HYDROCHLORIDE 10 MG: 5 TABLET ORAL at 19:59

## 2021-03-26 RX ADMIN — ACETAMINOPHEN 975 MG: 325 TABLET, FILM COATED ORAL at 15:50

## 2021-03-26 RX ADMIN — SENNOSIDES AND DOCUSATE SODIUM 1 TABLET: 8.6; 5 TABLET ORAL at 08:26

## 2021-03-26 RX ADMIN — HYDROMORPHONE HYDROCHLORIDE 0.4 MG: 1 INJECTION, SOLUTION INTRAMUSCULAR; INTRAVENOUS; SUBCUTANEOUS at 03:52

## 2021-03-26 RX ADMIN — HYDROMORPHONE HYDROCHLORIDE 0.4 MG: 1 INJECTION, SOLUTION INTRAMUSCULAR; INTRAVENOUS; SUBCUTANEOUS at 21:30

## 2021-03-26 RX ADMIN — CYCLOBENZAPRINE 10 MG: 10 TABLET, FILM COATED ORAL at 03:47

## 2021-03-26 RX ADMIN — LISINOPRIL: 10 TABLET ORAL at 08:25

## 2021-03-26 RX ADMIN — ACETAMINOPHEN 975 MG: 325 TABLET, FILM COATED ORAL at 08:24

## 2021-03-26 RX ADMIN — ACETAMINOPHEN 975 MG: 325 TABLET, FILM COATED ORAL at 00:02

## 2021-03-26 RX ADMIN — HYDROXYZINE HYDROCHLORIDE 25 MG: 25 TABLET, FILM COATED ORAL at 19:59

## 2021-03-26 RX ADMIN — DOCUSATE SODIUM 100 MG: 100 CAPSULE, LIQUID FILLED ORAL at 21:26

## 2021-03-26 RX ADMIN — POLYETHYLENE GLYCOL 3350 17 G: 17 POWDER, FOR SOLUTION ORAL at 08:27

## 2021-03-26 RX ADMIN — SENNOSIDES AND DOCUSATE SODIUM 1 TABLET: 8.6; 5 TABLET ORAL at 21:26

## 2021-03-26 RX ADMIN — HYDROMORPHONE HYDROCHLORIDE 0.4 MG: 1 INJECTION, SOLUTION INTRAMUSCULAR; INTRAVENOUS; SUBCUTANEOUS at 12:00

## 2021-03-26 RX ADMIN — METHOCARBAMOL 750 MG: 750 TABLET ORAL at 17:00

## 2021-03-26 RX ADMIN — OXYCODONE HYDROCHLORIDE 10 MG: 5 TABLET ORAL at 00:41

## 2021-03-26 RX ADMIN — LISINOPRIL 5 MG: 5 TABLET ORAL at 08:27

## 2021-03-26 RX ADMIN — HYDROMORPHONE HYDROCHLORIDE 0.4 MG: 1 INJECTION, SOLUTION INTRAMUSCULAR; INTRAVENOUS; SUBCUTANEOUS at 01:52

## 2021-03-26 RX ADMIN — FINASTERIDE 5 MG: 5 TABLET, FILM COATED ORAL at 21:26

## 2021-03-26 RX ADMIN — OXYCODONE HYDROCHLORIDE 10 MG: 5 TABLET ORAL at 15:49

## 2021-03-26 RX ADMIN — HYDROMORPHONE HYDROCHLORIDE 0.4 MG: 1 INJECTION, SOLUTION INTRAMUSCULAR; INTRAVENOUS; SUBCUTANEOUS at 08:22

## 2021-03-26 RX ADMIN — OXYCODONE HYDROCHLORIDE 10 MG: 5 TABLET ORAL at 10:11

## 2021-03-26 RX ADMIN — DOCUSATE SODIUM 100 MG: 100 CAPSULE, LIQUID FILLED ORAL at 08:24

## 2021-03-26 RX ADMIN — TRAZODONE HYDROCHLORIDE 50 MG: 50 TABLET ORAL at 22:06

## 2021-03-26 RX ADMIN — HYDROMORPHONE HYDROCHLORIDE 0.4 MG: 1 INJECTION, SOLUTION INTRAMUSCULAR; INTRAVENOUS; SUBCUTANEOUS at 06:09

## 2021-03-26 RX ADMIN — HYDROMORPHONE HYDROCHLORIDE 0.4 MG: 1 INJECTION, SOLUTION INTRAMUSCULAR; INTRAVENOUS; SUBCUTANEOUS at 14:33

## 2021-03-26 ASSESSMENT — ACTIVITIES OF DAILY LIVING (ADL)
ADLS_ACUITY_SCORE: 12
ADLS_ACUITY_SCORE: 10

## 2021-03-26 NOTE — PROVIDER NOTIFICATION
MD Notification    Notified Person: MD    Notified Person Name: Teresa Garnett NP    Notification Date/Time: 03/26/21  3:42 PM    Notification Interaction: webpaged with callback     Purpose of Notification: pt mentioned you were going to increase PO oxy from q4h to q3h if he could stick with PO pain meds. Do you want to do this?     Orders Received: pt and PA have already had conversation regarding pain meds. Recommend starting Robaxin with Oxycodone. No new orders     Comments:

## 2021-03-26 NOTE — PLAN OF CARE
Physical Therapy Discharge Summary    Reason for therapy discharge:    Discharged to home.    Progress towards therapy goal(s). See goals on Care Plan in [facility] electronic health record for goal details.  Goals met    Therapy recommendation(s):    Continued therapy is recommended.  Rationale/Recommendations:  Pt return home w/ spouse assistance as he is independent w/ self cares.

## 2021-03-26 NOTE — PROGRESS NOTES
[facility]    Neurosurgery Progress Note    Date of Service (when I saw the patient): 03/26/2021     Assessment & Plan   Oseas Edwards is a 50 year old male who was admitted on 3/25/2021. He is s/p occiput to C4 posterior segmental instrumentation with C1 to C2 laminectomy with Dr. Craig. Today he was seen lying in bed. He reports neck stiffness. He denies radicular arm pain. He states his RUE paresthesias are gone and his LUE paresthesias have improved. No weakness. Dressing intact.    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 Craig who is in agreement with initial plan and will follow up with further consultation recommendations.    Teresa Garnett CNP  [facility]  [place] [address]    Tel [phone]  Pager [phone]      Interval History   Stable.    Physical Exam   Temp: 98.8  F (37.1  C) Temp src: Oral BP: 124/82 Pulse: 100   Resp: 16 SpO2: 95 % O2 Device: None (Room air) Oxygen Delivery: 3 LPM  Vitals:    03/25/21 0500   Weight: 157 lb 11.2 oz (71.5 kg)     Vital Signs with Ranges  Temp:  [97.4  F (36.3  C)-98.8  F (37.1  C)] 98.8  F (37.1  C)  Pulse:  [] 100  Resp:  [12-21] 16  BP: (100-126)/(51-88) 124/82  MAP:  [92 mmHg-115 mmHg] 115 mmHg  Arterial Line BP: (132-163)/(70-85) 163/85  SpO2:  [94 %-100 %] 95 %  I/O last 3 completed shifts:  In: 2860 [P.O.:360; I.V.:2000]  Out: 3360 [Urine:3185; Blood:175]     , Blood pressure 124/82, pulse 100, temperature 98.8  F (37.1  C), temperature source Oral, resp. rate 16, height 5' (1.524 m), weight 157 lb 11.2 oz (71.5 kg), SpO2 95 %.  157 lbs 11.2 oz  HEENT:  Normocephalic.   PERRLA.  EOM s intact.    Heart:  No peripheral edema  Lungs:  No SOB  Skin:  Warm and dry, good capillary refill. Dressing intact.  Extremities:  Good radial and dorsalis pedis pulses bilaterally, no edema, cyanosis or clubbing.    NEUROLOGICAL EXAMINATION:   Mental status:  Alert and Oriented x 3, speech is fluent.  Cranial nerves:  II-XII intact.   Motor:  Strength is 5/5 throughout the upper and lower extremities  Shoulder Abduction:  Right:  5/5   Left:  5/5  Biceps:                      Right:  5/5   Left:  5/5  Triceps:                     Right:  5/5   Left:  5/5  Wrist Extensors:       Right:  5/5   Left:  5/5  Wrist Flexors:           Right:  5/5   Left:  5/5  interosseus :            Right:  5/5   Left:  5/5  Sensation:  Intact    Medications       acetaminophen  975 mg Oral Q8H     docusate sodium  100 mg Oral BID     finasteride  5 mg Oral At Bedtime     lisinopril  5 mg Oral Daily     lisinopril-hydrochlorothiazide (ZESTORETIC) 10-12.5 mg combo dose   Oral Daily     polyethylene glycol  17 g Oral Daily     senna-docusate  1 tablet Oral BID     sodium chloride (PF)  3 mL Intracatheter Q8H       Data     CBC RESULTS:   Recent Labs   Lab Test 03/26/21  0731 03/23/21   WBC  --  5.3   RBC  --  4.81   HGB 14.3 14.8   HCT  --  44.7   MCV  --  92.9   MCH  --  30.9   MCHC  --  33.2   PLT  --  182     Basic Metabolic Panel:  Lab Results   Component Value Date     03/26/2021      Lab Results   Component Value Date    POTASSIUM 3.5 03/26/2021     Lab Results   Component Value Date    CHLORIDE 105 03/26/2021     Lab Results   Component Value Date    ANDREW 8.9 03/26/2021     Lab Results   Component Value Date    CO2 25 03/26/2021     Lab Results   Component Value Date    BUN 20 03/26/2021     Lab Results   Component Value Date    CR 1.01 03/26/2021     Lab Results   Component Value Date     03/26/2021     INR:  No results found for: INR

## 2021-03-26 NOTE — PROGRESS NOTES
03/26/21 1000   Quick Adds   Type of Visit Initial PT Evaluation   Living Environment   People in home spouse   Current Living Arrangements house  (14 stairs up tp bed)   Home Accessibility stairs to enter home   Number of Stairs, Main Entrance 2   Stair Railings, Main Entrance railings on both sides of stairs   Transportation Anticipated family or friend will provide   Self-Care   Usual Activity Tolerance good   Current Activity Tolerance good   Disability/Function   Hearing Difficulty or Deaf no   Wear Glasses or Blind no   Concentrating, Remembering or Making Decisions Difficulty no   Fall history within last six months no   General Information   Onset of Illness/Injury or Date of Surgery 03/25/21   Referring Physician Ronen Craig   Patient/Family Therapy Goals Statement (PT) To return home   Pertinent History of Current Problem (include personal factors and/or comorbidities that impact the POC) Occiput-C4 fusion, C1-C2 laminectomy   Existing Precautions/Restrictions spinal   General Observations Graham soft collar applied in session    Cognition   Orientation Status (Cognition) oriented x 4   Affect/Mental Status (Cognition) WNL   Pain Assessment   Patient Currently in Pain Yes, see Vital Sign flowsheet   Integumentary/Edema   Integumentary/Edema no deficits were identifed   Integumentary/Edema Comments Bandages intact    Posture    Posture Forward head position;Kyphosis   Range of Motion (ROM)   ROM Comment No cervical movement secondary to soft collar application, bilat UE ROM limited to 90 deg abduction, 90 deg flexion   Strength   Strength Comments 3-/5 for general UE strength    Bed Mobility   Bed Mobility supine-sit;sit-supine   Supine-Sit [place] (Bed Mobility) independent   Bed Mobility Limitations decreased ability to use arms for pushing/pulling   Sit-Stand Transfer   Sit-Stand [place] (Transfers) independent   Assistive Device (Sit-Stand Transfers) cane, straight   Gait/Stairs  (Locomotion)   [place] Level (Gait) independent   Assistive Device (Gait) cane, straight   Distance in Feet (Required for LE Total Joints) 200 x 2   Pattern (Gait) step-through   Deviations/Abnormal Patterns (Gait) base of support, wide   Negotiation (Stairs) stairs independence   [place] Level (Stairs) independent   Comment (Gait/Stairs) 4 stairs w/ SP assist    Balance   Balance no deficits were identified   Clinical Impression   Criteria for Skilled Therapeutic Intervention yes, treatment indicated   PT Diagnosis (PT) Limited UE ROM   Influenced by the following impairments Pain   Functional limitations due to impairments Pain secondary C0-C4 fusion   Clinical Presentation Stable/Uncomplicated   Clinical Presentation Rationale Pt tolerating functional mobility independently   Clinical Decision Making (Complexity) low complexity   Therapy Frequency (PT) One time eval and treatment only   Predicted Duration of Therapy Intervention (days/wks) 1 day   Planned Therapy Interventions (PT) home exercise program   Anticipated Equipment Needs at Discharge (PT) cane, straight   Risk & Benefits of therapy have been explained care plan/treatment goals reviewed   Clinical Impression Comments Pt able to return home w/ spouse with some assist   PT Discharge Planning    PT Discharge Recommendation (DC Rec) home with assist   PT Rationale for DC Rec Able to complete ADL's w/ modified independence and wife assist   PT Brief overview of current status  Independent    Total Evaluation Time   Total Evaluation Time (Minutes) 16

## 2021-03-26 NOTE — PLAN OF CARE
A/Ox4, neuros intact. VSS RA. Pain managed w/ IV dilaudid and PO oxycodone. A1 GB to BR, w/ good UO/P. L R PIV SL. Tolerating reg diet. Cervical collar in room for comfort. Reinforced dressing to back of neck, CDI. CMS intact. Plan pending.

## 2021-03-26 NOTE — PROGRESS NOTES
[facility]    Medicine Progress Note - Hospitalist Service       Date of Admission:  3/25/2021  Date of Service: 03/26/2021    Assessment & Plan         Oseas Edwards is a 50 year old male admitted on 3/25/2021.     Past medical history significant for Ankylosing spondylitis of the cervical spine (C1-2) with stenosis and instability with myelopathy, Chronic pain on chronic opioids, HTN, BPH, Pernicious anemia, Low testosterone level, GERD, History of gastric ulceration who underwent an elective occiput-cervical 4 posterior instrumentation and fusion and C1-C2 laminectomy.  Hospitalist were consulted to assist with post-op medical management of HTN, BPH, GERD.     Ankylosing spondylitis of the cervical spine (C1-2) with stenosis and instability with myelopathy s/p elective occiput-cervical 4 posterior instrumentation and fusion and C1-C2 laminectomy  POD# 0.   - Neurosurgery Service is managing.               --Defer analgesic management, DVT prophylaxis, PT/OT.                 --Belcher catheter management per Neurosurgery.    - Encourage utilization of incentive spirometer.   - Hold PTA Enbrel subcutaneous injection weekly (Saturday), resume at discharge.       Chronic pain on chronic opioids  PTA pain regiment includes oxycodone 10 mg every 4 hours PRN, indomethacin 50 mg at bedtime, Voltaren gel daily PRN, Flexeril 10 mg BID and 5 mg PRN daily.  - Analgesic management deferred to Neurosurgery.       HTN  - Resume PTA Zestoretic 10-12.5 mg and lisinopril 5 mg/d.  Hold parameters in place (SBP < 115 and/or creatinine > 1.2).    - PRN IV hydralazine for SBP > 180.        BPH  - Resume PTA finasteride 5 mg at bedtime.       Pernicious anemia  - Hold PTA monthly cyanocobalamin subcutaneous injection.  Resume at discharge.       Low testosterone level  - Hold PTA testosterone gel every 3 days.  Resume at discharge.       GERD  History of gastric ulceration  - Resume PTA omeprazole 20 mg/d PRN.        Diet: Advance Diet as Tolerated: Regular Diet Adult    DVT Prophylaxis: Defer to primary service  Belcher Catheter: not present  Code Status: Full Code           Disposition Plan   Expected discharge: 2 - 3 days, recommended to transitional care unit once adequate pain management/ tolerating PO medications and safe disposition plan/ TCU bed available.  Entered: Pb Epps MD 03/26/2021, 3:52 PM       The patient's care was discussed with the Bedside Nurse and Patient.    Pb Epps MD  Hospitalist Service  [facility]  Contact information available via [facility] Paging/Directory    ______________________________________________________________________    Interval History     Main complaint is lack of sleep  No CP/SOB  No fevers or chills  No nausea/vomiting or abdominal pain  No new complaints.     Data reviewed today: I reviewed all medications, new labs and imaging results over the last 24 hours. I personally reviewed no images or EKG's today.    Physical Exam   Vital Signs: Temp: 98.8  F (37.1  C) Temp src: Oral BP: 124/82 Pulse: 100   Resp: 16 SpO2: 95 % O2 Device: None (Room air)    Weight: 157 lbs 11.2 oz    Constitutional: Awake, alert, cooperative, no apparent distress.    Neck: Supple, symmetrical, trachea midline, no adenopathy.  Pulmonary: CTABL  Cardiovascular: Regular rate and rhythm, normal S1 and S2, no S3 or S4, and no murmur noted.  GI: Normal bowel sounds, soft, non-distended, non-tender.    Neuro: CN II-XII grossly intact.  Upper and lower extremities strength, coordination and sensation intact bilaterally.    Psych:  Alert and oriented x 3. Normal affect.  Extremities: No lower extremity edema noted, and calves are non-tender to palpation bilaterally.     Data   Recent Labs   Lab 03/26/21  0731 03/25/21  0619 03/23/21   WBC  --   --  5.3   HGB 14.3  --  14.8   MCV  --   --  92.9   PLT  --   --  182     --   --    POTASSIUM 3.5 3.9  --    CHLORIDE 105  --   --    CO2  25  --   --    BUN 20  --   --    CR 1.01 1.08  --    ANIONGAP 6  --   --    ANDREW 8.9  --   --    *  --   --      Recent Results (from the past 24 hour(s))   XR Cervical Spine 2/3 Views    Narrative    CERVICAL SPINE TWO TO THREE VIEWS  3/26/2021 8:53 AM     HISTORY:  Status post cervical spine fusion, upright films when able.    COMPARISON: X-rays 2/22/2021      Impression    IMPRESSION: Postoperative change of craniocervical fusion and  decompression. Diffuse ankylosis. Poor visualization of the  craniocervical junction.    LENI RASMUSSEN MD     Medications       acetaminophen  975 mg Oral Q8H     docusate sodium  100 mg Oral BID     finasteride  5 mg Oral At Bedtime     lisinopril  5 mg Oral Daily     lisinopril-hydrochlorothiazide (ZESTORETIC) 10-12.5 mg combo dose   Oral Daily     polyethylene glycol  17 g Oral Daily     senna-docusate  1 tablet Oral BID     sodium chloride (PF)  3 mL Intracatheter Q8H

## 2021-03-26 NOTE — PROGRESS NOTES
Post OP day 1. A&O X4. VSS on RA. Regular diet. PIV SL. PRN dilaudid IV and oxycodone given for neck pain. Simi  removed today @0615. CMS intact. Neuro intact. Continue to monitor.

## 2021-03-26 NOTE — PLAN OF CARE
Orientation: A & O x 4  VS/ O2/ IV: VSS on RA. IV SL x 2.  Mobility: Not oob.  Pain Management: Dilaudid IV and Oxycodone  Diet: Regular, adequate intake.  Bowel/ Bladder: Belcher patent. Adequate output. No BM on shift.  Skin: Right hip scar. Dressing to neck c,d,I. Reinforced prior on shift.   CMS: Intact.  Neuros: Intact.   Consults/ Providers: Dr. Craig  Discharge/ Plan: Continue to monitor.

## 2021-03-27 VITALS
HEIGHT: 60 IN | RESPIRATION RATE: 16 BRPM | TEMPERATURE: 98.2 F | HEART RATE: 86 BPM | SYSTOLIC BLOOD PRESSURE: 92 MMHG | WEIGHT: 157.7 LBS | BODY MASS INDEX: 30.96 KG/M2 | DIASTOLIC BLOOD PRESSURE: 66 MMHG | OXYGEN SATURATION: 95 %

## 2021-03-27 LAB
ANION GAP SERPL CALCULATED.3IONS-SCNC: 3 MMOL/L (ref 3–14)
BUN SERPL-MCNC: 16 MG/DL (ref 7–30)
CALCIUM SERPL-MCNC: 8.9 MG/DL (ref 8.5–10.1)
CHLORIDE SERPL-SCNC: 104 MMOL/L (ref 94–109)
CO2 SERPL-SCNC: 29 MMOL/L (ref 20–32)
CREAT SERPL-MCNC: 0.86 MG/DL (ref 0.66–1.25)
ERYTHROCYTE [DISTWIDTH] IN BLOOD BY AUTOMATED COUNT: 12.4 % (ref 10–15)
GFR SERPL CREATININE-BSD FRML MDRD: >90 ML/MIN/{1.73_M2}
GLUCOSE SERPL-MCNC: 114 MG/DL (ref 70–99)
HCT VFR BLD AUTO: 42.7 % (ref 40–53)
HGB BLD-MCNC: 13.9 G/DL (ref 13.3–17.7)
MCH RBC QN AUTO: 31.1 PG (ref 26.5–33)
MCHC RBC AUTO-ENTMCNC: 32.6 G/DL (ref 31.5–36.5)
MCV RBC AUTO: 96 FL (ref 78–100)
PLATELET # BLD AUTO: 159 10E9/L (ref 150–450)
POTASSIUM SERPL-SCNC: 3.8 MMOL/L (ref 3.4–5.3)
RBC # BLD AUTO: 4.47 10E12/L (ref 4.4–5.9)
SODIUM SERPL-SCNC: 136 MMOL/L (ref 133–144)
WBC # BLD AUTO: 9.8 10E9/L (ref 4–11)

## 2021-03-27 PROCEDURE — 250N000013 HC RX MED GY IP 250 OP 250 PS 637: Performed by: PHYSICIAN ASSISTANT

## 2021-03-27 PROCEDURE — 80048 BASIC METABOLIC PNL TOTAL CA: CPT | Performed by: STUDENT IN AN ORGANIZED HEALTH CARE EDUCATION/TRAINING PROGRAM

## 2021-03-27 PROCEDURE — 36415 COLL VENOUS BLD VENIPUNCTURE: CPT | Performed by: STUDENT IN AN ORGANIZED HEALTH CARE EDUCATION/TRAINING PROGRAM

## 2021-03-27 PROCEDURE — 85027 COMPLETE CBC AUTOMATED: CPT | Performed by: STUDENT IN AN ORGANIZED HEALTH CARE EDUCATION/TRAINING PROGRAM

## 2021-03-27 PROCEDURE — 250N000013 HC RX MED GY IP 250 OP 250 PS 637: Performed by: NURSE PRACTITIONER

## 2021-03-27 RX ORDER — METHOCARBAMOL 750 MG/1
750 TABLET, FILM COATED ORAL 4 TIMES DAILY PRN
Qty: 50 TABLET | Refills: 0 | Status: SHIPPED | OUTPATIENT
Start: 2021-03-27 | End: 2021-04-26

## 2021-03-27 RX ORDER — OXYCODONE HYDROCHLORIDE 5 MG/1
5-10 TABLET ORAL
Qty: 50 TABLET | Refills: 0 | Status: SHIPPED | OUTPATIENT
Start: 2021-03-27 | End: 2021-04-19

## 2021-03-27 RX ORDER — OXYCODONE HYDROCHLORIDE 5 MG/1
5 TABLET ORAL
Status: DISCONTINUED | OUTPATIENT
Start: 2021-03-27 | End: 2021-03-27 | Stop reason: HOSPADM

## 2021-03-27 RX ORDER — DOCUSATE SODIUM 100 MG/1
100 CAPSULE, LIQUID FILLED ORAL 2 TIMES DAILY PRN
Qty: 60 CAPSULE | Refills: 0 | Status: ON HOLD | OUTPATIENT
Start: 2021-03-27 | End: 2021-04-28

## 2021-03-27 RX ORDER — OXYCODONE HYDROCHLORIDE 5 MG/1
10 TABLET ORAL
Status: DISCONTINUED | OUTPATIENT
Start: 2021-03-27 | End: 2021-03-27 | Stop reason: HOSPADM

## 2021-03-27 RX ADMIN — ACETAMINOPHEN 975 MG: 325 TABLET, FILM COATED ORAL at 00:48

## 2021-03-27 RX ADMIN — METHOCARBAMOL 750 MG: 750 TABLET ORAL at 08:31

## 2021-03-27 RX ADMIN — OXYCODONE HYDROCHLORIDE 10 MG: 5 TABLET ORAL at 13:55

## 2021-03-27 RX ADMIN — OXYCODONE HYDROCHLORIDE 10 MG: 5 TABLET ORAL at 10:41

## 2021-03-27 RX ADMIN — OXYCODONE HYDROCHLORIDE 10 MG: 5 TABLET ORAL at 06:36

## 2021-03-27 RX ADMIN — ACETAMINOPHEN 975 MG: 325 TABLET, FILM COATED ORAL at 08:26

## 2021-03-27 RX ADMIN — OXYCODONE HYDROCHLORIDE 10 MG: 5 TABLET ORAL at 00:49

## 2021-03-27 ASSESSMENT — ACTIVITIES OF DAILY LIVING (ADL)
ADLS_ACUITY_SCORE: 12

## 2021-03-27 NOTE — PLAN OF CARE
A&O x4, VSS on RA. Neck drsg CDI. Cervical collar in place. CMS intact except left fingers numbness & tingling-baseline. Pain managed with prn oxycodone & scheduled tylenol. Ambulates to bathroom with SBA, gait steady. Plans for discharge pending improvement.

## 2021-03-27 NOTE — PLAN OF CARE
Patient is A&O x4. Up with SBA to bathroom, voiding. Denies chest pain or SOB. Pain well managed with oxycodone, robaxin and tylenol. Dressing changed, CDI. IV access discontinued. Reviewed AVS with pt. No further questions asked. Discharging home with medications and belonging.

## 2021-03-27 NOTE — DISCHARGE SUMMARY
Admit Date:     03/25/2021   Discharge Date:           PRIMARY DIAGNOSIS:  C1 through C2 instability with myelopathy and ankylosing spondylolysis.      OPERATION PERFORMED:  Occiput to C4 posterior fusion with a C1 to C2 laminectomy and occiput to C4 allograft with right iliac crest bone graft harvest performed by Dr. Craig 03/25/2021.      COMPLICATIONS:  None.      HOSPITAL COURSE:  The patient is a 50-year-old male who presented to our clinic with the above diagnosis in which surgical management was deemed appropriate and the patient was prepared for surgery 03/25/2021 at which time he underwent the above described procedure.  Please see the dictated operative report for further details.  He tolerated surgery well and there were no complications.      Postoperatively, he has made an adequate neurosurgical recovery.  He has remained afebrile with stable vital signs.  He is tolerating a regular diet without difficulty and ambulating well.  His operative incision is healing well.  There are no signs of drainage, erythema or edema.  He is ready to be discharged home 03/27/2021 in improved condition as compared to admission.      All instructions regarding diet, activity, wound care, bowel management, and follow-up were given to the patient, who was released in good condition.      DISCHARGE MEDICATIONS:   1.  Oxycodone 5 mg, instructed to take 1-2 tablets every 3 hours as needed for pain, #50.   2.  Robaxin 750 mg, instructed to take 1 tablet every 4 hours as needed for muscle spasms.   3.  Colace 100 mg, instructed to take 1 tablet twice daily as needed for constipation.      PLAN:   1.  Follow up in Neurosurgery Nurse clinic 04/09/2021.   2.  Follow up in Neurosurgery Clinic 05/06/2021 and followup in Neurosurgery Clinic 06/25/2021.      As always, the patient was instructed to call or return to the clinic for any worsening or changes in symptoms.       As dictated by MAXIME REEDER          D: 03/27/2021   T:  2021   MT: SUNI      Name:     JUANPABLO RODRIGUEZ   MRN:      [id number]        Account:        [id number]   :      1970           Admit Date:     2021                                  Discharge Date:       Document: [id number]

## 2021-03-27 NOTE — PLAN OF CARE
A&Ox4. SBA. Tolerating diet. Pt attempted managing pain with only PO pain meds but needed IV Dilaudid at bedtime. Oxy, atarax, robaxin, dilaudid for pain. Dressing CDI. Some tingling in fingers of LUE-improving per pt. Doing PT exercises independently.

## 2021-03-29 PROBLEM — M79.642 BILATERAL HAND PAIN: Status: RESOLVED | Noted: 2020-12-08 | Resolved: 2021-03-29

## 2021-03-29 PROBLEM — M79.641 BILATERAL HAND PAIN: Status: RESOLVED | Noted: 2020-12-08 | Resolved: 2021-03-29

## 2021-03-29 NOTE — PROGRESS NOTES
Patient seen for one time evaluation and treatment.  Patient did not return for further treatment and current status is unknown.  Please see initial evaluation for further information.     (4) excellent

## 2021-04-09 ENCOUNTER — OFFICE VISIT (OUTPATIENT)
Dept: NEUROSURGERY | Facility: CLINIC | Age: 51
End: 2021-04-09
Payer: COMMERCIAL

## 2021-04-09 VITALS
HEIGHT: 60 IN | HEART RATE: 87 BPM | SYSTOLIC BLOOD PRESSURE: 114 MMHG | DIASTOLIC BLOOD PRESSURE: 79 MMHG | OXYGEN SATURATION: 98 % | BODY MASS INDEX: 31.41 KG/M2 | WEIGHT: 160 LBS

## 2021-04-09 DIAGNOSIS — Z98.1 S/P CERVICAL SPINAL FUSION: Primary | ICD-10-CM

## 2021-04-09 PROCEDURE — 99207 PR NO CHARGE NURSE ONLY: CPT

## 2021-04-09 PROCEDURE — G0463 HOSPITAL OUTPT CLINIC VISIT: HCPCS

## 2021-04-09 PROCEDURE — 999N000103 HC STATISTIC NO CHARGE FACILITY FEE

## 2021-04-09 ASSESSMENT — PAIN SCALES - GENERAL: PAINLEVEL: MODERATE PAIN (4)

## 2021-04-09 ASSESSMENT — MIFFLIN-ST. JEOR: SCORE: 1423.26

## 2021-04-09 NOTE — NURSING NOTE
April 9, 2021 10:31 AM   Oseas Edwards is a 51 year old male who presents for:    Chief Complaint   Patient presents with     Surgical Followup     2 week follow-up, DOS: 3/25/21     Initial Vitals: /79   Pulse 87   Ht 1.524 m (5')   Wt 72.6 kg (160 lb)   SpO2 98%   BMI 31.25 kg/m   Estimated body mass index is 31.25 kg/m  as calculated from the following:    Height as of this encounter: 1.524 m (5').    Weight as of this encounter: 72.6 kg (160 lb). Body surface area is 1.75 meters squared.  Moderate Pain (4) Comment: Data Unavailable       Clinical concerns: Oseas Edwards is here today for 2 week follow-up, DOS: 3/25/21.    Daniel Fernandez MA

## 2021-04-09 NOTE — PROGRESS NOTES
Patient presents for 2 week incision nurse visit check.     DOS: 3/25/2021  Procedure: Occiput-Cervical 4 posterior instrumentation and fusion and C1 and C2 laminectomy  Surgeon: Yobany Craig MD    Pain is described at 4/10, he reports numbness and tingling is better on left side than prior to surgery. He reports that he does have chronic pain. Currently he is taking oxycodone 6, 10mg tabs daily, chronically and robaxin PRN.     Encouraged icing for at least 5-7 times throughout the day for 20-30 minutes at a time, avoiding heat to the incision area. Discussed maximum dose of Acetaminophen in 24 hours, along with holding NSAIDs for a period of 6 weeks.     Patient is walking frequently without difficulty. Legs examined in clinic; no redness, swelling, or warmth noted. Patient denies any pain in bilateral calves. Activity restrictions reinforced at this time as outlined the After Visit Summary.     Patient's appetite is normal  Bowel/bladder problems? No  Taking stool softeners? No  Discussed reasons for constipation post-operatively and encouraged stool softeners while taking narcotic pain medication.     Patient denies signs of infection at incision site. Cervical incision inspected. Edges well-approximated. Mild redness present. No swelling, drainage, or warmth noted. Incision prepped with ChloraPrep and suture(s) removed without difficulty. Steri-strips applied. Aftercare instructions discussed with patient.     Refills given at this appointment? No  Sent for x-rays after this appointment? No  Return to work discussed at this appointment? No    All of patient's questions addressed today. He was instructed to call with any additional questions/concerns.

## 2021-04-09 NOTE — PATIENT INSTRUCTIONS
Instructions for Patient    Keep your incision clean and dry at all times.     No bathing, swimming, or submerging in water until incision is well healed.      No lifting greater than 10-15 pounds. No bending, twisting, or overhead reaching.    Return in 4 weeks for follow up appointment and xray    Weaning from narcotic pain medications    When it is time, start weaning by extending the time between doses.     For example, if you're taking 2 tabs every 4 hours, spread it out to 2 tabs over 4.5, 5, 6 hours.     At that point you can certainly cut down to 1 tab, then wean to an as needed basis until completely done with them.    For refills, please call our clinic. A nurse will call you back to obtain a pain assessment. Please call 3-4 business days before you run out so we can ensure there is a provider available at the location you prefer for .    Call the clinic or go to Emergency Room if you develop any new pain, drainage, swelling, or fever. Go to the Emergency Room if sudden onset of severe headache, weakness, confusion, change in level of consciousness, pain, or loss of movement.    Post-operative appointments    You will need an x-ray before your 6 week post op, 3 months post op, 6 months post op, 1 year post-op appointments (    Please call clinic with any further questions or concerns    HOWARD Vidal  [facility]  [address] [address]  T:  [phone]  F:  [phone]

## 2021-04-19 ENCOUNTER — HOSPITAL ENCOUNTER (OUTPATIENT)
Dept: GENERAL RADIOLOGY | Facility: CLINIC | Age: 51
Discharge: HOME | End: 2021-04-19
Attending: PHYSICIAN ASSISTANT | Admitting: PHYSICIAN ASSISTANT
Payer: COMMERCIAL

## 2021-04-19 DIAGNOSIS — Z98.1 S/P CERVICAL SPINAL FUSION: Primary | ICD-10-CM

## 2021-04-19 DIAGNOSIS — Z98.1 S/P CERVICAL SPINAL FUSION: ICD-10-CM

## 2021-04-19 DIAGNOSIS — G99.2 STENOSIS OF CERVICAL SPINE WITH MYELOPATHY (H): ICD-10-CM

## 2021-04-19 DIAGNOSIS — M48.02 STENOSIS OF CERVICAL SPINE WITH MYELOPATHY (H): ICD-10-CM

## 2021-04-19 PROCEDURE — 72040 X-RAY EXAM NECK SPINE 2-3 VW: CPT

## 2021-04-19 RX ORDER — OXYCODONE HYDROCHLORIDE 5 MG/1
5-10 TABLET ORAL
Qty: 20 TABLET | Refills: 0 | Status: SHIPPED | OUTPATIENT
Start: 2021-04-19 | End: 2021-04-21

## 2021-04-19 NOTE — TELEPHONE ENCOUNTER
Called the patient re the orders placed for the xray. The patient also was requesting a refill of the oxycodone  Now rates the pain 6/10.         Last fill: 3/27 #50  Next visit:  5/6/21     Medication pended for your approval, if appropriate. Pharmacy verified.     [facility] query completed.  Printed and scanned into chart.     Any patient questions or concerns:     Informed patient request will be forwarded to care team.

## 2021-04-19 NOTE — TELEPHONE ENCOUNTER
"Last Visit: 3/25 Occiput-Cervical 4 posterior instrumentation and fusion and C1 and C2 laminectomy with Dr Craig  RIGHT ILLIAC CREST BONE GRAFT HARVEST    Next Visit: 5/6  6 week    Assessment: The patient reports increased pain over the weekend. He rates the pain 7-8/10.  He feels there is increased swelling in the back of head and it is tender to touch. Denies redness. No fever. He denies any injury. The patient states he \"was not able to turn his head and now he can move it about 10th of an inch more and it is rubbing\"  He still has  some numbness in the left hand.  No issues with bowel or bladder.   Patient was seen in the clinic 4/9 for his 2 week post op and his pain was 4/10.  He takes oxycodone 6 tabs per day for his chronic pain. He took an extra oxycodone 5 mg with his usual dose for the first 10 days postop. He is using robaxin 2-3 x daily and ice.   The patient is asking for an xray.     Recommendation given: Requested a picture of the neck/swollen area be sent to [facility] and the information will be sent to the providers.     Action needed from provider:  The patient is requesting an XR to make sure everything is stable.         "

## 2021-04-19 NOTE — TELEPHONE ENCOUNTER
Patient having severe pain at the back of the head where the bolts are located. Wants to have some call as soon as possible.

## 2021-04-20 ENCOUNTER — PREP FOR PROCEDURE (OUTPATIENT)
Dept: NEUROLOGY | Facility: CLINIC | Age: 51
End: 2021-04-20

## 2021-04-20 DIAGNOSIS — M45.2 ANKYLOSING SPONDYLITIS OF CERVICAL REGION (H): Primary | ICD-10-CM

## 2021-04-20 DIAGNOSIS — G99.2 STENOSIS OF CERVICAL SPINE WITH MYELOPATHY (H): ICD-10-CM

## 2021-04-20 DIAGNOSIS — M48.02 STENOSIS OF CERVICAL SPINE WITH MYELOPATHY (H): ICD-10-CM

## 2021-04-20 NOTE — TELEPHONE ENCOUNTER
Last Visit: 4/9      Name of Provider:  Dr Craig    Assessment:  The patient had called twice about the xray results seen on [facility] and wanted to have them reviewed.    Pain is the same as yesterday. He feels a clicking.  He is worried about  not having enough pain medication. A script was sent over yesterday.  He will pick that up.    Recommendation given:  Patient is limiting his activity and taking pain medications as needed.  He will call in for refills as needed.     Action needed from provider:   Review the cervical xray from 4/19.

## 2021-04-21 ENCOUNTER — AMBULATORY - HEALTHEAST (OUTPATIENT)
Dept: LAB | Facility: CLINIC | Age: 51
End: 2021-04-21

## 2021-04-21 DIAGNOSIS — Z11.59 ENCOUNTER FOR SCREENING FOR OTHER VIRAL DISEASES: ICD-10-CM

## 2021-04-21 DIAGNOSIS — Z98.1 S/P CERVICAL SPINAL FUSION: Primary | ICD-10-CM

## 2021-04-21 RX ORDER — OXYCODONE HYDROCHLORIDE 5 MG/1
5-10 TABLET ORAL
Qty: 20 TABLET | Refills: 0 | Status: ON HOLD | OUTPATIENT
Start: 2021-04-21 | End: 2021-04-28

## 2021-04-21 NOTE — TELEPHONE ENCOUNTER
----- Message from Yobany Craig MD sent at 4/20/2021  3:28 PM CDT -----  I have put in a surgery request. I would like to do it next Tuesday at 330p.     Can we get him a CT of the cervical spine up through all the hardware prior?    Thanks  Rajan  ----- Message -----  From: Shaggy Bee PA-C  Sent: 4/20/2021  11:54 AM CDT  To: Yobany Craig MD    S/p:    1.  Occiput to C4 posterior segmental instrumentation.   2.  C1 to C2 laminectomy.   3.  Occiput to C4 arthrodesis using local autograft, as well as right iliac crest bone graft harvest for cancellous and structural autograft.    Started having pain Friday after helping his son put on his coat.     XR CERVICAL SPINE 2/3 VWS 4/19/2021 3:36 PM     Ankylosing spondylitis. Craniocervical fusion. The occipital  plate has pulled out. No loosening of the C2-C4 lateral mass screws.  Severe atlantoaxial degenerative change again noted.      I spoke to him just now. Fairly uncomfortable.    Ivan

## 2021-04-21 NOTE — TELEPHONE ENCOUNTER
Notified patient of updates on POC and upcoming procedure.     Pt asking for neck brace to keep everything in place till surgery, states he hears clicking noise while moving his head/neck will ask LUCILA if this is necessary/appropriate.

## 2021-04-23 ENCOUNTER — AMBULATORY - HEALTHEAST (OUTPATIENT)
Dept: LAB | Facility: CLINIC | Age: 51
End: 2021-04-23

## 2021-04-23 DIAGNOSIS — Z11.59 ENCOUNTER FOR SCREENING FOR OTHER VIRAL DISEASES: ICD-10-CM

## 2021-04-23 NOTE — PROGRESS NOTES
[facility] [address] [place] [address]  Phone: [phone]  Fax: [phone]  Primary Provider: Farhat Forrest  Pre-op Performing Provider: FARHAT FORREST      PREOPERATIVE EVALUATION:  Today's date: 4/26/2021    Oseas Edwards is a 51 year old male who presents for a preoperative evaluation.    Surgical Information:  Surgery/Procedure: Revision of occipito-cervical 4 instrumentation.  Surgery Location:  OR   Surgeon: Dr Yobany Craig  Surgery Date: 4/27/21  Time of Surgery: 1530 pm  Where patient plans to recover: At home with family  Fax number for surgical facility: Note does not need to be faxed, will be available electronically in Epic.    Type of Anesthesia Anticipated: General  Preoperative Questionnaire:   No - Have you ever had a heart attack or stroke?  No - Have you ever had surgery on your heart or blood vessels, such as a stent, coronary (heart) bypass, or surgery on an artery in the head, neck, heart, or legs?  No - Do you have chest pain when you are physically active?  No - Do you have a history of heart failure?  No - Do you currently have a cold, bronchitis, or symptoms of other respiratory (head and chest) infections?  No - Do you have a cough, shortness of breath, or wheezing?  No - Do you or anyone in your family have a history of blood clots?  No - Do you or anyone in your family have a serious bleeding problem, such as long-lasting bleeding after surgeries or cuts?  No - Have you ever had anemia or been told to take iron pills?  No - Have you had any abnormal blood loss such as black, tarry or bloody stools, or abnormal vaginal bleeding?  YES - HAVE YOU EVER HAD A BLOOD TRANSFUSION? 3/25/21  Yes - Are you willing to have a blood transfusion if it is medically needed before, during, or after your surgery?  No - Have you or anyone in your family ever had problems with anesthesia (sedation for surgery)?  No - Do you have sleep apnea, excessive snoring, or  daytime drowsiness?   No - Do you have any artifical heart valves or other implanted medical devices, such as a pacemaker, defibrillator, or continuous glucose monitor?  YES - Do you have any artifical joints? Bilateral hip  No - Are you allergic to latex?  No - Is there any chance that you may be pregnant?    Assessment & Plan      The proposed surgical procedure is considered INTERMEDIATE risk.     Preop general physical exam     Essential Hypertension   Reviewed his current HTN management. Discussed our goal for him is a systolic pressure at or below 128 and diastolic pressure at or below 83.  We today managed his prescriptions with refills ensured to ensure availabilty of current medications.  Discussed the importance for aggressive management of HTN to prevent vascular complications later.  Recommended lower fat, lower carbohydrate, and lower sodium (<2000 mg)diet. Required intervals for follow up on HTN, lab studies reviewed.    Strongly recommened he follow his blood pressures outside the clinic to ensure that BPs are remaining within guidelines,.  Instructed to contact me if the readings are not within guidelines on a regular basis so we can adjust treatment as needed.       Claudication (H)  Saw vascular providers and working through the cervical spine issues first     Chronic narcotic dependence (H)  I am refilling his OxyCodone 10 mg today, we hope to wean in the long term post op period due to less pain.  Has been on chronic narcotic management for his back pain for decades, has tried to wean and has been unsuccessful, hope is this surgery and other management interventions will allow to decrease narcotics     Bilateral hand pain  Has improved since surgery     Ankylosing spondylitis of cervical region (H)  As above     PA (pernicious anemia)  On B12     Hx of gastric ulcer  Noted and not a current issue     Low serum testosterone level  Known issue that I take into account for his medical decisions, no  current exacerbations or new concerns.                 Risks and Recommendations:  The patient has the following additional risks and recommendations for perioperative complications:  Social and Substance:    - High tolerance to opioid analgesics due to patient is taking medications for chronic pain     Medication Instructions:  Patient has taken all scheduled medications this morning     RECOMMENDATION:  APPROVAL GIVEN to proceed with proposed procedure, without further diagnostic evaluation.     Review of external notes as documented above      Subjective     HPI related to upcoming procedure: Has had good resolution of his arm pain and numbness since cervical fusion on 3/25/21. In the recovery period he has noted localized pain in the left side of the incision with a feeling of protrusion under the skin. An xray on 4/19 shows that the occipital plate has pulled out and today is scheduled for a revision by Dr. Craig    Health Care Directive:  Patient does not have a Health Care Directive or Living Will:     Preoperative Review of :   reviewed - controlled substances reflected in medication list.      Status of Chronic Conditions:  See problem list for active medical problems.  Problems all longstanding and stable, except as noted/documented.  See ROS for pertinent symptoms related to these conditions.      Review of Systems  Constitutional, neuro, ENT, endocrine, pulmonary, cardiac, gastrointestinal, genitourinary, musculoskeletal, integument and psychiatric systems are negative, except as otherwise noted.    Patient Active Problem List    Diagnosis Date Noted     Claudication (H) 03/23/2021     Priority: Medium     Ankylosing spondylitis of cervical region (H) 02/23/2021     Priority: Medium     Added automatically from request for surgery [id number]       Spinal instability of cervical region 02/23/2021     Priority: Medium     Added automatically from request for surgery [id number]       Stenosis of cervical  spine with myelopathy (H) 02/22/2021     Priority: Medium     Keloid scar, mostly just dark 02/07/2017     Priority: Medium     Chronic pain syndrome 06/20/2016     Priority: Medium     Hx of gastric ulcer 06/06/2016     Priority: Medium     Rectus diastasis 11/11/2013     Priority: Medium     Chronic narcotic dependence (H) 11/04/2013     Priority: Medium     Due to long term pain management.  Patient is followed by FARHAT ALBARADO for ongoing prescription of pain medication.  All refills should be approved by this provider, or covering partner.    Medication(s): oxycodone IR around 3 per day and Tylenol #3 30/300 max of 4 per day.     Clinic visit frequency required: Q 2 month     Controlled substance agreement on file: Yes       Date(s): 10/15/15    Pain Clinic evaluation in the past: but now referring to [facility] pain clinic    DIRE Total Score(s):  No flowsheet data found.    Last [facility] website verification:  none   [email or web address]         Health Care Home 10/07/2013     Priority: Medium     Low serum testosterone level 01/30/2012     Priority: Medium     PA (pernicious anemia) 06/16/2011     Priority: Medium      Past Medical History:   Diagnosis Date     Ankylosing spondylitis (H)      Arthritis      AS (ankylosing spondylitis) (H) 1985     Cellulitis of leg 2011     Chronic narcotic dependence (H) 11/4/2013    Due to long term pain management.      Hypertension      Keloid scar, mostly just dark 2/7/2017     Low serum testosterone level 1/30/2012     Optic neuritis 3/20/2012     PA (pernicious anemia) 6/16/2011     Past Surgical History:   Procedure Laterality Date     ARTHROPLASTY REVISION HIP Left 2/14/2017    Procedure: ARTHROPLASTY REVISION HIP;  Surgeon: Saravanan Alcaraz MD;  Location:  OR     GRAFT BONE FROM ILIAC CREST Right 3/25/2021    Procedure: RIGHT ILLIAC CREST BONE GRAFT HARVEST;  Surgeon: Yobany Craig MD;  Location:  OR     HERNIORRHAPHY VENTRAL  11/11/2013     Procedure: HERNIORRHAPHY VENTRAL;  UMBILICAL HERNIA REPAIR WITH MESH, RECTUS DIASTASIS REPAIR WITH MESH;  Surgeon: Jason Dodson MD;  Location: [facility]     JOINT REPLACEMENT, HIP RT/LT  1994    Left     JOINT REPLACEMENT, HIP RT/LT  2000ish    Right     OPEN SEPARATION COMPONENT HERNIORRHAPHY  11/11/2013    Procedure: OPEN SEPARATION COMPONENT HERNIORRHAPHY;;  Surgeon: Jason Dodson MD;  Location: [facility]     OPTICAL TRACKING SYSTEM FUSION POSTERIOR CERVICAL THREE + LEVELS N/A 3/25/2021    Procedure: Occiput-Cervical 4 posterior instrumentation and fusion and C1 and C2 laminectomy;  Surgeon: Yobany Craig MD;  Location:  OR     Current Outpatient Medications   Medication Sig Dispense Refill     cyanocobalamin (CYANOCOBALAMIN) 1000 MCG/ML injection INJECT 1 ML (CC) ONCE EVERY MONTH. NEED B12 LEVELS CHECKED BEFORE NEXT REFILL 3 mL 0     cyclobenzaprine (FLEXERIL) 10 MG tablet Take 10 mg by mouth 3 times daily as needed for muscle spasms       diclofenac (VOLTAREN) 1 % GEL topical gel Apply topically daily as needed for moderate pain        docusate sodium (COLACE) 100 MG capsule Take 1 capsule (100 mg) by mouth 2 times daily as needed for constipation 60 capsule 0     etanercept (ENBREL) 50 MG/ML injection Inject 50 mg Subcutaneous once a week (on Saturday)       finasteride (PROSCAR) 5 MG tablet Take 1 tablet (5 mg) by mouth daily (Patient taking differently: Take 1 tablet by mouth At Bedtime ) 90 tablet 0     Hypromellose (ARTIFICIAL TEARS OP) Place 2-3 drops into both eyes 3 times daily as needed        indomethacin (INDOCIN) 50 MG capsule Take 1 capsule by mouth once daily (Patient taking differently: Take 50 mg by mouth At Bedtime Take 1 capsule by mouth once daily) 90 capsule 0     lisinopril (ZESTRIL) 5 MG tablet Take 1 tablet (5 mg) by mouth daily (Patient taking differently: Take 5 mg by mouth daily (in addition to lisinopril-hydrochlorothiazide 10-12.5 mg morning dose)) 90 tablet  3     lisinopril-hydrochlorothiazide (ZESTORETIC) 10-12.5 MG tablet Take 1 tablet by mouth once daily 90 tablet 0     Multiple Vitamins-Iron (MULTIVITAMIN/IRON PO) Take 1 tablet by mouth daily       oxyCODONE (ROXICODONE) 5 MG tablet Take 1-2 tablets (5-10 mg) by mouth every 3 hours as needed for moderate to severe pain 20 tablet 0     oxyCODONE IR (ROXICODONE) 10 MG tablet Take 1 tablet (10 mg) by mouth every 4 hours as needed for pain 180 tablet 0     testosterone (ANDROGEL/TESTIM) 50 MG/5GM (1%) topical gel Place 1 packet (50 mg) onto the skin daily (Patient taking differently: Place 50 mg onto the skin every 3 days (after showering)) 90 packet 3     NARCAN 4 MG/0.1ML nasal spray          Allergies   Allergen Reactions     Infliximab      Other reaction(s): *Unknown  Caused LT eye blindness     No Clinical Screening - See Comments      methotrexate- mood alterating     Remicade [Infliximab Injection]      Blindness in one eye        Social History     Tobacco Use     Smoking status: Former Smoker     Quit date: 2009     Years since quittin.3     Smokeless tobacco: Never Used     Tobacco comment: quit 2 1/2 yrs   Substance Use Topics     Alcohol use: Yes     Comment: 1 to 2 per week     Family History   Problem Relation Age of Onset     Diabetes Mother      Coronary Artery Disease Mother      History   Drug Use No         Objective     BP (!) 146/82   Pulse 92   Temp 98.1  F (36.7  C) (Temporal)   Ht 1.524 m (5')   Wt 71.4 kg (157 lb 6.4 oz)   BMI 30.74 kg/m      Physical Exam    GENERAL APPEARANCE: healthy, alert and no distress     EYES: EOMI,  PERRL     HENT: ear canals and TM's normal and nose and mouth without ulcers or lesions     NECK: no adenopathy, no asymmetry, masses, or scars and thyroid normal to palpation     RESP: lungs clear to auscultation - no rales, rhonchi or wheezes     CV: regular rates and rhythm, normal S1 S2, no S3 or S4 and no murmur, click or rub     ABDOMEN:  soft,  nontender, no HSM or masses and bowel sounds normal     MS: extremities normal- no gross deformities noted, no evidence of inflammation in joints, FROM in all extremities.     MS: decreased range of motion in the cervical spine with small protrusion on the left side that is sore to the touch.     SKIN: no suspicious lesions or rashes     NEURO: Normal strength and tone, sensory exam grossly normal, mentation intact and speech normal     PSYCH: mentation appears normal. and affect normal/bright     LYMPHATICS: No cervical adenopathy    Recent Labs   Lab Test 03/27/21  0801 03/26/21  0731 03/23/21  0000 03/23/21   HGB 13.9 14.3  --  14.8     --   --  182    136  --   --    POTASSIUM 3.8 3.5   < >  --    CR 0.86 1.01   < >  --     < > = values in this interval not displayed.        Diagnostics:  No labs were ordered during this visit.   No EKG this visit, completed in the last 90 days.    Revised Cardiac Risk Index (RCRI):  The patient has the following serious cardiovascular risks for perioperative complications:   - No serious cardiac risks = 0 points     RCRI Interpretation: 0 points: Class I (very low risk - 0.4% complication rate)           Signed Electronically by: Willy Forrest MD  Copy of this evaluation report is provided to requesting physician.

## 2021-04-23 NOTE — PATIENT INSTRUCTIONS

## 2021-04-23 NOTE — H&P (VIEW-ONLY)
[facility] [address] [place] [address]  Phone: [phone]  Fax: [phone]  Primary Provider: Farhat Forrest  Pre-op Performing Provider: FARHAT FORREST      PREOPERATIVE EVALUATION:  Today's date: 4/26/2021    Oseas Edwards is a 51 year old male who presents for a preoperative evaluation.    Surgical Information:  Surgery/Procedure: Revision of occipito-cervical 4 instrumentation.  Surgery Location:  OR   Surgeon: Dr Yobany Craig  Surgery Date: 4/27/21  Time of Surgery: 1530 pm  Where patient plans to recover: At home with family  Fax number for surgical facility: Note does not need to be faxed, will be available electronically in Epic.    Type of Anesthesia Anticipated: General  Preoperative Questionnaire:   No - Have you ever had a heart attack or stroke?  No - Have you ever had surgery on your heart or blood vessels, such as a stent, coronary (heart) bypass, or surgery on an artery in the head, neck, heart, or legs?  No - Do you have chest pain when you are physically active?  No - Do you have a history of heart failure?  No - Do you currently have a cold, bronchitis, or symptoms of other respiratory (head and chest) infections?  No - Do you have a cough, shortness of breath, or wheezing?  No - Do you or anyone in your family have a history of blood clots?  No - Do you or anyone in your family have a serious bleeding problem, such as long-lasting bleeding after surgeries or cuts?  No - Have you ever had anemia or been told to take iron pills?  No - Have you had any abnormal blood loss such as black, tarry or bloody stools, or abnormal vaginal bleeding?  YES - HAVE YOU EVER HAD A BLOOD TRANSFUSION? 3/25/21  Yes - Are you willing to have a blood transfusion if it is medically needed before, during, or after your surgery?  No - Have you or anyone in your family ever had problems with anesthesia (sedation for surgery)?  No - Do you have sleep apnea, excessive snoring, or  daytime drowsiness?   No - Do you have any artifical heart valves or other implanted medical devices, such as a pacemaker, defibrillator, or continuous glucose monitor?  YES - Do you have any artifical joints? Bilateral hip  No - Are you allergic to latex?  No - Is there any chance that you may be pregnant?    Assessment & Plan      The proposed surgical procedure is considered INTERMEDIATE risk.     Preop general physical exam     Essential Hypertension   Reviewed his current HTN management. Discussed our goal for him is a systolic pressure at or below 128 and diastolic pressure at or below 83.  We today managed his prescriptions with refills ensured to ensure availabilty of current medications.  Discussed the importance for aggressive management of HTN to prevent vascular complications later.  Recommended lower fat, lower carbohydrate, and lower sodium (<2000 mg)diet. Required intervals for follow up on HTN, lab studies reviewed.    Strongly recommened he follow his blood pressures outside the clinic to ensure that BPs are remaining within guidelines,.  Instructed to contact me if the readings are not within guidelines on a regular basis so we can adjust treatment as needed.       Claudication (H)  Saw vascular providers and working through the cervical spine issues first     Chronic narcotic dependence (H)  I am refilling his OxyCodone 10 mg today, we hope to wean in the long term post op period due to less pain.  Has been on chronic narcotic management for his back pain for decades, has tried to wean and has been unsuccessful, hope is this surgery and other management interventions will allow to decrease narcotics     Bilateral hand pain  Has improved since surgery     Ankylosing spondylitis of cervical region (H)  As above     PA (pernicious anemia)  On B12     Hx of gastric ulcer  Noted and not a current issue     Low serum testosterone level  Known issue that I take into account for his medical decisions, no  current exacerbations or new concerns.                 Risks and Recommendations:  The patient has the following additional risks and recommendations for perioperative complications:  Social and Substance:    - High tolerance to opioid analgesics due to patient is taking medications for chronic pain     Medication Instructions:  Patient has taken all scheduled medications this morning     RECOMMENDATION:  APPROVAL GIVEN to proceed with proposed procedure, without further diagnostic evaluation.     Review of external notes as documented above      Subjective     HPI related to upcoming procedure: Has had good resolution of his arm pain and numbness since cervical fusion on 3/25/21. In the recovery period he has noted localized pain in the left side of the incision with a feeling of protrusion under the skin. An xray on 4/19 shows that the occipital plate has pulled out and today is scheduled for a revision by Dr. Craig    Health Care Directive:  Patient does not have a Health Care Directive or Living Will:     Preoperative Review of :   reviewed - controlled substances reflected in medication list.      Status of Chronic Conditions:  See problem list for active medical problems.  Problems all longstanding and stable, except as noted/documented.  See ROS for pertinent symptoms related to these conditions.      Review of Systems  Constitutional, neuro, ENT, endocrine, pulmonary, cardiac, gastrointestinal, genitourinary, musculoskeletal, integument and psychiatric systems are negative, except as otherwise noted.    Patient Active Problem List    Diagnosis Date Noted     Claudication (H) 03/23/2021     Priority: Medium     Ankylosing spondylitis of cervical region (H) 02/23/2021     Priority: Medium     Added automatically from request for surgery [id number]       Spinal instability of cervical region 02/23/2021     Priority: Medium     Added automatically from request for surgery [id number]       Stenosis of cervical  spine with myelopathy (H) 02/22/2021     Priority: Medium     Keloid scar, mostly just dark 02/07/2017     Priority: Medium     Chronic pain syndrome 06/20/2016     Priority: Medium     Hx of gastric ulcer 06/06/2016     Priority: Medium     Rectus diastasis 11/11/2013     Priority: Medium     Chronic narcotic dependence (H) 11/04/2013     Priority: Medium     Due to long term pain management.  Patient is followed by FARHAT ALBARADO for ongoing prescription of pain medication.  All refills should be approved by this provider, or covering partner.    Medication(s): oxycodone IR around 3 per day and Tylenol #3 30/300 max of 4 per day.     Clinic visit frequency required: Q 2 month     Controlled substance agreement on file: Yes       Date(s): 10/15/15    Pain Clinic evaluation in the past: but now referring to [facility] pain clinic    DIRE Total Score(s):  No flowsheet data found.    Last [facility] website verification:  none   [email or web address]         Health Care Home 10/07/2013     Priority: Medium     Low serum testosterone level 01/30/2012     Priority: Medium     PA (pernicious anemia) 06/16/2011     Priority: Medium      Past Medical History:   Diagnosis Date     Ankylosing spondylitis (H)      Arthritis      AS (ankylosing spondylitis) (H) 1985     Cellulitis of leg 2011     Chronic narcotic dependence (H) 11/4/2013    Due to long term pain management.      Hypertension      Keloid scar, mostly just dark 2/7/2017     Low serum testosterone level 1/30/2012     Optic neuritis 3/20/2012     PA (pernicious anemia) 6/16/2011     Past Surgical History:   Procedure Laterality Date     ARTHROPLASTY REVISION HIP Left 2/14/2017    Procedure: ARTHROPLASTY REVISION HIP;  Surgeon: Saravanan Alcaraz MD;  Location:  OR     GRAFT BONE FROM ILIAC CREST Right 3/25/2021    Procedure: RIGHT ILLIAC CREST BONE GRAFT HARVEST;  Surgeon: Yobany Craig MD;  Location:  OR     HERNIORRHAPHY VENTRAL  11/11/2013     Procedure: HERNIORRHAPHY VENTRAL;  UMBILICAL HERNIA REPAIR WITH MESH, RECTUS DIASTASIS REPAIR WITH MESH;  Surgeon: Jason Dodson MD;  Location: [facility]     JOINT REPLACEMENT, HIP RT/LT  1994    Left     JOINT REPLACEMENT, HIP RT/LT  2000ish    Right     OPEN SEPARATION COMPONENT HERNIORRHAPHY  11/11/2013    Procedure: OPEN SEPARATION COMPONENT HERNIORRHAPHY;;  Surgeon: Jason Dodson MD;  Location: [facility]     OPTICAL TRACKING SYSTEM FUSION POSTERIOR CERVICAL THREE + LEVELS N/A 3/25/2021    Procedure: Occiput-Cervical 4 posterior instrumentation and fusion and C1 and C2 laminectomy;  Surgeon: Yobnay Craig MD;  Location:  OR     Current Outpatient Medications   Medication Sig Dispense Refill     cyanocobalamin (CYANOCOBALAMIN) 1000 MCG/ML injection INJECT 1 ML (CC) ONCE EVERY MONTH. NEED B12 LEVELS CHECKED BEFORE NEXT REFILL 3 mL 0     cyclobenzaprine (FLEXERIL) 10 MG tablet Take 10 mg by mouth 3 times daily as needed for muscle spasms       diclofenac (VOLTAREN) 1 % GEL topical gel Apply topically daily as needed for moderate pain        docusate sodium (COLACE) 100 MG capsule Take 1 capsule (100 mg) by mouth 2 times daily as needed for constipation 60 capsule 0     etanercept (ENBREL) 50 MG/ML injection Inject 50 mg Subcutaneous once a week (on Saturday)       finasteride (PROSCAR) 5 MG tablet Take 1 tablet (5 mg) by mouth daily (Patient taking differently: Take 1 tablet by mouth At Bedtime ) 90 tablet 0     Hypromellose (ARTIFICIAL TEARS OP) Place 2-3 drops into both eyes 3 times daily as needed        indomethacin (INDOCIN) 50 MG capsule Take 1 capsule by mouth once daily (Patient taking differently: Take 50 mg by mouth At Bedtime Take 1 capsule by mouth once daily) 90 capsule 0     lisinopril (ZESTRIL) 5 MG tablet Take 1 tablet (5 mg) by mouth daily (Patient taking differently: Take 5 mg by mouth daily (in addition to lisinopril-hydrochlorothiazide 10-12.5 mg morning dose)) 90 tablet  3     lisinopril-hydrochlorothiazide (ZESTORETIC) 10-12.5 MG tablet Take 1 tablet by mouth once daily 90 tablet 0     Multiple Vitamins-Iron (MULTIVITAMIN/IRON PO) Take 1 tablet by mouth daily       oxyCODONE (ROXICODONE) 5 MG tablet Take 1-2 tablets (5-10 mg) by mouth every 3 hours as needed for moderate to severe pain 20 tablet 0     oxyCODONE IR (ROXICODONE) 10 MG tablet Take 1 tablet (10 mg) by mouth every 4 hours as needed for pain 180 tablet 0     testosterone (ANDROGEL/TESTIM) 50 MG/5GM (1%) topical gel Place 1 packet (50 mg) onto the skin daily (Patient taking differently: Place 50 mg onto the skin every 3 days (after showering)) 90 packet 3     NARCAN 4 MG/0.1ML nasal spray          Allergies   Allergen Reactions     Infliximab      Other reaction(s): *Unknown  Caused LT eye blindness     No Clinical Screening - See Comments      methotrexate- mood alterating     Remicade [Infliximab Injection]      Blindness in one eye        Social History     Tobacco Use     Smoking status: Former Smoker     Quit date: 2009     Years since quittin.3     Smokeless tobacco: Never Used     Tobacco comment: quit 2 1/2 yrs   Substance Use Topics     Alcohol use: Yes     Comment: 1 to 2 per week     Family History   Problem Relation Age of Onset     Diabetes Mother      Coronary Artery Disease Mother      History   Drug Use No         Objective     BP (!) 146/82   Pulse 92   Temp 98.1  F (36.7  C) (Temporal)   Ht 1.524 m (5')   Wt 71.4 kg (157 lb 6.4 oz)   BMI 30.74 kg/m      Physical Exam    GENERAL APPEARANCE: healthy, alert and no distress     EYES: EOMI,  PERRL     HENT: ear canals and TM's normal and nose and mouth without ulcers or lesions     NECK: no adenopathy, no asymmetry, masses, or scars and thyroid normal to palpation     RESP: lungs clear to auscultation - no rales, rhonchi or wheezes     CV: regular rates and rhythm, normal S1 S2, no S3 or S4 and no murmur, click or rub     ABDOMEN:  soft,  nontender, no HSM or masses and bowel sounds normal     MS: extremities normal- no gross deformities noted, no evidence of inflammation in joints, FROM in all extremities.     MS: decreased range of motion in the cervical spine with small protrusion on the left side that is sore to the touch.     SKIN: no suspicious lesions or rashes     NEURO: Normal strength and tone, sensory exam grossly normal, mentation intact and speech normal     PSYCH: mentation appears normal. and affect normal/bright     LYMPHATICS: No cervical adenopathy    Recent Labs   Lab Test 03/27/21  0801 03/26/21  0731 03/23/21  0000 03/23/21   HGB 13.9 14.3  --  14.8     --   --  182    136  --   --    POTASSIUM 3.8 3.5   < >  --    CR 0.86 1.01   < >  --     < > = values in this interval not displayed.        Diagnostics:  No labs were ordered during this visit.   No EKG this visit, completed in the last 90 days.    Revised Cardiac Risk Index (RCRI):  The patient has the following serious cardiovascular risks for perioperative complications:   - No serious cardiac risks = 0 points     RCRI Interpretation: 0 points: Class I (very low risk - 0.4% complication rate)           Signed Electronically by: Willy Forrest MD  Copy of this evaluation report is provided to requesting physician.

## 2021-04-24 LAB
SARS-COV-2 PCR COMMENT: NORMAL
SARS-COV-2 RNA SPEC QL NAA+PROBE: NEGATIVE
SARS-COV-2 VIRUS SPECIMEN SOURCE: NORMAL

## 2021-04-25 ENCOUNTER — COMMUNICATION - HEALTHEAST (OUTPATIENT)
Dept: SCHEDULING | Facility: CLINIC | Age: 51
End: 2021-04-25

## 2021-04-25 DIAGNOSIS — I10 ESSENTIAL HYPERTENSION: Primary | ICD-10-CM

## 2021-04-25 RX ORDER — LISINOPRIL/HYDROCHLOROTHIAZIDE 10-12.5 MG
TABLET ORAL
Qty: 90 TABLET | Refills: 0 | Status: SHIPPED | OUTPATIENT
Start: 2021-04-25 | End: 2021-07-26

## 2021-04-26 ENCOUNTER — OFFICE VISIT (OUTPATIENT)
Dept: FAMILY MEDICINE | Facility: CLINIC | Age: 51
End: 2021-04-26

## 2021-04-26 ENCOUNTER — HOSPITAL ENCOUNTER (OUTPATIENT)
Dept: CT IMAGING | Facility: CLINIC | Age: 51
Discharge: HOME | End: 2021-04-26
Attending: NEUROLOGICAL SURGERY | Admitting: NEUROLOGICAL SURGERY
Payer: COMMERCIAL

## 2021-04-26 VITALS
SYSTOLIC BLOOD PRESSURE: 146 MMHG | HEIGHT: 60 IN | TEMPERATURE: 98.1 F | DIASTOLIC BLOOD PRESSURE: 82 MMHG | BODY MASS INDEX: 30.9 KG/M2 | WEIGHT: 157.4 LBS | HEART RATE: 92 BPM

## 2021-04-26 DIAGNOSIS — L91.0 KELOID SCAR: ICD-10-CM

## 2021-04-26 DIAGNOSIS — Z87.11 HX OF GASTRIC ULCER: ICD-10-CM

## 2021-04-26 DIAGNOSIS — F11.20 CHRONIC NARCOTIC DEPENDENCE (H): ICD-10-CM

## 2021-04-26 DIAGNOSIS — R79.89 LOW SERUM TESTOSTERONE LEVEL: ICD-10-CM

## 2021-04-26 DIAGNOSIS — Z01.818 PREOP GENERAL PHYSICAL EXAM: Primary | ICD-10-CM

## 2021-04-26 DIAGNOSIS — G99.2 STENOSIS OF CERVICAL SPINE WITH MYELOPATHY (H): ICD-10-CM

## 2021-04-26 DIAGNOSIS — M48.02 STENOSIS OF CERVICAL SPINE WITH MYELOPATHY (H): ICD-10-CM

## 2021-04-26 DIAGNOSIS — Z98.1 S/P CERVICAL SPINAL FUSION: ICD-10-CM

## 2021-04-26 DIAGNOSIS — I10 ESSENTIAL HYPERTENSION: ICD-10-CM

## 2021-04-26 DIAGNOSIS — M45.2 ANKYLOSING SPONDYLITIS OF CERVICAL REGION (H): ICD-10-CM

## 2021-04-26 DIAGNOSIS — M45.9 ANKYLOSING SPONDYLITIS, UNSPECIFIED SITE OF SPINE (H): ICD-10-CM

## 2021-04-26 DIAGNOSIS — I73.9 CLAUDICATION (H): ICD-10-CM

## 2021-04-26 DIAGNOSIS — D51.0 PA (PERNICIOUS ANEMIA): Chronic | ICD-10-CM

## 2021-04-26 LAB
% GRANULOCYTES: 73.2 % (ref 42.2–75.2)
HCT VFR BLD AUTO: 40.2 % (ref 39–51)
HEMOGLOBIN: 13.6 G/DL (ref 13.4–17.5)
LYMPHOCYTES NFR BLD AUTO: 21.3 % (ref 20.5–51.1)
MCH RBC QN AUTO: 31.6 PG (ref 27–31)
MCHC RBC AUTO-ENTMCNC: 34 G/DL (ref 33–37)
MCV RBC AUTO: 92.9 FL (ref 80–100)
MONOCYTES NFR BLD AUTO: 5.5 % (ref 1.7–9.3)
PLATELET # BLD AUTO: 244 K/UL (ref 140–450)
RBC # BLD AUTO: 4.32 X10/CMM (ref 4.2–5.9)
WBC # BLD AUTO: 5.8 X10/CMM (ref 3.8–11)

## 2021-04-26 PROCEDURE — 93050 ART PRESSURE WAVEFORM ANALYS: CPT | Performed by: FAMILY MEDICINE

## 2021-04-26 PROCEDURE — 36415 COLL VENOUS BLD VENIPUNCTURE: CPT | Performed by: FAMILY MEDICINE

## 2021-04-26 PROCEDURE — 85025 COMPLETE CBC W/AUTO DIFF WBC: CPT | Performed by: FAMILY MEDICINE

## 2021-04-26 PROCEDURE — 99215 OFFICE O/P EST HI 40 MIN: CPT | Performed by: FAMILY MEDICINE

## 2021-04-26 PROCEDURE — 72125 CT NECK SPINE W/O DYE: CPT

## 2021-04-26 RX ORDER — NALOXONE HYDROCHLORIDE 4 MG/.1ML
SPRAY NASAL
COMMUNITY
Start: 2021-03-23

## 2021-04-26 RX ORDER — CYCLOBENZAPRINE HCL 10 MG
10 TABLET ORAL 3 TIMES DAILY PRN
Status: ON HOLD | COMMUNITY
End: 2021-04-28

## 2021-04-26 ASSESSMENT — MIFFLIN-ST. JEOR: SCORE: 1411.46

## 2021-04-26 NOTE — PROGRESS NOTES
PTA medications updated by Medication Scribe prior to surgery via phone call with patient        Comments:    Medication history sources: Patient, Surescripts and H&P  In the past week, patient estimated taking medication this percent of the time: Greater than 90%  Adherence assessment: N/A Not Observed    Significant changes made to the medication list:  None      Additional medication history information:   None    The information provided in this note is only as accurate as the sources available at the time of update(s)      Prior to Admission medications    Medication Sig Last Dose Taking? Auth Provider   cyanocobalamin (CYANOCOBALAMIN) 1000 MCG/ML injection INJECT 1 ML (CC) ONCE EVERY MONTH. NEED B12 LEVELS CHECKED BEFORE NEXT REFILL  at PRN Yes Willy Forrest MD   cyclobenzaprine (FLEXERIL) 10 MG tablet Take 10 mg by mouth 3 times daily as needed for muscle spasms  at PM Yes Reported, Patient   diclofenac (VOLTAREN) 1 % GEL topical gel Apply topically daily as needed for moderate pain   at PRN Yes Reported, Patient   docusate sodium (COLACE) 100 MG capsule Take 1 capsule (100 mg) by mouth 2 times daily as needed for constipation  at AM Yes Reba Santoyo PA-C   etanercept (ENBREL) 50 MG/ML injection Inject 50 mg Subcutaneous once a week (on Saturday)  at AM Yes Reported, Patient   finasteride (PROSCAR) 5 MG tablet Take 1 tablet (5 mg) by mouth daily  Patient taking differently: Take 1 tablet by mouth At Bedtime   at PM Yes Willy Forrest MD   Hypromellose (ARTIFICIAL TEARS OP) Place 2-3 drops into both eyes 3 times daily as needed   at PRN Yes Reported, Patient   indomethacin (INDOCIN) 50 MG capsule Take 1 capsule by mouth once daily  Patient taking differently: Take 50 mg by mouth At Bedtime Take 1 capsule by mouth once daily  at PM Yes Willy Forrest MD   lisinopril (ZESTRIL) 5 MG tablet Take 1 tablet (5 mg) by mouth daily  Patient taking differently: Take 5 mg by mouth daily (in  addition to lisinopril-hydrochlorothiazide 10-12.5 mg morning dose)  at AM Yes Willy Forrest MD   lisinopril-hydrochlorothiazide (ZESTORETIC) 10-12.5 MG tablet Take 1 tablet by mouth once daily  at AM Yes Willy Forrest MD   Multiple Vitamins-Iron (MULTIVITAMIN/IRON PO) Take 1 tablet by mouth daily  at AM Yes Reported, Patient   oxyCODONE (ROXICODONE) 5 MG tablet Take 1-2 tablets (5-10 mg) by mouth every 3 hours as needed for moderate to severe pain  at AM Yes Yobany Craig MD   oxyCODONE IR (ROXICODONE) 10 MG tablet Take 1 tablet (10 mg) by mouth every 4 hours as needed for pain  at AM Yes Willy Forrest MD   testosterone (ANDROGEL/TESTIM) 50 MG/5GM (1%) topical gel Place 1 packet (50 mg) onto the skin daily  Patient taking differently: Place 50 mg onto the skin every 3 days (after showering)  at PRN Yes Willy Forrest MD

## 2021-04-27 ENCOUNTER — ANESTHESIA (OUTPATIENT)
Dept: SURGERY | Facility: CLINIC | Age: 51
DRG: 516 | End: 2021-04-27
Payer: COMMERCIAL

## 2021-04-27 ENCOUNTER — HOSPITAL ENCOUNTER (INPATIENT)
Facility: CLINIC | Age: 51
LOS: 1 days | Discharge: HOME | DRG: 516 | End: 2021-04-28
Attending: NEUROLOGICAL SURGERY | Admitting: NEUROLOGICAL SURGERY
Payer: COMMERCIAL

## 2021-04-27 ENCOUNTER — ANESTHESIA EVENT (OUTPATIENT)
Dept: SURGERY | Facility: CLINIC | Age: 51
DRG: 516 | End: 2021-04-27
Payer: COMMERCIAL

## 2021-04-27 ENCOUNTER — APPOINTMENT (OUTPATIENT)
Dept: GENERAL RADIOLOGY | Facility: CLINIC | Age: 51
DRG: 516 | End: 2021-04-27
Attending: NEUROLOGICAL SURGERY
Payer: COMMERCIAL

## 2021-04-27 DIAGNOSIS — G99.2 STENOSIS OF CERVICAL SPINE WITH MYELOPATHY (H): ICD-10-CM

## 2021-04-27 DIAGNOSIS — M48.02 STENOSIS OF CERVICAL SPINE WITH MYELOPATHY (H): ICD-10-CM

## 2021-04-27 DIAGNOSIS — M45.2 ANKYLOSING SPONDYLITIS OF CERVICAL REGION (H): ICD-10-CM

## 2021-04-27 DIAGNOSIS — Z98.1 S/P CERVICAL SPINAL FUSION: Primary | ICD-10-CM

## 2021-04-27 LAB
CREAT SERPL-MCNC: 0.95 MG/DL (ref 0.66–1.25)
GFR SERPL CREATININE-BSD FRML MDRD: >90 ML/MIN/{1.73_M2}
POTASSIUM SERPL-SCNC: 4.1 MMOL/L (ref 3.4–5.3)

## 2021-04-27 PROCEDURE — 250N000013 HC RX MED GY IP 250 OP 250 PS 637: Performed by: NEUROLOGICAL SURGERY

## 2021-04-27 PROCEDURE — 22849 REINSERT SPINAL FIXATION: CPT | Mod: AS | Performed by: PHYSICIAN ASSISTANT

## 2021-04-27 PROCEDURE — 250N000009 HC RX 250: Performed by: NURSE ANESTHETIST, CERTIFIED REGISTERED

## 2021-04-27 PROCEDURE — 258N000003 HC RX IP 258 OP 636: Performed by: NURSE ANESTHETIST, CERTIFIED REGISTERED

## 2021-04-27 PROCEDURE — 250N000011 HC RX IP 250 OP 636: Performed by: PHYSICIAN ASSISTANT

## 2021-04-27 PROCEDURE — 710N000009 HC RECOVERY PHASE 1, LEVEL 1, PER MIN: Performed by: NEUROLOGICAL SURGERY

## 2021-04-27 PROCEDURE — 120N000001 HC R&B MED SURG/OB

## 2021-04-27 PROCEDURE — 36415 COLL VENOUS BLD VENIPUNCTURE: CPT | Performed by: ANESTHESIOLOGY

## 2021-04-27 PROCEDURE — 370N000017 HC ANESTHESIA TECHNICAL FEE, PER MIN: Performed by: NEUROLOGICAL SURGERY

## 2021-04-27 PROCEDURE — 250N000011 HC RX IP 250 OP 636: Performed by: ANESTHESIOLOGY

## 2021-04-27 PROCEDURE — 258N000003 HC RX IP 258 OP 636: Performed by: PHYSICIAN ASSISTANT

## 2021-04-27 PROCEDURE — 22830 EXPLORATION OF SPINAL FUSION: CPT | Mod: 78 | Performed by: NEUROLOGICAL SURGERY

## 2021-04-27 PROCEDURE — 22830 EXPLORATION OF SPINAL FUSION: CPT | Mod: AS | Performed by: PHYSICIAN ASSISTANT

## 2021-04-27 PROCEDURE — 250N000011 HC RX IP 250 OP 636: Performed by: NURSE ANESTHETIST, CERTIFIED REGISTERED

## 2021-04-27 PROCEDURE — 272N000001 HC OR GENERAL SUPPLY STERILE: Performed by: NEUROLOGICAL SURGERY

## 2021-04-27 PROCEDURE — 250N000011 HC RX IP 250 OP 636: Performed by: NEUROLOGICAL SURGERY

## 2021-04-27 PROCEDURE — 250N000006 HC OR RX SURGIFLO W/THROMBIN KIT 2ML 1991 OPNP: Performed by: NEUROLOGICAL SURGERY

## 2021-04-27 PROCEDURE — 999N000180 XR SURGERY CARM FLUORO LESS THAN 5 MIN

## 2021-04-27 PROCEDURE — 0RP104Z REMOVAL OF INTERNAL FIXATION DEVICE FROM CERVICAL VERTEBRAL JOINT, OPEN APPROACH: ICD-10-PCS | Performed by: NEUROLOGICAL SURGERY

## 2021-04-27 PROCEDURE — C1713 ANCHOR/SCREW BN/BN,TIS/BN: HCPCS | Performed by: NEUROLOGICAL SURGERY

## 2021-04-27 PROCEDURE — 250N000025 HC SEVOFLURANE, PER MIN: Performed by: NEUROLOGICAL SURGERY

## 2021-04-27 PROCEDURE — 0RP004Z REMOVAL OF INTERNAL FIXATION DEVICE FROM OCCIPITAL-CERVICAL JOINT, OPEN APPROACH: ICD-10-PCS | Performed by: NEUROLOGICAL SURGERY

## 2021-04-27 PROCEDURE — 250N000013 HC RX MED GY IP 250 OP 250 PS 637: Performed by: PHYSICIAN ASSISTANT

## 2021-04-27 PROCEDURE — 0RH104Z INSERTION OF INTERNAL FIXATION DEVICE INTO CERVICAL VERTEBRAL JOINT, OPEN APPROACH: ICD-10-PCS | Performed by: NEUROLOGICAL SURGERY

## 2021-04-27 PROCEDURE — 250N000009 HC RX 250: Performed by: NEUROLOGICAL SURGERY

## 2021-04-27 PROCEDURE — 82565 ASSAY OF CREATININE: CPT | Performed by: ANESTHESIOLOGY

## 2021-04-27 PROCEDURE — 0RH004Z INSERTION OF INTERNAL FIXATION DEVICE INTO OCCIPITAL-CERVICAL JOINT, OPEN APPROACH: ICD-10-PCS | Performed by: NEUROLOGICAL SURGERY

## 2021-04-27 PROCEDURE — 258N000003 HC RX IP 258 OP 636: Performed by: ANESTHESIOLOGY

## 2021-04-27 PROCEDURE — P9041 ALBUMIN (HUMAN),5%, 50ML: HCPCS | Performed by: NURSE ANESTHETIST, CERTIFIED REGISTERED

## 2021-04-27 PROCEDURE — 84132 ASSAY OF SERUM POTASSIUM: CPT | Performed by: ANESTHESIOLOGY

## 2021-04-27 PROCEDURE — 360N000085 HC SURGERY LEVEL 5 W/ FLUORO, PER MIN: Performed by: NEUROLOGICAL SURGERY

## 2021-04-27 PROCEDURE — 999N000141 HC STATISTIC PRE-PROCEDURE NURSING ASSESSMENT: Performed by: NEUROLOGICAL SURGERY

## 2021-04-27 PROCEDURE — 22849 REINSERT SPINAL FIXATION: CPT | Mod: 78 | Performed by: NEUROLOGICAL SURGERY

## 2021-04-27 DEVICE — IMPLANTABLE DEVICE: Type: IMPLANTABLE DEVICE | Site: SPINE CERVICAL | Status: FUNCTIONAL

## 2021-04-27 RX ORDER — HYDROMORPHONE HYDROCHLORIDE 1 MG/ML
0.4 INJECTION, SOLUTION INTRAMUSCULAR; INTRAVENOUS; SUBCUTANEOUS
Status: DISCONTINUED | OUTPATIENT
Start: 2021-04-27 | End: 2021-04-28 | Stop reason: HOSPADM

## 2021-04-27 RX ORDER — FENTANYL CITRATE 50 UG/ML
25-50 INJECTION, SOLUTION INTRAMUSCULAR; INTRAVENOUS
Status: DISCONTINUED | OUTPATIENT
Start: 2021-04-27 | End: 2021-04-27 | Stop reason: HOSPADM

## 2021-04-27 RX ORDER — BISACODYL 10 MG
10 SUPPOSITORY, RECTAL RECTAL DAILY PRN
Status: DISCONTINUED | OUTPATIENT
Start: 2021-04-27 | End: 2021-04-28 | Stop reason: HOSPADM

## 2021-04-27 RX ORDER — FENTANYL CITRATE 50 UG/ML
INJECTION, SOLUTION INTRAMUSCULAR; INTRAVENOUS PRN
Status: DISCONTINUED | OUTPATIENT
Start: 2021-04-27 | End: 2021-04-27

## 2021-04-27 RX ORDER — DOCUSATE SODIUM 100 MG/1
100 CAPSULE, LIQUID FILLED ORAL 2 TIMES DAILY
Status: DISCONTINUED | OUTPATIENT
Start: 2021-04-27 | End: 2021-04-28 | Stop reason: HOSPADM

## 2021-04-27 RX ORDER — ALBUMIN, HUMAN INJ 5% 5 %
SOLUTION INTRAVENOUS CONTINUOUS PRN
Status: DISCONTINUED | OUTPATIENT
Start: 2021-04-27 | End: 2021-04-27

## 2021-04-27 RX ORDER — LISINOPRIL/HYDROCHLOROTHIAZIDE 10-12.5 MG
1 TABLET ORAL DAILY
Status: DISCONTINUED | OUTPATIENT
Start: 2021-04-28 | End: 2021-04-28 | Stop reason: HOSPADM

## 2021-04-27 RX ORDER — NALOXONE HYDROCHLORIDE 0.4 MG/ML
0.4 INJECTION, SOLUTION INTRAMUSCULAR; INTRAVENOUS; SUBCUTANEOUS
Status: DISCONTINUED | OUTPATIENT
Start: 2021-04-27 | End: 2021-04-28 | Stop reason: HOSPADM

## 2021-04-27 RX ORDER — ACETAMINOPHEN 325 MG/1
650 TABLET ORAL EVERY 4 HOURS PRN
Status: DISCONTINUED | OUTPATIENT
Start: 2021-04-30 | End: 2021-04-28 | Stop reason: HOSPADM

## 2021-04-27 RX ORDER — ACETAMINOPHEN 325 MG/1
975 TABLET ORAL ONCE
Status: COMPLETED | OUTPATIENT
Start: 2021-04-27 | End: 2021-04-27

## 2021-04-27 RX ORDER — ONDANSETRON 4 MG/1
4 TABLET, ORALLY DISINTEGRATING ORAL EVERY 6 HOURS PRN
Status: DISCONTINUED | OUTPATIENT
Start: 2021-04-27 | End: 2021-04-28 | Stop reason: HOSPADM

## 2021-04-27 RX ORDER — TESTOSTERONE GEL, 1% 10 MG/G
50 GEL TRANSDERMAL
Status: DISCONTINUED | OUTPATIENT
Start: 2021-04-27 | End: 2021-04-27

## 2021-04-27 RX ORDER — KETAMINE HYDROCHLORIDE 10 MG/ML
INJECTION INTRAMUSCULAR; INTRAVENOUS PRN
Status: DISCONTINUED | OUTPATIENT
Start: 2021-04-27 | End: 2021-04-27

## 2021-04-27 RX ORDER — OXYCODONE HYDROCHLORIDE 5 MG/1
5 TABLET ORAL EVERY 4 HOURS PRN
Status: DISCONTINUED | OUTPATIENT
Start: 2021-04-27 | End: 2021-04-28 | Stop reason: HOSPADM

## 2021-04-27 RX ORDER — CEFAZOLIN SODIUM 2 G/100ML
2 INJECTION, SOLUTION INTRAVENOUS SEE ADMIN INSTRUCTIONS
Status: DISCONTINUED | OUTPATIENT
Start: 2021-04-27 | End: 2021-04-27 | Stop reason: HOSPADM

## 2021-04-27 RX ORDER — LISINOPRIL 2.5 MG/1
5 TABLET ORAL DAILY
Status: DISCONTINUED | OUTPATIENT
Start: 2021-04-28 | End: 2021-04-28 | Stop reason: HOSPADM

## 2021-04-27 RX ORDER — HYDROXYZINE HYDROCHLORIDE 25 MG/1
25 TABLET, FILM COATED ORAL EVERY 6 HOURS PRN
Status: DISCONTINUED | OUTPATIENT
Start: 2021-04-27 | End: 2021-04-28 | Stop reason: HOSPADM

## 2021-04-27 RX ORDER — HYDROMORPHONE HYDROCHLORIDE 1 MG/ML
.3-.5 INJECTION, SOLUTION INTRAMUSCULAR; INTRAVENOUS; SUBCUTANEOUS EVERY 5 MIN PRN
Status: DISCONTINUED | OUTPATIENT
Start: 2021-04-27 | End: 2021-04-27 | Stop reason: HOSPADM

## 2021-04-27 RX ORDER — LIDOCAINE HYDROCHLORIDE 20 MG/ML
INJECTION, SOLUTION INFILTRATION; PERINEURAL PRN
Status: DISCONTINUED | OUTPATIENT
Start: 2021-04-27 | End: 2021-04-27

## 2021-04-27 RX ORDER — NALOXONE HYDROCHLORIDE 0.4 MG/ML
0.2 INJECTION, SOLUTION INTRAMUSCULAR; INTRAVENOUS; SUBCUTANEOUS
Status: DISCONTINUED | OUTPATIENT
Start: 2021-04-27 | End: 2021-04-28 | Stop reason: HOSPADM

## 2021-04-27 RX ORDER — POLYETHYLENE GLYCOL 3350 17 G/17G
17 POWDER, FOR SOLUTION ORAL DAILY
Status: DISCONTINUED | OUTPATIENT
Start: 2021-04-28 | End: 2021-04-28 | Stop reason: HOSPADM

## 2021-04-27 RX ORDER — AMOXICILLIN 250 MG
1 CAPSULE ORAL 2 TIMES DAILY
Status: DISCONTINUED | OUTPATIENT
Start: 2021-04-27 | End: 2021-04-28 | Stop reason: HOSPADM

## 2021-04-27 RX ORDER — SODIUM CHLORIDE, SODIUM LACTATE, POTASSIUM CHLORIDE, CALCIUM CHLORIDE 600; 310; 30; 20 MG/100ML; MG/100ML; MG/100ML; MG/100ML
INJECTION, SOLUTION INTRAVENOUS CONTINUOUS
Status: DISCONTINUED | OUTPATIENT
Start: 2021-04-27 | End: 2021-04-27 | Stop reason: HOSPADM

## 2021-04-27 RX ORDER — ONDANSETRON 2 MG/ML
4 INJECTION INTRAMUSCULAR; INTRAVENOUS EVERY 6 HOURS PRN
Status: DISCONTINUED | OUTPATIENT
Start: 2021-04-27 | End: 2021-04-28 | Stop reason: HOSPADM

## 2021-04-27 RX ORDER — ONDANSETRON 2 MG/ML
4 INJECTION INTRAMUSCULAR; INTRAVENOUS EVERY 30 MIN PRN
Status: DISCONTINUED | OUTPATIENT
Start: 2021-04-27 | End: 2021-04-27 | Stop reason: HOSPADM

## 2021-04-27 RX ORDER — LIDOCAINE 40 MG/G
CREAM TOPICAL
Status: DISCONTINUED | OUTPATIENT
Start: 2021-04-27 | End: 2021-04-28 | Stop reason: HOSPADM

## 2021-04-27 RX ORDER — SODIUM CHLORIDE, SODIUM LACTATE, POTASSIUM CHLORIDE, CALCIUM CHLORIDE 600; 310; 30; 20 MG/100ML; MG/100ML; MG/100ML; MG/100ML
INJECTION, SOLUTION INTRAVENOUS CONTINUOUS PRN
Status: DISCONTINUED | OUTPATIENT
Start: 2021-04-27 | End: 2021-04-27

## 2021-04-27 RX ORDER — ONDANSETRON 2 MG/ML
INJECTION INTRAMUSCULAR; INTRAVENOUS PRN
Status: DISCONTINUED | OUTPATIENT
Start: 2021-04-27 | End: 2021-04-27

## 2021-04-27 RX ORDER — CALCIUM CARBONATE 500 MG/1
500 TABLET, CHEWABLE ORAL 4 TIMES DAILY PRN
Status: DISCONTINUED | OUTPATIENT
Start: 2021-04-27 | End: 2021-04-28 | Stop reason: HOSPADM

## 2021-04-27 RX ORDER — METHOCARBAMOL 750 MG/1
750 TABLET, FILM COATED ORAL EVERY 6 HOURS PRN
Status: DISCONTINUED | OUTPATIENT
Start: 2021-04-27 | End: 2021-04-28 | Stop reason: HOSPADM

## 2021-04-27 RX ORDER — OXYCODONE HYDROCHLORIDE 5 MG/1
10 TABLET ORAL EVERY 4 HOURS PRN
Status: DISCONTINUED | OUTPATIENT
Start: 2021-04-27 | End: 2021-04-28 | Stop reason: HOSPADM

## 2021-04-27 RX ORDER — ACETAMINOPHEN 325 MG/1
975 TABLET ORAL EVERY 8 HOURS
Status: DISCONTINUED | OUTPATIENT
Start: 2021-04-27 | End: 2021-04-28 | Stop reason: HOSPADM

## 2021-04-27 RX ORDER — FINASTERIDE 5 MG/1
5 TABLET, FILM COATED ORAL AT BEDTIME
Status: DISCONTINUED | OUTPATIENT
Start: 2021-04-27 | End: 2021-04-28 | Stop reason: HOSPADM

## 2021-04-27 RX ORDER — VECURONIUM BROMIDE 1 MG/ML
INJECTION, POWDER, LYOPHILIZED, FOR SOLUTION INTRAVENOUS PRN
Status: DISCONTINUED | OUTPATIENT
Start: 2021-04-27 | End: 2021-04-27

## 2021-04-27 RX ORDER — ONDANSETRON 4 MG/1
4 TABLET, ORALLY DISINTEGRATING ORAL EVERY 30 MIN PRN
Status: DISCONTINUED | OUTPATIENT
Start: 2021-04-27 | End: 2021-04-27 | Stop reason: HOSPADM

## 2021-04-27 RX ORDER — PROPOFOL 10 MG/ML
INJECTION, EMULSION INTRAVENOUS PRN
Status: DISCONTINUED | OUTPATIENT
Start: 2021-04-27 | End: 2021-04-27

## 2021-04-27 RX ORDER — LIDOCAINE 40 MG/G
CREAM TOPICAL
Status: DISCONTINUED | OUTPATIENT
Start: 2021-04-27 | End: 2021-04-27 | Stop reason: HOSPADM

## 2021-04-27 RX ORDER — HYDROMORPHONE HCL IN WATER/PF 6 MG/30 ML
0.2 PATIENT CONTROLLED ANALGESIA SYRINGE INTRAVENOUS
Status: DISCONTINUED | OUTPATIENT
Start: 2021-04-27 | End: 2021-04-28 | Stop reason: HOSPADM

## 2021-04-27 RX ORDER — CEFAZOLIN SODIUM 1 G/3ML
1 INJECTION, POWDER, FOR SOLUTION INTRAMUSCULAR; INTRAVENOUS EVERY 8 HOURS
Status: COMPLETED | OUTPATIENT
Start: 2021-04-27 | End: 2021-04-28

## 2021-04-27 RX ORDER — PROCHLORPERAZINE MALEATE 10 MG
10 TABLET ORAL EVERY 6 HOURS PRN
Status: DISCONTINUED | OUTPATIENT
Start: 2021-04-27 | End: 2021-04-28 | Stop reason: HOSPADM

## 2021-04-27 RX ORDER — DEXAMETHASONE SODIUM PHOSPHATE 4 MG/ML
INJECTION, SOLUTION INTRA-ARTICULAR; INTRALESIONAL; INTRAMUSCULAR; INTRAVENOUS; SOFT TISSUE PRN
Status: DISCONTINUED | OUTPATIENT
Start: 2021-04-27 | End: 2021-04-27

## 2021-04-27 RX ORDER — BUPIVACAINE HYDROCHLORIDE AND EPINEPHRINE 2.5; 5 MG/ML; UG/ML
INJECTION, SOLUTION INFILTRATION; PERINEURAL PRN
Status: DISCONTINUED | OUTPATIENT
Start: 2021-04-27 | End: 2021-04-27 | Stop reason: HOSPADM

## 2021-04-27 RX ORDER — CEFAZOLIN SODIUM 2 G/100ML
2 INJECTION, SOLUTION INTRAVENOUS
Status: DISCONTINUED | OUTPATIENT
Start: 2021-04-27 | End: 2021-04-27 | Stop reason: HOSPADM

## 2021-04-27 RX ORDER — SODIUM CHLORIDE 9 MG/ML
INJECTION, SOLUTION INTRAVENOUS CONTINUOUS
Status: DISCONTINUED | OUTPATIENT
Start: 2021-04-27 | End: 2021-04-28 | Stop reason: HOSPADM

## 2021-04-27 RX ORDER — OXYCODONE HYDROCHLORIDE 10 MG/1
10 TABLET ORAL EVERY 4 HOURS PRN
Qty: 180 TABLET | Refills: 0 | Status: ON HOLD | OUTPATIENT
Start: 2021-04-27 | End: 2021-04-28

## 2021-04-27 RX ADMIN — ALBUMIN HUMAN: 0.05 INJECTION, SOLUTION INTRAVENOUS at 16:47

## 2021-04-27 RX ADMIN — HYDROMORPHONE HYDROCHLORIDE 0.5 MG: 1 INJECTION, SOLUTION INTRAMUSCULAR; INTRAVENOUS; SUBCUTANEOUS at 18:41

## 2021-04-27 RX ADMIN — FENTANYL CITRATE 50 MCG: 50 INJECTION, SOLUTION INTRAMUSCULAR; INTRAVENOUS at 18:10

## 2021-04-27 RX ADMIN — HYDROMORPHONE HYDROCHLORIDE 0.4 MG: 1 INJECTION, SOLUTION INTRAMUSCULAR; INTRAVENOUS; SUBCUTANEOUS at 22:16

## 2021-04-27 RX ADMIN — SUGAMMADEX 200 MG: 100 INJECTION, SOLUTION INTRAVENOUS at 17:52

## 2021-04-27 RX ADMIN — SODIUM CHLORIDE, POTASSIUM CHLORIDE, SODIUM LACTATE AND CALCIUM CHLORIDE: 600; 310; 30; 20 INJECTION, SOLUTION INTRAVENOUS at 13:50

## 2021-04-27 RX ADMIN — FENTANYL CITRATE 50 MCG: 50 INJECTION, SOLUTION INTRAMUSCULAR; INTRAVENOUS at 15:36

## 2021-04-27 RX ADMIN — PHENYLEPHRINE HYDROCHLORIDE 0.25 MCG/KG/MIN: 10 INJECTION INTRAVENOUS at 15:59

## 2021-04-27 RX ADMIN — SUCCINYLCHOLINE CHLORIDE 100 MG: 20 INJECTION, SOLUTION INTRAMUSCULAR; INTRAVENOUS; PARENTERAL at 15:39

## 2021-04-27 RX ADMIN — DEXAMETHASONE SODIUM PHOSPHATE 4 MG: 4 INJECTION, SOLUTION INTRA-ARTICULAR; INTRALESIONAL; INTRAMUSCULAR; INTRAVENOUS; SOFT TISSUE at 15:47

## 2021-04-27 RX ADMIN — ONDANSETRON 4 MG: 2 INJECTION INTRAMUSCULAR; INTRAVENOUS at 17:13

## 2021-04-27 RX ADMIN — ROCURONIUM BROMIDE 50 MG: 10 INJECTION INTRAVENOUS at 15:42

## 2021-04-27 RX ADMIN — HYDROMORPHONE HYDROCHLORIDE 0.5 MG: 1 INJECTION, SOLUTION INTRAMUSCULAR; INTRAVENOUS; SUBCUTANEOUS at 19:42

## 2021-04-27 RX ADMIN — SODIUM CHLORIDE: 9 INJECTION, SOLUTION INTRAVENOUS at 22:20

## 2021-04-27 RX ADMIN — MIDAZOLAM 2 MG: 1 INJECTION INTRAMUSCULAR; INTRAVENOUS at 15:32

## 2021-04-27 RX ADMIN — FINASTERIDE 5 MG: 5 TABLET, FILM COATED ORAL at 20:39

## 2021-04-27 RX ADMIN — PHENYLEPHRINE HYDROCHLORIDE 100 MCG: 10 INJECTION INTRAVENOUS at 17:10

## 2021-04-27 RX ADMIN — PHENYLEPHRINE HYDROCHLORIDE 100 MCG: 10 INJECTION INTRAVENOUS at 16:26

## 2021-04-27 RX ADMIN — PHENYLEPHRINE HYDROCHLORIDE 200 MCG: 10 INJECTION INTRAVENOUS at 15:48

## 2021-04-27 RX ADMIN — DEXMEDETOMIDINE HYDROCHLORIDE 4 MCG: 100 INJECTION, SOLUTION INTRAVENOUS at 16:59

## 2021-04-27 RX ADMIN — SODIUM CHLORIDE, POTASSIUM CHLORIDE, SODIUM LACTATE AND CALCIUM CHLORIDE: 600; 310; 30; 20 INJECTION, SOLUTION INTRAVENOUS at 15:53

## 2021-04-27 RX ADMIN — OXYCODONE HYDROCHLORIDE 10 MG: 5 TABLET ORAL at 20:39

## 2021-04-27 RX ADMIN — Medication 30 MG: at 16:08

## 2021-04-27 RX ADMIN — HYDROMORPHONE HYDROCHLORIDE 0.5 MG: 1 INJECTION, SOLUTION INTRAMUSCULAR; INTRAVENOUS; SUBCUTANEOUS at 18:54

## 2021-04-27 RX ADMIN — CEFAZOLIN SODIUM 2 G: 2 INJECTION, SOLUTION INTRAVENOUS at 15:47

## 2021-04-27 RX ADMIN — SODIUM CHLORIDE, POTASSIUM CHLORIDE, SODIUM LACTATE AND CALCIUM CHLORIDE: 600; 310; 30; 20 INJECTION, SOLUTION INTRAVENOUS at 16:30

## 2021-04-27 RX ADMIN — ACETAMINOPHEN 975 MG: 325 TABLET, FILM COATED ORAL at 13:49

## 2021-04-27 RX ADMIN — FENTANYL CITRATE 50 MCG: 50 INJECTION, SOLUTION INTRAMUSCULAR; INTRAVENOUS at 16:56

## 2021-04-27 RX ADMIN — HYDROMORPHONE HYDROCHLORIDE 0.5 MG: 1 INJECTION, SOLUTION INTRAMUSCULAR; INTRAVENOUS; SUBCUTANEOUS at 18:11

## 2021-04-27 RX ADMIN — LIDOCAINE HYDROCHLORIDE 60 MG: 20 INJECTION, SOLUTION INFILTRATION; PERINEURAL at 15:36

## 2021-04-27 RX ADMIN — FENTANYL CITRATE 50 MCG: 50 INJECTION, SOLUTION INTRAMUSCULAR; INTRAVENOUS at 18:15

## 2021-04-27 RX ADMIN — PROPOFOL 170 MG: 10 INJECTION, EMULSION INTRAVENOUS at 15:36

## 2021-04-27 RX ADMIN — VECURONIUM BROMIDE 2 MG: 1 INJECTION, POWDER, LYOPHILIZED, FOR SOLUTION INTRAVENOUS at 16:35

## 2021-04-27 RX ADMIN — PHENYLEPHRINE HYDROCHLORIDE 100 MCG: 10 INJECTION INTRAVENOUS at 15:44

## 2021-04-27 RX ADMIN — PHENYLEPHRINE HYDROCHLORIDE 100 MCG: 10 INJECTION INTRAVENOUS at 16:00

## 2021-04-27 RX ADMIN — ACETAMINOPHEN 975 MG: 325 TABLET, FILM COATED ORAL at 20:39

## 2021-04-27 ASSESSMENT — LIFESTYLE VARIABLES: TOBACCO_USE: 1

## 2021-04-27 ASSESSMENT — MIFFLIN-ST. JEOR: SCORE: 1398.31

## 2021-04-27 NOTE — BRIEF OP NOTE
[facility]    Brief Operative Note    Pre-operative diagnosis: Ankylosing spondylitis of cervical region (H) [M45.2]  Stenosis of cervical spine with myelopathy (H) [M48.02, G99.2]  Post-operative diagnosis Same as pre-operative diagnosis    Procedure: Procedure(s):  Revision of occipito-cervical 4 instrumentation.  Surgeon: Surgeon(s) and Role:     * Yobany Craig MD - Primary     * Shaggy Bee PA-C - Assisting  Anesthesia: General   Estimated blood loss: 100ml  Drains: None  Specimens: * No specimens in log *  Findings:   None.  Complications: None.  Implants:   Implant Name Type Inv. Item Serial No.  Lot No. LRB No. Used Action   DEPUY SYNTHES OCCIPITAL SCREW 4.5 X 6MM    Depuy [id number] N/A 3 Explanted   DEPUY SYNTHES OCCIPITAL SCREW 4.5 X 8MM    [employer]uy [id number] N/A 1 Explanted   DEPUY SYNTHES OCCIPITAL SET SCREW    Depuy [id number] N/A 2 Explanted   IMP SET SCREW     41 03, [id number] N/A 6 Explanted   IMP PRE-CONTOURED 3.5 OCCIPITAL BRANDT 110 DEGREE     41 03, [id number] N/A 2 Explanted   IMP SET SCREWS    SYNTHES 26APR 2021 41 05 N/A 6 Implanted   IMP TI 3.5MM X 240MM STR BRANDT    SYNTHES 26APR 2021 41 05 N/A 1 Implanted   IMP 4.5MM X 8MM O.C. SCREW    SYNTHES 26APR 2021 41 05 N/A 1 Implanted   IMP 4.5MM X 10MM O.C. SCREW    SYNTHES 26APR 2021 41 05 N/A 1 Implanted   IMP 4.5MM X 14MM O.C. SCREWS    SYNTHES 26APR 2021 41 05 N/A 2 Implanted   IMP O.C. LOCKING SCREWS    SYNTHES 26APR 2021 41 05 N/A 2 Implanted       [id number]

## 2021-04-27 NOTE — ANESTHESIA PREPROCEDURE EVALUATION
Anesthesia Pre-Procedure Evaluation    Patient: Oseas Edwards   MRN: [id number] : 1970        Preoperative Diagnosis: Ankylosing spondylitis of cervical region (H) [M45.2]  Stenosis of cervical spine with myelopathy (H) [M48.02, G99.2]   Procedure : Procedure(s):  Revision of occipito-cervical 4 instrumentation.     Past Medical History:   Diagnosis Date     Ankylosing spondylitis (H)      Arthritis      AS (ankylosing spondylitis) (H) 1985     Cellulitis of leg      Chronic narcotic dependence (H) 2013    Due to long term pain management.      Hypertension      Keloid scar, mostly just dark 2017     Low serum testosterone level 2012     Optic neuritis 3/20/2012     PA (pernicious anemia) 2011      Past Surgical History:   Procedure Laterality Date     ARTHROPLASTY REVISION HIP Left 2017    Procedure: ARTHROPLASTY REVISION HIP;  Surgeon: Saravanan Alcaraz MD;  Location:  OR     GRAFT BONE FROM ILIAC CREST Right 3/25/2021    Procedure: RIGHT ILLIAC CREST BONE GRAFT HARVEST;  Surgeon: Yobany Craig MD;  Location:  OR     HERNIORRHAPHY VENTRAL  2013    Procedure: HERNIORRHAPHY VENTRAL;  UMBILICAL HERNIA REPAIR WITH MESH, RECTUS DIASTASIS REPAIR WITH MESH;  Surgeon: Jason Dodson MD;  Location:  SD     JOINT REPLACEMENT, HIP RT/LT      Left     JOINT REPLACEMENT, HIP RT/LT      Right     OPEN SEPARATION COMPONENT HERNIORRHAPHY  2013    Procedure: OPEN SEPARATION COMPONENT HERNIORRHAPHY;;  Surgeon: Jason Dodson MD;  Location:  [facility]     OPTICAL TRACKING SYSTEM FUSION POSTERIOR CERVICAL THREE + LEVELS N/A 3/25/2021    Procedure: Occiput-Cervical 4 posterior instrumentation and fusion and C1 and C2 laminectomy;  Surgeon: Yobany Craig MD;  Location:  OR      Allergies   Allergen Reactions     Infliximab      Other reaction(s): *Unknown  Caused LT eye blindness     No Clinical Screening - See Comments      methotrexate- mood  alterating     Remicade [Infliximab Injection]      Blindness in one eye      Social History     Tobacco Use     Smoking status: Former Smoker     Quit date: 2009     Years since quittin.3     Smokeless tobacco: Never Used     Tobacco comment: quit 2 1/2 yrs   Substance Use Topics     Alcohol use: Yes     Comment: 1 to 2 per week      Wt Readings from Last 1 Encounters:   21 71.4 kg (157 lb 6.4 oz)        Anesthesia Evaluation   Pt has had prior anesthetic. Type: General.    No history of anesthetic complications       ROS/MED HX  ENT/Pulmonary:     (+) tobacco use (Quit in '09), Past use,     Neurologic:     (+) peripheral neuropathy, - Cervical radiculopathy; claudication.     Cardiovascular:     (+) hypertension-----Previous cardiac testing   Echo: Date: 11/10/20 Results:  Interpretation Summary  The visual ejection fraction is estimated at 55-60%. The right ventricle is normal in size and function. There is no pericardial effusion. No hemodynamically significant valve disease.    Left Ventricle  The left ventricle is normal in size. Left ventricular systolic function is normal. The visual ejection fraction is estimated at 55-60%. Diastolic Doppler findings (E/E' ratio and/or other parameters) suggest left ventricular filling pressures are indeterminate.    Right Ventricle  The right ventricle is normal in size and function.    Atria  Normal left atrial size. Right atrial size is normal.    Mitral Valve  There is trace mitral regurgitation. There is no mitral valve stenosis.    Tricuspid Valve  The tricuspid valve is normal in structure and function. There is physiologic tricuspid regurgitation. Right ventricular systolic pressure could not be approximated due to inadequate tricuspid regurgitation.    Aortic Valve  The aortic valve is trileaflet with aortic valve sclerosis. No aortic regurgitation is present. No hemodynamically significant valvular aortic stenosis.    Pulmonic Valve  The pulmonic  valve is not well visualized.    Vessels  The aortic root is not well visualized. The aortic root is normal size.    Pericardium  There is no pericardial effusion.  Stress Test: Date: Results:    ECG Reviewed: Date: 3/23/21 Results:  NSR  Cath: Date: Results:      METS/Exercise Tolerance:     Hematologic:     (+) anemia (Pernicious),     Musculoskeletal: Comment: Ankylosing spondylitis  (+) arthritis, cervical spine instability (C-spine fusion 2/2 AK and surgical fusion).     GI/Hepatic: Comment: Hx of gastric ulcer      Renal/Genitourinary:       Endo:     (+) Obesity (Class 1),     Psychiatric/Substance Use:     (+) H/O chronic opiod use  (Oxy 10mg).     Infectious Disease:       Malignancy:       Other: Comment: Hx of optic neuritis     (+) , H/O Chronic Pain,           OUTSIDE LABS:  CBC:   Lab Results   Component Value Date    WBC 5.8 04/26/2021    WBC 9.8 03/27/2021    HGB 13.6 04/26/2021    HGB 13.9 03/27/2021    HCT 40.2 04/26/2021    HCT 42.7 03/27/2021     04/26/2021     03/27/2021     BMP:   Lab Results   Component Value Date     03/27/2021     03/26/2021    POTASSIUM 3.8 03/27/2021    POTASSIUM 3.5 03/26/2021    CHLORIDE 104 03/27/2021    CHLORIDE 105 03/26/2021    CO2 29 03/27/2021    CO2 25 03/26/2021    BUN 16 03/27/2021    BUN 20 03/26/2021    CR 0.86 03/27/2021    CR 1.01 03/26/2021     (H) 03/27/2021     (H) 03/26/2021     COAGS: No results found for: PTT, INR, FIBR  POC:   Lab Results   Component Value Date     (H) 03/26/2021     HEPATIC:   Lab Results   Component Value Date    ALBUMIN 4.2 01/25/2021    PROTTOTAL 6.8 01/25/2021    ALT 18 01/25/2021    AST 23 01/25/2021    ALKPHOS 77 01/25/2021    BILITOTAL <0.2 01/25/2021     OTHER:   Lab Results   Component Value Date    ANDREW 8.9 03/27/2021    TSH 1.32 04/10/2009    CRP 6 01/25/2021    SED 3 01/25/2021       Anesthesia Plan    ASA Status:  3      Anesthesia Type: General.   Induction: Intravenous.    Maintenance: Balanced.   Techniques and Equipment:     - Airway: Video-Laryngoscope (Glide likely sufficient, will plan to have Fiberoptic cart in the room)         Consents    Anesthesia Plan(s) and associated risks, benefits, and realistic alternatives discussed. Questions answered and patient/representative(s) expressed understanding.     - Discussed with:  Patient         Postoperative Care    Pain management: IV analgesics, Multi-modal analgesia.   PONV prophylaxis: Ondansetron (or other 5HT-3), Dexamethasone or Solumedrol     Comments:    Chronic pain on Oxy 10's, plan for preincisional ketamine and Precedex pushes prn            Akshat Anderson MD

## 2021-04-27 NOTE — ANESTHESIA CARE TRANSFER NOTE
Patient: Oseas Edwards    Procedure(s):  Revision of occipito-cervical 4 instrumentation.    Diagnosis: Ankylosing spondylitis of cervical region (H) [M45.2]  Stenosis of cervical spine with myelopathy (H) [M48.02, G99.2]  Diagnosis Additional Information: No value filed.    Anesthesia Type:   General     Note:    Oropharynx: oropharynx clear of all foreign objects  Level of Consciousness: awake  Oxygen Supplementation: face mask    Independent Airway: airway patency satisfactory and stable  Dentition: dentition unchanged  Vital Signs Stable: post-procedure vital signs reviewed and stable  Report to RN Given: handoff report given  Patient transferred to: PACU    Handoff Report: Identifed the Patient, Identified the Reponsible Provider, Reviewed the pertinent medical history, Discussed the surgical course, Reviewed Intra-OP anesthesia mangement and issues during anesthesia, Set expectations for post-procedure period and Allowed opportunity for questions and acknowledgement of understanding      Vitals: (Last set prior to Anesthesia Care Transfer)  CRNA VITALS  4/27/2021 1730 - 4/27/2021 1803      4/27/2021             SpO2:  100 %    Resp Rate (set):  10        Electronically Signed By: CIRA Green CRNA  April 27, 2021  6:03 PM

## 2021-04-27 NOTE — ANESTHESIA POSTPROCEDURE EVALUATION
Patient: Oseas Edwards    Procedure(s):  Revision of occipito-cervical 4 instrumentation.    Diagnosis:Ankylosing spondylitis of cervical region (H) [M45.2]  Stenosis of cervical spine with myelopathy (H) [M48.02, G99.2]  Diagnosis Additional Information: No value filed.    Anesthesia Type:  General    Note:     Postop Pain Control: Uneventful            Sign Out: Well controlled pain   PONV: No   Neuro/Psych: Uneventful            Sign Out: Acceptable/Baseline neuro status   Airway/Respiratory: Uneventful            Sign Out: Acceptable/Baseline resp. status   CV/Hemodynamics: Uneventful            Sign Out: Acceptable CV status; No obvious hypovolemia; No obvious fluid overload   Other NRE: NONE   DID A NON-ROUTINE EVENT OCCUR? No           Last vitals:  Vitals:    04/27/21 1339 04/27/21 1802   BP: 104/74 139/80   Pulse: 94 88   Resp: 16    Temp: 36.6  C (97.8  F) 36.7  C (98  F)   SpO2: 96% 100%       Last vitals prior to Anesthesia Care Transfer:  CRNA VITALS  4/27/2021 1730 - 4/27/2021 1824      4/27/2021             NIBP:  139/80    Pulse:  86          Electronically Signed By: Akshat Anderson MD  April 27, 2021  6:24 PM

## 2021-04-27 NOTE — OR NURSING
Pt has yellow metallic ring, black beaded bracelet with yellow metallic inscriptions, cell phone. Belongings at pt's bedside because he is currently using cell phone, will be taken to PACU storage when pt goes into surgery.

## 2021-04-28 ENCOUNTER — APPOINTMENT (OUTPATIENT)
Dept: PHYSICAL THERAPY | Facility: CLINIC | Age: 51
DRG: 516 | End: 2021-04-28
Attending: PHYSICIAN ASSISTANT
Payer: COMMERCIAL

## 2021-04-28 VITALS
RESPIRATION RATE: 18 BRPM | WEIGHT: 154.5 LBS | BODY MASS INDEX: 30.33 KG/M2 | DIASTOLIC BLOOD PRESSURE: 76 MMHG | HEIGHT: 60 IN | TEMPERATURE: 98.8 F | HEART RATE: 85 BPM | SYSTOLIC BLOOD PRESSURE: 120 MMHG | OXYGEN SATURATION: 97 %

## 2021-04-28 LAB
GLUCOSE BLDC GLUCOMTR-MCNC: 119 MG/DL (ref 70–99)
HGB BLD-MCNC: 11.7 G/DL (ref 13.3–17.7)

## 2021-04-28 PROCEDURE — 999N000111 HC STATISTIC OT IP EVAL DEFER: Performed by: OCCUPATIONAL THERAPIST

## 2021-04-28 PROCEDURE — 250N000011 HC RX IP 250 OP 636: Performed by: PHYSICIAN ASSISTANT

## 2021-04-28 PROCEDURE — 36415 COLL VENOUS BLD VENIPUNCTURE: CPT | Performed by: PHYSICIAN ASSISTANT

## 2021-04-28 PROCEDURE — 97161 PT EVAL LOW COMPLEX 20 MIN: CPT | Mod: GP

## 2021-04-28 PROCEDURE — 250N000013 HC RX MED GY IP 250 OP 250 PS 637: Performed by: PHYSICIAN ASSISTANT

## 2021-04-28 PROCEDURE — 85018 HEMOGLOBIN: CPT | Performed by: PHYSICIAN ASSISTANT

## 2021-04-28 PROCEDURE — 999N001017 HC STATISTIC GLUCOSE BY METER IP

## 2021-04-28 RX ORDER — METHOCARBAMOL 750 MG/1
750 TABLET, FILM COATED ORAL EVERY 6 HOURS PRN
Qty: 40 TABLET | Refills: 0 | Status: SHIPPED | OUTPATIENT
Start: 2021-04-28 | End: 2021-05-10

## 2021-04-28 RX ORDER — OXYCODONE HYDROCHLORIDE 5 MG/1
5-10 TABLET ORAL EVERY 4 HOURS PRN
Qty: 40 TABLET | Refills: 0 | Status: SHIPPED | OUTPATIENT
Start: 2021-04-28 | End: 2021-05-03

## 2021-04-28 RX ORDER — AMOXICILLIN 250 MG
1-2 CAPSULE ORAL 2 TIMES DAILY PRN
Qty: 20 TABLET | Refills: 0 | Status: SHIPPED | OUTPATIENT
Start: 2021-04-28

## 2021-04-28 RX ADMIN — ACETAMINOPHEN 975 MG: 325 TABLET, FILM COATED ORAL at 14:07

## 2021-04-28 RX ADMIN — CEFAZOLIN 1 G: 1 INJECTION, POWDER, FOR SOLUTION INTRAMUSCULAR; INTRAVENOUS at 08:12

## 2021-04-28 RX ADMIN — CEFAZOLIN 1 G: 1 INJECTION, POWDER, FOR SOLUTION INTRAMUSCULAR; INTRAVENOUS at 00:18

## 2021-04-28 RX ADMIN — SENNOSIDES AND DOCUSATE SODIUM 1 TABLET: 8.6; 5 TABLET ORAL at 09:43

## 2021-04-28 RX ADMIN — HYDROMORPHONE HYDROCHLORIDE 0.4 MG: 1 INJECTION, SOLUTION INTRAMUSCULAR; INTRAVENOUS; SUBCUTANEOUS at 03:36

## 2021-04-28 RX ADMIN — LISINOPRIL AND HYDROCHLOROTHIAZIDE 1 TABLET: 12.5; 1 TABLET ORAL at 09:41

## 2021-04-28 RX ADMIN — HYDROMORPHONE HYDROCHLORIDE 0.4 MG: 1 INJECTION, SOLUTION INTRAMUSCULAR; INTRAVENOUS; SUBCUTANEOUS at 01:27

## 2021-04-28 RX ADMIN — METHOCARBAMOL 750 MG: 750 TABLET ORAL at 14:07

## 2021-04-28 RX ADMIN — OXYCODONE HYDROCHLORIDE 10 MG: 5 TABLET ORAL at 04:48

## 2021-04-28 RX ADMIN — OXYCODONE HYDROCHLORIDE 10 MG: 5 TABLET ORAL at 00:18

## 2021-04-28 RX ADMIN — HYDROMORPHONE HYDROCHLORIDE 0.4 MG: 1 INJECTION, SOLUTION INTRAMUSCULAR; INTRAVENOUS; SUBCUTANEOUS at 08:11

## 2021-04-28 RX ADMIN — ACETAMINOPHEN 975 MG: 325 TABLET, FILM COATED ORAL at 04:47

## 2021-04-28 RX ADMIN — OXYCODONE HYDROCHLORIDE 10 MG: 5 TABLET ORAL at 09:03

## 2021-04-28 RX ADMIN — HYDROMORPHONE HYDROCHLORIDE 0.4 MG: 1 INJECTION, SOLUTION INTRAMUSCULAR; INTRAVENOUS; SUBCUTANEOUS at 05:55

## 2021-04-28 RX ADMIN — OXYCODONE HYDROCHLORIDE 10 MG: 5 TABLET ORAL at 12:14

## 2021-04-28 ASSESSMENT — ACTIVITIES OF DAILY LIVING (ADL)
ADLS_ACUITY_SCORE: 11
ADLS_ACUITY_SCORE: 11
ADLS_ACUITY_SCORE: 12
ADLS_ACUITY_SCORE: 11
ADLS_ACUITY_SCORE: 11

## 2021-04-28 NOTE — OP NOTE
Procedure Date: 04/27/2021    PREOPERATIVE DIAGNOSES:     1.  Ankylosing spondylitis.  2.  Cervical stenosis with myelopathy.  3.  Occipitocervical hardware failure.    POSTOPERATIVE DIAGNOSES:     1.  Ankylosing spondylitis.  2.  Cervical stenosis with myelopathy.  3.  Occipitocervical hardware failure.    PROCEDURE:  Revision of occipitocervical for instrumentation.    SURGEON:  Yobany Craig MD    ASSISTANT:  Shaggy Bee PA-C    ANESTHESIA:  General endotracheal anesthesia.    ESTIMATED BLOOD LOSS:  100 mL.    INDICATIONS FOR PROCEDURE:  The patient is a 51-year-old male who recently underwent an occipitocervical fusion; however, he noticed increasing pain, and disconnection of the plate from the occiput was noticed on x-ray.  He was brought for revision of his hardware. Please note that Shaggy Bee PA-C's assistance was needed for positioning, retraction, suctioning, and closure.     DESCRIPTION OF PROCEDURE:  The patient was brought to the operating room.  General endotracheal anesthesia was induced.  The patient was rolled in the prone position on the Denzel 4-poster table with a C-Flex headholder.  The previous incision was prepped and draped in sterile fashion, and the previous midline exposure was opened.  The hardware was exposed, and the occipital plate was no longer connected to the occiput.  The plate and rods were removed.  The screws were felt to be in good condition, and those were left in place.  Once this was done after reviewing his CT, the occipital plate was placed in a slightly higher position with better bone purchase.  Four screws were placed into the occipital bone with good purchase.  Once this was done, a new pair of rods was then contoured and seated in the plate and 3 screws on each side.  Set screws were then replaced in each location and final tightened.  Once this was done, additional area on the left was exposed.  An accessory doni was connected with an offset connector  to the left-sided doni and then the doni was secured to the occiput with a single plate.  Once this was done, x-rays confirmed good position of the hardware.  The wound was copiously irrigated, and the fascia was closed with 0 Vicryl.  2-0 inverted, interrupted Vicryl was used for subcutaneous layer and a running 3-0 nylon for skin.  Sterile dressing was placed.  The patient was awakened, extubated, and taken to the recovery room in good condition.    Yobany Craig MD        D: 2021   T: 2021   MT: IRIS    Name:     JUANPABLO RODRIGUEZGera  MRN:      [id number]        Account:        [id number]   :      1970           Procedure Date: 2021     Document: [id number]

## 2021-04-28 NOTE — DISCHARGE INSTRUCTIONS
Spine and Brain Clinic at [facility]  Dr. Craig Discharge Instructions Following Spine Surgery  [phone]  Monday - Friday; 8:00 AM - 4:00 PM    In General:   After you have had surgery on your spine, remember do not twist, or excessively flex or extend the area that you had surgery.  These activities can prevent healing.  Pain is normal and to be expected following surgery.  Please call our office to schedule your appointment follow up appointment.      Bowel Care:  Many people have constipation (hard stools) after surgery.  To help prevent constipation: Drink plenty of fluid (8-10 glasses/day); Eat more fiber, such as whole grain bread, bran cereal, and fruits and vegetables; Stay active by walking; Over the counter stool softener may also help.      Medications:  Spine surgery and pain management is unique to all patients.  You will generally be given medications for pain, muscle spasms or tightness, and for constipation during the immediate post op period.  It is important that you use these as prescribed.  Please remember to bring your pill bottles to all of your appointments. Avoid alcoholic beverages while taking narcotic pain medications. You can use ice to areas of pain as needed, 20 minutes at a time.  Changing positions and walking will help loosen your muscles as well.    Driving:  No driving while on narcotic pain medications.  It is state law not to drive while under the influence of a drug to a degree which renders you incapable of safely driving.  The narcotic medication you will be taking after surgery falls under this category.     Activity:   After surgery, most people feel less pain than they have had in a long time.  Walking and light activities will help you regain the use of your muscles.  You are encouraged to walk: start with short walks 5-10 minutes at a time for 4-5 times per day and increase as tolerated.  Stair climbing as tolerated, we recommend you use the railing.  No lifting greater than 10 pounds: approximately equal to one gallon of milk. No twisting, bending in the area you have had surgery. No housework, vacuuming, laundry, leaf raking, lawn mowing, or snow removal. Wear your brace (if ordered) as directed.    Showers:  If you have sutures or staples you may shower two days after surgery. It is ok to let water run over your incision but do not touch or scrub on the incision. Pat dry immediately after showering. If there is a dressing in place, you may remove it 2 days after surgery. If you were closed with Derma garza (glue), you may shower without covering the incision. No baths, hot tubs, or pool activity for at least 6 weeks.     Nutrition:  In general, your diet restrictions will not change with your surgery.  You may need to eat small frequent meals initially until your appetite returns.  Eat plenty of high fiber foods and drink plenty of fluids. If you do not have a fluid restriction from or prior to surgery, we recommend 6-8 (8oz) glasses of water per day. Other fluids are fine, but water is best. Nausea is not uncommon; it is a common side effect to many pain medications.  We recommend that you take the pain medications with food, if this does not improve your symptoms, please call us.     Smoking:  For proper healing it is required that you quit using all tobacco products.  This includes smoking, chewing, nicotine gums, and nicotine patches.  Call Dr. Craig if these occur: Drainage from your incision, increased pain/redness/swelling, temperatures greater than 101.5, increased leg pain or swelling or unrelieved headaches    Go to the nearest Emergency Room if you experience: chest pain, shortness of breath, neck swelling or swallowing problems

## 2021-04-28 NOTE — PROGRESS NOTES
S:  We received an order for replacement padding for the [place] collar  O:  I went to the patients room and he was not there.  I left a set of replacement pads on his table.  A:  The new additional padding set pads are for an [place] collar.  P:  The replacement pads will be switched out as needed, and the dirty pads will be hand washed cycling between the two sets of padding while wearing the [place] collar.   G:  Provide new padding set.  Jaswinder RUBALCAVA

## 2021-04-28 NOTE — PLAN OF CARE
OT order received and chart reviewed.  Per discussion with PT, no skilled OT needs identified.  Patient is adhering to spinal precautions with ADLs, and has family to assist as needed.  Will complete orders.  Please reorder if needs change.

## 2021-04-28 NOTE — PROGRESS NOTES
04/28/21 1027   Quick Adds   Type of Visit Initial PT Evaluation   Living Environment   People in home spouse;child(jena), adult   Current Living Arrangements house   Home Accessibility stairs to enter home;stairs within home   Number of Stairs, Main Entrance 1   Stair Railings, Main Entrance none  (doorframe, SPC PRN)   Number of Stairs, Within Home, Primary   (1 flight to bed upstairs)   Stair Railings, Within Home, Primary railings safe and in good condition   Transportation Anticipated family or friend will provide   Living Environment Comments Pt reports family will be able to help with hair washing if needed. Pt demonstrating eating wtihout leaning forward - generalized this modification iwth ADLs in bathrooms with cut or tissue coming to him vs leaning forward over the sink.    Self-Care   Usual Activity Tolerance moderate   Current Activity Tolerance moderate   Equipment Currently Used at Home cane, straight   Activity/Exercise/Self-Care Comment Hugo with cane for longer distances - pt reports 1 leg shorter.    Disability/Function   Walking or Climbing Stairs Difficulty yes   Walking or Climbing Stairs ambulation difficulty, requires equipment   Fall history within last six months no   Change in Functional Status Since Onset of Current Illness/Injury no   General Information   Onset of Illness/Injury or Date of Surgery 04/27/21   Referring Physician Shaggy Bee PA-C   Patient/Family Therapy Goals Statement (PT) To do well, to heal.    Pertinent History of Current Problem (include personal factors and/or comorbidities that impact the POC) Occiput-C4 revision with instrumentation with baseline L hand numbness (improving per pt), hx ankylosing spondylitis with myelopathy.    Existing Precautions/Restrictions spinal;other (see comments)   Weight-Bearing Status - LUE weight-bearing as tolerated  (all)   General Observations C-Collar ([place]) at all times.    Cognition   Affect/Mental Status  (Cognition) WFL   Follows Commands (Cognition) WFL   Pain Assessment   Patient Currently in Pain Yes, see Vital Sign flowsheet  (Neck as expected - activity to tolerance)   Posture    Posture Comments Excessive structural thoracic kyphosis with downward gaze unless pt compensates with increased lumbar lordosis.    Range of Motion (ROM)   ROM Comment C-spine precautions in [place] collar. Pt able to reach to head B keeping elbow <= shoulder level.    Strength   Strength Comments WFL to stand, walk, stairs. Pt reports that by the end of the day his postural control declines given fatigue.    Bed Mobility   Comment (Bed Mobility) SBA supine-sit logroll training with increased effort.    Transfers   Transfer Safety Comments Independent stand & pivot no AD and SPC.    Gait/Stairs (Locomotion)   Distance in Feet (Required for LE Total Joints) Hugo SPC and no AD with R hip drop, steady.    Comment (Gait/Stairs) 8 stairs: 4 with no rail with SPC, 4 with 1 rail - slow but steady.    Balance   Balance Comments Hugo self-selected mobility without AD & with SPC.    Sensory Examination   Sensory Perception Comments Improving L UE numbness, no decline grossly per pt report.   Coordination   Coordination Comments WFL   Muscle Tone   Muscle Tone Comments [facility]   Clinical Impression   Criteria for Skilled Therapeutic Intervention evaluation only;current level of function same as previous level of function;no significant expected improvement in functional status;no problems identified which require skilled intervention   PT Diagnosis (PT) Impaired mobility   Influenced by the following impairments Impaired ROM, posture   Functional limitations due to impairments Impaired mobility   Clinical Presentation Stable/Uncomplicated   Clinical Decision Making (Complexity) low complexity   Therapy Frequency (PT) Evaluation only   Anticipated Equipment Needs at Discharge (PT)   (has SPC)   Risk & Benefits of therapy have been explained  evaluation/treatment results reviewed;participants voiced agreement with care plan;participants included;patient   PT Discharge Planning    PT Discharge Recommendation (DC Rec) home with assist   PT Rationale for DC Rec Pt has support at home to return home safely with assist PRN.    PT Brief overview of current status  SBA bed mob, independent transfers, gait, stairs. Good precautions, modified mobility awareness. C-collar appears to be well-fitting. No further acute PT services noted with possibility of DC today - PT signing off.    Total Evaluation Time   Total Evaluation Time (Minutes) 25

## 2021-04-28 NOTE — PROGRESS NOTES
[facility]    Neurosurgery  Daily Post-Op Note    Assessment & Plan   Procedure(s):  Revision of occipito-cervical 4 instrumentation.   -1 Day Post-Op  Patient seen while in bed this morning. He reports some neck pain at incision site. Denies any arm pain, paresthesias, or weakness. Able to move all extremities well. Incision CDI. Cervical collar in place. Belcher discontinued this morning, due to void. Per notes, Orthotics fitted Alea collar for showering and supplied replacement padding for [place] collar.     Plan:  -Cervical collar to be worn at all times.   -Ambulate and advance activity as tolerated  -Continue physical therapy  -Pain control measures as needed  -Advance diet as tolerated  -Routine wound care  -Likely discharge later today or tomorrow morning pending progress    Discussed with Dr. Rafa Elkins CNP  [facility] Neurosurgery  [facility]  [address] [address]  Tel [phone]  Pager [phone]      Interval History   Stable     Physical Exam   Temp: 98.3  F (36.8  C) Temp src: Oral BP: 117/71 Pulse: 90   Resp: 15 SpO2: 96 % O2 Device: Nasal cannula Oxygen Delivery: 1 LPM  Vitals:    04/27/21 1339   Weight: 154 lb 8 oz (70.1 kg)     Vital Signs with Ranges  Temp:  [97.8  F (36.6  C)-98.5  F (36.9  C)] 98.3  F (36.8  C)  Pulse:  [75-94] 90  Resp:  [7-17] 15  BP: (104-157)/(71-89) 117/71  SpO2:  [96 %-100 %] 96 %  I/O last 3 completed shifts:  In: 3510 [P.O.:800; I.V.:2460]  Out: 1800 [Urine:1700; Blood:100]    Mental status:  Alert and Oriented x 3, speech is fluent.  Motor:  Moves all extremities. Strength is 5/5 throughout the upper and lower extremities. Cervical collar in place.   Sensation:  Intact  Incision: CDI      Medications     sodium chloride 75 mL/hr at 04/27/21 2220        acetaminophen  975 mg Oral Q8H     docusate sodium  100 mg Oral BID     finasteride  5 mg Oral At Bedtime     lisinopril  5 mg  Oral Daily     lisinopril-hydrochlorothiazide  1 tablet Oral Daily     polyethylene glycol  17 g Oral Daily     senna-docusate  1 tablet Oral BID     sodium chloride (PF)  3 mL Intracatheter Q8H       Trang Elkins CNP  [facility] Neurosurgery   [address] [address]  Tel [phone]  Pager [phone]

## 2021-04-28 NOTE — PROGRESS NOTES
S:  We received an order for an [place] collar and Alea collar.  O:  I entered the patients room and he is in bed alert with a [place] collar on.  The [place] fits adequate.  I removed the [place] and donned a Alea showering collar on.  I locked the Alea collar in the lowest setting #2.  I removed the Alea collar and placed the [place] back on.  A:  The Alea collar fits adequate and will provide cervical support when bathing.  The patients RN requested an additional set of padding for the [place] collar because there is fluid drainage building up on the internal padding.    P:  The Alea collar will be used for bathing/showering.  I will bring up a set of [place] replacement padding.  Jaswinder RUBALCAVA

## 2021-04-28 NOTE — PLAN OF CARE
POD 1 from a C4 revision and instrumentation. A&O. CMS intact ex for numbness in thumb and forefinger in LUE. Bowel sounds audible, passing flatus, tolerating regular/soft diet. VSS. Dressing CDI. Up with STBY. C/o neck pain, decreased with IV dilaudid x 1, oxy 10mg x1 and robaxin and tylenol. Voiding adequately. C-collar in place. Pt scoring green on the Aggression Stop Light Tool. Plan to discharge home this afternoon.  Patient Belongings: sent with patient at discharge  No home medications

## 2021-04-28 NOTE — PROGRESS NOTES
POD0/1 from an occiput to C4 revision of intrumentation. A&Ox4. CMS intact except baseline numbness/tingling to thumb and forefinger on L hand. Bowel sounds active this AM, pt reports passing flatus, will advance to soft regular diet. VSS on 1.5L NC, capno in place. Dressing to pos neck CDI, aspen collar in place. Belcher w/ good output overnight, will removed later this AM per pt request. Up with 1, GB. C/o mod pain, decreased with dilaudid, oxycodone, tylenol, ice and repos. Rates pain 8/10 but sleeping between cares. Plan for orthodist to see for further brace fitting, nursing continue to monitor. Discharge pending.

## 2021-04-28 NOTE — DISCHARGE SUMMARY
[facility]    Discharge Summary   [facility] Neurosurgery    Date of Admission:  4/27/2021  Date of Discharge:  4/28/2021  Discharging Provider: Trang Elkins  Date of Service (when I saw the patient): 04/28/21    Discharge Diagnoses   Active Problems:    Stenosis of cervical spine with myelopathy (H)    Ankylosing spondylitis of cervical region (H)    History of Present Illness   Oseas Edwards is a 51 year old male who recently underwent an occipitocervical fusion. He noticed increasing pain and xray revealed disconnection of the plate from the occiput. He was brought for revision of his hardware.    Hospital Course   Oseas Edwards was admitted on 4/27/2021. Underwent revision of occipito-cervical 4 instrumentation on 4/27/21 with Dr. Craig. Today, patient reports some neck pain at incision site. Denies any arm pain, paresthesias, or weakness. Able to move all extremities well. Incision CDI. Cervical collar in place. Orthotics fitted Alea collar for showering and supplied replacement padding for [place] collar. Belcher was removed and patient voiding well.     Active Problems:    Stenosis of cervical spine with myelopathy (H)    Ankylosing spondylitis of cervical region (H)    Assessment: s/p revision of occipito-cervical 4 instrumentation on 4/27/21    Plan:   - Cervical collar to be worn at all times  - Routine post op care plan  - Follow up appts scheduled with [facility] clinic    I have discussed the following assessment and plan with Dr. Craig who is in agreement with initial plan and will follow up with further consultation recommendations.    Trang Elkins, KATIE  [facility] Neurosurgery  [address] [address]  Tel [phone]  Pager [phone]    Code Status   Full Code    Primary Care Physician   Wilyl Forrest    Physical Exam   Temp: 98.8  F (37.1  C) Temp src: Oral BP: 120/76 Pulse: 85   Resp: 18 SpO2: 97 % O2 Device:  None (Room air) Oxygen Delivery: 1 LPM  Vitals:    04/27/21 1339   Weight: 154 lb 8 oz (70.1 kg)     Vital Signs with Ranges  Temp:  [98  F (36.7  C)-98.8  F (37.1  C)] 98.8  F (37.1  C)  Pulse:  [75-90] 85  Resp:  [7-18] 18  BP: (117-157)/(71-89) 120/76  SpO2:  [96 %-100 %] 97 %  I/O last 3 completed shifts:  In: 3510 [P.O.:800; I.V.:2460]  Out: 1800 [Urine:1700; Blood:100]    Constitutional: Awake, alert, cooperative, no apparent distress, and appears stated age.  Eyes: Lids and lashes normal, pupils equal, round and reactive to light, extra ocular muscles intact  ENT: Normocephalic, without obvious abnormality, atraumatic  Respiratory: No increased work of breathing  Skin: No bruising or bleeding, normal skin color, texture, turgor, no redness, warmth, or swelling.  Incision clean, dry, intact. Cervical collar in place.    Musculoskeletal: There is no redness, warmth, or swelling of the joints.  Full range of motion noted.  Motor strength is 5 out of 5 all extremities bilaterally.  Tone is normal.  Neurologic: Awake, alert, oriented to name, place and time.  Cranial nerves II-XII are grossly intact.  Motor is 5 out of 5 bilaterally.     Neuropsychiatric: Calm, normal eye contact, alert, normal affect, oriented to self, place, time and situation, memory for past and recent events intact and thought process normal.     Time Spent on this Encounter   Trang VILLA NP, personally saw the patient today and spent less than or equal to 30 minutes discharging this patient.    Discharge Disposition   Discharged to home  Condition at discharge: Stable    Consultations This Hospital Stay   ORTHOSIS CERVICAL COLLAR IP CONSULT  OCCUPATIONAL THERAPY ADULT IP CONSULT  PHYSICAL THERAPY ADULT IP CONSULT  ORTHOSIS BRACE IP CONSULT    Discharge Orders      Reason for your hospital stay    Revision of occipito-cervical 4 instrumentation.     Follow-up and recommended labs and tests     Your Neurosurgical follow up appointments  have been scheduled for 05/11 at 10:00, 06/08 at 1:00 and 07/30 at 1:00. You may call [phone] to make, confirm or change your follow-up Neurosurgery appointment dates and/or times.     Activity    Please limit your lifting to no more that ten pounds and avoid excessive bending, twisting and turning at the neck. You should also avoid excessive jostling and jarring activities.     YOU MUST WEAR YOUR HARD CERVICAL COLLAR AT ALL TIMES FOR THREE MONTHS. [place] (rubber) collar for showering.     Wound care and dressings    Instructions to care for your wound at home: ice to area for comfort, keep wound clean and dry and may get incision wet in shower but do not soak or scrub.     Diet    Follow this diet upon discharge: Advance to a regular diet as tolerated.     Discharge Medications   Current Discharge Medication List      START taking these medications    Details   methocarbamol (ROBAXIN) 750 MG tablet Take 1 tablet (750 mg) by mouth every 6 hours as needed for muscle spasms  Qty: 40 tablet, Refills: 0    Associated Diagnoses: S/P cervical spinal fusion      senna-docusate (SENOKOT-S/PERICOLACE) 8.6-50 MG tablet Take 1-2 tablets by mouth 2 times daily as needed for constipation  Qty: 20 tablet, Refills: 0    Associated Diagnoses: S/P cervical spinal fusion         CONTINUE these medications which have CHANGED    Details   oxyCODONE (ROXICODONE) 5 MG tablet Take 1-2 tablets (5-10 mg) by mouth every 4 hours as needed for moderate to severe pain  Qty: 40 tablet, Refills: 0    Associated Diagnoses: S/P cervical spinal fusion         CONTINUE these medications which have NOT CHANGED    Details   cyanocobalamin (CYANOCOBALAMIN) 1000 MCG/ML injection INJECT 1 ML (CC) ONCE EVERY MONTH. NEED B12 LEVELS CHECKED BEFORE NEXT REFILL  Qty: 3 mL, Refills: 0    Associated Diagnoses: PA (pernicious anemia)      etanercept (ENBREL) 50 MG/ML injection Inject 50 mg Subcutaneous once a week (on Saturday)      finasteride  (PROSCAR) 5 MG tablet Take 1 tablet (5 mg) by mouth daily  Qty: 90 tablet, Refills: 0    Associated Diagnoses: Low serum testosterone level      Hypromellose (ARTIFICIAL TEARS OP) Place 2-3 drops into both eyes 3 times daily as needed       lisinopril (ZESTRIL) 5 MG tablet Take 1 tablet (5 mg) by mouth daily  Qty: 90 tablet, Refills: 3    Comments: Will take in addition to the 10-12.5 dose to have a total of Lisinopril -15 and HTCZ 12.5  Associated Diagnoses: Essential hypertension      lisinopril-hydrochlorothiazide (ZESTORETIC) 10-12.5 MG tablet Take 1 tablet by mouth once daily  Qty: 90 tablet, Refills: 0    Associated Diagnoses: Essential hypertension      Multiple Vitamins-Iron (MULTIVITAMIN/IRON PO) Take 1 tablet by mouth daily      testosterone (ANDROGEL/TESTIM) 50 MG/5GM (1%) topical gel Place 1 packet (50 mg) onto the skin daily  Qty: 90 packet, Refills: 3    Associated Diagnoses: Hypogonadism male      NARCAN 4 MG/0.1ML nasal spray          STOP taking these medications       cyclobenzaprine (FLEXERIL) 10 MG tablet Comments:   Reason for Stopping:         diclofenac (VOLTAREN) 1 % GEL topical gel Comments:   Reason for Stopping:         docusate sodium (COLACE) 100 MG capsule Comments:   Reason for Stopping:         indomethacin (INDOCIN) 50 MG capsule Comments:   Reason for Stopping:             Allergies   Allergies   Allergen Reactions     Infliximab      Other reaction(s): *Unknown  Caused LT eye blindness     No Clinical Screening - See Comments      methotrexate- mood alterating     Remicade [Infliximab Injection]      Blindness in one eye

## 2021-05-03 DIAGNOSIS — Z98.1 S/P CERVICAL SPINAL FUSION: ICD-10-CM

## 2021-05-03 RX ORDER — OXYCODONE HYDROCHLORIDE 5 MG/1
5-10 TABLET ORAL EVERY 4 HOURS PRN
Qty: 40 TABLET | Refills: 0 | Status: SHIPPED | OUTPATIENT
Start: 2021-05-03 | End: 2021-05-10

## 2021-05-03 NOTE — TELEPHONE ENCOUNTER
Patient calling for a refill of Oxycodone.     DOS: 4/27/21  Procedure:Revision of occipito-cervical 4 instrumentation   Surgeon: Dr. Craig    Current symptom(s): Patient has complaints of incisonal pain that radiates to the top right of his neck and head. States the incisional site is numb and he occasionally . Patient rates pain at a 5-6/10. He says pain is slowly getting better.     Current pain management: Taking oxycodone 2 tablets about every 4 hours. Also taking robaxin 1 tablet every 6 hours.     Last fill: 4/28, Oxycodone, #40  Next visit:     Medication pended for your approval, if appropriate. Pharmacy verified.     Any patient questions or concerns:     Informed patient request will be forwarded to care team.

## 2021-05-10 DIAGNOSIS — Z98.1 S/P CERVICAL SPINAL FUSION: ICD-10-CM

## 2021-05-10 RX ORDER — OXYCODONE HYDROCHLORIDE 5 MG/1
5-10 TABLET ORAL EVERY 4 HOURS PRN
Qty: 40 TABLET | Refills: 0 | Status: SHIPPED | OUTPATIENT
Start: 2021-05-10 | End: 2021-05-24

## 2021-05-10 RX ORDER — METHOCARBAMOL 750 MG/1
750 TABLET, FILM COATED ORAL EVERY 6 HOURS PRN
Qty: 40 TABLET | Refills: 0 | Status: SHIPPED | OUTPATIENT
Start: 2021-05-10

## 2021-05-10 NOTE — TELEPHONE ENCOUNTER
Patient calling for a refill of oxycodone and robaxin    DOS: 4/27/21  Procedure:Revision of occipito-cervical 4 instrumentation   Surgeon: Dr. Craig      Current symptom(s):  Rates the pain 4-5/10. Numbness and tender at the back of the neck. The pain is less and he has been able to decrease the oxycodone.   Current pain management:  Oxycodone 1 tab every 4 hours. Robaxin 1 tab every 8 hours.    Last fill: 5/3 #40  Next visit: 5/11    Medication pended for your approval, if appropriate. Pharmacy verified.     Any patient questions or concerns:     Informed patient request will be forwarded to care team.

## 2021-05-11 ENCOUNTER — OFFICE VISIT (OUTPATIENT)
Dept: NEUROSURGERY | Facility: CLINIC | Age: 51
End: 2021-05-11
Payer: COMMERCIAL

## 2021-05-11 VITALS
TEMPERATURE: 98 F | OXYGEN SATURATION: 99 % | WEIGHT: 157 LBS | BODY MASS INDEX: 30.82 KG/M2 | HEIGHT: 60 IN | HEART RATE: 79 BPM | SYSTOLIC BLOOD PRESSURE: 113 MMHG | DIASTOLIC BLOOD PRESSURE: 75 MMHG

## 2021-05-11 DIAGNOSIS — Z98.1 S/P CERVICAL SPINAL FUSION: Primary | ICD-10-CM

## 2021-05-11 PROCEDURE — 99207 PR NO CHARGE NURSE ONLY: CPT

## 2021-05-11 PROCEDURE — 999N000103 HC STATISTIC NO CHARGE FACILITY FEE

## 2021-05-11 ASSESSMENT — PAIN SCALES - GENERAL: PAINLEVEL: MODERATE PAIN (5)

## 2021-05-11 ASSESSMENT — MIFFLIN-ST. JEOR: SCORE: 1409.65

## 2021-05-11 NOTE — PROGRESS NOTES
Patient presents for 2 week incision nurse visit check.     DOS: 4/27/2021  Procedure:Revision of occipito-cervical 4 instrumentation.  Surgeon: Yobany Craig MD    Pain symptom description: Pt states that his recovery has been more painful this time around, in comparison to his first procedure. The pain is 5/10, primarily incisional and radiates to his occiput. He also reports some numbness and tingling which is intermittent and positional in his arm and fingers bilaterally.      Pt takes oxycodone 10mg q4hrs chronically as well as an additional 5mg q4hrs for acute surgical pain. He is taking the muscle relaxer TID and tylenol PRN.    Encouraged icing for at least 5-7 times throughout the day for 20-30 minutes at a time, avoiding heat to the incision area. Discussed maximum dose of Acetaminophen in 24 hours, along with holding NSAIDs.    Patient is walking frequently with a cane which he uses chronically. Signs of DVT assessed in clinic; pt reports no redness, swelling, or warmth noted. Patient denies any pain in bilateral calves. Activity restrictions reinforced at this time as outlined the After Visit Summary.     Patient's appetite is normal  Bowel/bladder problems? No  Taking stool softeners? Yes  Discussed reasons for constipation post-operatively and encouraged stool softeners while taking narcotic pain medication.     Patient denies signs of infection at incision site. Cervical/Occiput incision inspected. Edges well-approximated. No redness, swelling, drainage, or warmth noted. Incision prepped with ChloraPrep and suture(s) removed without difficulty. Aftercare instructions discussed with patient.     Refills given at this appointment? No  Sent for x-rays after this appointment? No  Wrote message to Dr. Craig asking if he wants CT or Xray prior to 6 wk visit  Return to work discussed at this appointment? Yes pt dropped off FMLA    All of patient's questions addressed today. He was instructed to call with any  additional questions/concerns.

## 2021-05-11 NOTE — PATIENT INSTRUCTIONS
Instructions for Patient    Keep your incision clean and dry at all times.     No bathing, swimming, or submerging in water until incision is well healed.      No lifting greater than 10-15 pounds. No bending, twisting, or overhead reaching.    Continue to wear brace as directed by your surgeon    Return in 4 weeks for follow up appointment and xray    Weaning from narcotic pain medications    When it is time, start weaning by extending the time between doses.     For example, if you're taking 2 tabs every 4 hours, spread it out to 2 tabs over 4.5, 5, 6 hours.     At that point you can certainly cut down to 1 tab, then wean to an as needed basis until completely done with them.    For refills, please call our clinic. A nurse will call you back to obtain a pain assessment. Please call 3-4 business days before you run out so we can ensure there is a provider available at the location you prefer for .    Call the clinic or go to Emergency Room if you develop any new pain, drainage, swelling, or fever. Go to the Emergency Room if sudden onset of severe headache, weakness, confusion, change in level of consciousness, pain, or loss of movement.    Post-operative appointments    You will need an x-ray before your 6 week post op, 3 months post op, 6 months post op, 1 year post-op appointments    Please call clinic with any further questions or concerns    HOWARD Vidal  [facility]  [address] [address]  T:  [phone]  F:  [phone]

## 2021-05-11 NOTE — NURSING NOTE
Oseas Edwards is a 51 year old male who presents for:  Chief Complaint   Patient presents with     Neurologic Problem     2 week follow up         Initial Vitals:  /75 (BP Location: Right arm, Patient Position: Sitting, Cuff Size: Adult Regular)   Pulse 79   Temp 98  F (36.7  C) (Oral)   Ht 5' (1.524 m)   Wt 157 lb (71.2 kg)   SpO2 99%   BMI 30.66 kg/m   Estimated body mass index is 30.66 kg/m  as calculated from the following:    Height as of this encounter: 5' (1.524 m).    Weight as of this encounter: 157 lb (71.2 kg).. Body surface area is 1.74 meters squared. BP completed using cuff size: regular  Moderate Pain (5)    Nursing Comments: see chief complaint    Dayn Joseph, CMA

## 2021-05-24 DIAGNOSIS — Z98.1 S/P CERVICAL SPINAL FUSION: ICD-10-CM

## 2021-05-24 DIAGNOSIS — M45.9 ANKYLOSING SPONDYLITIS, UNSPECIFIED SITE OF SPINE (H): Primary | ICD-10-CM

## 2021-05-24 RX ORDER — OXYCODONE HYDROCHLORIDE 5 MG/1
5-10 TABLET ORAL EVERY 4 HOURS PRN
Qty: 40 TABLET | Refills: 0 | Status: CANCELLED | OUTPATIENT
Start: 2021-05-24

## 2021-05-24 RX ORDER — OXYCODONE HYDROCHLORIDE 5 MG/1
5-10 TABLET ORAL EVERY 4 HOURS PRN
Qty: 40 TABLET | Refills: 0 | Status: SHIPPED | OUTPATIENT
Start: 2021-05-24 | End: 2021-06-24

## 2021-05-25 RX ORDER — OXYCODONE HYDROCHLORIDE 10 MG/1
10 TABLET ORAL EVERY 4 HOURS PRN
Qty: 180 TABLET | Refills: 0 | Status: SHIPPED | OUTPATIENT
Start: 2021-05-26 | End: 2021-06-25

## 2021-05-25 NOTE — CONFIDENTIAL NOTE
5/25/21 patient calls regarding oxycodone rx he requested yesterday. Miscommunication occurred - patient was wanting refill on his monthly oxycodone 10mg #180 tabs but instead received refill on oxycodone 5mg #40 tabs. 5mg is what his neurosurgery office has been prescribing for him pre and post operatively  - had cervical spine fusion surgery 3/25/21 and revision 4/27/21.     Patient did  the #40 5mg tabs 5/24 and left pharmacy before realized was incorrect strength. With current maintenance dosing of 10mg q 4 hours (max 6/day) #180 per month - this rx will only last a little more than 3 days. Needs new rx sent to pharmacy.     Per [place] PDMP last oxy fills:     5/24/21 oxy 5mg #40 Dr. Forrest   5/10/21 oxy 5mg #40 neurosurg  5/3/21  Oxy 5mg #40 neurosurg   4/28/21 oxy 5mg #40 neurosurg  4/27/21 oxy 10mg #180 Dr. Forrest   4/22/21 oxy 5mg  #20 neurosurg  4/20/21 oxy 5mg #20 neurosurg  3/27/21 oxy 5mg #50   3/22/21 oxy 10mg #180 Dr. Forrest  2/22/21 oxy 10mg #180 Dr. Forrest  1/25/21 oxy 10mg #180 Dr. Forrest   12/22/20 oxy 10mg #180 Dr. Forrest   11/23/20 oxy 10mg #180 Dr. Forrest     Routed to Dr. Forrest for review.  Heena Haro RN

## 2021-05-27 ENCOUNTER — DOCUMENTATION ONLY (OUTPATIENT)
Dept: NEUROSURGERY | Facility: CLINIC | Age: 51
End: 2021-05-27

## 2021-05-27 DIAGNOSIS — Z98.1 S/P CERVICAL SPINAL FUSION: Primary | ICD-10-CM

## 2021-05-27 NOTE — PROGRESS NOTES
TYPE: FMLA    RECEIVED DATE: [unfilled]    FAX/MAIL/EMAIL TO: [facility] [phone]     ATTN: Leave management center    SCANNED TO MEDIA: Yes    Dany Joseph, CMA

## 2021-06-04 ENCOUNTER — TELEPHONE (OUTPATIENT)
Dept: OTHER | Facility: CLINIC | Age: 51
End: 2021-06-04

## 2021-06-04 NOTE — TELEPHONE ENCOUNTER
Farooq was called to verify appointment with Dr. Mann on 6/7/2021.      Farooq states he recently had a procedure and is unable to drive. Rescheduled appointment to 8/9/2021.    He is questioning if he needs labs completed prior to the appointment.     Orders in chart from labs are for 11/2020 & 1/2021.    Stated a  will call back to let him know once a nurse has reviewed his chart.    Mimi SORIA  Routing to nurse [facility] Medicine Triage

## 2021-06-04 NOTE — TELEPHONE ENCOUNTER
Called Oseas back to let him know per nurse review no labs will be needed prior to upcoming Aug. 2021 appointment.     Oseas states understanding.     Mimi CRESPO

## 2021-06-04 NOTE — TELEPHONE ENCOUNTER
Patient does not need any labs completed. Dr. Mann ordered CBC to be done but patient already completed by different provider in April 2021.    Alicia Matthews  BSN, RN    [facility]  Office: [phone]  Fax: [phone]

## 2021-06-08 ENCOUNTER — ANCILLARY PROCEDURE (OUTPATIENT)
Dept: GENERAL RADIOLOGY | Facility: CLINIC | Age: 51
End: 2021-06-08
Attending: NEUROLOGICAL SURGERY
Payer: COMMERCIAL

## 2021-06-08 ENCOUNTER — OFFICE VISIT (OUTPATIENT)
Dept: NEUROSURGERY | Facility: CLINIC | Age: 51
End: 2021-06-08
Attending: PHYSICIAN ASSISTANT
Payer: COMMERCIAL

## 2021-06-08 VITALS
SYSTOLIC BLOOD PRESSURE: 126 MMHG | TEMPERATURE: 99 F | OXYGEN SATURATION: 98 % | BODY MASS INDEX: 30.23 KG/M2 | DIASTOLIC BLOOD PRESSURE: 79 MMHG | WEIGHT: 154 LBS | HEIGHT: 60 IN | HEART RATE: 81 BPM

## 2021-06-08 DIAGNOSIS — Z98.1 S/P CERVICAL SPINAL FUSION: ICD-10-CM

## 2021-06-08 DIAGNOSIS — Z98.1 S/P CERVICAL SPINAL FUSION: Primary | ICD-10-CM

## 2021-06-08 PROCEDURE — 72040 X-RAY EXAM NECK SPINE 2-3 VW: CPT | Performed by: RADIOLOGY

## 2021-06-08 PROCEDURE — 99024 POSTOP FOLLOW-UP VISIT: CPT | Performed by: PHYSICIAN ASSISTANT

## 2021-06-08 PROCEDURE — G0463 HOSPITAL OUTPT CLINIC VISIT: HCPCS

## 2021-06-08 ASSESSMENT — PAIN SCALES - GENERAL: PAINLEVEL: MILD PAIN (3)

## 2021-06-08 ASSESSMENT — MIFFLIN-ST. JEOR: SCORE: 1396.04

## 2021-06-08 NOTE — LETTER
6/8/2021         RE: Oseas Edwards  [address]  [place] [address]        Dear Colleague,    Thank you for referring your patient, Oseas Edwards, to the [facility] [place]. Please see a copy of my visit note below.    Neurosurgery 6-week follow-up    Mr. Edwards is 6 weeks status post revision of cranial occipital fusion.  Patient states he is doing better than before surgery but does have some soreness at the back of his head.  He denies any issues with his incision.  He states his arms are feeling well but better but he still has some numbness in his left and right hands.    He is wearing a cervical collar as directed, but he is requesting a new collar today as his collar is beginning to wear out.    Exam    Alert oriented no acute distress  Moving bilateral upper extremities appropriate strength  Incision is well-healed  Gait is normal    Imaging    X-rays demonstrate stable placement of craniocervical hardware.    Assessment    Status post revision of craniocervical fusion.  Ankylosing spondylitis    Plan        Follow-up visit in 6 weeks with cervical x-ray with AP/lateral views prior.               Again, thank you for allowing me to participate in the care of your patient.        Sincerely,        Omer Maradiaga PA-C

## 2021-06-08 NOTE — NURSING NOTE
Oseas Edwards is a 51 year old male who presents for:       Initial Vitals:  /79   Pulse 81   Temp 99  F (37.2  C)   Ht 5' (1.524 m)   Wt 154 lb (69.9 kg)   SpO2 98%   BMI 30.08 kg/m   Estimated body mass index is 30.08 kg/m  as calculated from the following:    Height as of this encounter: 5' (1.524 m).    Weight as of this encounter: 154 lb (69.9 kg).. Body surface area is 1.72 meters squared. BP completed using cuff size: regular  Mild Pain (3)    Nursing Comments: Patient presents for Cervical XR prior 6 Wk F/U. DOS: 4/27/21.  Revision of occipito-cervical instrumentation    Eleuterio Perry MA

## 2021-06-08 NOTE — PROGRESS NOTES
Neurosurgery 6-week follow-up    Mr. Edwards is 6 weeks status post revision of cranial occipital fusion.  Patient states he is doing better than before surgery but does have some soreness at the back of his head.  He denies any issues with his incision.  He states his arms are feeling well but better but he still has some numbness in his left and right hands.    He is wearing a cervical collar as directed, but he is requesting a new collar today as his collar is beginning to wear out.    Exam    Alert oriented no acute distress  Moving bilateral upper extremities appropriate strength  Incision is well-healed  Gait is normal    Imaging    X-rays demonstrate stable placement of craniocervical hardware.    Assessment    Status post revision of craniocervical fusion.  Ankylosing spondylitis    Plan        Follow-up visit in 6 weeks with cervical x-ray with AP/lateral views prior.

## 2021-06-22 ENCOUNTER — TELEPHONE (OUTPATIENT)
Dept: NEUROSURGERY | Facility: CLINIC | Age: 51
End: 2021-06-22

## 2021-06-22 NOTE — TELEPHONE ENCOUNTER
Reason for call: Please call Leilani at [employer]. She can be reached at [phone]. Thank you.

## 2021-06-24 DIAGNOSIS — M45.9 ANKYLOSING SPONDYLITIS, UNSPECIFIED SITE OF SPINE (H): ICD-10-CM

## 2021-06-24 NOTE — CONFIDENTIAL NOTE
Patient requesting refill on oxycodone 10mg #180 monthly rx. Had been also getting oxycodone 5mg tabs from neurosurgeon for recent spine surgery post-op pain but is done with that.     Last related visit: 4/26/21 with Dr. Forrest   Future visit: 7/26/21 with Dr. Forrest   Controlled Substance Agreement: 10/26/20  Urine tox screen: 1/25/21    Per [place] PDMP last fills:     oxyCODONE HCl   Dispensed Days Supply Quantity Provider Pharmacy   OxyCODONE 10MG      [facility]2021 [facility]   OXYCODONE HCL 5 MG TABLET 05/24/2021 4 40 tablet FARHAT FORREST [facility] ...   OXYCODONE 5MG TAB 05/10/2021 4 40 Units IDANIA,[facility]448 ...   OXYCODONE [facility]MG [facility]   OXYCODONE HCL 5 MG TABLET 04/28/2021 4 40 tablet FIONA RUDD [facility]...   OxyCODONE 10MG      TAB 04/27/2021 30 180 Units VERENAFARHAT [facility]448 ...   OXYCODONE 5MG TAB 04/22/2021 2 20 Units APARNA MATIAS [facility]448 ...   OXYCODONE [facility]MG [facility]   OXYCODONE HCL 5 MG TABLET 03/27/2021 5 50 tablet BAILEY CALDERA [facility]...   OXYCODONE HCL 10 MG TABLET 03/22/2021 30 180 tablet VERENAFARHAT [facility]         Plan: routed to Dr. Forrest for review.  Heena Haro RN

## 2021-06-25 RX ORDER — OXYCODONE HYDROCHLORIDE 10 MG/1
10 TABLET ORAL EVERY 4 HOURS PRN
Qty: 180 TABLET | Refills: 0 | Status: SHIPPED | OUTPATIENT
Start: 2021-06-25 | End: 2021-07-26

## 2021-07-26 ENCOUNTER — OFFICE VISIT (OUTPATIENT)
Dept: FAMILY MEDICINE | Facility: CLINIC | Age: 51
End: 2021-07-26

## 2021-07-26 VITALS
HEART RATE: 87 BPM | HEIGHT: 60 IN | SYSTOLIC BLOOD PRESSURE: 105 MMHG | WEIGHT: 157 LBS | OXYGEN SATURATION: 97 % | DIASTOLIC BLOOD PRESSURE: 72 MMHG | BODY MASS INDEX: 30.82 KG/M2 | RESPIRATION RATE: 18 BRPM

## 2021-07-26 DIAGNOSIS — Z12.11 SCREEN FOR COLON CANCER: Primary | ICD-10-CM

## 2021-07-26 DIAGNOSIS — R79.89 LOW SERUM TESTOSTERONE LEVEL: ICD-10-CM

## 2021-07-26 DIAGNOSIS — I10 ESSENTIAL HYPERTENSION: ICD-10-CM

## 2021-07-26 DIAGNOSIS — M45.9 ANKYLOSING SPONDYLITIS, UNSPECIFIED SITE OF SPINE (H): ICD-10-CM

## 2021-07-26 PROCEDURE — 99214 OFFICE O/P EST MOD 30 MIN: CPT | Performed by: FAMILY MEDICINE

## 2021-07-26 PROCEDURE — 93050 ART PRESSURE WAVEFORM ANALYS: CPT | Performed by: FAMILY MEDICINE

## 2021-07-26 RX ORDER — LISINOPRIL/HYDROCHLOROTHIAZIDE 10-12.5 MG
1 TABLET ORAL DAILY
Qty: 90 TABLET | Refills: 3 | Status: SHIPPED | OUTPATIENT
Start: 2021-07-26 | End: 2022-04-28

## 2021-07-26 RX ORDER — FINASTERIDE 5 MG/1
1 TABLET, FILM COATED ORAL DAILY
Qty: 90 TABLET | Refills: 0 | Status: SHIPPED | OUTPATIENT
Start: 2021-07-26 | End: 2022-02-24

## 2021-07-26 RX ORDER — LISINOPRIL 5 MG/1
5 TABLET ORAL DAILY
Qty: 90 TABLET | Refills: 3 | Status: SHIPPED | OUTPATIENT
Start: 2021-07-26 | End: 2022-04-28

## 2021-07-26 RX ORDER — OXYCODONE HYDROCHLORIDE 10 MG/1
10 TABLET ORAL EVERY 4 HOURS PRN
Qty: 180 TABLET | Refills: 0 | Status: SHIPPED | OUTPATIENT
Start: 2021-07-26 | End: 2021-08-26

## 2021-07-26 ASSESSMENT — ANXIETY QUESTIONNAIRES
7. FEELING AFRAID AS IF SOMETHING AWFUL MIGHT HAPPEN: NOT AT ALL
5. BEING SO RESTLESS THAT IT IS HARD TO SIT STILL: NOT AT ALL
3. WORRYING TOO MUCH ABOUT DIFFERENT THINGS: NOT AT ALL
2. NOT BEING ABLE TO STOP OR CONTROL WORRYING: NOT AT ALL
6. BECOMING EASILY ANNOYED OR IRRITABLE: NOT AT ALL
1. FEELING NERVOUS, ANXIOUS, OR ON EDGE: NOT AT ALL
GAD7 TOTAL SCORE: 0

## 2021-07-26 ASSESSMENT — MIFFLIN-ST. JEOR: SCORE: 1409.65

## 2021-07-26 ASSESSMENT — PATIENT HEALTH QUESTIONNAIRE - PHQ9
SUM OF ALL RESPONSES TO PHQ QUESTIONS 1-9: 2
5. POOR APPETITE OR OVEREATING: NOT AT ALL

## 2021-07-26 NOTE — PROGRESS NOTES
Recovering from the above neck fusion surgery.  Using this brace:    Hopes to get off by this Friday.  Needs refill on his meds.  Chronic Pain Follow-Up    Where in your body do you have pain? Lower back  How has your pain affected your ability to work? 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 Surgery  How well are you sleeping? Fair  How has your mood been since your last visit? About the same  Have you had a significant life event? Other: surgery to fuse the neck  Other aggravating factors: none  Taking medication as directed? Yes    PHQ-9 SCORE 8/29/2017 10/15/2018 7/26/2021   PHQ-9 Total Score 8 3 2     JOSÉ-7 SCORE 8/29/2017 10/15/2018 7/26/2021   Total Score 0 0 0     No flowsheet data found.  Encounter-Level CSA - 10/26/2020:    Controlled Substance Agreement - Scan on 10/26/2020  3:19 PM: CONTROLLED SUBSTANCE AGREEMENT 10/26/20     Encounter-Level CSA - 08/29/2017:    Controlled Substance Agreement - Scan on 8/29/2017  4:39 PM: CONTROLLED SUBSTANCE AGREEMENT 8/29/17     Encounter-Level CSA - 09/09/2016:    Controlled Substance Agreement - Scan on 9/12/2016 10:10 AM: Controlled Substance Agreement 9/9/16     Encounter-Level CSA - 10/15/2015:    Controlled Substance Agreement - Scan on 10/15/2015 12:54 PM: CONTROLLED MED AGREEMENT 10/15/15     Patient-Level CSA:    Controlled Substance Agreement - Non - Opioid - Scan on 5/13/2019 10:45 AM: CONTROLLED SUBSTANCE AGREEMENT 5/13/19       Problem(s) Oriented visit      ROS:  General and Resp. completed and negative except as noted above     HISTORY:   reports current alcohol use.   reports that he quit smoking about 12 years ago. He has never used smokeless tobacco.    Past Medical History:   Diagnosis Date     Ankylosing spondylitis (H)      Arthritis      AS (ankylosing spondylitis) (H) 1985     Cellulitis of leg 2011     Chronic  narcotic dependence (H) 11/4/2013     Hypertension      Keloid scar, mostly just dark 2/7/2017     Low serum testosterone level 1/30/2012     Optic neuritis 3/20/2012     PA (pernicious anemia) 6/16/2011     Past Surgical History:   Procedure Laterality Date     ARTHROPLASTY REVISION HIP Left 2/14/2017    Procedure: ARTHROPLASTY REVISION HIP;  Surgeon: Saravanan Alcaraz MD;  Location:  OR     BACK SURGERY       FUSION CERVICAL POSTERIOR THREE+ LEVELS N/A 4/27/2021    Procedure: Revision of occipito-cervical 4 instrumentation.;  Surgeon: Yobany Craig MD;  Location:  OR     GRAFT BONE FROM ILIAC CREST Right 3/25/2021    Procedure: RIGHT ILLIAC CREST BONE GRAFT HARVEST;  Surgeon: Yobany Craig MD;  Location:  OR     HERNIORRHAPHY VENTRAL  11/11/2013    Procedure: HERNIORRHAPHY VENTRAL;  UMBILICAL HERNIA REPAIR WITH MESH, RECTUS DIASTASIS REPAIR WITH MESH;  Surgeon: Jason Dodson MD;  Location:  SD     JOINT REPLACEMENT, HIP RT/LT  1994    Left     JOINT REPLACEMENT, HIP RT/LT  2000ish    Right     OPEN SEPARATION COMPONENT HERNIORRHAPHY  11/11/2013    Procedure: OPEN SEPARATION COMPONENT HERNIORRHAPHY;;  Surgeon: Jason Dodson MD;  Location:  SD     OPTICAL TRACKING SYSTEM FUSION POSTERIOR CERVICAL THREE + LEVELS N/A 3/25/2021    Procedure: Occiput-Cervical 4 posterior instrumentation and fusion and C1 and C2 laminectomy;  Surgeon: Yobany Craig MD;  Location:  OR       EXAM:  BP: 105/72   Pulse: 87    Temp: Data Unavailable    Wt Readings from Last 2 Encounters:   07/26/21 71.2 kg (157 lb)   06/08/21 69.9 kg (154 lb)       BMI= Body mass index is 30.66 kg/m .    EXAM:  APPEARANCE: = Relaxed and in no distress  Musculsktl: decreased range of motion in the neck and lower back  NEURO = CN II-XII are tested and no deficits found  Mood/Affect = Cooperative and interested      Assessment/Plan:  Farooq was seen today for recheck medication.    Diagnoses and all orders for  "this visit:    Screen for colon cancer  -     ANTONIO([employer])    Essential hypertension.  Reviewed his current HTN management. Discussed our goal for him is a systolic pressure at or below 128 and diastolic pressure at or below 83.  We today managed his prescriptions with refills ensured to ensure availabilty of current medications.  Discussed the importance for aggressive management of HTN to prevent vascular complications later.  Recommended lower fat, lower carbohydrate, and lower sodium (<2000 mg)diet. Required intervals for follow up on HTN, lab studies reviewed.    Strongly recommened he follow his blood pressures outside the clinic to ensure that BPs are remaining within guidelines,.  Instructed to contact me if the readings are not within guidelines on a regular basis so we can adjust treatment as needed.    -     [place] ART PRESS WAVEFORM ANALYS CENTRAL ART PRESSURE  -     lisinopril (ZESTRIL) 5 MG tablet; Take 1 tablet (5 mg) by mouth daily  -     lisinopril-hydrochlorothiazide (ZESTORETIC) 10-12.5 MG tablet; Take 1 tablet by mouth daily    Low serum testosterone level  -     finasteride (PROSCAR) 5 MG tablet; Take 1 tablet (5 mg) by mouth daily    Ankylosing spondylitis, unspecified site of spine (H)  We will wean down to 5 pills a day this month.    Discussed side effects associated with narcotic pain meds including GI disturbances (such as diarrhea, nausea, vomiting, constipation, etc.), drowsiness, decreased cognition, narcotic withdrawal, and narcotic addiction/dependence.  Told the patient never to drink alcohol while taking this medication and not to drive or operate dangerous machinery while taking it.  Also discussed in specific detail my expectation regarding responsible use of narcotic and the implied \"contract\" with the patient that this type of medication will be used properly and exactly as instructed, and that any abnormal behaviors associated with the use of this medication will cause " me to stop prescribing these medications at any time.      -     oxyCODONE IR (ROXICODONE) 10 MG tablet; Take 1 tablet (10 mg) by mouth every 4 hours as needed for severe pain (max 6/day)        COUNSELING:   reports that he quit smoking about 12 years ago. He has never used smokeless tobacco.    Estimated body mass index is 30.66 kg/m  as calculated from the following:    Height as of this encounter: 1.524 m (5').    Weight as of this encounter: 71.2 kg (157 lb).       Appropriate preventive services were discussed with this patient, including applicable screening as appropriate for cardiovascular disease, diabetes, osteopenia/osteoporosis, and glaucoma.  As appropriate for age/gender, discussed screening for colorectal cancer, prostate cancer, breast cancer, and cervical cancer. Checklist reviewing preventive services available has been given to the patient.    Reviewed patients plan of care and provided an AVS. The Basic Care Plan (routine screening as documented in Health Maintenance) for Farooq meets the Care Plan requirement. This Care Plan has been established and reviewed with the  Patient.      The following health maintenance items are reviewed in Epic and correct as of today:  Health Maintenance   Topic Date Due     ADVANCE CARE PLANNING  Never done     HEPATITIS B IMMUNIZATION (1 of 3 - Risk 3-dose series) Never done     MEDICARE ANNUAL WELLNESS VISIT  06/06/2017     ZOSTER IMMUNIZATION (1 of 2) Never done     COLORECTAL CANCER SCREENING  06/20/2021     INFLUENZA VACCINE (1) 09/01/2021     URINE DRUG SCREEN  01/25/2022     LIPID  01/25/2026     DTAP/TDAP/TD IMMUNIZATION (3 - Td or Tdap) 10/26/2030     HEPATITIS C SCREENING  Completed     HIV SCREENING  Completed     PHQ-2  Completed     IPV IMMUNIZATION  Completed     COVID-19 Vaccine  Completed     Pneumococcal Vaccine: Pediatrics (0 to 5 Years) and At-Risk Patients (6 to 64 Years)  Aged Out     MENINGITIS IMMUNIZATION  Aged Out       Willy Vaca  Lon  [facility]  For any issues my office # is [phone]

## 2021-07-27 ASSESSMENT — ANXIETY QUESTIONNAIRES: GAD7 TOTAL SCORE: 0

## 2021-07-28 ENCOUNTER — TELEPHONE (OUTPATIENT)
Dept: NEUROSURGERY | Facility: CLINIC | Age: 51
End: 2021-07-28

## 2021-07-28 NOTE — TELEPHONE ENCOUNTER
Last Visit:6/8/21  S/P: Revision of occipito-cervical 4 instrumentation on 4/27/21  Next Visit: 7/30/21    Name of Provider: Dr. Craig    Assessment: Pt is calling to confirm his xray appt prior to office visit on the 30th. No further questions or concerns.

## 2021-07-30 ENCOUNTER — OFFICE VISIT (OUTPATIENT)
Dept: NEUROSURGERY | Facility: CLINIC | Age: 51
End: 2021-07-30
Attending: NEUROLOGICAL SURGERY
Payer: COMMERCIAL

## 2021-07-30 ENCOUNTER — ANCILLARY PROCEDURE (OUTPATIENT)
Dept: GENERAL RADIOLOGY | Facility: CLINIC | Age: 51
End: 2021-07-30
Attending: PHYSICIAN ASSISTANT
Payer: COMMERCIAL

## 2021-07-30 VITALS — HEART RATE: 101 BPM | OXYGEN SATURATION: 98 % | SYSTOLIC BLOOD PRESSURE: 100 MMHG | DIASTOLIC BLOOD PRESSURE: 67 MMHG

## 2021-07-30 DIAGNOSIS — Z98.1 S/P CERVICAL SPINAL FUSION: Primary | ICD-10-CM

## 2021-07-30 DIAGNOSIS — Z98.1 S/P CERVICAL SPINAL FUSION: ICD-10-CM

## 2021-07-30 PROCEDURE — 72040 X-RAY EXAM NECK SPINE 2-3 VW: CPT | Performed by: RADIOLOGY

## 2021-07-30 PROCEDURE — 99024 POSTOP FOLLOW-UP VISIT: CPT | Performed by: NEUROLOGICAL SURGERY

## 2021-07-30 PROCEDURE — G0463 HOSPITAL OUTPT CLINIC VISIT: HCPCS

## 2021-07-30 NOTE — PROGRESS NOTES
It was a pleasure to see Oseas Edwards today in Neurosurgery Clinic. He is a 51 year old male who underwent:    Procedure Date: 04/27/2021     PREOPERATIVE DIAGNOSES:     1.  Ankylosing spondylitis.  2.  Cervical stenosis with myelopathy.  3.  Occipitocervical hardware failure.     POSTOPERATIVE DIAGNOSES:     1.  Ankylosing spondylitis.  2.  Cervical stenosis with myelopathy.  3.  Occipitocervical hardware failure.     PROCEDURE:  Revision of occipitocervical for instrumentation.     SURGEON:  Yobany Craig MD     ASSISTANT:  Shaggy Bee PA-C     ANESTHESIA:  General endotracheal anesthesia.     ESTIMATED BLOOD LOSS:  100 mL.    Overall he is doing well.  He does have some pain in the left neck over some of the hardware.  This is worse when he sleeps or when he is wearing his collar.  Neurologically he is doing well overall.    Vitals:    07/30/21 1253   BP: 100/67   Pulse: 101   SpO2: 98%     There is no height or weight on file to calculate BMI.  Data Unavailable    Awake alert and oriented.  Well-healed incision.  Bilateral upper and lower extremity strength 5 out of 5 in all muscle groups.    Imaging: X-rays of the cervical spine demonstrate stable alignment and good position of the hardware with no evidence of loosening or hardware failure.  The imaging was reviewed with the patient showed the patient in clinic today.    Assessment: Status post revision occipitocervical fusion.    Plan: I would like to see him back in 9 months with a CT of the cervical spine to evaluate his fusion.  If he continues to have neck pain related to the hardware at that point we might consider removal of the hardware if his fusion is solid.

## 2021-07-30 NOTE — LETTER
7/30/2021         RE: Oseas Edwards  [address]  [place] [address]        Dear Colleague,    Thank you for referring your patient, Oseas Edwards, to the [facility] [place]. Please see a copy of my visit note below.    It was a pleasure to see Oseas Edwards today in Neurosurgery Clinic. He is a 51 year old male who underwent:    Procedure Date: 04/27/2021     PREOPERATIVE DIAGNOSES:     1.  Ankylosing spondylitis.  2.  Cervical stenosis with myelopathy.  3.  Occipitocervical hardware failure.     POSTOPERATIVE DIAGNOSES:     1.  Ankylosing spondylitis.  2.  Cervical stenosis with myelopathy.  3.  Occipitocervical hardware failure.     PROCEDURE:  Revision of occipitocervical for instrumentation.     SURGEON:  Yobany Craig MD     ASSISTANT:  Shaggy Bee PA-C     ANESTHESIA:  General endotracheal anesthesia.     ESTIMATED BLOOD LOSS:  100 mL.    Overall he is doing well.  He does have some pain in the left neck over some of the hardware.  This is worse when he sleeps or when he is wearing his collar.  Neurologically he is doing well overall.    Vitals:    07/30/21 1253   BP: 100/67   Pulse: 101   SpO2: 98%     There is no height or weight on file to calculate BMI.  Data Unavailable    Awake alert and oriented.  Well-healed incision.  Bilateral upper and lower extremity strength 5 out of 5 in all muscle groups.    Imaging: X-rays of the cervical spine demonstrate stable alignment and good position of the hardware with no evidence of loosening or hardware failure.  The imaging was reviewed with the patient showed the patient in clinic today.    Assessment: Status post revision occipitocervical fusion.    Plan: I would like to see him back in 9 months with a CT of the cervical spine to evaluate his fusion.  If he continues to have neck pain related to the hardware at that point we might consider removal of the hardware if his fusion is solid.         Again, thank you for allowing me  to participate in the care of your patient.        Sincerely,        Yobany Craig MD

## 2021-08-09 ENCOUNTER — OFFICE VISIT (OUTPATIENT)
Dept: OTHER | Facility: CLINIC | Age: 51
End: 2021-08-09
Attending: INTERNAL MEDICINE
Payer: COMMERCIAL

## 2021-08-09 VITALS
BODY MASS INDEX: 30.43 KG/M2 | HEIGHT: 60 IN | OXYGEN SATURATION: 96 % | SYSTOLIC BLOOD PRESSURE: 124 MMHG | WEIGHT: 155 LBS | HEART RATE: 97 BPM | RESPIRATION RATE: 18 BRPM | DIASTOLIC BLOOD PRESSURE: 81 MMHG

## 2021-08-09 DIAGNOSIS — R20.0 ARM NUMBNESS: ICD-10-CM

## 2021-08-09 DIAGNOSIS — I10 ESSENTIAL HYPERTENSION: ICD-10-CM

## 2021-08-09 DIAGNOSIS — I35.9 AVD (AORTIC VALVE DISEASE): ICD-10-CM

## 2021-08-09 PROCEDURE — G0463 HOSPITAL OUTPT CLINIC VISIT: HCPCS

## 2021-08-09 PROCEDURE — 99214 OFFICE O/P EST MOD 30 MIN: CPT | Performed by: INTERNAL MEDICINE

## 2021-08-09 ASSESSMENT — MIFFLIN-ST. JEOR: SCORE: 1400.58

## 2021-08-09 NOTE — PROGRESS NOTES
Vascular Medicine Progress Note     Oseas Edwards is a 51 year old male who was seen here today for follow-up on upper extremity numbness with discrepancy in blood pressure values/measurements    Interval History   Patient numbness both upper extremities was secondary to radiculopathy/changes in the cervical spine secondary to his ankylosing spondylitis  Patient underwent surgery he is feeling much better and the difference between blood pressure is minimal now after surgery    Physical Exam       BP: 124/81 Pulse: 97   Resp: 18 SpO2: 96 %      Vitals:    08/09/21 1255   Weight: 70.3 kg (155 lb)     Vital Signs with Ranges  Pulse:  [97] 97  Resp:  [18] 18  BP: (112-124)/(69-81) 124/81  SpO2:  [96 %] 96 %  [unfilled]    Constitutional: awake, alert, cooperative, no apparent distress, and appears stated age  Eyes: Lids and lashes normal, pupils equal, round and reactive to light, extra ocular muscles intact, sclera clear, conjunctiva normal  ENT: normocepalic, without obvious abnormality, oropharynx pink and moist  Hematologic / Lymphatic: no lymphadenopathy  Respiratory: No increased work of breathing, good air exchange, clear to auscultation bilaterally, no crackles or wheezing  Cardiovascular: regular rate and rhythm, normal S1 and S2 and no murmur noted  GI: Normal bowel sounds, soft, non-distended, non-tender  Skin: no redness, warmth, or swelling, no rashes and no lesions  Musculoskeletal: There is no redness, warmth, or swelling of the joints.  Full range of motion noted.  Motor strength is 5 out of 5 all extremities bilaterally.  Tone is normal.  Neurologic: Awake, alert, oriented to name, place and time.  Cranial nerves II-XII are grossly intact.  Motor is 5 out of 5 bilaterally.    Neuropsychiatric:  Normal affect, memory, insight.  Pulses: Positive pulsations bilateral and equal. No carotid bruits appreciated.     Medications         Data   No results found for this or any previous visit (from the past  24 hour(s)).    Assessment & Plan   (I10) Essential hypertension  Comment: Controlled continue with current medications  Plan: Continue with the same current management    (I35.9) AVD (aortic valve disease)  Comment: No evidence of aortic valve disease which is a common association and ankylosing spondylitis      (R20.0) Arm numbness  Comment: Much better now post surgery  Plan: Follow-up visit in 1 year from now        Summary: Follow-up as needed    Gian Mann MD

## 2021-08-09 NOTE — PROGRESS NOTES
[facility] Vascular Clinic        Patient is here for a follow up  to discuss about lab results      Patient's condition is stable.    /81 (BP Location: Left arm, Patient Position: Chair, Cuff Size: Adult Regular)   Pulse 97   Resp 18   Ht 5' (1.524 m)   Wt 155 lb (70.3 kg)   SpO2 96%   BMI 30.27 kg/m      The provider has been notified that the patient has no concerns.     Questions patient would like addressed today are: N/A.    Refills are needed: No    Has homecare services and agency name:  Amy Castillo, [facility]

## 2021-08-26 ENCOUNTER — OFFICE VISIT (OUTPATIENT)
Dept: FAMILY MEDICINE | Facility: CLINIC | Age: 51
End: 2021-08-26

## 2021-08-26 VITALS
DIASTOLIC BLOOD PRESSURE: 79 MMHG | WEIGHT: 157.8 LBS | OXYGEN SATURATION: 99 % | BODY MASS INDEX: 30.82 KG/M2 | HEART RATE: 73 BPM | SYSTOLIC BLOOD PRESSURE: 122 MMHG

## 2021-08-26 DIAGNOSIS — D51.0 PA (PERNICIOUS ANEMIA): Chronic | ICD-10-CM

## 2021-08-26 DIAGNOSIS — M45.9 ANKYLOSING SPONDYLITIS, UNSPECIFIED SITE OF SPINE (H): ICD-10-CM

## 2021-08-26 DIAGNOSIS — E29.1 HYPOGONADISM MALE: ICD-10-CM

## 2021-08-26 DIAGNOSIS — G99.2 STENOSIS OF CERVICAL SPINE WITH MYELOPATHY (H): Primary | ICD-10-CM

## 2021-08-26 DIAGNOSIS — M48.02 STENOSIS OF CERVICAL SPINE WITH MYELOPATHY (H): Primary | ICD-10-CM

## 2021-08-26 DIAGNOSIS — F11.20 CHRONIC NARCOTIC DEPENDENCE (H): ICD-10-CM

## 2021-08-26 PROCEDURE — 99214 OFFICE O/P EST MOD 30 MIN: CPT | Performed by: FAMILY MEDICINE

## 2021-08-26 RX ORDER — CYANOCOBALAMIN 1000 UG/ML
INJECTION, SOLUTION INTRAMUSCULAR; SUBCUTANEOUS
Qty: 3 ML | Refills: 0 | Status: SHIPPED | OUTPATIENT
Start: 2021-08-26 | End: 2023-12-18

## 2021-08-26 RX ORDER — TESTOSTERONE GEL, 1% 10 MG/G
50 GEL TRANSDERMAL
Qty: 30 PACKET | Refills: 1 | Status: SHIPPED | OUTPATIENT
Start: 2021-08-26 | End: 2023-12-18

## 2021-08-26 RX ORDER — OXYCODONE HYDROCHLORIDE 10 MG/1
10 TABLET ORAL EVERY 4 HOURS PRN
Qty: 180 TABLET | Refills: 0 | Status: SHIPPED | OUTPATIENT
Start: 2021-08-26 | End: 2021-09-23

## 2021-08-26 NOTE — PROGRESS NOTES
Problem(s) Oriented visit        SUBJECTIVE:                                                    Oseas Edwards is a 51 year old male who presents to clinic today for the following health issues :    Pain History:  When did you first notice your pain? - More than 6 weeks   Have you seen this provider for your pain in the past?   Yes   Where in your body do you have pain? Lower back  Are you seeing anyone else for your pain? Yes - just had spinal fusion surgery    PHQ-9 SCORE 8/29/2017 10/15/2018 7/26/2021   PHQ-9 Total Score 8 3 2       JOSÉ-7 SCORE 8/29/2017 10/15/2018 7/26/2021   Total Score 0 0 0             Chronic Pain Follow Up:    Location of pain: lower back  Analgesia/pain control:    - Recent changes:  Was on extra meds post op    - Overall control: Tolerable with discomfort    - Current treatments: surgery   Adherence:     - Do you ever take more pain medicine than prescribed? No    - When did you take your last dose of pain medicine?  daily   Adverse effects: No   PDMP Review       Value Time User    State PDMP site checked  Yes 8/26/2021 11:05 AM Willy Forrest MD        Last CSA Agreement:   Patient-Level CSA:    Controlled Substance Agreement - Non - Opioid - Scan on 5/13/2019 10:45 AM: CONTROLLED SUBSTANCE AGREEMENT 5/13/19       Last UDS:             1. Ankylosing spondylitis, unspecified site of spine (H)  Very willing to try another pain clinic to try and reduce med usage.  Weaning this month did not go well       Problem list, Medication list, Allergies, and Medical/Social/Surgical histories reviewed in EPIC and updated as appropriate.   Additional history: as documented    ROS:  General and Resp. completed and negative except as noted above    Histories:   Patient Active Problem List   Diagnosis     PA (pernicious anemia)     Low serum testosterone level     Health Care Home     Chronic narcotic dependence (H)     Rectus diastasis     Hx of gastric ulcer     Chronic pain syndrome     Keloid scar,  mostly just dark     Stenosis of cervical spine with myelopathy (H)     Ankylosing spondylitis of cervical region (H)     Spinal instability of cervical region     Claudication (H)     Past Surgical History:   Procedure Laterality Date     ARTHROPLASTY REVISION HIP Left 2017    Procedure: ARTHROPLASTY REVISION HIP;  Surgeon: Saravanan Alcaraz MD;  Location:  OR     BACK SURGERY       FUSION CERVICAL POSTERIOR THREE+ LEVELS N/A 2021    Procedure: Revision of occipito-cervical 4 instrumentation.;  Surgeon: Yobany Craig MD;  Location:  OR     GRAFT BONE FROM ILIAC CREST Right 3/25/2021    Procedure: RIGHT ILLIAC CREST BONE GRAFT HARVEST;  Surgeon: Yobany Craig MD;  Location:  OR     HERNIORRHAPHY VENTRAL  2013    Procedure: HERNIORRHAPHY VENTRAL;  UMBILICAL HERNIA REPAIR WITH MESH, RECTUS DIASTASIS REPAIR WITH MESH;  Surgeon: Jason Dodson MD;  Location:  SD     JOINT REPLACEMENT, HIP RT/LT      Left     JOINT REPLACEMENT, HIP RT/LT      Right     OPEN SEPARATION COMPONENT HERNIORRHAPHY  2013    Procedure: OPEN SEPARATION COMPONENT HERNIORRHAPHY;;  Surgeon: Jason Dodson MD;  Location: [facility]     OPTICAL TRACKING SYSTEM FUSION POSTERIOR CERVICAL THREE + LEVELS N/A 3/25/2021    Procedure: Occiput-Cervical 4 posterior instrumentation and fusion and C1 and C2 laminectomy;  Surgeon: Yobany Craig MD;  Location:  OR       Social History     Tobacco Use     Smoking status: Former Smoker     Quit date: 2009     Years since quittin.6     Smokeless tobacco: Never Used     Tobacco comment: quit 2 1/2 yrs   Substance Use Topics     Alcohol use: Yes     Comment: occ.      Family History   Problem Relation Age of Onset     Diabetes Mother      Coronary Artery Disease Mother            OBJECTIVE:                                                    /79   Pulse 73   Wt 71.6 kg (157 lb 12.8 oz)   SpO2 99%   BMI 30.82 kg/m    Body  mass index is 30.82 kg/m .   APPEARANCE: = alert and mild distress     ASSESSMENT/PLAN:                                                        Farooq was seen today for neck.    Diagnoses and all orders for this visit:    Ankylosing spondylitis, unspecified site of spine (H)          See Patient Instructions  Patient Instructions   [facility]:        The following health maintenance items are reviewed in Epic and correct as of today:  Health Maintenance   Topic Date Due     ADVANCE CARE PLANNING  Never done     HEPATITIS B IMMUNIZATION (1 of 3 - Risk 3-dose series) Never done     MEDICARE ANNUAL WELLNESS VISIT  06/06/2017     ZOSTER IMMUNIZATION (1 of 2) Never done     COLORECTAL CANCER SCREENING  06/20/2021     COVID-19 Vaccine (3 - Pfizer risk 3-dose series) 07/21/2021     INFLUENZA VACCINE (1) 09/01/2021     URINE DRUG SCREEN  01/25/2022     LIPID  01/25/2026     DTAP/TDAP/TD IMMUNIZATION (6 - Td or Tdap) 10/26/2030     HEPATITIS C SCREENING  Completed     HIV SCREENING  Completed     PHQ-2  Completed     IPV IMMUNIZATION  Completed     Pneumococcal Vaccine: Pediatrics (0 to 5 Years) and At-Risk Patients (6 to 64 Years)  Aged Out     MENINGITIS IMMUNIZATION  Aged Out       Willy Forrest MD  [facility]  Family Practice  [facility]  [phone]    For any issues my office # is [phone]

## 2021-08-31 ENCOUNTER — TELEPHONE (OUTPATIENT)
Dept: FAMILY MEDICINE | Facility: CLINIC | Age: 51
End: 2021-08-31

## 2021-08-31 NOTE — TELEPHONE ENCOUNTER
Received fax from [facility] requesting PA for testosterone gel for patient. Submitted through [employer]. PA was approved right away with dates of 08/01/.2021-08/31/2022.  Called pharmacy to inform them of approval.

## 2021-09-05 ENCOUNTER — HEALTH MAINTENANCE LETTER (OUTPATIENT)
Age: 51
End: 2021-09-05

## 2021-09-23 DIAGNOSIS — M45.9 ANKYLOSING SPONDYLITIS, UNSPECIFIED SITE OF SPINE (H): ICD-10-CM

## 2021-09-23 RX ORDER — OXYCODONE HYDROCHLORIDE 10 MG/1
10 TABLET ORAL EVERY 4 HOURS PRN
Qty: 180 TABLET | Refills: 0 | Status: SHIPPED | OUTPATIENT
Start: 2021-09-23 | End: 2021-10-25

## 2021-10-14 ENCOUNTER — TELEPHONE (OUTPATIENT)
Dept: FAMILY MEDICINE | Facility: CLINIC | Age: 51
End: 2021-10-14

## 2021-10-14 NOTE — TELEPHONE ENCOUNTER
has cologuard kit 10/1/21  Instructions given- patient agreed to proceed  Graciela Mccabe MA October 14, 2021 12:50 PM    No copy of order form seen in media

## 2021-10-25 DIAGNOSIS — M45.9 ANKYLOSING SPONDYLITIS, UNSPECIFIED SITE OF SPINE (H): ICD-10-CM

## 2021-10-25 RX ORDER — OXYCODONE HYDROCHLORIDE 10 MG/1
10 TABLET ORAL EVERY 4 HOURS PRN
Qty: 180 TABLET | Refills: 0 | Status: SHIPPED | OUTPATIENT
Start: 2021-10-25 | End: 2021-11-23

## 2021-11-01 ENCOUNTER — MYC MEDICAL ADVICE (OUTPATIENT)
Dept: FAMILY MEDICINE | Facility: CLINIC | Age: 51
End: 2021-11-01

## 2021-11-01 NOTE — TELEPHONE ENCOUNTER
Elías Farooq,    I am reaching out to you today because I am following up on the cologuard order Dr. Forrest gave you from 7/2021.  We are wondering if you are still going to do the test or if you have decided to not do it?  Please let us know.    Jaswinder Herring,   [facility]  [phone]

## 2021-11-15 ENCOUNTER — TRANSFERRED RECORDS (OUTPATIENT)
Dept: FAMILY MEDICINE | Facility: CLINIC | Age: 51
End: 2021-11-15

## 2021-11-15 LAB — COLOGUARD-ABSTRACT: NEGATIVE

## 2021-11-16 NOTE — RESULT ENCOUNTER NOTE
Dear Oseas,   I am writing to report that your included test results are within expected ranges. I do not suggest that we make any changes at this time.  Willy Forrest M.D.

## 2021-11-23 ENCOUNTER — OFFICE VISIT (OUTPATIENT)
Dept: FAMILY MEDICINE | Facility: CLINIC | Age: 51
End: 2021-11-23

## 2021-11-23 VITALS
BODY MASS INDEX: 32.04 KG/M2 | HEART RATE: 97 BPM | HEIGHT: 60 IN | DIASTOLIC BLOOD PRESSURE: 89 MMHG | OXYGEN SATURATION: 99 % | RESPIRATION RATE: 18 BRPM | SYSTOLIC BLOOD PRESSURE: 134 MMHG | WEIGHT: 163.2 LBS

## 2021-11-23 DIAGNOSIS — M45.9 ANKYLOSING SPONDYLITIS, UNSPECIFIED SITE OF SPINE (H): ICD-10-CM

## 2021-11-23 DIAGNOSIS — F11.20 CHRONIC NARCOTIC DEPENDENCE (H): ICD-10-CM

## 2021-11-23 DIAGNOSIS — I10 ESSENTIAL HYPERTENSION: ICD-10-CM

## 2021-11-23 DIAGNOSIS — Z23 NEEDS FLU SHOT: Primary | ICD-10-CM

## 2021-11-23 DIAGNOSIS — M45.2 ANKYLOSING SPONDYLITIS OF CERVICAL REGION (H): ICD-10-CM

## 2021-11-23 DIAGNOSIS — G89.4 CHRONIC PAIN SYNDROME: ICD-10-CM

## 2021-11-23 DIAGNOSIS — M53.2X2 SPINAL INSTABILITY OF CERVICAL REGION: ICD-10-CM

## 2021-11-23 PROCEDURE — 90686 IIV4 VACC NO PRSV 0.5 ML IM: CPT | Performed by: FAMILY MEDICINE

## 2021-11-23 PROCEDURE — G0008 ADMIN INFLUENZA VIRUS VAC: HCPCS | Mod: 59 | Performed by: FAMILY MEDICINE

## 2021-11-23 PROCEDURE — 99214 OFFICE O/P EST MOD 30 MIN: CPT | Mod: 25 | Performed by: FAMILY MEDICINE

## 2021-11-23 RX ORDER — OXYCODONE HYDROCHLORIDE 10 MG/1
10 TABLET ORAL EVERY 4 HOURS PRN
Qty: 180 TABLET | Refills: 0 | Status: SHIPPED | OUTPATIENT
Start: 2021-11-23 | End: 2021-12-22

## 2021-11-23 ASSESSMENT — MIFFLIN-ST. JEOR: SCORE: 1437.77

## 2021-11-23 NOTE — LETTER
[facility]  11/23/21  Patient: Oseas Edwards  YOB: 1970  Medical Record Number: [id number]                                                                                  Non-Opioid Controlled Substance Agreement    This is an agreement between you and your provider regarding safe and appropriate use of controlled substances prescribed by your care team. Controlled substances are?medicines that can cause physical and mental dependence (abuse).     There are strict laws about having and using these medicines. We here at [facility] are  committed to working with you in your efforts to get better. To support you in this work, we'll help you schedule regular office appointments for medicine refills. If we must cancel or change your appointment for any reason, we'll make sure you have enough medicine to last until your next appointment.     As a Provider, I will:     Listen carefully to your concerns while treating you with respect.     Recommend a treatment plan that I believe is in your best interest and may involve therapies other than medicine.      Talk with you often about the possible benefits and the risk of harm of any medicine that we prescribe for you.    Assess the safety of this medicine and check how well it works.      Provide a plan on how to taper (discontinue or go off) using this medicine if the decision is made to stop its use.      ::  As a Patient, I understand controlled substances:       Are prescribed by my care provider to help me function or work and manage my condition(s).?    Are strong medicines and can cause serious side effects.       Need to be taken exactly as prescribed.?Combining controlled substances with certain medicines or chemicals (such as illegal drugs, alcohol, sedatives, sleeping pills, and benzodiazepines) can be dangerous or even fatal.? If I stop taking my medicines suddenly, I may have severe withdrawal symptoms.     The risks, benefits, and  side effects of these medicine(s) were explained to me. I agree that:    1. I will take part in other treatments as advised by my care team. This may be psychiatry or counseling, physical therapy, behavioral therapy, group treatment or a referral to specialist.    2. I will keep all my appointments and understand this is part of the monitoring of controlled substances.?My care team may require an office visit for EVERY controlled substance refill. If I miss appointments or don t follow instructions, my care team may stop my medicine    3. I will take my medicines as prescribed. I will not change the dose or schedule unless my care team tells me to. There will be no refills if I run out early.      4. I may be asked to come to the clinic and complete a urine drug test or complete a pill count. If I don t give a urine sample or participate in a pill count, the care team may stop my medicine.    5. I will only receive controlled substance prescriptions from this clinic. If I am treated by another provider, I will tell them that I am taking controlled substances and that I have a treatment agreement with this provider. I will inform my [facility] care team within one business day if I am given a prescription for any controlled substance by another healthcare provider. My [facility] care team can contact other providers and pharmacists about my use of any medicines.    6. It is up to me to make sure that I don't run out of my medicines on weekends or holidays.?If my care team is willing to refill my prescription without a visit, I must request refills only during office hours. Refills may take up to 3 business days to process. I will use one pharmacy to fill all my controlled substance prescriptions. I will notify the clinic about any changes to my insurance or medicine availability.    7. I am responsible for my prescriptions. If the medicine/prescription is lost, stolen or destroyed, it will not be  replaced.?I also agree not to share controlled substance medicines with anyone.     8. I am aware I should not use any illegal or recreational drugs. I agree not to drink alcohol unless my care team says I can.     9. If I enroll in the [place] [facility] Cannabis program, I will tell my care team before my next refill.    10. I will tell my care team right away if I become pregnant, have a new medical problem treated outside of my regular clinic, or have a change in my medicines.     11. I understand that this medicine can affect my thinking, judgment and reaction time.? Alcohol and drugs affect the brain and body, which can affect the safety of my driving. Being under the influence of alcohol or drugs can affect my decision-making, behaviors, personal safety and the safety of others. Driving while impaired (DWI) can occur if a person is driving, operating or in physical control of a car, motorcycle, boat, snowmobile, ATV, motorbike, off-road vehicle or any other motor vehicle ([place] Statute 169A.20). I understand the risk if I choose to drive or operate any vehicle or machinery.    I understand that if I do not follow any of the conditions above, my prescriptions or treatment may be stopped or changed.   I agree that my provider, clinic care team and pharmacy may work with any city, state or federal law enforcement agency that investigates the misuse, sale or other diversion of my controlled medicine. I will allow my provider to discuss my care with, or share a copy of, this agreement with any other treating provider, pharmacy or emergency room where I receive care.     I have read this agreement and have asked questions about anything I did not understand.    ________________________________________________________  Patient Signature - Oseas Edwards     ___________________                   Date     ________________________________________________________  Provider Signature - Willy Forrest MD        ___________________                   Date     ________________________________________________________  Witness Signature (required if provider not present while patient signing)          ___________________                   Date

## 2021-11-23 NOTE — PROGRESS NOTES
Problem(s) Oriented visit        SUBJECTIVE:                                                    Oseas Edwards is a 51 year old male who presents to clinic today for the following health issues :    1. Needs flu shot  due    2. Spinal instability of cervical region  Ongoing cause of pain    3. Chronic pain syndrome  Chronic Pain Follow-Up    Where in your body do you have pain? Neck and back  How has your pain affected your ability to work? 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 Massage, Rest and Stretching  How well are you sleeping? Fair  How has your mood been since your last visit? About the same  Have you had a significant life event? No  Other aggravating factors: none  Taking medication as directed? Yes    PHQ-9 SCORE 8/29/2017 10/15/2018 7/26/2021   PHQ-9 Total Score 8 3 2     JOSÉ-7 SCORE 8/29/2017 10/15/2018 7/26/2021   Total Score 0 0 0     No flowsheet data found.  CSA -- Encounter Level on 10/26/2020:    Controlled Substance Agreement - Scan on 10/26/2020  3:19 PM: CONTROLLED SUBSTANCE AGREEMENT 10/26/20     CSA -- Encounter Level on 08/29/2017:    Controlled Substance Agreement - Scan on 8/29/2017  4:39 PM: CONTROLLED SUBSTANCE AGREEMENT 8/29/17     CSA -- Encounter Level on 09/09/2016:    Controlled Substance Agreement - Scan on 9/12/2016 10:10 AM: Controlled Substance Agreement 9/9/16     CSA -- Encounter Level on 10/15/2015:    Controlled Substance Agreement - Scan on 10/15/2015 12:54 PM: CONTROLLED MED AGREEMENT 10/15/15     CSA -- Patient Level:    Controlled Substance Agreement - Non - Opioid - Scan on 5/13/2019 10:45 AM: CONTROLLED SUBSTANCE AGREEMENT 5/13/19             4. Chronic narcotic dependence (H)  .ppd REVIEWED    5. Ankylosing spondylitis of cervical region (H)  Cause of above, neck fusion is still painful...  6. Essential hypertension  Essential Hypertension   Remains well  controlled when checked out of clinic. Forgot to take his meds today  he has not experienced any significant side effects from medications for hypertension.    NO active cardiac complaints or symptoms with exercise.  Current medications for treatment:  See list    Reviewed last 6 BP readings in chart:  BP Readings from Last 6 Encounters:   11/23/21 (!) 164/89   08/26/21 122/79   08/09/21 124/81   07/30/21 100/67   07/26/21 105/72   06/08/21 126/79              Problem list, Medication list, Allergies, and Medical/Social/Surgical histories reviewed in [facility] and updated as appropriate.   Additional history: as documented    ROS:  General and CV completed and negative except as noted above    Histories:   Patient Active Problem List   Diagnosis     PA (pernicious anemia)     Low serum testosterone level     Health Care Home     Chronic narcotic dependence (H)     Rectus diastasis     Hx of gastric ulcer     Chronic pain syndrome     Keloid scar, mostly just dark     Stenosis of cervical spine with myelopathy (H)     Ankylosing spondylitis of cervical region (H)     Spinal instability of cervical region     Claudication (H)     Past Surgical History:   Procedure Laterality Date     ARTHROPLASTY REVISION HIP Left 2/14/2017    Procedure: ARTHROPLASTY REVISION HIP;  Surgeon: Saravanan Alcaraz MD;  Location:  OR     BACK SURGERY       FUSION CERVICAL POSTERIOR THREE+ LEVELS N/A 4/27/2021    Procedure: Revision of occipito-cervical 4 instrumentation.;  Surgeon: Yobany Carig MD;  Location:  OR     GRAFT BONE FROM ILIAC CREST Right 3/25/2021    Procedure: RIGHT ILLIAC CREST BONE GRAFT HARVEST;  Surgeon: Yobany Craig MD;  Location:  OR     HERNIORRHAPHY VENTRAL  11/11/2013    Procedure: HERNIORRHAPHY VENTRAL;  UMBILICAL HERNIA REPAIR WITH MESH, RECTUS DIASTASIS REPAIR WITH MESH;  Surgeon: Jason Dodson MD;  Location:  SD     JOINT REPLACEMENT, HIP RT/LT  1994    Left     JOINT REPLACEMENT,  HIP RT/LT  2000ish    Right     OPEN SEPARATION COMPONENT HERNIORRHAPHY  2013    Procedure: OPEN SEPARATION COMPONENT HERNIORRHAPHY;;  Surgeon: Jason Dodson MD;  Location:  SD     OPTICAL TRACKING SYSTEM FUSION POSTERIOR CERVICAL THREE + LEVELS N/A 3/25/2021    Procedure: Occiput-Cervical 4 posterior instrumentation and fusion and C1 and C2 laminectomy;  Surgeon: Yobany Craig MD;  Location:  OR       Social History     Tobacco Use     Smoking status: Former Smoker     Quit date: 2009     Years since quittin.9     Smokeless tobacco: Never Used     Tobacco comment: quit 2 1/2 yrs   Substance Use Topics     Alcohol use: Yes     Comment: occ.      Family History   Problem Relation Age of Onset     Diabetes Mother      Coronary Artery Disease Mother            OBJECTIVE:                                                    BP (!) 164/89   Pulse 97   Resp 18   Ht 1.524 m (5')   Wt 74 kg (163 lb 3.2 oz)   SpO2 99%   BMI 31.87 kg/m    Body mass index is 31.87 kg/m .   APPEARANCE: = alert and mild distress  Neck = No anterior or posterior adenopathy appreciated.  Digits and Nails = FROM in all finger joints, no nail dystrophy  Recent/Remote Memory = Alert and Oriented x 3  Mood/Affect = Cooperative and interested     ASSESSMENT/PLAN:                                                        Farooq was seen today for recheck.    Diagnoses and all orders for this visit:    Needs flu shot    Spinal instability of cervical region    Chronic pain syndrome    Chronic narcotic dependence (H)    Ankylosing spondylitis of cervical region (H)    Essential hypertension        See Patient Instructions  There are no Patient Instructions on file for this visit.    The following health maintenance items are reviewed in Epic and correct as of today:  Health Maintenance   Topic Date Due     ADVANCE CARE PLANNING  Never done     HEPATITIS B IMMUNIZATION (1 of 3 - Risk 3-dose series) Never done     MEDICARE  ANNUAL WELLNESS VISIT  06/06/2017     ZOSTER IMMUNIZATION (1 of 2) Never done     LUNG CANCER SCREENING  04/04/2019     COVID-19 Vaccine (3 - Pfizer risk 4-dose series) 07/21/2021     INFLUENZA VACCINE (1) 09/01/2021     URINE DRUG SCREEN  01/25/2022     COLORECTAL CANCER SCREENING  11/15/2024     LIPID  01/25/2026     DTAP/TDAP/TD IMMUNIZATION (6 - Td or Tdap) 10/26/2030     HEPATITIS C SCREENING  Completed     HIV SCREENING  Completed     PHQ-2  Completed     IPV IMMUNIZATION  Completed     Pneumococcal Vaccine: Pediatrics (0 to 5 Years) and At-Risk Patients (6 to 64 Years)  Aged Out     MENINGITIS IMMUNIZATION  Aged Out       Willy Forrest MD  [facility]  Family Practice  [facility]  [phone]    For any issues my office # is [phone]

## 2021-12-22 DIAGNOSIS — M45.9 ANKYLOSING SPONDYLITIS, UNSPECIFIED SITE OF SPINE (H): ICD-10-CM

## 2021-12-23 RX ORDER — OXYCODONE HYDROCHLORIDE 10 MG/1
10 TABLET ORAL EVERY 4 HOURS PRN
Qty: 180 TABLET | Refills: 0 | Status: SHIPPED | OUTPATIENT
Start: 2021-12-23 | End: 2022-01-24

## 2021-12-27 ENCOUNTER — IMMUNIZATION (OUTPATIENT)
Dept: NURSING | Facility: CLINIC | Age: 51
End: 2021-12-27
Payer: COMMERCIAL

## 2021-12-27 PROCEDURE — 91300 PR COVID VAC PFIZER DIL RECON 30 MCG/0.3 ML IM: CPT

## 2021-12-27 PROCEDURE — 0004A PR COVID VAC PFIZER DIL RECON 30 MCG/0.3 ML IM: CPT

## 2022-01-24 DIAGNOSIS — M45.9 ANKYLOSING SPONDYLITIS, UNSPECIFIED SITE OF SPINE (H): ICD-10-CM

## 2022-01-24 NOTE — TELEPHONE ENCOUNTER
Oxycodone---Last visit 11-23-21---[facility] appt 02-24-22 Dr Forrest

## 2022-01-25 RX ORDER — OXYCODONE HYDROCHLORIDE 10 MG/1
10 TABLET ORAL EVERY 4 HOURS PRN
Qty: 180 TABLET | Refills: 0 | Status: SHIPPED | OUTPATIENT
Start: 2022-01-25 | End: 2022-02-24

## 2022-02-20 ENCOUNTER — HEALTH MAINTENANCE LETTER (OUTPATIENT)
Age: 52
End: 2022-02-20

## 2022-02-24 ENCOUNTER — OFFICE VISIT (OUTPATIENT)
Dept: FAMILY MEDICINE | Facility: CLINIC | Age: 52
End: 2022-02-24

## 2022-02-24 VITALS
OXYGEN SATURATION: 98 % | SYSTOLIC BLOOD PRESSURE: 124 MMHG | HEART RATE: 103 BPM | BODY MASS INDEX: 32.32 KG/M2 | WEIGHT: 165.5 LBS | DIASTOLIC BLOOD PRESSURE: 72 MMHG | RESPIRATION RATE: 16 BRPM

## 2022-02-24 DIAGNOSIS — M53.2X2 SPINAL INSTABILITY OF CERVICAL REGION: Primary | ICD-10-CM

## 2022-02-24 DIAGNOSIS — I73.9 CLAUDICATION (H): ICD-10-CM

## 2022-02-24 DIAGNOSIS — M48.02 STENOSIS OF CERVICAL SPINE WITH MYELOPATHY (H): ICD-10-CM

## 2022-02-24 DIAGNOSIS — G99.2 STENOSIS OF CERVICAL SPINE WITH MYELOPATHY (H): ICD-10-CM

## 2022-02-24 DIAGNOSIS — R79.89 LOW SERUM TESTOSTERONE LEVEL: ICD-10-CM

## 2022-02-24 DIAGNOSIS — F11.20 CHRONIC NARCOTIC DEPENDENCE (H): ICD-10-CM

## 2022-02-24 DIAGNOSIS — M45.9 ANKYLOSING SPONDYLITIS, UNSPECIFIED SITE OF SPINE (H): ICD-10-CM

## 2022-02-24 DIAGNOSIS — D64.9 ANEMIA, UNSPECIFIED TYPE: ICD-10-CM

## 2022-02-24 LAB
% GRANULOCYTES: 64.1 % (ref 42.2–75.2)
AMPHETAMINES (AMP) UR: NEGATIVE
BARBITURATES (BAR) UR: NEGATIVE
BENZODIAZEPINES (BZO) UR: NEGATIVE
BUPRENORPHINE (BUP) UR: NEGATIVE
CANNABINOIDS (THC) UR: NEGATIVE
COCAINE (COC) UR: NEGATIVE
HCT VFR BLD AUTO: 45.3 % (ref 39–51)
HEMOGLOBIN: 14.5 G/DL (ref 13.4–17.5)
LYMPHOCYTES NFR BLD AUTO: 28.6 % (ref 20.5–51.1)
MCH RBC QN AUTO: 30.3 PG (ref 27–31)
MCHC RBC AUTO-ENTMCNC: 32.1 G/DL (ref 33–37)
MCV RBC AUTO: 94.6 FL (ref 80–100)
MDMA UR: NEGATIVE
METHADONE (MTD) UR: NEGATIVE
METHAMPHETAMINE (METHA) UR: NEGATIVE
MONOCYTES NFR BLD AUTO: 7.3 % (ref 1.7–9.3)
MORPH/OPIATES (MOP) UR: NEGATIVE
OXYCODONE (OXYCO) UR: NORMAL
PCP UR: NEGATIVE
PLATELET # BLD AUTO: 208 K/UL (ref 140–450)
RBC # BLD AUTO: 4.78 X10/CMM (ref 4.2–5.9)
SPECIMEN VALIDITY INTERNAL QC UR: NORMAL
SPECIMEN VALIDITY TEMPERATURE UR: NORMAL
SPECIMEN VALIDITY UR CREATININE: NORMAL MG/DL (ref 20–200)
SPECIMEN VALIDITY UR PH: 5 (ref 4–9)
SPECIMEN VALIDITY UR SPECIFIC GRAVITY: 1.02 (ref 1–1.02)
WBC # BLD AUTO: 7.1 X10/CMM (ref 3.8–11)

## 2022-02-24 PROCEDURE — 80306 DRUG TEST PRSMV INSTRMNT: CPT | Performed by: FAMILY MEDICINE

## 2022-02-24 PROCEDURE — 99214 OFFICE O/P EST MOD 30 MIN: CPT | Performed by: FAMILY MEDICINE

## 2022-02-24 PROCEDURE — 85025 COMPLETE CBC W/AUTO DIFF WBC: CPT | Performed by: FAMILY MEDICINE

## 2022-02-24 PROCEDURE — 36415 COLL VENOUS BLD VENIPUNCTURE: CPT | Performed by: FAMILY MEDICINE

## 2022-02-24 RX ORDER — FINASTERIDE 5 MG/1
1 TABLET, FILM COATED ORAL DAILY
Qty: 90 TABLET | Refills: 3 | Status: SHIPPED | OUTPATIENT
Start: 2022-02-24 | End: 2023-01-24

## 2022-02-24 RX ORDER — OXYCODONE HYDROCHLORIDE 10 MG/1
10 TABLET ORAL EVERY 4 HOURS PRN
Qty: 180 TABLET | Refills: 0 | Status: SHIPPED | OUTPATIENT
Start: 2022-02-24 | End: 2022-03-24

## 2022-02-24 NOTE — PROGRESS NOTES
Problem(s) Oriented visit        SUBJECTIVE:                                                    Oseas Edwards is a 51 year old male who presents to clinic today for the following health issues :    1. Low serum testosterone level  Known issue, urination is better  - finasteride (PROSCAR) 5 MG tablet; Take 1 tablet (5 mg) by mouth daily  Dispense: 90 tablet; Refill: 3    2. Ankylosing spondylitis, unspecified site of spine (H)  Cervical fusion, getting pain ongoing stomach issues continue to not tolerate NSAIDS  - UScreen Toxisure ([facility])  - oxyCODONE IR (ROXICODONE) 10 MG tablet; Take 1 tablet (10 mg) by mouth every 4 hours as needed for severe pain (max 6/day)  Dispense: 180 tablet; Refill: 0    3. Spinal instability of cervical region  PT is working at home, may need to go again will work on at home first    4. Chronic narcotic dependence (H)  [facility] Policy-- if pt needs  pain meds greater than 2 months, needs to fill out narcotic contract and may need pain specilaity  referral.  Please have pt  come in for appt to discuss.  ar Thanks,  Willy Forrest MD    - UScreen Toxisure ([facility])       Problem list, Medication list, Allergies, and Medical/Social/Surgical histories reviewed in EPIC and updated as appropriate.   Additional history: as documented    ROS:  General and Resp. completed and negative except as noted above    Histories:   Patient Active Problem List   Diagnosis     PA (pernicious anemia)     Low serum testosterone level     Health Care Home     Chronic narcotic dependence (H)     Rectus diastasis     Hx of gastric ulcer     Chronic pain syndrome     Keloid scar, mostly just dark     Stenosis of cervical spine with myelopathy (H)     Ankylosing spondylitis of cervical region (H)     Spinal instability of cervical region     Claudication (H)     Past Surgical History:   Procedure Laterality Date     ARTHROPLASTY REVISION HIP Left 2/14/2017    Procedure: ARTHROPLASTY REVISION HIP;  Surgeon:  Saravanan Alcaraz MD;  Location:  OR     BACK SURGERY       FUSION CERVICAL POSTERIOR THREE+ LEVELS N/A 2021    Procedure: Revision of occipito-cervical 4 instrumentation.;  Surgeon: Yobany Craig MD;  Location:  OR     GRAFT BONE FROM ILIAC CREST Right 3/25/2021    Procedure: RIGHT ILLIAC CREST BONE GRAFT HARVEST;  Surgeon: Yobany Craig MD;  Location:  OR     HERNIORRHAPHY VENTRAL  2013    Procedure: HERNIORRHAPHY VENTRAL;  UMBILICAL HERNIA REPAIR WITH MESH, RECTUS DIASTASIS REPAIR WITH MESH;  Surgeon: Jason Dodson MD;  Location:  SD     JOINT REPLACEMENT, HIP RT/LT      Left     JOINT REPLACEMENT, HIP RT/LT  ish    Right     OPEN SEPARATION COMPONENT HERNIORRHAPHY  2013    Procedure: OPEN SEPARATION COMPONENT HERNIORRHAPHY;;  Surgeon: Jason Dodson MD;  Location: [facility]     OPTICAL TRACKING SYSTEM FUSION POSTERIOR CERVICAL THREE + LEVELS N/A 3/25/2021    Procedure: Occiput-Cervical 4 posterior instrumentation and fusion and C1 and C2 laminectomy;  Surgeon: Yobany Craig MD;  Location:  OR       Social History     Tobacco Use     Smoking status: Former Smoker     Quit date: 2009     Years since quittin.1     Smokeless tobacco: Never Used     Tobacco comment: quit 2 1/2 yrs   Substance Use Topics     Alcohol use: Yes     Comment: occ.      Family History   Problem Relation Age of Onset     Diabetes Mother      Coronary Artery Disease Mother            OBJECTIVE:                                                    /72   Pulse 103   Resp 16   Wt 75.1 kg (165 lb 8 oz)   SpO2 98%   BMI 32.32 kg/m    Body mass index is 32.32 kg/m .   APPEARANCE: = healthy, alert and no distress  Musculsktl: decreased range of motion in all of spine  Recent/Remote Memory = Alert and Oriented x 3     ASSESSMENT/PLAN:                                                        Farooq was seen today for recheck medication.    Diagnoses and all orders  for this visit:    Spinal instability of cervical region    BPH  Finasteride is working  -     finasteride (PROSCAR) 5 MG tablet; Take 1 tablet (5 mg) by mouth daily    Ankylosing spondylitis, unspecified site of spine (H)  Post surgical pain or FUSION material is controlled.  The lower back pain, not ready for surgery..  -     UScreen Toxisure ([facility])  -     oxyCODONE IR (ROXICODONE) 10 MG tablet; Take 1 tablet (10 mg) by mouth every 4 hours as needed for severe pain (max 6/day)  -     Comp. Metabolic Panel (14) ([facility])    Chronic narcotic dependence (H)  Reviewed his pain history.  Has had long standing pain in back.  Has seen Orthopedists and Rheumatologists and has been told there is on medication from Rheum.  His back hurts all the time.   He has been referred to Pain Clinic, and we are trying to get him into the pain clinic in [place]  We have tried to titrate down on his current doses of pain medications, but each time this has caused significant worsening of pain and significant decrease in functionality.   He has been very good in following instructions regarding pain med use, follow up appointments.  There has never been any signs of inappropriate use of these medications or irregularity regarding refills.    -     UScreen Toxisure ([facility])    Anemia, unspecified type  -     CBC with Diff/Plt ([facility])  -     Comp. Metabolic Panel (14) ([facility])    Claudication   No new or worsening    See Patient Instructions  There are no Patient Instructions on file for this visit.    The following health maintenance items are reviewed in Epic and correct as of today:  Health Maintenance   Topic Date Due     ADVANCE CARE PLANNING  Never done     HEPATITIS B IMMUNIZATION (1 of 3 - Risk 3-dose series) Never done     MEDICARE ANNUAL WELLNESS VISIT  06/06/2017     ZOSTER IMMUNIZATION (1 of 2) Never done     LUNG CANCER SCREENING  04/04/2019     URINE DRUG SCREEN  01/25/2022     COVID-19 Vaccine (4 - Booster for Pfizer series)  05/27/2022     COLORECTAL CANCER SCREENING  11/15/2024     LIPID  01/25/2026     DTAP/TDAP/TD IMMUNIZATION (6 - Td or Tdap) 10/26/2030     HEPATITIS C SCREENING  Completed     HIV SCREENING  Completed     PHQ-2  Completed     INFLUENZA VACCINE  Completed     IPV IMMUNIZATION  Completed     Pneumococcal Vaccine: Pediatrics (0 to 5 Years) and At-Risk Patients (6 to 64 Years)  Aged Out     MENINGITIS IMMUNIZATION  Aged Out       Willy Forrest MD  [facility]  Family Practice  [facility]  [phone]    For any issues my office # is [phone]

## 2022-02-24 NOTE — LETTER
Opioid / Opioid Plus Controlled Substance Agreement    This is an agreement between you and your provider about the safe and appropriate use of controlled substance/opioids prescribed by your care team. Controlled substances are medicines that can cause physical and mental dependence (abuse).    There are strict laws about having and using these medicines. We here at [facility] are committing to working with you in your efforts to get better. To support you in this work, we ll help you schedule regular office appointments for medicine refills. If we must cancel or change your appointment for any reason, we ll make sure you have enough medicine to last until your next appointment.     As a Provider, I will:    Listen carefully to your concerns and treat you with respect.     Recommend a treatment plan that I believe is in your best interest. This plan may involve therapies other than opioid pain medication.     Talk with you often about the possible benefits, and the risk of harm of any medicine that we prescribe for you.     Provide a plan on how to taper (discontinue or go off) using this medicine if the decision is made to stop its use.    As a Patient, I understand that opioid(s):     Are a controlled substance prescribed by my care team to help me function or work and manage my condition(s).     Are strong medicines and can cause serious side effects such as:    Drowsiness, which can seriously affect my driving ability    A lower breathing rate, enough to cause death    Harm to my thinking ability     Depression     Abuse of and addiction to this medicine    Need to be taken exactly as prescribed. Combining opioids with certain medicines or chemicals (such as illegal drugs, sedatives, sleeping pills, and benzodiazepines) can be dangerous or even fatal. If I stop opioids suddenly, I may have severe withdrawal symptoms.    Do not work for all types of pain nor for all patients. If they re not helpful, I may  be asked to stop them.        The risks, benefits and side effects of these medicine(s) were explained to me. I agree that:  1. I will take part in other treatments as advised by my care team. This may be psychiatry or counseling, physical therapy, behavioral therapy, group treatment or a referral to a specialist.     2. I will keep all my appointments. I understand that this is part of the monitoring of opioids. My care team may require an office visit for EVERY opioid/controlled substance refill. If I miss appointments or don t follow instructions, my care team may stop my medicine.    3. I will take my medicines as prescribed. I will not change the dose or schedule unless my care team tells me to. There will be no refills if I run out early.     4. I may be asked to come to the clinic and complete a urine drug test or complete a pill count at any time. If I don t give a urine sample or participate in a pill count, the care team may stop my medicine.    5. I will only receive prescriptions from this clinic for chronic pain. If I am treated by another provider for acute pain issues, I will tell them that I am taking opioid pain medication for chronic pain and that I have a treatment agreement with this provider. I will inform my [facility] care team within one business day if I am given a prescription for any pain medication by another healthcare provider. My [facility] care team can contact other providers and pharmacists about my use of any medicines.    6. It is up to me to make sure that I don t run out of my medicines on weekends or holidays. If my care team is willing to refill my opioid prescription without a visit, I must request refills only during office hours. Refills may take up to 3 business days to process. I will use one pharmacy to fill all my opioid and other controlled substance prescriptions. I will notify the clinic about any changes to my insurance or medication  availability.    7. I am responsible for my prescriptions. If the medicine/prescription is lost, stolen or destroyed, it will not be replaced. I also agree not to share controlled substance medicines with anyone.    8. I am aware I should not use any illegal or recreational drugs. I agree not to drink alcohol unless my care team says I can.       9. If I enroll in the [place] [facility] program, I will tell my care team prior to my next refill.     10. I will tell my care team right away if I become pregnant, have a new medical problem treated outside of my regular clinic, or have a change in my medications.    11. I understand that this medicine can affect my thinking, judgment and reaction time. Alcohol and drugs affect the brain and body, which can affect the safety of my driving. Being under the influence of alcohol or drugs can affect my decision-making, behaviors, personal safety, and the safety of others. Driving while impaired (DWI) can occur if a person is driving, operating, or in physical control of a car, motorcycle, boat, snowmobile, ATV, motorbike, off-road vehicle, or any other motor vehicle ([place] Statute 169A.20). I understand the risk if I choose to drive or operate any vehicle or machinery.    I understand that if I do not follow any of the conditions above, my prescriptions or treatment may be stopped or changed.          Opioids  What You Need to Know    What are opioids?   Opioids are pain medicines that must be prescribed by a doctor. They are also known as narcotics.     Examples are:   1. morphine (MS Contin, Bre)  2. oxycodone (Oxycontin)  3. oxycodone and acetaminophen (Percocet)  4. hydrocodone and acetaminophen (Vicodin, Norco)   5. fentanyl patch (Duragesic)   6. hydromorphone (Dilaudid)   7. methadone  8. codeine (Tylenol #3)     What do opioids do well?   Opioids are best for severe short-term pain such as after a surgery or injury. They may work well for cancer pain. They may  help some people with long-lasting (chronic) pain.     What do opioids NOT do well?   Opioids never get rid of pain entirely, and they don t work well for most patients with chronic pain. Opioids don t reduce swelling, one of the causes of pain.                                    Other ways to manage chronic pain and improve function include:       Treat the health problem that may be causing pain    Anti-inflammation medicines, which reduce swelling and tenderness, such as ibuprofen (Advil, Motrin) or naproxen (Aleve)    Acetaminophen (Tylenol)    Antidepressants and anti-seizure medicines, especially for nerve pain    Topical treatments such as patches or creams    Injections or nerve blocks    Chiropractic or osteopathic treatment    Acupuncture, massage, deep breathing, meditation, visual imagery, aromatherapy    Use heat or ice at the pain site    Physical therapy     Exercise    Stop smoking    Take part in therapy       Risks and side effects     Talk to your doctor before you start or decide to keep taking opioids. Possible side effects include:      Lowering your breathing rate enough to cause death    Overdose, including death, especially if taking higher than prescribed doses    Worse depression symptoms; less pleasure in things you usually enjoy    Feeling tired or sluggish    Slower thoughts or cloudy thinking    Being more sensitive to pain over time; pain is harder to control    Trouble sleeping or restless sleep    Changes in hormone levels (for example, less testosterone)    Changes in sex drive or ability to have sex    Constipation    Unsafe driving    Itching and sweating    Dizziness    Nausea, throwing up and dry mouth    What else should I know about opioids?    Opioids may lead to dependence, tolerance, or addiction.      Dependence means that if you stop or reduce the medicine too quickly, you will have withdrawal symptoms. These include loose poop (diarrhea), jitters, flu-like symptoms,  nervousness and tremors. Dependence is not the same as addiction.                       Tolerance means needing higher doses over time to get the same effect. This may increase the chance of serious side effects.      Addiction is when people improperly use a substance that harms their body, their mind or their relations with others. Use of opiates can cause a relapse of addiction if you have a history of drug or alcohol abuse.      People who have used opioids for a long time may have a lower quality of life, worse depression, higher levels of pain and more visits to doctors.    You can overdose on opioids. Take these steps to lower your risk of overdose:    1. Recognize the signs:  Signs of overdose include decrease or loss of consciousness (blackout), slowed breathing, trouble waking up and blue lips. If someone is worried about overdose, they should call 911.    2. Talk to your doctor about Narcan (naloxone).   If you are at risk for overdose, you may be given a prescription for Narcan. This medicine very quickly reverses the effects of opioids.   If you overdose, a friend or family member can give you Narcan while waiting for the ambulance. They need to know the signs of overdose and how to give Narcan.     3. Don't use alcohol or street drugs.   Taking them with opioids can cause death.    4. Do not take any of these medicines unless your doctor says it s OK. Taking these with opioids can cause death:    Benzodiazepines, such as lorazepam (Ativan), alprazolam (Xanax) or diazepam (Valium)    Muscle relaxers, such as cyclobenzaprine (Flexeril)    Sleeping pills like zolpidem (Ambien)     Other opioids      How to keep you and other people safe while taking opioids:    1. Never share your opioids with others.  Opioid medicines are regulated by the Drug Enforcement Agency (RAMANA). Selling or sharing medications is a criminal act.    2. Be sure to store opioids in a secure place, locked up if possible. Young children  can easily swallow them and overdose.    3. When you are traveling with your medicines, keep them in the original bottles. If you use a pill box, be sure you also carry a copy of your medicine list from your clinic or pharmacy.    4. Safe disposal of opioids    Most pharmacies have places to get rid of medicine, called disposal kiosks. Medicine disposal options are also available in every [facility]. Search your county and  medication disposal  to find more options. You can find more details at:  [email or web address]     I agree that my provider, clinic care team, and pharmacy may work with any city, state or federal law enforcement agency that investigates the misuse, sale, or other diversion of my controlled medicine. I will allow my provider to discuss my care with, or share a copy of, this agreement with any other treating provider, pharmacy or emergency room where I receive care.    I have read this agreement and have asked questions about anything I did not understand.    _______________________________________________________  Patient Signature - Oseas Edwards _____________________                   Date     _______________________________________________________  Provider Signature - Willy Forrest MD   _____________________                   Date     _______________________________________________________  Witness Signature (required if provider not present while patient signing)   _____________________                   Date

## 2022-02-26 LAB
ALBUMIN SERPL-MCNC: 4.1 G/DL (ref 3.8–4.9)
ALBUMIN/GLOB SERPL: 1.5 {RATIO} (ref 1.2–2.2)
ALP SERPL-CCNC: 95 IU/L (ref 44–121)
ALT SERPL-CCNC: 17 IU/L (ref 0–44)
AST SERPL-CCNC: 20 IU/L (ref 0–40)
BILIRUB SERPL-MCNC: <0.2 MG/DL (ref 0–1.2)
BUN SERPL-MCNC: 17 MG/DL (ref 6–24)
BUN/CREATININE RATIO: 18 (ref 9–20)
CALCIUM SERPL-MCNC: 9.4 MG/DL (ref 8.7–10.2)
CHLORIDE SERPLBLD-SCNC: 104 MMOL/L (ref 96–106)
CREAT SERPL-MCNC: 0.96 MG/DL (ref 0.76–1.27)
EGFR IF AFRICN AM: 105 ML/MIN/1.73
EGFR IF NONAFRICN AM: 91 ML/MIN/1.73
GLOBULIN, TOTAL: 2.7 G/DL (ref 1.5–4.5)
GLUCOSE SERPL-MCNC: 124 MG/DL (ref 65–99)
POTASSIUM SERPL-SCNC: 3.8 MMOL/L (ref 3.5–5.2)
PROT SERPL-MCNC: 6.8 G/DL (ref 6–8.5)
SODIUM SERPL-SCNC: 140 MMOL/L (ref 134–144)
TOTAL CO2: 22 MMOL/L (ref 20–29)

## 2022-03-01 NOTE — RESULT ENCOUNTER NOTE
Dear Oseas,   I am writing to report that your included test results are as expected.  Many lab panels contain some results that are outside of the normal range, I have reviewed each of these results and they require no changes at this time.    Willy Forrest MD

## 2022-03-24 DIAGNOSIS — M45.9 ANKYLOSING SPONDYLITIS, UNSPECIFIED SITE OF SPINE (H): ICD-10-CM

## 2022-03-24 RX ORDER — OXYCODONE HYDROCHLORIDE 10 MG/1
10 TABLET ORAL EVERY 4 HOURS PRN
Qty: 180 TABLET | Refills: 0 | Status: SHIPPED | OUTPATIENT
Start: 2022-03-24 | End: 2022-04-25

## 2022-03-24 NOTE — TELEPHONE ENCOUNTER
oxy--last seen 2/24/22 (realted), patient states due in May.  toxisure signed 2/24/22, contract signed 2/24/22

## 2022-04-25 DIAGNOSIS — M45.9 ANKYLOSING SPONDYLITIS, UNSPECIFIED SITE OF SPINE (H): ICD-10-CM

## 2022-04-25 RX ORDER — OXYCODONE HYDROCHLORIDE 10 MG/1
10 TABLET ORAL EVERY 4 HOURS PRN
Qty: 180 TABLET | Refills: 0 | Status: SHIPPED | OUTPATIENT
Start: 2022-04-25 | End: 2022-05-24

## 2022-04-25 NOTE — TELEPHONE ENCOUNTER
Refill request oxycodone 10mg IR, last office visit 2/24/22, upcoming office visit 5/24/22. CSA signed 2/24/22, tox screen done 2/24/22. Fill history per [place]  below. Routed to Dr. Forrest for review. Madiha Flores

## 2022-04-27 DIAGNOSIS — I10 ESSENTIAL HYPERTENSION: ICD-10-CM

## 2022-04-28 RX ORDER — LISINOPRIL/HYDROCHLOROTHIAZIDE 10-12.5 MG
TABLET ORAL
Qty: 90 TABLET | Refills: 0 | Status: SHIPPED | OUTPATIENT
Start: 2022-04-28 | End: 2022-07-26

## 2022-04-28 RX ORDER — LISINOPRIL 5 MG/1
TABLET ORAL
Qty: 90 TABLET | Refills: 0 | Status: SHIPPED | OUTPATIENT
Start: 2022-04-28 | End: 2022-07-22

## 2022-04-29 ENCOUNTER — HOSPITAL ENCOUNTER (OUTPATIENT)
Dept: CT IMAGING | Facility: CLINIC | Age: 52
Discharge: HOME | End: 2022-04-29
Attending: NEUROLOGICAL SURGERY
Payer: COMMERCIAL

## 2022-04-29 ENCOUNTER — OFFICE VISIT (OUTPATIENT)
Dept: NEUROSURGERY | Facility: CLINIC | Age: 52
End: 2022-04-29
Attending: NEUROLOGICAL SURGERY
Payer: COMMERCIAL

## 2022-04-29 VITALS
SYSTOLIC BLOOD PRESSURE: 92 MMHG | HEIGHT: 60 IN | HEART RATE: 93 BPM | BODY MASS INDEX: 32.39 KG/M2 | WEIGHT: 165 LBS | DIASTOLIC BLOOD PRESSURE: 63 MMHG | OXYGEN SATURATION: 97 %

## 2022-04-29 DIAGNOSIS — G99.2 STENOSIS OF CERVICAL SPINE WITH MYELOPATHY (H): ICD-10-CM

## 2022-04-29 DIAGNOSIS — M48.02 STENOSIS OF CERVICAL SPINE WITH MYELOPATHY (H): ICD-10-CM

## 2022-04-29 DIAGNOSIS — G56.02 LEFT CARPAL TUNNEL SYNDROME: ICD-10-CM

## 2022-04-29 DIAGNOSIS — Z98.1 S/P CERVICAL SPINAL FUSION: ICD-10-CM

## 2022-04-29 DIAGNOSIS — M45.2 ANKYLOSING SPONDYLITIS OF CERVICAL REGION (H): ICD-10-CM

## 2022-04-29 DIAGNOSIS — Z98.1 S/P CERVICAL SPINAL FUSION: Primary | ICD-10-CM

## 2022-04-29 PROCEDURE — 72125 CT NECK SPINE W/O DYE: CPT

## 2022-04-29 PROCEDURE — 99214 OFFICE O/P EST MOD 30 MIN: CPT | Performed by: NEUROLOGICAL SURGERY

## 2022-04-29 ASSESSMENT — PAIN SCALES - GENERAL: PAINLEVEL: MILD PAIN (3)

## 2022-04-29 NOTE — NURSING NOTE
Oseas Edwards is a 52 year old male who presents for:  Chief Complaint   Patient presents with     Neurologic Problem     1 year Follow up-Cervical         Initial Vitals:  BP 92/63   Pulse 93   Ht 5' (1.524 m)   Wt 165 lb (74.8 kg)   SpO2 97%   BMI 32.22 kg/m   Estimated body mass index is 32.22 kg/m  as calculated from the following:    Height as of this encounter: 5' (1.524 m).    Weight as of this encounter: 165 lb (74.8 kg).. Body surface area is 1.78 meters squared. BP completed using cuff size: regular    Nursing Comments:     Flavia Soto

## 2022-04-29 NOTE — PROGRESS NOTES
It was a pleasure to see Oseas Edwards today in Neurosurgery Clinic. He is a 52 year old male who underwent:    Procedure Date: 04/27/2021     PREOPERATIVE DIAGNOSES:     1.  Ankylosing spondylitis.  2.  Cervical stenosis with myelopathy.  3.  Occipitocervical hardware failure.     POSTOPERATIVE DIAGNOSES:     1.  Ankylosing spondylitis.  2.  Cervical stenosis with myelopathy.  3.  Occipitocervical hardware failure.     PROCEDURE:  Revision of occipitocervical for instrumentation.     SURGEON:  Yobany Craig MD     ASSISTANT:  Shaggy Bee PA-C     ANESTHESIA:  General endotracheal anesthesia.     ESTIMATED BLOOD LOSS:  100 mL.     Overall he is doing well with improved neurologic symptoms.  He does describe some pain on the back of the head where the occipital instrumentation is but this is generally fairly tolerable.  He has some numbness and tingling in the left hand in the first 3 digits that is worse with use of the hand and occasionally at night.    Vitals:    04/29/22 1006   BP: 92/63   Pulse: 93   SpO2: 97%   Weight: 74.8 kg (165 lb)   Height: 1.524 m (5')     Estimated body mass index is 32.22 kg/m  as calculated from the following:    Height as of this encounter: 1.524 m (5').    Weight as of this encounter: 74.8 kg (165 lb).  Mild Pain (3)    Awake and alert.  Bilateral upper and lower extremity strength 5 out of 5 in all muscle groups.  Well-healed posterior cervical incision.    Positive Phalen sign on the left.      Imaging: CT scan of the cervical spine shows solid bony fusion from the skull through the cervical spine.  There is no sign of loosening of the hardware.  We also reviewed his previous lumbar MRI which shows degeneration at the T12-L1 level which is likely the only level unfused remaining in his spine because of his ankylosing spondylitis.  The imaging was reviewed with patient shown to the patient in clinic today.    Assessment: Status postoccipital cervical fusion.  Left carpal tunnel  syndrome.  Ankylosing spondylitis.    Plan: At this point I think no further follow-up is needed for his cervical fusion.  We discussed his carpal tunnel syndrome and I think that if he had worsening symptoms he he has a brace already that he could wear or if it is continue to worsen we could consider a left carpal tunnel release.  We also discussed that if he had worsening problems in the mid to low back we would consider reevaluation and potential fusion at the T12-L1 level if needed.  Otherwise at this point he may follow-up on an as-needed basis.

## 2022-04-29 NOTE — LETTER
4/29/2022         RE: Oseas Edwards  [address]  [place] [address]        Dear Colleague,    Thank you for referring your patient, Oseas Edwards, to the [facility]UROLOGY [facility]. Please see a copy of my visit note below.    It was a pleasure to see Oseas Edwards today in Neurosurgery Clinic. He is a 52 year old male who underwent:    Procedure Date: 04/27/2021     PREOPERATIVE DIAGNOSES:     1.  Ankylosing spondylitis.  2.  Cervical stenosis with myelopathy.  3.  Occipitocervical hardware failure.     POSTOPERATIVE DIAGNOSES:     1.  Ankylosing spondylitis.  2.  Cervical stenosis with myelopathy.  3.  Occipitocervical hardware failure.     PROCEDURE:  Revision of occipitocervical for instrumentation.     SURGEON:  Yobany Craig MD     ASSISTANT:  Shaggy Bee PA-C     ANESTHESIA:  General endotracheal anesthesia.     ESTIMATED BLOOD LOSS:  100 mL.     Overall he is doing well with improved neurologic symptoms.  He does describe some pain on the back of the head where the occipital instrumentation is but this is generally fairly tolerable.  He has some numbness and tingling in the left hand in the first 3 digits that is worse with use of the hand and occasionally at night.    Vitals:    04/29/22 1006   BP: 92/63   Pulse: 93   SpO2: 97%   Weight: 74.8 kg (165 lb)   Height: 1.524 m (5')     Estimated body mass index is 32.22 kg/m  as calculated from the following:    Height as of this encounter: 1.524 m (5').    Weight as of this encounter: 74.8 kg (165 lb).  Mild Pain (3)    Awake and alert.  Bilateral upper and lower extremity strength 5 out of 5 in all muscle groups.  Well-healed posterior cervical incision.    Positive Phalen sign on the left.      Imaging: CT scan of the cervical spine shows solid bony fusion from the skull through the cervical spine.  There is no sign of loosening of the hardware.  We also reviewed his previous lumbar MRI which shows degeneration at the T12-L1 level  which is likely the only level unfused remaining in his spine because of his ankylosing spondylitis.  The imaging was reviewed with patient shown to the patient in clinic today.    Assessment: Status postoccipital cervical fusion.  Left carpal tunnel syndrome.  Ankylosing spondylitis.    Plan: At this point I think no further follow-up is needed for his cervical fusion.  We discussed his carpal tunnel syndrome and I think that if he had worsening symptoms he he has a brace already that he could wear or if it is continue to worsen we could consider a left carpal tunnel release.  We also discussed that if he had worsening problems in the mid to low back we would consider reevaluation and potential fusion at the T12-L1 level if needed.  Otherwise at this point he may follow-up on an as-needed basis.         Again, thank you for allowing me to participate in the care of your patient.        Sincerely,        Yobany Craig MD

## 2022-05-24 ENCOUNTER — OFFICE VISIT (OUTPATIENT)
Dept: FAMILY MEDICINE | Facility: CLINIC | Age: 52
End: 2022-05-24

## 2022-05-24 VITALS
HEIGHT: 60 IN | WEIGHT: 165.8 LBS | RESPIRATION RATE: 18 BRPM | DIASTOLIC BLOOD PRESSURE: 69 MMHG | BODY MASS INDEX: 32.55 KG/M2 | HEART RATE: 88 BPM | SYSTOLIC BLOOD PRESSURE: 91 MMHG | OXYGEN SATURATION: 97 %

## 2022-05-24 DIAGNOSIS — M45.9 ANKYLOSING SPONDYLITIS, UNSPECIFIED SITE OF SPINE (H): ICD-10-CM

## 2022-05-24 PROCEDURE — 99214 OFFICE O/P EST MOD 30 MIN: CPT | Performed by: FAMILY MEDICINE

## 2022-05-24 RX ORDER — OXYCODONE HYDROCHLORIDE 10 MG/1
10 TABLET ORAL EVERY 4 HOURS PRN
Qty: 180 TABLET | Refills: 0 | Status: SHIPPED | OUTPATIENT
Start: 2022-05-24 | End: 2022-06-23

## 2022-05-24 NOTE — PROGRESS NOTES
"Having Carpal tunnel and not too bad and wearing splints for the hands.    Cervical insrtumented lead to some ongoing mild pain.    T12-L1 pain continues, and the Neurosurgeon offers fusion eventually.  At that point in the back he gets some pain  Aching, not shooting or sharp.  Feels like he should not move but just sit still  Mostly before it rains.  Takes also in the morning before he gets up.  Needs two of the pain pills to walk to go   Reviewed that he cannot take any pills and drive.    Occasional pains in the hips and knees.      Unable to tolerate NSAIDS    I don't see that he has tried suboxone, has been to both [facility] and [facility]...    Assessment/Plan:  Oseas was seen today for recheck.    Diagnoses and all orders for this visit:    Ankylosing spondylitis, unspecified site of spine (H)  -     oxyCODONE IR (ROXICODONE) 10 MG tablet; Take 1 tablet (10 mg) by mouth every 4 hours as needed for severe pain (max 6/day)    Was referred to the [place] pain clinic by Rheum.  Was delayed due to covid. He will set up to see prior to next visit.  Discussed side effects associated with narcotic pain meds including GI disturbances (such as diarrhea, nausea, vomiting, constipation, etc.), drowsiness, decreased cognition, narcotic withdrawal, and narcotic addiction/dependence.  Told the patient never to drink alcohol while taking this medication and not to drive or operate dangerous machinery while taking it.  Also discussed in specific detail my expectation regarding responsible use of narcotic and the implied \"contract\" with the patient that this type of medication will be used properly and exactly as instructed, and that any abnormal behaviors associated with the use of this medication will cause me to stop prescribing these medications at any time.    Willy Forrest MD   [facility]  [phone]            "

## 2022-06-23 DIAGNOSIS — M45.9 ANKYLOSING SPONDYLITIS, UNSPECIFIED SITE OF SPINE (H): ICD-10-CM

## 2022-06-23 RX ORDER — OXYCODONE HYDROCHLORIDE 10 MG/1
10 TABLET ORAL EVERY 4 HOURS PRN
Qty: 180 TABLET | Refills: 0 | Status: SHIPPED | OUTPATIENT
Start: 2022-06-23

## 2022-07-06 ENCOUNTER — TRANSFERRED RECORDS (OUTPATIENT)
Dept: FAMILY MEDICINE | Facility: CLINIC | Age: 52
End: 2022-07-06

## 2022-07-15 ENCOUNTER — PATIENT OUTREACH (OUTPATIENT)
Dept: CARE COORDINATION | Facility: CLINIC | Age: 52
End: 2022-07-15

## 2022-07-15 NOTE — PROGRESS NOTES
[facility]  [employer] Program- Proactive Outreach    Background: Patient outreach conducted as part of [facility], a Medicare [employer] Entity (D[employer]), participating in a program within Medicare. RN Care Coordinator making proactive outreach to identified high-risk patients in this program.      Patient desires to have SW CC outreach: No    Patient desires to have MTM referral: No    Is Patient Due or Overdue for Annual Wellness Visit? Yes   If patient is due for AWV, RN encouraged patient to contact PCP clinic to schedule.    RN will route completed encounter to appropriate SW CC for awareness of outreach.    Frances Solitario RN  [facility]

## 2022-07-22 DIAGNOSIS — I10 ESSENTIAL HYPERTENSION: ICD-10-CM

## 2022-07-22 NOTE — TELEPHONE ENCOUNTER
LISINOPRIL     LOV: 5/24/22    BP Readings from Last 3 Encounters:   05/24/22 91/69   04/29/22 92/63   02/24/22 124/72

## 2022-07-25 RX ORDER — LISINOPRIL 5 MG/1
5 TABLET ORAL DAILY
Qty: 90 TABLET | Refills: 1 | Status: SHIPPED | OUTPATIENT
Start: 2022-07-25 | End: 2023-01-19

## 2022-07-26 DIAGNOSIS — I10 ESSENTIAL HYPERTENSION: ICD-10-CM

## 2022-07-26 RX ORDER — LISINOPRIL/HYDROCHLOROTHIAZIDE 10-12.5 MG
1 TABLET ORAL DAILY
Qty: 90 TABLET | Refills: 1 | Status: SHIPPED | OUTPATIENT
Start: 2022-07-26

## 2022-07-26 NOTE — TELEPHONE ENCOUNTER
LISINOPRIL-HCTZ     LOV: 5/24/22    BP Readings from Last 3 Encounters:   05/24/22 91/69   04/29/22 92/63   02/24/22 124/72

## 2022-08-31 NOTE — PROGRESS NOTES
3  SUBJECTIVE:   CC: Oseas Edwards is an 52 year old male who presents for preventive health visit.       Patient has been advised of split billing requirements and indicates understanding: Yes  Healthy Habits:  Do you get at least three servings of calcium containing foods daily (dairy, green leafy vegetables, etc.)? no, taking calcium and/or vitamin D supplement: no  Amount of exercise or daily activities, outside of work: 3/4 day(s) per week  Problems taking medications regularly No  Medication side effects: Yes  lack of energy  Have you had an eye exam in the past two years? yes  Do you see a dentist twice per year? yes  Do you have sleep apnea, excessive snoring or daytime drowsiness?no      PROBLEMS TO ADD ON...    Today's PHQ-2 Score:   PHQ-2 (  [employer]) 2022   Q1: Little interest or pleasure in doing things 3 0   Q2: Feeling down, depressed or hopeless 0 0   PHQ-2 Score 3 0   PHQ-2 Total Score (12-17 Years)- Positive if 3 or more points; Administer PHQ-A if positive - -       Abuse: Current or Past(Physical, Sexual or Emotional)- No  Do you feel safe in your environment? Yes    Have you ever done Advance Care Planning? (For example, a Health Directive, POLST, or a discussion with a medical provider or your loved ones about your wishes): No, advance care planning information given to patient to review.  Advanced care planning was discussed at today's visit.    Social History     Tobacco Use     Smoking status: Former Smoker     Quit date: 2009     Years since quittin.6     Smokeless tobacco: Never Used     Tobacco comment: quit 2 1/2 yrs   Substance Use Topics     Alcohol use: Yes     Comment: occ.      If you drink alcohol do you typically have >3 drinks per day or >7 drinks per week? No                      Last PSA: No results found for: PSA    Reviewed orders with patient. Reviewed health maintenance and updated orders accordingly - Yes  Lab work is in process    Reviewed and updated  "as needed this visit by clinical staff    Allergies  Meds                Reviewed and updated as needed this visit by Provider                   Past Medical History:   Diagnosis Date     Ankylosing spondylitis (H)      Arthritis      AS (ankylosing spondylitis) (H) 1985     Cellulitis of leg 2011     Chronic narcotic dependence (H) 11/4/2013    Due to long term pain management.      Hypertension      Keloid scar, mostly just dark 2/7/2017     Low serum testosterone level 1/30/2012     Optic neuritis 3/20/2012     PA (pernicious anemia) 6/16/2011        ROS:  CONSTITUTIONAL: NEGATIVE for fever, chills, change in weight  INTEGUMENTARY/SKIN: NEGATIVE for worrisome rashes, moles or lesions  EYES: NEGATIVE for vision changes or irritation  ENT: NEGATIVE for ear, mouth and throat problems  RESP: NEGATIVE for significant cough or SOB  CV: NEGATIVE for chest pain, palpitations or peripheral edema  GI: NEGATIVE for nausea, abdominal pain, heartburn, or change in bowel habits   male: negative for dysuria, hematuria, decreased urinary stream, erectile dysfunction, urethral discharge  MUSCULOSKELETAL:ongoing issues  NEURO: NEGATIVE for weakness, dizziness or paresthesias  PSYCHIATRIC: NEGATIVE for changes in mood or affect    OBJECTIVE:   BP 99/72   Pulse 76   Resp 16   Ht 1.473 m (4' 10\")   Wt 72.5 kg (159 lb 12.8 oz)   SpO2 97%   BMI 33.40 kg/m    EXAM:  GENERAL: healthy, alert and no distress  EYES: Eyes grossly normal to inspection, PERRL and conjunctivae and sclerae normal  HENT: ear canals and TM's normal, nose and mouth without ulcers or lesions  NECK: no adenopathy, no asymmetry, masses, or scars and thyroid normal to palpation  RESP: lungs clear to auscultation - no rales, rhonchi or wheezes  CV: regular rate and rhythm, normal S1 S2, no S3 or S4, no murmur, click or rub, no peripheral edema and peripheral pulses strong  ABDOMEN: soft, nontender, no hepatosplenomegaly, no masses and bowel sounds normal  MS: " decreased range of motion in the spine and neck to a significant degree  SKIN: no suspicious lesions or rashes keloid scars  NEURO: Normal strength and tone, mentation intact and speech normal  PSYCH: mentation appears normal, affect normal/bright    Diagnostic Test Results:  Labs reviewed in Epic    ASSESSMENT/PLAN:   Assessment/Plan:  Oseas was seen today for physical.    Diagnoses and all orders for this visit:    Encounter for Medicare annual wellness exam    Chronic narcotic dependence (H)  Now seeing pain clinic and they will prescribe al narcotics  Chronic pain syndrome    Ankylosing spondylitis of cervical region (H)  Reviewed Rheum plan and doing well with Dr. Slade  -     CBC with Diff/Plt ([facility])  -     Comp. Metabolic Panel (14) ([facility])    Vasculogenic erectile dysfunction, unspecified vasculogenic erectile dysfunction type  Discussed erectile dysfunction in detail including a review of the physiology that produces erections.  Reviewed typical causes of impotence such as medication side effect, diabetes, neuropathies, drugs/alcohol, underlying mood disorder, relationship issues, neurogenic causes, hypogonadism.  Discussed typical medical evaluation including physical exam findings, labs (thyroid, testosterone, BMP, etc.)  Treatment will include modifying any causative features if able, stopping nay substance use, treating low testosterone levels, and consideration of medications (Viagra/Levitra/Cialis).     Discussed the risks and benefits of these meds.  Discussed the potential side effects of Viagra/Levitra/Cialis including dizziness, headache, vision changes, syncope, GI upset/dyspepsia, hypotension.  Recommended avoiding taking with large meal to improve absorption.  Avoid taking with alcohol.  Never take nitroglycerin containing compounds when on these medications.    WIll start with a lower dose and titrate upward as needed, aiming for the lowest effective dose.  If effective, they will call for a  "prescription provided the medication if effective and there are no side effects.     -     Lipid Profile ([facility])    Combined arterial insufficiency and corporo-venous occlusive erectile dysfunction  -     sildenafil (VIAGRA) 100 MG tablet; Take 0.5-1 tablets ( mg) by mouth daily as needed Take 30 min to 4 hours before intercourse.  Never use with nitroglycerin, terazosin or doxazosin.    Need for vaccination  -     MA CCIIV4 VACCINE PRESERVATIVE FREE 0.5 ML IM USE  -     VACCINE ADMINISTRATION, INITIAL    Screening for prostate cancer  -     PSA Serum ([facility])      Patient has been advised of split billing requirements and indicates understanding: Yes  COUNSELING:  Reviewed preventive health counseling, as reflected in patient instructions       Regular exercise       Healthy diet/nutrition    Estimated body mass index is 33.4 kg/m  as calculated from the following:    Height as of this encounter: 1.473 m (4' 10\").    Weight as of this encounter: 72.5 kg (159 lb 12.8 oz).        He reports that he quit smoking about 13 years ago. He has never used smokeless tobacco.      Counseling Resources:  ATP IV Guidelines  Pooled Cohorts Equation Calculator  FRAX Risk Assessment  ICSI Preventive Guidelines  Dietary Guidelines for Americans, 2010  [employer] MyPlate  ASA Prophylaxis  Lung CA Screening    Willy Forrest MD  [facility]  "

## 2022-08-31 NOTE — PATIENT INSTRUCTIONS
Patient Education   Personalized Prevention Plan  You are due for the preventive services outlined below.  Your care team is available to assist you in scheduling these services.  If you have already completed any of these items, please share that information with your care team to update in your medical record.  Health Maintenance Due   Topic Date Due    Zoster (Shingles) Vaccine (1 of 2) Never done    Hepatitis B Vaccine (1 of 3 - Risk 3-dose series) Never done    LUNG CANCER SCREENING  04/04/2019    COVID-19 Vaccine (4 - Booster for Pfizer series) 03/27/2022    Flu Vaccine (1) 09/01/2022

## 2022-09-01 ENCOUNTER — OFFICE VISIT (OUTPATIENT)
Dept: FAMILY MEDICINE | Facility: CLINIC | Age: 52
End: 2022-09-01

## 2022-09-01 VITALS
OXYGEN SATURATION: 97 % | HEIGHT: 60 IN | RESPIRATION RATE: 16 BRPM | BODY MASS INDEX: 31.37 KG/M2 | SYSTOLIC BLOOD PRESSURE: 99 MMHG | WEIGHT: 159.8 LBS | DIASTOLIC BLOOD PRESSURE: 72 MMHG | HEART RATE: 76 BPM

## 2022-09-01 DIAGNOSIS — G89.4 CHRONIC PAIN SYNDROME: ICD-10-CM

## 2022-09-01 DIAGNOSIS — Z23 NEED FOR VACCINATION: ICD-10-CM

## 2022-09-01 DIAGNOSIS — Z12.5 SCREENING FOR PROSTATE CANCER: ICD-10-CM

## 2022-09-01 DIAGNOSIS — N52.9 VASCULOGENIC ERECTILE DYSFUNCTION, UNSPECIFIED VASCULOGENIC ERECTILE DYSFUNCTION TYPE: ICD-10-CM

## 2022-09-01 DIAGNOSIS — F11.20 CHRONIC NARCOTIC DEPENDENCE (H): ICD-10-CM

## 2022-09-01 DIAGNOSIS — M45.2 ANKYLOSING SPONDYLITIS OF CERVICAL REGION (H): ICD-10-CM

## 2022-09-01 DIAGNOSIS — Z00.00 ENCOUNTER FOR MEDICARE ANNUAL WELLNESS EXAM: Primary | ICD-10-CM

## 2022-09-01 LAB
% GRANULOCYTES: 56.7 % (ref 42.2–75.2)
CHOL/HDL RATIO (RMG): 4.5 MG/DL (ref 0–4.5)
CHOLESTEROL: 244 MG/DL (ref 100–199)
HCT VFR BLD AUTO: 46.9 % (ref 39–51)
HDL (RMG): 54 MG/DL (ref 40–?)
HEMOGLOBIN: 16.2 G/DL (ref 13.4–17.5)
LDL CALCULATED (RMG): 162 MG/DL (ref 0–130)
LYMPHOCYTES NFR BLD AUTO: 35 % (ref 20.5–51.1)
MCH RBC QN AUTO: 31.4 PG (ref 27–31)
MCHC RBC AUTO-ENTMCNC: 34.7 G/DL (ref 33–37)
MCV RBC AUTO: 90.6 FL (ref 80–100)
MONOCYTES NFR BLD AUTO: 8.3 % (ref 1.7–9.3)
PLATELET # BLD AUTO: 179 K/UL (ref 140–450)
RBC # BLD AUTO: 5.17 X10/CMM (ref 4.2–5.9)
TRIGLYCERIDES (RMG): 135 MG/DL (ref 0–149)
WBC # BLD AUTO: 5.9 X10/CMM (ref 3.8–11)

## 2022-09-01 PROCEDURE — 99214 OFFICE O/P EST MOD 30 MIN: CPT | Mod: 25 | Performed by: FAMILY MEDICINE

## 2022-09-01 PROCEDURE — 36415 COLL VENOUS BLD VENIPUNCTURE: CPT | Performed by: FAMILY MEDICINE

## 2022-09-01 PROCEDURE — G0008 ADMIN INFLUENZA VIRUS VAC: HCPCS | Mod: 59 | Performed by: FAMILY MEDICINE

## 2022-09-01 PROCEDURE — 90674 CCIIV4 VAC NO PRSV 0.5 ML IM: CPT | Performed by: FAMILY MEDICINE

## 2022-09-01 PROCEDURE — 85025 COMPLETE CBC W/AUTO DIFF WBC: CPT | Performed by: FAMILY MEDICINE

## 2022-09-01 PROCEDURE — 99396 PREV VISIT EST AGE 40-64: CPT | Performed by: FAMILY MEDICINE

## 2022-09-01 PROCEDURE — 80061 LIPID PANEL: CPT | Mod: QW | Performed by: FAMILY MEDICINE

## 2022-09-01 RX ORDER — SILDENAFIL 100 MG/1
50-100 TABLET, FILM COATED ORAL DAILY PRN
Qty: 30 TABLET | Refills: 3 | Status: SHIPPED | OUTPATIENT
Start: 2022-09-01 | End: 2023-12-18

## 2022-09-01 ASSESSMENT — ANXIETY QUESTIONNAIRES
5. BEING SO RESTLESS THAT IT IS HARD TO SIT STILL: NOT AT ALL
GAD7 TOTAL SCORE: 0
7. FEELING AFRAID AS IF SOMETHING AWFUL MIGHT HAPPEN: NOT AT ALL
6. BECOMING EASILY ANNOYED OR IRRITABLE: NOT AT ALL
GAD7 TOTAL SCORE: 0
IF YOU CHECKED OFF ANY PROBLEMS ON THIS QUESTIONNAIRE, HOW DIFFICULT HAVE THESE PROBLEMS MADE IT FOR YOU TO DO YOUR WORK, TAKE CARE OF THINGS AT HOME, OR GET ALONG WITH OTHER PEOPLE: NOT DIFFICULT AT ALL
3. WORRYING TOO MUCH ABOUT DIFFERENT THINGS: NOT AT ALL
2. NOT BEING ABLE TO STOP OR CONTROL WORRYING: NOT AT ALL
1. FEELING NERVOUS, ANXIOUS, OR ON EDGE: NOT AT ALL

## 2022-09-01 ASSESSMENT — PATIENT HEALTH QUESTIONNAIRE - PHQ9
SUM OF ALL RESPONSES TO PHQ QUESTIONS 1-9: 7
5. POOR APPETITE OR OVEREATING: NOT AT ALL

## 2022-09-02 LAB
ALBUMIN SERPL-MCNC: 4.4 G/DL (ref 3.8–4.9)
ALBUMIN/GLOB SERPL: 1.5 {RATIO} (ref 1.2–2.2)
ALP SERPL-CCNC: 87 IU/L (ref 44–121)
ALT SERPL-CCNC: 15 IU/L (ref 0–44)
AST SERPL-CCNC: 17 IU/L (ref 0–40)
BILIRUB SERPL-MCNC: 0.5 MG/DL (ref 0–1.2)
BUN SERPL-MCNC: 19 MG/DL (ref 6–24)
BUN/CREATININE RATIO: 18 (ref 9–20)
CALCIUM SERPL-MCNC: 9.5 MG/DL (ref 8.7–10.2)
CHLORIDE SERPLBLD-SCNC: 103 MMOL/L (ref 96–106)
CREAT SERPL-MCNC: 1.06 MG/DL (ref 0.76–1.27)
EGFR: 84 ML/MIN/1.73
GLOBULIN, TOTAL: 2.9 G/DL (ref 1.5–4.5)
GLUCOSE SERPL-MCNC: 110 MG/DL (ref 65–99)
POTASSIUM SERPL-SCNC: 3.9 MMOL/L (ref 3.5–5.2)
PROT SERPL-MCNC: 7.3 G/DL (ref 6–8.5)
PSA NG/ML: 0.2 NG/ML (ref 0–4)
SODIUM SERPL-SCNC: 140 MMOL/L (ref 134–144)
TOTAL CO2: 22 MMOL/L (ref 20–29)

## 2022-09-02 NOTE — RESULT ENCOUNTER NOTE
Dear Oseas,     I am writing to report that your included test results are as expected.    Many labs contain some results that are slightly outside of the normal range, I have reviewed any of these results and they require no changes at this time.    Willy Forrest MD

## 2022-09-15 NOTE — TELEPHONE ENCOUNTER
OXY CODONE LAST SEEN 8/26/21 UPCOMING 11/23/21  
A 54 year old female, from home, fully ambulating, with PMHx of metastatic breast cancer to LN, and new to brain p/w CVA like features. Admitted for neuromonitoring and stabilization.     Brain mass.   kp/w L sided weakness, trembling, facial droop and slurred speech per EMS. s/p code stroke. NIHSS 0 on admission. Telestroke consulted, pt not a TPA candidate due to   brain mass, more likely atypical seizure.CTH showed brain mass with inc vasogenic edema likely 2/2 metastatic breast cancer. Patient follows with Dr. Anthony Hwang at [facility].   Troponin negative. EEG showed right hemispheric slowing. Placed on keppra  1000 mg IV BID and decadron 4 mg IV q6hr. Neuro Dr. Young consulted. MRI head showed   Increase in size of a right frontal parasagittal lesion since comparison MRI, now measuring up to 2.5 cm. Increasing surrounding vasogenic edema, No new lesion identified,   and No acute infarct.    Patient is stable for discharge per attending and is advised to follow up with PCP as outpatient  Please refer to patient's complete medical chart with documents for a full hospital course, for this is only a brief summary.

## 2022-09-16 ENCOUNTER — TRANSFERRED RECORDS (OUTPATIENT)
Dept: FAMILY MEDICINE | Facility: CLINIC | Age: 52
End: 2022-09-16

## 2022-10-17 ENCOUNTER — TRANSFERRED RECORDS (OUTPATIENT)
Dept: FAMILY MEDICINE | Facility: CLINIC | Age: 52
End: 2022-10-17

## 2022-10-18 ENCOUNTER — TRANSFERRED RECORDS (OUTPATIENT)
Dept: FAMILY MEDICINE | Facility: CLINIC | Age: 52
End: 2022-10-18

## 2022-11-16 ENCOUNTER — TRANSFERRED RECORDS (OUTPATIENT)
Dept: FAMILY MEDICINE | Facility: CLINIC | Age: 52
End: 2022-11-16

## 2022-12-16 ENCOUNTER — TRANSFERRED RECORDS (OUTPATIENT)
Dept: FAMILY MEDICINE | Facility: CLINIC | Age: 52
End: 2022-12-16

## 2023-01-16 ENCOUNTER — TRANSFERRED RECORDS (OUTPATIENT)
Dept: FAMILY MEDICINE | Facility: CLINIC | Age: 53
End: 2023-01-16

## 2023-01-19 DIAGNOSIS — I10 ESSENTIAL HYPERTENSION: ICD-10-CM

## 2023-01-19 RX ORDER — LISINOPRIL 5 MG/1
5 TABLET ORAL DAILY
Qty: 90 TABLET | Refills: 1 | Status: SHIPPED | OUTPATIENT
Start: 2023-01-19

## 2023-01-19 NOTE — TELEPHONE ENCOUNTER
Lisinopril 5 mg  LOV 9/1/22  BP Readings from Last 3 Encounters:   09/01/22 99/72   05/24/22 91/69   04/29/22 92/63     Component      Latest Ref Rng & Units 9/1/2022   Glucose      65 - 99 mg/dL 110 (H)   Urea Nitrogen      6 - 24 mg/dL 19   Creatinine      0.76 - 1.27 mg/dL 1.06   eGFR       >59 mL/min/1.73 84   BUN/Creatinine Ratio      9 - 20 18   Sodium      134 - 144 mmol/L 140   Potassium      3.5 - 5.2 mmol/L 3.9   Chloride      96 - 106 mmol/L 103   Total CO2      20 - 29 mmol/L 22   Calcium      8.7 - 10.2 mg/dL 9.5   Protein Total      6.0 - 8.5 g/dL 7.3   Albumin      3.8 - 4.9 g/dL 4.4   Globulin, Total      1.5 - 4.5 g/dL 2.9   A/G Ratio      1.2 - 2.2 1.5   Bilirubin Total      0.0 - 1.2 mg/dL 0.5   Alkaline Phosphatase      44 - 121 IU/L 87   AST      0 - 40 IU/L 17   ALT      0 - 44 IU/L 15   WBC      3.8 - 11.0 x10/cmm 5.9   % Lymphocytes      20.5 - 51.1 % 35.0   % Monocytes      1.7 - 9.3 % 8.3   % Granulocytes      42.2 - 75.2 % 56.7   RBC x10/cmm      4.2 - 5.9 x10/cmm 5.17   Hemoglobin      13.4 - 17.5 g/dl 16.2   Hematocrit      39 - 51 % 46.9   MCV      80 - 100 fL 90.6   MCH      27.0 - 31.0 pg 31.4 (A)   MCHC      33.0 - 37.0 g/dL 34.7   Platelet Count      140 - 450 K/uL 179   CHOLESTEROL      100 - 199 mg/dl 244 (A)   HDL      40 mg/dl 54   TRIGLYCERIDES ([facility])      0 - 149 mg/dl 135   LDL CALCULATED ([facility])      0 - 130 mg/dl 162 (A)   CHOL/HDL RATIO ([facility])      0.0 - 4.5 mg/dl 4.5

## 2023-01-23 DIAGNOSIS — R79.89 LOW SERUM TESTOSTERONE LEVEL: ICD-10-CM

## 2023-01-24 RX ORDER — FINASTERIDE 5 MG/1
1 TABLET, FILM COATED ORAL DAILY
Qty: 90 TABLET | Refills: 3 | Status: SHIPPED | OUTPATIENT
Start: 2023-01-24

## 2023-02-14 ENCOUNTER — TRANSFERRED RECORDS (OUTPATIENT)
Dept: FAMILY MEDICINE | Facility: CLINIC | Age: 53
End: 2023-02-14

## 2023-03-17 ENCOUNTER — TRANSFERRED RECORDS (OUTPATIENT)
Dept: FAMILY MEDICINE | Facility: CLINIC | Age: 53
End: 2023-03-17

## 2023-04-14 ENCOUNTER — TRANSFERRED RECORDS (OUTPATIENT)
Dept: FAMILY MEDICINE | Facility: CLINIC | Age: 53
End: 2023-04-14

## 2023-05-12 ENCOUNTER — TRANSFERRED RECORDS (OUTPATIENT)
Dept: FAMILY MEDICINE | Facility: CLINIC | Age: 53
End: 2023-05-12

## 2023-06-14 ENCOUNTER — TRANSFERRED RECORDS (OUTPATIENT)
Dept: FAMILY MEDICINE | Facility: CLINIC | Age: 53
End: 2023-06-14

## 2023-07-05 ENCOUNTER — OFFICE VISIT (OUTPATIENT)
Dept: FAMILY MEDICINE | Facility: CLINIC | Age: 53
End: 2023-07-05

## 2023-07-05 ENCOUNTER — TRANSFERRED RECORDS (OUTPATIENT)
Dept: FAMILY MEDICINE | Facility: CLINIC | Age: 53
End: 2023-07-05

## 2023-07-05 VITALS
HEART RATE: 80 BPM | OXYGEN SATURATION: 100 % | HEIGHT: 60 IN | SYSTOLIC BLOOD PRESSURE: 102 MMHG | WEIGHT: 151 LBS | BODY MASS INDEX: 29.64 KG/M2 | DIASTOLIC BLOOD PRESSURE: 65 MMHG

## 2023-07-05 DIAGNOSIS — H53.142 PHOTOPHOBIA OF LEFT EYE: Primary | ICD-10-CM

## 2023-07-05 PROCEDURE — 99214 OFFICE O/P EST MOD 30 MIN: CPT | Performed by: FAMILY MEDICINE

## 2023-07-05 RX ORDER — CYCLOBENZAPRINE HCL 10 MG
10 TABLET ORAL 2 TIMES DAILY PRN
COMMUNITY
Start: 2023-05-16

## 2023-07-05 NOTE — PROGRESS NOTES
Eye consult scheduled today at noon with Dr. Mccollum at [facility] in [place]. Referral and visit note sent with patient and faxed to clinic.   Heena Haro RN

## 2023-07-05 NOTE — PROGRESS NOTES
"SUBJECTIVE:    Oseas Edwards, is a 53 year old male presenting for the below:     1. 3 day h/o redness to the left eye with watering and photophobia. No headache. Does not recall any FB entering the eye. Aching on blinking. No recent viral URI symptoms. No fevers or chills. Does not wear contact lenses.     OBJECTIVE:  Vitals:    07/05/23 1055   BP: 102/65   Pulse: 80   SpO2: 100%   Weight: 68.5 kg (151 lb)   Height: 1.511 m (4' 11.5\")    Body mass index is 29.99 kg/m .    General: looking uncomfortable. Wearing sunglasses.    Left eye : scleral injection. Pupil constricted and less reactive. Positive for photophobia. No ulceration or abrasion on fluorescin exam.     ASSESSMENT / PLAN:      Photophobia of left eye  Concerning for anterior uveitis or similar. Urgent referral to ophthalmology.   Patient assisted making appointment for later today.   -     Adult Eye  Referral; Future      "

## 2023-07-11 ENCOUNTER — TRANSFERRED RECORDS (OUTPATIENT)
Dept: FAMILY MEDICINE | Facility: CLINIC | Age: 53
End: 2023-07-11

## 2023-07-26 ENCOUNTER — TRANSFERRED RECORDS (OUTPATIENT)
Dept: FAMILY MEDICINE | Facility: CLINIC | Age: 53
End: 2023-07-26

## 2023-08-11 ENCOUNTER — TRANSFERRED RECORDS (OUTPATIENT)
Dept: FAMILY MEDICINE | Facility: CLINIC | Age: 53
End: 2023-08-11

## 2023-08-22 ENCOUNTER — TRANSFERRED RECORDS (OUTPATIENT)
Dept: FAMILY MEDICINE | Facility: CLINIC | Age: 53
End: 2023-08-22

## 2023-09-13 ENCOUNTER — TRANSFERRED RECORDS (OUTPATIENT)
Dept: FAMILY MEDICINE | Facility: CLINIC | Age: 53
End: 2023-09-13

## 2023-10-13 ENCOUNTER — TRANSFERRED RECORDS (OUTPATIENT)
Dept: FAMILY MEDICINE | Facility: CLINIC | Age: 53
End: 2023-10-13

## 2023-11-05 ENCOUNTER — HEALTH MAINTENANCE LETTER (OUTPATIENT)
Age: 53
End: 2023-11-05

## 2023-11-10 ENCOUNTER — TRANSFERRED RECORDS (OUTPATIENT)
Dept: FAMILY MEDICINE | Facility: CLINIC | Age: 53
End: 2023-11-10

## 2023-12-08 ENCOUNTER — TRANSFERRED RECORDS (OUTPATIENT)
Dept: FAMILY MEDICINE | Facility: CLINIC | Age: 53
End: 2023-12-08

## 2023-12-18 ENCOUNTER — OFFICE VISIT (OUTPATIENT)
Dept: FAMILY MEDICINE | Facility: CLINIC | Age: 53
End: 2023-12-18

## 2023-12-18 VITALS
DIASTOLIC BLOOD PRESSURE: 80 MMHG | WEIGHT: 164.8 LBS | HEART RATE: 74 BPM | OXYGEN SATURATION: 98 % | BODY MASS INDEX: 32.73 KG/M2 | SYSTOLIC BLOOD PRESSURE: 120 MMHG

## 2023-12-18 DIAGNOSIS — M45.2 ANKYLOSING SPONDYLITIS OF CERVICAL REGION (H): ICD-10-CM

## 2023-12-18 DIAGNOSIS — G99.2 STENOSIS OF CERVICAL SPINE WITH MYELOPATHY (H): ICD-10-CM

## 2023-12-18 DIAGNOSIS — M48.02 STENOSIS OF CERVICAL SPINE WITH MYELOPATHY (H): ICD-10-CM

## 2023-12-18 DIAGNOSIS — M53.2X2 SPINAL INSTABILITY OF CERVICAL REGION: Primary | ICD-10-CM

## 2023-12-18 DIAGNOSIS — E29.1 HYPOGONADISM MALE: ICD-10-CM

## 2023-12-18 DIAGNOSIS — Z23 NEED FOR VACCINATION: ICD-10-CM

## 2023-12-18 DIAGNOSIS — D51.0 PA (PERNICIOUS ANEMIA): ICD-10-CM

## 2023-12-18 DIAGNOSIS — Z87.891 PERSONAL HISTORY OF TOBACCO USE: ICD-10-CM

## 2023-12-18 DIAGNOSIS — N52.9 VASCULOGENIC ERECTILE DYSFUNCTION, UNSPECIFIED VASCULOGENIC ERECTILE DYSFUNCTION TYPE: ICD-10-CM

## 2023-12-18 DIAGNOSIS — Z12.5 SCREENING FOR PROSTATE CANCER: ICD-10-CM

## 2023-12-18 DIAGNOSIS — I73.9 CLAUDICATION (H): ICD-10-CM

## 2023-12-18 DIAGNOSIS — F11.20 CHRONIC NARCOTIC DEPENDENCE (H): ICD-10-CM

## 2023-12-18 PROBLEM — M45.9 ANKYLOSING SPONDYLITIS (H): Status: ACTIVE | Noted: 2017-01-23

## 2023-12-18 LAB
ALBUMIN SERPL BCG-MCNC: 3.9 G/DL (ref 3.5–5.2)
ALP SERPL-CCNC: 96 U/L (ref 40–150)
ALT SERPL W P-5'-P-CCNC: 20 U/L (ref 0–70)
ANION GAP SERPL CALCULATED.3IONS-SCNC: 9 MMOL/L (ref 7–15)
AST SERPL W P-5'-P-CCNC: 26 U/L (ref 0–45)
BASOPHILS # BLD AUTO: 0 10E3/UL (ref 0–0.2)
BASOPHILS NFR BLD AUTO: 1 %
BILIRUB SERPL-MCNC: 0.2 MG/DL
BUN SERPL-MCNC: 18.6 MG/DL (ref 6–20)
CALCIUM SERPL-MCNC: 9 MG/DL (ref 8.6–10)
CHLORIDE SERPL-SCNC: 103 MMOL/L (ref 98–107)
CHOLESTEROL: 164 MG/DL (ref 100–199)
CREAT SERPL-MCNC: 0.83 MG/DL (ref 0.67–1.17)
DEPRECATED HCO3 PLAS-SCNC: 29 MMOL/L (ref 22–29)
EGFRCR SERPLBLD CKD-EPI 2021: >90 ML/MIN/1.73M2
EOSINOPHIL # BLD AUTO: 0.2 10E3/UL (ref 0–0.7)
EOSINOPHIL NFR BLD AUTO: 3 %
ERYTHROCYTE [DISTWIDTH] IN BLOOD BY AUTOMATED COUNT: 12.4 % (ref 10–15)
FASTING?: YES
GLUCOSE SERPL-MCNC: 106 MG/DL (ref 70–99)
HCT VFR BLD AUTO: 41.8 % (ref 40–53)
HDL (RMG): 45 MG/DL (ref 40–?)
HGB BLD-MCNC: 13.8 G/DL (ref 13.3–17.7)
IMM GRANULOCYTES # BLD: 0 10E3/UL
IMM GRANULOCYTES NFR BLD: 0 %
LDL CALCULATED (RMG): 108 MG/DL (ref 0–130)
LYMPHOCYTES # BLD AUTO: 1.3 10E3/UL (ref 0.8–5.3)
LYMPHOCYTES NFR BLD AUTO: 23 %
MCH RBC QN AUTO: 31.3 PG (ref 26.5–33)
MCHC RBC AUTO-ENTMCNC: 33 G/DL (ref 31.5–36.5)
MCV RBC AUTO: 95 FL (ref 78–100)
MONOCYTES # BLD AUTO: 0.7 10E3/UL (ref 0–1.3)
MONOCYTES NFR BLD AUTO: 13 %
NEUTROPHILS # BLD AUTO: 3.4 10E3/UL (ref 1.6–8.3)
NEUTROPHILS NFR BLD AUTO: 60 %
NRBC # BLD AUTO: 0 10E3/UL
NRBC BLD AUTO-RTO: 0 /100
PLATELET # BLD AUTO: 168 10E3/UL (ref 150–450)
POTASSIUM SERPL-SCNC: 4.2 MMOL/L (ref 3.4–5.3)
PROT SERPL-MCNC: 7 G/DL (ref 6.4–8.3)
PSA SERPL DL<=0.01 NG/ML-MCNC: 0.52 NG/ML (ref 0–3.5)
RBC # BLD AUTO: 4.41 10E6/UL (ref 4.4–5.9)
SHBG SERPL-SCNC: 34 NMOL/L (ref 11–80)
SODIUM SERPL-SCNC: 141 MMOL/L (ref 135–145)
TRIGLYCERIDES (RMG): 57 MG/DL (ref 0–149)
WBC # BLD AUTO: 5.6 10E3/UL (ref 4–11)

## 2023-12-18 PROCEDURE — 85025 COMPLETE CBC W/AUTO DIFF WBC: CPT | Mod: ORL | Performed by: FAMILY MEDICINE

## 2023-12-18 PROCEDURE — 80061 LIPID PANEL: CPT | Mod: QW | Performed by: FAMILY MEDICINE

## 2023-12-18 PROCEDURE — G0296 VISIT TO DETERM LDCT ELIG: HCPCS | Performed by: FAMILY MEDICINE

## 2023-12-18 PROCEDURE — 99214 OFFICE O/P EST MOD 30 MIN: CPT | Mod: 25 | Performed by: FAMILY MEDICINE

## 2023-12-18 PROCEDURE — G0103 PSA SCREENING: HCPCS | Mod: ORL | Performed by: FAMILY MEDICINE

## 2023-12-18 PROCEDURE — 36415 COLL VENOUS BLD VENIPUNCTURE: CPT | Performed by: FAMILY MEDICINE

## 2023-12-18 PROCEDURE — 90677 PCV20 VACCINE IM: CPT | Performed by: FAMILY MEDICINE

## 2023-12-18 PROCEDURE — 84403 ASSAY OF TOTAL TESTOSTERONE: CPT | Mod: ORL | Performed by: FAMILY MEDICINE

## 2023-12-18 PROCEDURE — 90471 IMMUNIZATION ADMIN: CPT | Mod: 59 | Performed by: FAMILY MEDICINE

## 2023-12-18 PROCEDURE — 80053 COMPREHEN METABOLIC PANEL: CPT | Mod: ORL | Performed by: FAMILY MEDICINE

## 2023-12-18 PROCEDURE — 84270 ASSAY OF SEX HORMONE GLOBUL: CPT | Mod: ORL | Performed by: FAMILY MEDICINE

## 2023-12-18 RX ORDER — INDOMETHACIN 50 MG/1
1 CAPSULE ORAL
COMMUNITY
End: 2023-12-18

## 2023-12-18 RX ORDER — INDOMETHACIN 50 MG/1
50 CAPSULE ORAL DAILY
Qty: 90 CAPSULE | Refills: 0 | Status: SHIPPED | OUTPATIENT
Start: 2023-12-18

## 2023-12-18 RX ORDER — SILDENAFIL 100 MG/1
50-100 TABLET, FILM COATED ORAL DAILY PRN
Qty: 30 TABLET | Refills: 3 | Status: SHIPPED | OUTPATIENT
Start: 2023-12-18

## 2023-12-18 RX ORDER — CYANOCOBALAMIN 1000 UG/ML
INJECTION, SOLUTION INTRAMUSCULAR; SUBCUTANEOUS
Qty: 3 ML | Refills: 3 | Status: SHIPPED | OUTPATIENT
Start: 2023-12-18

## 2023-12-18 RX ORDER — TESTOSTERONE GEL, 1% 10 MG/G
50 GEL TRANSDERMAL
Qty: 30 PACKET | Refills: 1 | Status: SHIPPED | OUTPATIENT
Start: 2023-12-18

## 2023-12-18 NOTE — LETTER
December 21, 2023        Oseas Edwards  [address]  [place] [address]        Dear Oseas,     I am writing to report that your included test results are as expected.    Many labs contain some results that are slightly outside of the normal range, I have reviewed any of these results and they require no changes at this time.     Willy Forrest MD     Resulted Orders   Lipid Profile ([facility])   Result Value Ref Range    Cholesterol 164 100 - 199 mg/dL    HDL 45 40 mg/dL    Triglycerides 57 0 - 149 mg/dL    LDL CALCULATED ([facility]) 108 0 - 130 mg/dL    Patient Fasting? Yes    Comprehensive metabolic panel   Result Value Ref Range    Sodium 141 135 - 145 mmol/L      Comment:      Reference intervals for this test were updated on 09/26/2023 to more accurately reflect our healthy population. There may be differences in the flagging of prior results with similar values performed with this method. Interpretation of those prior results can be made in the context of the updated reference intervals.     Potassium 4.2 3.4 - 5.3 mmol/L    Carbon Dioxide (CO2) 29 22 - 29 mmol/L    Anion Gap 9 7 - 15 mmol/L    Urea Nitrogen 18.6 6.0 - 20.0 mg/dL    Creatinine 0.83 0.67 - 1.17 mg/dL    GFR Estimate >90 >60 mL/min/1.73m2    Calcium 9.0 8.6 - 10.0 mg/dL    Chloride 103 98 - 107 mmol/L    Glucose 106 (H) 70 - 99 mg/dL    Alkaline Phosphatase 96 40 - 150 U/L      Comment:      Reference intervals for this test were updated on 11/14/2023 to more accurately reflect our healthy population. There may be differences in the flagging of prior results with similar values performed with this method. Interpretation of those prior results can be made in the context of the updated reference intervals.    AST 26 0 - 45 U/L      Comment:      Reference intervals for this test were updated on 6/12/2023 to more accurately reflect our healthy population. There may be differences in the flagging of prior results with similar values performed with this  method. Interpretation of those prior results can be made in the context of the updated reference intervals.    ALT 20 0 - 70 U/L      Comment:      Reference intervals for this test were updated on 6/12/2023 to more accurately reflect our healthy population. There may be differences in the flagging of prior results with similar values performed with this method. Interpretation of those prior results can be made in the context of the updated reference intervals.      Protein Total 7.0 6.4 - 8.3 g/dL    Albumin 3.9 3.5 - 5.2 g/dL    Bilirubin Total 0.2 <=1.2 mg/dL   Prostate Specific Antigen Screen   Result Value Ref Range    Prostate Specific Antigen Screen 0.52 0.00 - 3.50 ng/mL    Narrative    This result is obtained using the Roche Elecsys total PSA method on the luba [id number] immunoassay analyzer. Results obtained with different assay methods or kits cannot be used interchangeably.   Sex Hormone Binding Globulin   Result Value Ref Range    Sex Hormone Binding Globulin 34 11 - 80 nmol/L   Testosterone Free and Total   Result Value Ref Range    Free Testosterone Calculated 4.75 ng/dL      Comment:      Male Darron Ranges:  Darron Stage I: Less than or equal to 0.37 ng/dL  Darron Stage II: 0.03-2.1 ng/dL  Darron Stage III: 0.10-9.8 ng/dL  Darron Stage IV: 3.5-16.9 ng/dL  Darron Stage V: 4.1-23.9 ng/dL    Testosterone Total 250 240 - 950 ng/dL    Narrative    This test was developed and its performance characteristics determined by the [facility],  [facility]. It has not been cleared or approved by the FDA. The laboratory is regulated under CLIA as qualified to perform high-complexity testing. This test is used for clinical purposes. It should not be regarded as investigational or for research.   CBC with platelets and differential   Result Value Ref Range    WBC Count 5.6 4.0 - 11.0 10e3/uL    RBC Count 4.41 4.40 - 5.90 10e6/uL    Hemoglobin 13.8 13.3 - 17.7 g/dL     Hematocrit 41.8 40.0 - 53.0 %    MCV 95 78 - 100 fL    MCH 31.3 26.5 - 33.0 pg    MCHC 33.0 31.5 - 36.5 g/dL    RDW 12.4 10.0 - 15.0 %    Platelet Count 168 150 - 450 10e3/uL    % Neutrophils 60 %    % Lymphocytes 23 %    % Monocytes 13 %    % Eosinophils 3 %    % Basophils 1 %    % Immature Granulocytes 0 %    NRBCs per 100 WBC 0 <1 /100    Absolute Neutrophils 3.4 1.6 - 8.3 10e3/uL    Absolute Lymphocytes 1.3 0.8 - 5.3 10e3/uL    Absolute Monocytes 0.7 0.0 - 1.3 10e3/uL    Absolute Eosinophils 0.2 0.0 - 0.7 10e3/uL    Absolute Basophils 0.0 0.0 - 0.2 10e3/uL    Absolute Immature Granulocytes 0.0 <=0.4 10e3/uL    Absolute NRBCs 0.0 10e3/uL       If you have any questions or concerns, please call the clinic at the number listed above.       Sincerely,      Willy Forrest MD

## 2023-12-18 NOTE — PROGRESS NOTES
Problem(s) Oriented visit        SUBJECTIVE:                                                    Oseas Edwards is a 53 year old male who presents to clinic today for the following health issues :      Doing well with these issues    1. Spinal instability of cervical region  Seeing pain clinic on narcotic with them for 2. Ankylosing spondylitis of cervical region (H)    3. Vasculogenic erectile dysfunction, unspecified vasculogenic erectile dysfunction type  Could use refill on Sildenafil    4. Hypogonadism male  On testostero  5. Claudication (H24)       Problem list, Medication list, Allergies, and Medical/Social/Surgical histories reviewed in [facility] and updated as appropriate.   Additional history: as documented    ROS:  General and Resp. completed and negative except as noted above    Histories:   Patient Active Problem List   Diagnosis    PA (pernicious anemia)    Low serum testosterone level    Health Care Home    Chronic narcotic dependence (H)    Rectus diastasis    Hx of gastric ulcer    Chronic pain syndrome    Keloid scar, mostly just dark    Stenosis of cervical spine with myelopathy (H)    Ankylosing spondylitis (H)    Spinal instability of cervical region    Claudication (H24)     Past Surgical History:   Procedure Laterality Date    ARTHROPLASTY REVISION HIP Left 2/14/2017    Procedure: ARTHROPLASTY REVISION HIP;  Surgeon: Saravanan Alcaraz MD;  Location:  OR    BACK SURGERY      FUSION CERVICAL POSTERIOR THREE+ LEVELS N/A 4/27/2021    Procedure: Revision of occipito-cervical 4 instrumentation.;  Surgeon: Yobany Craig MD;  Location:  OR    GRAFT BONE FROM ILIAC CREST Right 3/25/2021    Procedure: RIGHT ILLIAC CREST BONE GRAFT HARVEST;  Surgeon: Yobany Craig MD;  Location:  OR    HERNIORRHAPHY VENTRAL  11/11/2013    Procedure: HERNIORRHAPHY VENTRAL;  UMBILICAL HERNIA REPAIR WITH MESH, RECTUS DIASTASIS REPAIR WITH MESH;  Surgeon: Jason Dodson MD;  Location: [facility]    JOINT  REPLACEMENT, HIP RT/LT      Left    JOINT REPLACEMENT, HIP RT/LT  2000ish    Right    OPEN SEPARATION COMPONENT HERNIORRHAPHY  2013    Procedure: OPEN SEPARATION COMPONENT HERNIORRHAPHY;;  Surgeon: Jason Dodson MD;  Location:  [facility]    OPTICAL TRACKING SYSTEM FUSION POSTERIOR CERVICAL THREE + LEVELS N/A 3/25/2021    Procedure: Occiput-Cervical 4 posterior instrumentation and fusion and C1 and C2 laminectomy;  Surgeon: Yobany Craig MD;  Location:  OR       Social History     Tobacco Use    Smoking status: Former     Years: 21     Types: Cigarettes     Quit date: 2009     Years since quittin.9    Smokeless tobacco: Never    Tobacco comments:     quit 2 1/2 yrs   Substance Use Topics    Alcohol use: Yes     Comment: occ.      Family History   Problem Relation Age of Onset    Diabetes Mother     Coronary Artery Disease Mother            OBJECTIVE:                                                    /80   Pulse 74   Wt 74.8 kg (164 lb 12.8 oz)   SpO2 98%   BMI 32.73 kg/m    Body mass index is 32.73 kg/m .   APPEARANCE: = Relaxed and in no distress  Oropharynx = No leukoplakia, No injection to the tissues, Normal Uvula  Resp effort = Calm regular breathing  Breath Sounds = Good air movement with no rales or rhonchi in any lung fields  Heart Rate, Rhythm, & sounds (no Murm)  = Regular rate and rhythm with no S3, S4, or murmur appreciated.  Ext (edema) = No pretibial edema noted or elsewhere     ASSESSMENT/PLAN:                                                        Oseas was seen today for recheck medication and health maintenance.    Diagnoses and all orders for this visit:    He is actively managed at the [place] pain clinic for   Chronic narcotic dependence (H) in treatment of  Spinal instability of cervical region and Stenosis of cervical spine with myelopathy (H)  Due to Ankylosing spondylitis of cervical region (H) and will take   -     indomethacin (INDOCIN) 50 MG  capsule; Take 1 capsule (50 mg) by mouth daily    Vasculogenic erectile dysfunction, unspecified vasculogenic erectile dysfunction type  -     sildenafil (VIAGRA) 100 MG tablet; Take 0.5-1 tablets ( mg) by mouth daily as needed Take 30 min to 4 hours before intercourse.  Never use with nitroglycerin, terazosin or doxazosin.    Hypogonadism male  -     testosterone (ANDROGEL/TESTIM) 50 MG/5GM (1%) topical gel; Place 1 packet (50 mg of testosterone) onto the skin every 3 days  -     Testosterone Free and Total  Claudication (H24)  Much improved since quit smoking  -     CBC with Diff/Plt ([facility])  -     Lipid Profile ([facility])  -     Comprehensive metabolic panel    Need for vaccination  -     PNEUMOCOCCAL 20 VALENT CONJUGATE (PREVNAR 20)    Screening for prostate cancer  -     Prostate Specific Antigen Screen    Personal history of tobacco use  -     VENOUS COLLECTION  -     Prof fee: Shared Decision Making for Lung Cancer Screening  -     CT Chest Lung Cancer Scrn Low Dose wo; Future        Regular exercise  Patient Instructions   Lung Cancer Screening   Frequently Asked Questions  If you are at high-risk for lung cancer, getting screened with low-dose computed tomography (LDCT) every year can help save your life. This handout offers answers to some of the most common questions about lung cancer screening. If you have other questions, please call [phone] ([phone]).     What is it?  Lung cancer screening uses special X-ray technology to create an image of your lung tissue. The exam is quick and easy and takes less than 10 seconds. We don t give you any medicine or use any needles. You can eat before and after the exam. You don t need to change your clothes as long as the clothing on your chest doesn t contain metal. But, you do need to be able to hold your breath for at least 6 seconds during the exam.    What is the goal of lung cancer screening?  The goal of lung cancer screening is to save lives.  Many times, lung cancer is not found until a person starts having physical symptoms. Lung cancer screening can help detect lung cancer in the earliest stages when it may be easier to treat.    Who should be screened for lung cancer?  We suggest lung cancer screening for anyone who is at high-risk for lung cancer. You are in the high-risk group if you:     are between the ages of 55 and 79, and   have smoked at least 1 pack of cigarettes a day for 20 or more years, and   still smoke or have quit within the past 15 years.    However, if you have a new cough or shortness of breath, you should talk to your doctor before being screened.    Why does it matter if I have symptoms?  Certain symptoms can be a sign that you have a condition in your lungs that should be checked and treated by your doctor. These symptoms include fever, chest pain, a new or changing cough, shortness of breath that you have never felt before, coughing up blood or unexplained weight loss. Having any of these symptoms can greatly affect the results of lung cancer screening.       Should all smokers get an LDCT lung cancer screening exam?  It depends. Lung cancer screening is for a very specific group of men and women who have a history of heavy smoking over a long period of time (see  Who should be screened for lung cancer  above).  I am in the high-risk group, but have been diagnosed with cancer in the past. Is LDCT lung cancer screening right for me?  In some cases, you should not have LDCT lung screening, such as when your doctor is already following your cancer with CT scan studies. Your doctor will help you decide if LDCT lung screening is right for you.  Do I need to have a screening exam every year?  Yes. If you are in the high-risk group described earlier, you should get an LDCT lung cancer screening exam every year until you are 79, or are no longer willing or able to undergo screening and possible procedures to diagnose and treat lung  cancer.  How effective is LDCT at preventing death from lung cancer?  Studies have shown that LDCT lung cancer screening can lower the risk of death from lung cancer by 20 percent in people who are at high-risk.  What are the risks?  There are some risks and limitations of LDCT lung cancer screening. We want to make sure you understand the risks and benefits, so please let us know if you have any questions. Your doctor may want to talk with you more about these risks.   Radiation exposure: As with any exam that uses radiation, there is a very small increased risk of cancer. The amount of radiation in LDCT is small--about the same amount a person would get from a mammogram. Your doctor orders the exam when he or she feels the potential benefits outweigh the risks.   False negatives: No test is perfect, including LDCT. It is possible that you may have a medical condition, including lung cancer, that is not found during your exam. This is called a false negative result.   False positives and more testing: LDCT very often finds something in the lung that could be cancer, but in fact is not. This is called a false positive result. False positive tests often cause anxiety. To make sure these findings are not cancer, you may need to have more tests. These tests will be done only if you give us permission. Sometimes patients need a treatment that can have side effects, such as a biopsy. For more information on false positives, see  What can I expect from the results?    Findings not related to lung cancer: Your LDCT exam also takes pictures of areas of your body next to your lungs. In a very small number of cases, the CT scan will show an abnormal finding in one of these areas, such as your kidneys, adrenal glands, liver or thyroid. This finding may not be serious, but you may need more tests. Your doctor can help you decide what other tests you may need, if any.  What can I expect from the results?  About 1 out of 4 LDCT  exams will find something that may need more tests. Most of the time, these findings are lung nodules. Lung nodules are very small collections of tissue in the lung. These nodules are very common, and the vast majority--more than 97 percent--are not cancer (benign). Most are normal lymph nodes or small areas of scarring from past infections.  But, if a small lung nodule is found to be cancer, the cancer can be cured more than 90 percent of the time. To know if the nodule is cancer, we may need to get more images before your next yearly screening exam. If the nodule has suspicious features (for example, it is large, has an odd shape or grows over time), we will refer you to a specialist for further testing.  Will my doctor also get the results?  Yes. Your doctor will get a copy of your results.  Is it okay to keep smoking now that there s a cancer screening exam?  No. Tobacco is one of the strongest cancer-causing agents. It causes not only lung cancer, but other cancers and cardiovascular (heart) diseases as well. The damage caused by smoking builds over time. This means that the longer you smoke, the higher your risk of disease. While it is never too late to quit, the sooner you quit, the better.  Where can I find help to quit smoking?  The best way to prevent lung cancer is to stop smoking. If you have already quit smoking, congratulations and keep it up! For help on quitting smoking, please call [facility] at 1-328-QUITNOW (1-679.167.9804) or the American Cancer Society at 1-607.449.2883 to find local resources near you.  One-on-one health coaching:  If you d prefer to work individually with a health care provider on tobacco cessation, we offer:     Medication Therapy Management:  Our specially trained pharmacists work closely with you and your doctor to help you quit smoking.  Call [phone] or [phone] (toll free).      The following health maintenance items are reviewed in Epic and correct as of  today:  Health Maintenance   Topic Date Due    Pneumococcal Vaccine: Pediatrics (0 to 5 Years) and At-Risk Patients (6 to 64 Years) (1 - PCV) Never done    ZOSTER IMMUNIZATION (1 of 2) Never done    HEPATITIS A IMMUNIZATION (1 of 2 - Risk 2-dose series) Never done    PHQ-2 (once per calendar year)  01/01/2023    MEDICARE ANNUAL WELLNESS VISIT  09/01/2023    COVID-19 Vaccine (4 - 2023-24 season) 09/01/2023    COLORECTAL CANCER SCREENING  11/15/2024    LIPID  09/01/2027    ADVANCE CARE PLANNING  09/01/2027    DTAP/TDAP/TD IMMUNIZATION (6 - Td or Tdap) 10/26/2030    HEPATITIS C SCREENING  Completed    HIV SCREENING  Completed    INFLUENZA VACCINE  Completed    IPV IMMUNIZATION  Completed    HPV IMMUNIZATION  Aged Out    MENINGITIS IMMUNIZATION  Aged Out    RSV MONOCLONAL ANTIBODY  Aged Out    HEPATITIS B IMMUNIZATION  Discontinued    LUNG CANCER SCREENING  Discontinued       Willy Forrest MD  [facility]  Family Practice  [facility]  [phone]    For any issues my office # is [phone]      Lung Cancer Screening Shared Decision Making Visit     Oseas Edwards, a 53 year old male, is eligible for lung cancer screening    History   Smoking Status    Former    Years: 21.00    Types: Cigarettes    Quit date: 1/1/2009   Smokeless Tobacco    Never       I have discussed with patient the risks and benefits of screening for lung cancer with low-dose CT.     The risks include:    radiation exposure: one low dose chest CT has as much ionizing radiation as about 15 chest x-rays, or 6 months of background radiation living in [place]      false positives: most findings/nodules are NOT cancer, but some might still require additional diagnostic evaluation, including biopsy    over-diagnosis: some slow growing cancers that might never have been clinically significant will be detected and treated unnecessarily     The benefit of early detection of lung cancer is contingent upon adherence to annual  screening or more frequent follow up if indicated.     Furthermore, to benefit from screening, Farooq must be willing and able to undergo diagnostic procedures, if indicated. Although no specific guide is available for determining severity of comorbidities, it is reasonable to withhold screening in patients who have greater mortality risk from other diseases.     We did discuss that the best way to prevent lung cancer is to not smoke.    Some patients may value a numeric estimation of lung cancer risk when evaluating if lung cancer screening is right for them, here is one calculator:    ShouldIScreen

## 2023-12-18 NOTE — PATIENT INSTRUCTIONS
Lung Cancer Screening   Frequently Asked Questions  If you are at high-risk for lung cancer, getting screened with low-dose computed tomography (LDCT) every year can help save your life. This handout offers answers to some of the most common questions about lung cancer screening. If you have other questions, please call [phone] ([phone]).     What is it?  Lung cancer screening uses special X-ray technology to create an image of your lung tissue. The exam is quick and easy and takes less than 10 seconds. We don t give you any medicine or use any needles. You can eat before and after the exam. You don t need to change your clothes as long as the clothing on your chest doesn t contain metal. But, you do need to be able to hold your breath for at least 6 seconds during the exam.    What is the goal of lung cancer screening?  The goal of lung cancer screening is to save lives. Many times, lung cancer is not found until a person starts having physical symptoms. Lung cancer screening can help detect lung cancer in the earliest stages when it may be easier to treat.    Who should be screened for lung cancer?  We suggest lung cancer screening for anyone who is at high-risk for lung cancer. You are in the high-risk group if you:      are between the ages of 55 and 79, and    have smoked at least 1 pack of cigarettes a day for 20 or more years, and    still smoke or have quit within the past 15 years.    However, if you have a new cough or shortness of breath, you should talk to your doctor before being screened.    Why does it matter if I have symptoms?  Certain symptoms can be a sign that you have a condition in your lungs that should be checked and treated by your doctor. These symptoms include fever, chest pain, a new or changing cough, shortness of breath that you have never felt before, coughing up blood or unexplained weight loss. Having any of these symptoms can greatly affect the results of lung  cancer screening.       Should all smokers get an LDCT lung cancer screening exam?  It depends. Lung cancer screening is for a very specific group of men and women who have a history of heavy smoking over a long period of time (see  Who should be screened for lung cancer  above).  I am in the high-risk group, but have been diagnosed with cancer in the past. Is LDCT lung cancer screening right for me?  In some cases, you should not have LDCT lung screening, such as when your doctor is already following your cancer with CT scan studies. Your doctor will help you decide if LDCT lung screening is right for you.  Do I need to have a screening exam every year?  Yes. If you are in the high-risk group described earlier, you should get an LDCT lung cancer screening exam every year until you are 79, or are no longer willing or able to undergo screening and possible procedures to diagnose and treat lung cancer.  How effective is LDCT at preventing death from lung cancer?  Studies have shown that LDCT lung cancer screening can lower the risk of death from lung cancer by 20 percent in people who are at high-risk.  What are the risks?  There are some risks and limitations of LDCT lung cancer screening. We want to make sure you understand the risks and benefits, so please let us know if you have any questions. Your doctor may want to talk with you more about these risks.    Radiation exposure: As with any exam that uses radiation, there is a very small increased risk of cancer. The amount of radiation in LDCT is small--about the same amount a person would get from a mammogram. Your doctor orders the exam when he or she feels the potential benefits outweigh the risks.    False negatives: No test is perfect, including LDCT. It is possible that you may have a medical condition, including lung cancer, that is not found during your exam. This is called a false negative result.    False positives and more testing: LDCT very often finds  something in the lung that could be cancer, but in fact is not. This is called a false positive result. False positive tests often cause anxiety. To make sure these findings are not cancer, you may need to have more tests. These tests will be done only if you give us permission. Sometimes patients need a treatment that can have side effects, such as a biopsy. For more information on false positives, see  What can I expect from the results?     Findings not related to lung cancer: Your LDCT exam also takes pictures of areas of your body next to your lungs. In a very small number of cases, the CT scan will show an abnormal finding in one of these areas, such as your kidneys, adrenal glands, liver or thyroid. This finding may not be serious, but you may need more tests. Your doctor can help you decide what other tests you may need, if any.  What can I expect from the results?  About 1 out of 4 LDCT exams will find something that may need more tests. Most of the time, these findings are lung nodules. Lung nodules are very small collections of tissue in the lung. These nodules are very common, and the vast majority--more than 97 percent--are not cancer (benign). Most are normal lymph nodes or small areas of scarring from past infections.  But, if a small lung nodule is found to be cancer, the cancer can be cured more than 90 percent of the time. To know if the nodule is cancer, we may need to get more images before your next yearly screening exam. If the nodule has suspicious features (for example, it is large, has an odd shape or grows over time), we will refer you to a specialist for further testing.  Will my doctor also get the results?  Yes. Your doctor will get a copy of your results.  Is it okay to keep smoking now that there s a cancer screening exam?  No. Tobacco is one of the strongest cancer-causing agents. It causes not only lung cancer, but other cancers and cardiovascular (heart) diseases as well. The damage  caused by smoking builds over time. This means that the longer you smoke, the higher your risk of disease. While it is never too late to quit, the sooner you quit, the better.  Where can I find help to quit smoking?  The best way to prevent lung cancer is to stop smoking. If you have already quit smoking, congratulations and keep it up! For help on quitting smoking, please call [facility] at 9-425-QUITNOW (1-110.276.2256) or the American Cancer Society at 1-974.493.7726 to find local resources near you.  One-on-one health coaching:  If you d prefer to work individually with a health care provider on tobacco cessation, we offer:      Medication Therapy Management:  Our specially trained pharmacists work closely with you and your doctor to help you quit smoking.  Call [phone] or [phone] (toll free).

## 2023-12-21 LAB
TESTOST FREE SERPL-MCNC: 4.75 NG/DL
TESTOST SERPL-MCNC: 250 NG/DL (ref 240–950)

## 2024-01-09 ENCOUNTER — HOSPITAL ENCOUNTER (OUTPATIENT)
Dept: CT IMAGING | Facility: CLINIC | Age: 54
Discharge: HOME | End: 2024-01-09
Attending: FAMILY MEDICINE | Admitting: FAMILY MEDICINE
Payer: COMMERCIAL

## 2024-01-09 DIAGNOSIS — Z87.891 PERSONAL HISTORY OF TOBACCO USE: ICD-10-CM

## 2024-01-09 PROCEDURE — 71271 CT THORAX LUNG CANCER SCR C-: CPT

## 2024-01-10 ENCOUNTER — TRANSFERRED RECORDS (OUTPATIENT)
Dept: FAMILY MEDICINE | Facility: CLINIC | Age: 54
End: 2024-01-10

## 2024-02-09 ENCOUNTER — TRANSFERRED RECORDS (OUTPATIENT)
Dept: FAMILY MEDICINE | Facility: CLINIC | Age: 54
End: 2024-02-09

## 2024-03-12 ENCOUNTER — TRANSFERRED RECORDS (OUTPATIENT)
Dept: FAMILY MEDICINE | Facility: CLINIC | Age: 54
End: 2024-03-12

## 2024-05-08 ENCOUNTER — TRANSFERRED RECORDS (OUTPATIENT)
Dept: FAMILY MEDICINE | Facility: CLINIC | Age: 54
End: 2024-05-08

## 2024-06-05 ENCOUNTER — TRANSFERRED RECORDS (OUTPATIENT)
Dept: FAMILY MEDICINE | Facility: CLINIC | Age: 54
End: 2024-06-05

## 2024-07-09 ENCOUNTER — TRANSFERRED RECORDS (OUTPATIENT)
Dept: FAMILY MEDICINE | Facility: CLINIC | Age: 54
End: 2024-07-09

## 2024-08-07 ENCOUNTER — TRANSFERRED RECORDS (OUTPATIENT)
Dept: FAMILY MEDICINE | Facility: CLINIC | Age: 54
End: 2024-08-07

## 2024-09-04 ENCOUNTER — TRANSFERRED RECORDS (OUTPATIENT)
Dept: FAMILY MEDICINE | Facility: CLINIC | Age: 54
End: 2024-09-04

## 2024-10-07 ENCOUNTER — TRANSFERRED RECORDS (OUTPATIENT)
Dept: FAMILY MEDICINE | Facility: CLINIC | Age: 54
End: 2024-10-07

## 2024-11-05 ENCOUNTER — TRANSFERRED RECORDS (OUTPATIENT)
Dept: FAMILY MEDICINE | Facility: CLINIC | Age: 54
End: 2024-11-05

## 2024-12-31 ENCOUNTER — TRANSFERRED RECORDS (OUTPATIENT)
Dept: FAMILY MEDICINE | Facility: CLINIC | Age: 54
End: 2024-12-31

## 2025-01-27 NOTE — PATIENT INSTRUCTIONS
Patient Education   Preventive Care Advice   This is general advice given by our system to help you stay healthy. However, your care team may have specific advice just for you. Please talk to your care team about your preventive care needs.  Nutrition  Eat 5 or more servings of fruits and vegetables each day.  Try wheat bread, brown rice and whole grain pasta (instead of white bread, rice, and pasta).  Get enough calcium and vitamin D. Check the label on foods and aim for 100% of the RDA (recommended daily allowance).  Lifestyle  Exercise at least 150 minutes each week  (30 minutes a day, 5 days a week).  Do muscle strengthening activities 2 days a week. These help control your weight and prevent disease.  No smoking.  Wear sunscreen to prevent skin cancer.  Have a dental exam and cleaning every 6 months.  Yearly exams  See your health care team every year to talk about:  Any changes in your health.  Any medicines your care team has prescribed.  Preventive care, family planning, and ways to prevent chronic diseases.  Shots (vaccines)   HPV shots (up to age 26), if you've never had them before.  Hepatitis B shots (up to age 59), if you've never had them before.  COVID-19 shot: Get this shot when it's due.  Flu shot: Get a flu shot every year.  Tetanus shot: Get a tetanus shot every 10 years.  Pneumococcal, hepatitis A, and RSV shots: Ask your care team if you need these based on your risk.  Shingles shot (for age 50 and up)  General health tests  Diabetes screening:  Starting at age 35, Get screened for diabetes at least every 3 years.  If you are younger than age 35, ask your care team if you should be screened for diabetes.  Cholesterol test: At age 39, start having a cholesterol test every 5 years, or more often if advised.  Bone density scan (DEXA): At age 50, ask your care team if you should have this scan for osteoporosis (brittle bones).  Hepatitis C: Get tested at least once in your life.  STIs (sexually  transmitted infections)  Before age 24: Ask your care team if you should be screened for STIs.  After age 24: Get screened for STIs if you're at risk. You are at risk for STIs (including HIV) if:  You are sexually active with more than one person.  You don't use condoms every time.  You or a partner was diagnosed with a sexually transmitted infection.  If you are at risk for HIV, ask about PrEP medicine to prevent HIV.  Get tested for HIV at least once in your life, whether you are at risk for HIV or not.  Cancer screening tests  Cervical cancer screening: If you have a cervix, begin getting regular cervical cancer screening tests starting at age 21.  Breast cancer scan (mammogram): If you've ever had breasts, begin having regular mammograms starting at age 40. This is a scan to check for breast cancer.  Colon cancer screening: It is important to start screening for colon cancer at age 45.  Have a colonoscopy test every 10 years (or more often if you're at risk) Or, ask your provider about stool tests like a FIT test every year or Cologuard test every 3 years.  To learn more about your testing options, visit:   .  For help making a decision, visit:   https://bit.ly/lx82806.  Prostate cancer screening test: If you have a prostate, ask your care team if a prostate cancer screening test (PSA) at age 55 is right for you.  Lung cancer screening: If you are a current or former smoker ages 50 to 80, ask your care team if ongoing lung cancer screenings are right for you.  For informational purposes only. Not to replace the advice of your health care provider. Copyright   2023 [employer]. All rights reserved. Clinically reviewed by the [facility]. [employer] [id number] - REV 01/24.

## 2025-01-28 ENCOUNTER — OFFICE VISIT (OUTPATIENT)
Dept: FAMILY MEDICINE | Facility: CLINIC | Age: 55
End: 2025-01-28

## 2025-01-28 VITALS
BODY MASS INDEX: 33.57 KG/M2 | SYSTOLIC BLOOD PRESSURE: 122 MMHG | HEART RATE: 78 BPM | HEIGHT: 60 IN | WEIGHT: 171 LBS | DIASTOLIC BLOOD PRESSURE: 83 MMHG | OXYGEN SATURATION: 97 %

## 2025-01-28 DIAGNOSIS — M45.2 ANKYLOSING SPONDYLITIS OF CERVICAL REGION (H): ICD-10-CM

## 2025-01-28 DIAGNOSIS — F11.20 CHRONIC NARCOTIC DEPENDENCE (H): ICD-10-CM

## 2025-01-28 DIAGNOSIS — Z13.6 SCREENING FOR HEART DISEASE: ICD-10-CM

## 2025-01-28 DIAGNOSIS — R79.89 LOW SERUM TESTOSTERONE LEVEL: ICD-10-CM

## 2025-01-28 DIAGNOSIS — I10 ESSENTIAL HYPERTENSION: ICD-10-CM

## 2025-01-28 DIAGNOSIS — R63.5 WEIGHT GAIN: ICD-10-CM

## 2025-01-28 DIAGNOSIS — N40.1 BENIGN PROSTATIC HYPERPLASIA WITH NOCTURIA: ICD-10-CM

## 2025-01-28 DIAGNOSIS — G99.2 STENOSIS OF CERVICAL SPINE WITH MYELOPATHY (H): ICD-10-CM

## 2025-01-28 DIAGNOSIS — M48.02 STENOSIS OF CERVICAL SPINE WITH MYELOPATHY (H): ICD-10-CM

## 2025-01-28 DIAGNOSIS — Z00.00 ENCOUNTER FOR MEDICARE ANNUAL WELLNESS EXAM: Primary | ICD-10-CM

## 2025-01-28 DIAGNOSIS — N52.9 VASCULOGENIC ERECTILE DYSFUNCTION, UNSPECIFIED VASCULOGENIC ERECTILE DYSFUNCTION TYPE: ICD-10-CM

## 2025-01-28 DIAGNOSIS — Z12.5 SCREENING FOR PROSTATE CANCER: ICD-10-CM

## 2025-01-28 DIAGNOSIS — Z12.11 SCREENING FOR COLON CANCER: ICD-10-CM

## 2025-01-28 DIAGNOSIS — R35.1 BENIGN PROSTATIC HYPERPLASIA WITH NOCTURIA: ICD-10-CM

## 2025-01-28 DIAGNOSIS — M45.9 ANKYLOSING SPONDYLITIS, UNSPECIFIED SITE OF SPINE (H): ICD-10-CM

## 2025-01-28 LAB
% GRANULOCYTES: 64.8 % (ref 42.2–75.2)
ALBUMIN SERPL BCG-MCNC: 3.9 G/DL (ref 3.5–5.2)
ALP SERPL-CCNC: 92 U/L (ref 40–150)
ALT SERPL W P-5'-P-CCNC: 24 U/L (ref 0–70)
ANION GAP SERPL CALCULATED.3IONS-SCNC: 9 MMOL/L (ref 7–15)
AST SERPL W P-5'-P-CCNC: 24 U/L (ref 0–45)
BILIRUB SERPL-MCNC: 0.5 MG/DL
BUN SERPL-MCNC: 21.1 MG/DL (ref 6–20)
CALCIUM SERPL-MCNC: 9.3 MG/DL (ref 8.8–10.4)
CHLORIDE SERPL-SCNC: 104 MMOL/L (ref 98–107)
CHOLESTEROL: 217 MG/DL (ref 100–199)
CREAT SERPL-MCNC: 0.88 MG/DL (ref 0.67–1.17)
EGFRCR SERPLBLD CKD-EPI 2021: >90 ML/MIN/1.73M2
FASTING STATUS PATIENT QL REPORTED: YES
FASTING?: YES
GLUCOSE SERPL-MCNC: 103 MG/DL (ref 70–99)
HCO3 SERPL-SCNC: 24 MMOL/L (ref 22–29)
HCT VFR BLD AUTO: 42.5 % (ref 39–51)
HDL (RMG): 51 MG/DL (ref 40–?)
HEMOGLOBIN: 14.7 G/DL (ref 13.4–17.5)
LDL CALCULATED (RMG): 146 MG/DL (ref 0–130)
LYMPHOCYTES NFR BLD AUTO: 28 % (ref 20.5–51.1)
MCH RBC QN AUTO: 32 PG (ref 27–31)
MCHC RBC AUTO-ENTMCNC: 34.6 G/DL (ref 33–37)
MCV RBC AUTO: 92.5 FL (ref 80–100)
MONOCYTES NFR BLD AUTO: 7.2 % (ref 1.7–9.3)
PLATELET # BLD AUTO: 168 K/UL (ref 140–450)
POTASSIUM SERPL-SCNC: 4.3 MMOL/L (ref 3.4–5.3)
PROT SERPL-MCNC: 7.3 G/DL (ref 6.4–8.3)
RBC # BLD AUTO: 4.59 X10/CMM (ref 4.2–5.9)
SHBG SERPL-SCNC: 34 NMOL/L (ref 11–80)
SODIUM SERPL-SCNC: 137 MMOL/L (ref 135–145)
TRIGLYCERIDES (RMG): 99 MG/DL (ref 0–149)
TSH SERPL DL<=0.005 MIU/L-ACNC: 1.36 UIU/ML (ref 0.3–4.2)
WBC # BLD AUTO: 6.8 X10/CMM (ref 3.8–11)

## 2025-01-28 PROCEDURE — 80053 COMPREHEN METABOLIC PANEL: CPT | Mod: ORL | Performed by: FAMILY MEDICINE

## 2025-01-28 PROCEDURE — 99214 OFFICE O/P EST MOD 30 MIN: CPT | Mod: 25 | Performed by: FAMILY MEDICINE

## 2025-01-28 PROCEDURE — 99396 PREV VISIT EST AGE 40-64: CPT | Mod: 25 | Performed by: FAMILY MEDICINE

## 2025-01-28 PROCEDURE — 84443 ASSAY THYROID STIM HORMONE: CPT | Mod: ORL | Performed by: FAMILY MEDICINE

## 2025-01-28 PROCEDURE — 80061 LIPID PANEL: CPT | Mod: QW | Performed by: FAMILY MEDICINE

## 2025-01-28 PROCEDURE — 36415 COLL VENOUS BLD VENIPUNCTURE: CPT | Performed by: FAMILY MEDICINE

## 2025-01-28 PROCEDURE — 90471 IMMUNIZATION ADMIN: CPT | Performed by: FAMILY MEDICINE

## 2025-01-28 PROCEDURE — 84270 ASSAY OF SEX HORMONE GLOBUL: CPT | Mod: ORL | Performed by: FAMILY MEDICINE

## 2025-01-28 PROCEDURE — 90661 CCIIV3 VAC ABX FR 0.5 ML IM: CPT | Performed by: FAMILY MEDICINE

## 2025-01-28 PROCEDURE — 85025 COMPLETE CBC W/AUTO DIFF WBC: CPT | Performed by: FAMILY MEDICINE

## 2025-01-28 PROCEDURE — 84403 ASSAY OF TOTAL TESTOSTERONE: CPT | Mod: ORL | Performed by: FAMILY MEDICINE

## 2025-01-28 RX ORDER — INDOMETHACIN 50 MG/1
50 CAPSULE ORAL DAILY
Qty: 90 CAPSULE | Refills: 0 | Status: SHIPPED | OUTPATIENT
Start: 2025-01-28

## 2025-01-28 RX ORDER — TAMSULOSIN HYDROCHLORIDE 0.4 MG/1
0.4 CAPSULE ORAL DAILY
Qty: 90 CAPSULE | Refills: 1 | Status: SHIPPED | OUTPATIENT
Start: 2025-01-28

## 2025-01-28 RX ORDER — LISINOPRIL 5 MG/1
5 TABLET ORAL DAILY
Qty: 90 TABLET | Refills: 1 | Status: SHIPPED | OUTPATIENT
Start: 2025-01-28

## 2025-01-28 RX ORDER — SILDENAFIL 100 MG/1
50-100 TABLET, FILM COATED ORAL DAILY PRN
Qty: 30 TABLET | Refills: 3 | Status: SHIPPED | OUTPATIENT
Start: 2025-01-28

## 2025-01-28 RX ORDER — LISINOPRIL AND HYDROCHLOROTHIAZIDE 10; 12.5 MG/1; MG/1
1 TABLET ORAL DAILY
Qty: 90 TABLET | Refills: 1 | Status: SHIPPED | OUTPATIENT
Start: 2025-01-28

## 2025-01-28 SDOH — HEALTH STABILITY: PHYSICAL HEALTH: ON AVERAGE, HOW MANY DAYS PER WEEK DO YOU ENGAGE IN MODERATE TO STRENUOUS EXERCISE (LIKE A BRISK WALK)?: 2 DAYS

## 2025-01-28 SDOH — HEALTH STABILITY: PHYSICAL HEALTH: ON AVERAGE, HOW MANY MINUTES DO YOU ENGAGE IN EXERCISE AT THIS LEVEL?: 10 MIN

## 2025-01-28 ASSESSMENT — SOCIAL DETERMINANTS OF HEALTH (SDOH): HOW OFTEN DO YOU GET TOGETHER WITH FRIENDS OR RELATIVES?: ONCE A WEEK

## 2025-01-28 NOTE — PROGRESS NOTES
Preventive Care Visit  [facility]  Willy Forrest MD, Family Medicine  Jan 28, 2025      Assessment & Plan     Encounter for Medicare annual wellness exam    - INFLUENZA TRIVALENT VACCINE (FLUCELVAX)  - VACCINE ADMINISTRATION, INITIAL    Ankylosing spondylitis, unspecified site of spine (H)  Stable workingwith pain clinic and rheum    Stenosis of cervical spine with myelopathy (H)  No red flag signs    Chronic narcotic dependence (H)  On stool softeners    Essential hypertension  Well controlled on current meds copntineu  - Comprehensive metabolic panel  - CBC with Diff/Plt ([facility])  - VENOUS COLLECTION  - lisinopril (ZESTRIL) 5 MG tablet  Dispense: 90 tablet; Refill: 1  - lisinopril-hydrochlorothiazide (ZESTORETIC) 10-12.5 MG tablet  Dispense: 90 tablet; Refill: 1    Low serum testosterone level  Has been off for 3 monts  - Testosterone Free and Total    Ankylosing spondylitis of cervical region (H)  Uses if needed for breakthrough pain  - indomethacin (INDOCIN) 50 MG capsule  Dispense: 90 capsule; Refill: 0    Weight gain  Long discussion  - TSH    Vasculogenic erectile dysfunction, unspecified vasculogenic erectile dysfunction type  Discussed erectile dysfunction in detail including a review of the physiology that produces erections.  Reviewed typical causes of impotence such as medication side effect, diabetes, neuropathies, drugs/alcohol, underlying mood disorder, relationship issues, neurogenic causes, hypogonadism.  Discussed typical medical evaluation including physical exam findings, labs (thyroid, testosterone, BMP, etc.)  Treatment will include modifying any causative features if able, stopping nay substance use, treating low testosterone levels, and consideration of medications (Viagra/Levitra/Cialis).     Discussed the risks and benefits of these meds.  Discussed the potential side effects of Viagra/Levitra/Cialis including dizziness, headache, vision changes, syncope, GI upset/dyspepsia,  "hypotension.  Recommended avoiding taking with large meal to improve absorption.  Avoid taking with alcohol.  Never take nitroglycerin containing compounds when on these medications.    WIll start with a lower dose and titrate upward as needed, aiming for the lowest effective dose.  If effective, they will call for a prescription provided the medication if effective and there are no side effects.     - sildenafil (VIAGRA) 100 MG tablet  Dispense: 30 tablet; Refill: 3    Screening for prostate cancer    Screening for heart disease    - Lipid Profile ([facility])    Screening for colon cancer    - COLOGUASHASHI([employer])    Benign prostatic hyperplasia with nocturia  These symptoms sound most consistent with bladder irritation from an enlarged prostate (BPH).  Will make sure the PSA is normal and up to date.    Discussed treatment options for this including lifestyle changes ( i.e. restricting water intake close to bedtime or long trips, avoiding caffeine, etc.), medications (including FLOMAX, proscar, saw palmetto, etc), and referral to Urology.    - tamsulosin (FLOMAX) 0.4 MG capsule  Dispense: 90 capsule; Refill: 1      Patient has been advised of split billing requirements and indicates understanding: Yes        BMI  Estimated body mass index is 34.54 kg/m  as calculated from the following:    Height as of this encounter: 1.499 m (4' 11\").    Weight as of this encounter: 77.6 kg (171 lb).   Weight management plan: has gained too much weight    Counseling  Appropriate preventive services were addressed with this patient via screening, questionnaire, or discussion as appropriate for fall prevention, nutrition, physical activity, Tobacco-use cessation, social engagement, weight loss and cognition.  Checklist reviewing preventive services available has been given to the patient.  Reviewed patient's diet, addressing concerns and/or questions.   He is at risk for lack of exercise and has been provided with information to " increase physical activity for the benefit of his well-being.   Discussed possible causes of fatigue. Updated plan of care.  Patient reported difficulty with activities of daily living were addressed today.Information on urinary incontinence and treatment options given to patient.   I have reviewed Opioid Use Disorder and Substance Use Disorder risk factors and made any needed referrals.       Work on weight loss    No follow-ups on file.    Subjective   Farooq is a 54 year old, presenting for the following:  Physical (Fasting - physical./), Medication Request (Needs medication refills for indomethacin, lisinopril.), and Shortness of Breath (Shortness of breath when walking or exerting himself. Laying down helps. Started 2-3 months ago)            HPI  Here for annual physical and to check up on the above    Health Care Directive  Patient does not have a Health Care Directive: As is reasonable care for most folks, In the short term, he wants usual aggressive medical care.   No desire for long term prolongation of life through artificial means if no hope to bring back to a reasonable status.    Hearing screen    Left ear:    500 HZ: Pass  1000 HZ: Pass  2000 HZ: Pass  4000 HZ: Pass    Right ear:    500 HZ: Pass  1000 HZ: Pass  2000 HZ: Pass  4000 HZ: Pass        1/28/2025   General Health   How would you rate your overall physical health? (!) FAIR   Feel stress (tense, anxious, or unable to sleep) Not at all         1/28/2025   Nutrition   Diet: Regular (no restrictions)         1/28/2025   Exercise   Days per week of moderate/strenous exercise 2 days   Average minutes spent exercising at this level 10 min   (!) EXERCISE CONCERN      1/28/2025   Social Factors   Frequency of gathering with friends or relatives Once a week   Worry food won't last until get money to buy more No   Food not last or not have enough money for food? No   Do you have housing? (Housing is defined as stable permanent housing and does not include  staying ouside in a car, in a tent, in an abandoned building, in an overnight shelter, or couch-surfing.) No   Are you worried about losing your housing? No   Lack of transportation? No   Unable to get utilities (heat,electricity)? No   Want help with housing or utility concern? No   (!) HOUSING CONCERN PRESENT      2025   Fall Risk   Fallen 2 or more times in the past year? Yes    Trouble with walking or balance? Yes        Proxy-reported           2025   Activities of Daily Living- Home Safety   Needs help with the following daily activites Dressing   Safety concerns in the home None of the above         2025   Dental   Dentist two times every year? Yes         2025   Hearing Screening   Hearing concerns? None of the above         2025   Driving Risk Screening   Patient/family members have concerns about driving No         2025   General Alertness/Fatigue Screening   Have you been more tired than usual lately? (!) YES         2025   Urinary Incontinence Screening   Bothered by leaking urine in past 6 months Yes         2025   TB Screening   Were you born outside of the [place]? No         Today's PHQ-2 Score:       2025     9:09 AM   PHQ-2 (  [employer])   Q1: Little interest or pleasure in doing things 1   Q2: Feeling down, depressed or hopeless 0   PHQ-2 Score 1           2025   Substance Use   Alcohol more than 3/day or more than 7/wk No   Do you have a current opioid prescription? (!) YES   How severe/bad is pain from 1 to 10? 9/10   Do you use any other substances recreationally? No       Social History     Tobacco Use    Smoking status: Former     Current packs/day: 0.00     Types: Cigarettes     Start date: 1988     Quit date: 2009     Years since quittin.0    Smokeless tobacco: Never    Tobacco comments:     quit 2 1/2 yrs   Substance Use Topics    Alcohol use: Yes     Comment: occ.     Drug use: No       Last PSA:   Prostate Specific Antigen Screen    Date Value Ref Range Status   12/18/2023 0.52 0.00 - 3.50 ng/mL Final   The longitudinal plan of care for the diagnosis(es)/condition(s) as documented were addressed during this visit. Due to the added complexity in care, I will continue to support Farooq in the subsequent management and with ongoing continuity of care.  ASCVD Risk   The 10-year ASCVD risk score (Dion GUSMAN, et al., 2019) is: 5.3%    Values used to calculate the score:      Age: 54 years      Sex: Male      Is Non- : No      Diabetic: No      Tobacco smoker: No      Systolic Blood Pressure: 122 mmHg      Is BP treated: Yes      HDL Cholesterol: 45 mg/dL      Total Cholesterol: 164 mg/dL            Reviewed and updated as needed this visit by Provider      Problems               Lab work is in process  Current providers sharing in care for this patient include:  Patient Care Team:  Willy Forrest MD as PCP - General (Family Practice)  Willy Forrest MD as Assigned PCP    The following health maintenance items are reviewed in Epic and correct as of today:  Health Maintenance   Topic Date Due    ZOSTER IMMUNIZATION (1 of 2) Never done    HEPATITIS A IMMUNIZATION (1 of 2 - Risk 2-dose series) Never done    INFLUENZA VACCINE (1) 09/01/2024    COVID-19 Vaccine (4 - 2024-25 season) 09/01/2024    COLORECTAL CANCER SCREENING  11/15/2024    BMP  12/18/2024    MEDICARE ANNUAL WELLNESS VISIT  01/28/2026    GLUCOSE  12/18/2026    ADVANCE CARE PLANNING  09/01/2027    LIPID  12/18/2028    DTAP/TDAP/TD IMMUNIZATION (6 - Td or Tdap) 10/26/2030    RSV VACCINE (1 - 1-dose 75+ series) 04/04/2044    HEPATITIS C SCREENING  Completed    HIV SCREENING  Completed    PHQ-2 (once per calendar year)  Completed    Pneumococcal Vaccine: 50+ Years  Completed    HPV IMMUNIZATION  Aged Out    MENINGITIS IMMUNIZATION  Aged Out    RSV MONOCLONAL ANTIBODY  Aged Out    HEPATITIS B IMMUNIZATION  Discontinued    LUNG CANCER SCREENING   "Discontinued         Review of Systems  Constitutional, HEENT, cardiovascular, pulmonary, GI, , musculoskeletal, neuro, skin, endocrine and psych systems are negative, except as otherwise noted.     Objective    Exam  /83   Pulse 78   Ht 1.499 m (4' 11\")   Wt 77.6 kg (171 lb)   SpO2 97%   BMI 34.54 kg/m     Estimated body mass index is 34.54 kg/m  as calculated from the following:    Height as of this encounter: 1.499 m (4' 11\").    Weight as of this encounter: 77.6 kg (171 lb).    Physical Exam  GENERAL: alert and no distress  EYES: Eyes grossly normal to inspection, PERRL and conjunctivae and sclerae normal  HENT: ear canals and TM's normal, nose and mouth without ulcers or lesions  NECK: no adenopathy, no asymmetry, masses, or scars  RESP: lungs clear to auscultation - no rales, rhonchi or wheezes  CV: regular rate and rhythm, normal S1 S2, no S3 or S4, no murmur, click or rub, no peripheral edema  ABDOMEN: soft, nontender, no hepatosplenomegaly, no masses and bowel sounds normal  ABDOMEN: diastasis recti  MS: no gross musculoskeletal defects noted, no edema  MS: spine exam shows fixed cervical   SKIN: no suspicious lesions or rashes  NEURO: Normal strength and tone, mentation intact and speech normal  PSYCH: mentation appears normal, affect normal/bright         No data to display                       Signed Electronically by: Willy Forrest MD    "

## 2025-01-29 ENCOUNTER — TRANSFERRED RECORDS (OUTPATIENT)
Dept: FAMILY MEDICINE | Facility: CLINIC | Age: 55
End: 2025-01-29

## 2025-01-30 LAB
TESTOST FREE SERPL-MCNC: 5.08 NG/DL
TESTOST SERPL-MCNC: 266 NG/DL (ref 240–950)

## 2025-02-28 ENCOUNTER — TRANSFERRED RECORDS (OUTPATIENT)
Dept: FAMILY MEDICINE | Facility: CLINIC | Age: 55
End: 2025-02-28

## 2025-04-02 ENCOUNTER — TRANSFERRED RECORDS (OUTPATIENT)
Dept: FAMILY MEDICINE | Facility: CLINIC | Age: 55
End: 2025-04-02

## 2025-05-02 ENCOUNTER — TRANSFERRED RECORDS (OUTPATIENT)
Dept: FAMILY MEDICINE | Facility: CLINIC | Age: 55
End: 2025-05-02

## 2025-06-02 DIAGNOSIS — M45.2 ANKYLOSING SPONDYLITIS OF CERVICAL REGION (H): ICD-10-CM

## 2025-06-02 RX ORDER — INDOMETHACIN 50 MG/1
50 CAPSULE ORAL DAILY
Qty: 90 CAPSULE | Refills: 2 | Status: SHIPPED | OUTPATIENT
Start: 2025-06-02

## 2025-06-02 NOTE — TELEPHONE ENCOUNTER
Med: Indomethacin    LOV (related): 1/28/2025    Due for F/U around: 1/28/2026 for AWV    Next Appt: none scheduled

## 2025-06-30 ENCOUNTER — TRANSFERRED RECORDS (OUTPATIENT)
Dept: FAMILY MEDICINE | Facility: CLINIC | Age: 55
End: 2025-06-30

## 2025-07-25 DIAGNOSIS — I10 ESSENTIAL HYPERTENSION: ICD-10-CM

## 2025-07-25 NOTE — TELEPHONE ENCOUNTER
Med: Lisinopril     LOV (related): 1/28/25 CPX    Last Lab: 1/28/25      Due for F/U around: 1/2026 CPX    Next Appt: None scheduled         BP Readings from Last 3 Encounters:   01/28/25 122/83   12/18/23 120/80   07/05/23 102/65       Last Comprehensive Metabolic Panel:  Lab Results   Component Value Date     01/28/2025    POTASSIUM 4.3 01/28/2025    CHLORIDE 104 01/28/2025    CO2 24 01/28/2025    ANIONGAP 9 01/28/2025     (H) 01/28/2025    BUN 21.1 (H) 01/28/2025    CR 0.88 01/28/2025    GFRESTIMATED >90 01/28/2025    ANDREW 9.3 01/28/2025

## 2025-07-28 RX ORDER — LISINOPRIL 5 MG/1
5 TABLET ORAL DAILY
Qty: 90 TABLET | Refills: 2 | Status: SHIPPED | OUTPATIENT
Start: 2025-07-28

## 2025-07-29 DIAGNOSIS — R35.1 BENIGN PROSTATIC HYPERPLASIA WITH NOCTURIA: ICD-10-CM

## 2025-07-29 DIAGNOSIS — N40.1 BENIGN PROSTATIC HYPERPLASIA WITH NOCTURIA: ICD-10-CM

## 2025-07-29 DIAGNOSIS — I10 ESSENTIAL HYPERTENSION: ICD-10-CM

## 2025-07-29 RX ORDER — LISINOPRIL AND HYDROCHLOROTHIAZIDE 10; 12.5 MG/1; MG/1
1 TABLET ORAL DAILY
Qty: 90 TABLET | Refills: 0 | Status: SHIPPED | OUTPATIENT
Start: 2025-07-29

## 2025-07-29 RX ORDER — TAMSULOSIN HYDROCHLORIDE 0.4 MG/1
0.4 CAPSULE ORAL DAILY
Qty: 90 CAPSULE | Refills: 0 | Status: SHIPPED | OUTPATIENT
Start: 2025-07-29

## 2025-08-27 ENCOUNTER — TRANSFERRED RECORDS (OUTPATIENT)
Dept: FAMILY MEDICINE | Facility: CLINIC | Age: 55
End: 2025-08-27

## (undated) DEVICE — SU VICRYL 1 CTX CR 8X18" J765D

## (undated) DEVICE — SU VICRYL 0 CT-1 CR 8X18" J740D

## (undated) DEVICE — DRAPE IOBAN INCISE 23X17" 6650EZ

## (undated) DEVICE — IOM EEG SUPPLIES

## (undated) DEVICE — IONM UP TO 7 HOURS

## (undated) DEVICE — CATH TRAY FOLEY COUDE SURESTEP 16FR W/URNE MTR STLK A304716A

## (undated) DEVICE — TAPE MICROFOAM 3" 1528-3

## (undated) DEVICE — SPONGE KITTNER 30-101

## (undated) DEVICE — PACK TOTAL HIP W/POUCH SOP15HPFSM

## (undated) DEVICE — MANIFOLD NEPTUNE 4 PORT 700-20

## (undated) DEVICE — LINEN TOWEL PACK X5 5464

## (undated) DEVICE — SYR 50ML LL W/O NDL 309653

## (undated) DEVICE — PAD FOAM MCGUIRE KIT 0814-0150

## (undated) DEVICE — DRAPE STERI TOWEL LG 1010

## (undated) DEVICE — PREP CHLORAPREP 26ML TINTED ORANGE  260815

## (undated) DEVICE — STPL SKIN 35W 6.9MM  PXW35

## (undated) DEVICE — DRSG GAUZE 4X4" 3033

## (undated) DEVICE — BONE CLEANING TIP INTERPULSE  0210-010-000

## (undated) DEVICE — SU VICRYL 2-0 CT-1 27" UND J259H

## (undated) DEVICE — GLOVE PROTEXIS MICRO 7.5  2D73PM75

## (undated) DEVICE — DRAPE O ARM TUBE 9732722

## (undated) DEVICE — GOWN IMPERVIOUS ZONED XLG 9041

## (undated) DEVICE — PROTECTOR ARM ONE-STEP TRENDELENBURG 40418

## (undated) DEVICE — ESU GROUND PAD ADULT W/CORD E7507

## (undated) DEVICE — SU ETHIBOND 5 V-37 4X30" MB66G

## (undated) DEVICE — BLADE BONE MILL STRK 3.2MM FINE 5400-702-000

## (undated) DEVICE — DRAPE MAYO STAND 23X54 8337

## (undated) DEVICE — ADH SKIN CLOSURE PREMIERPRO EXOFIN 1.0ML 3470

## (undated) DEVICE — SOL NACL 0.9% INJ 250ML BAG 2B1322Q

## (undated) DEVICE — IMM PILLOW ABDUCT HIP MED 31143061

## (undated) DEVICE — RX SURGIFLO HEMOSTATIC MATRIX W/THROMBIN 8ML 2994

## (undated) DEVICE — GLOVE PROTEXIS W/NEU-THERA 8.0  2D73TE80

## (undated) DEVICE — WIPES FOLEY CARE SURESTEP PROVON DFC100

## (undated) DEVICE — DRAPE SPLIT SHEET 77X108 REINFORCED 29436

## (undated) DEVICE — SU ETHILON 3-0 PS-1 18" 1663G

## (undated) DEVICE — BONE CLEANING TIP INTERPULSE FEMORAL CANAL 0210-008-000

## (undated) DEVICE — GLOVE PROTEXIS BLUE W/NEU-THERA 8.0  2D73EB80

## (undated) DEVICE — GLOVE PROTEXIS POWDER FREE 7.5 ORTHOPEDIC 2D73ET75

## (undated) DEVICE — TOOL DISSECT MIDAS MR8 14CM MATCH HEAD 3MM MR8-14MH30

## (undated) DEVICE — DRILL BIT

## (undated) DEVICE — SU VICRYL 2-0 CT-2 CR 8X18" J726D

## (undated) DEVICE — DRAPE LAP W/ARMBOARD 29410

## (undated) DEVICE — GLOVE PROTEXIS BLUE W/NEU-THERA 8.5  2D73EB85

## (undated) DEVICE — SU VICRYL 0 CT-1 27" UND J260H

## (undated) DEVICE — PACK LARGE SPINE SNE15LSFSE

## (undated) DEVICE — SPONGE SURGIFOAM 100 1974

## (undated) DEVICE — GLOVE PROTEXIS BLUE W/NEU-THERA 7.0  2D73EB70

## (undated) DEVICE — PREP SKIN SCRUB TRAY 4461A

## (undated) DEVICE — SOL NACL 0.9% IRRIG 1000ML BOTTLE 2F7124

## (undated) DEVICE — SOL NACL 0.9% INJ 1000ML BAG 2B1324X

## (undated) DEVICE — BONE WAX 2.5GM W31G

## (undated) DEVICE — ESU GROUND PAD UNIVERSAL W/O CORD

## (undated) DEVICE — SPONGE COTTONOID 1/2X1 1/2" 1-STRING 80-1404

## (undated) DEVICE — NDL SPINAL 18GA 3.5" 405184

## (undated) DEVICE — KIT PATIENT JACKSON 1 SPK10118

## (undated) DEVICE — SU VICRYL 0 CT-2 CR 8X18" J727D

## (undated) DEVICE — SUCTION IRR SYSTEM W/O TIP INTERPULSE HANDPIECE 0210-100-000

## (undated) DEVICE — SOL WATER IRRIG 1000ML BOTTLE 2F7114

## (undated) DEVICE — DRILL BIT BIOM QUICK CONNECTING RING LOCK 3.2X30MM 31-323230

## (undated) DEVICE — Device

## (undated) DEVICE — DRSG TELFA ISLAND 4X8" 7541

## (undated) DEVICE — GOWN XXL 9575

## (undated) DEVICE — PACK MINOR SBA15MIFSE

## (undated) DEVICE — GLOVE PROTEXIS POWDER FREE 8.5 ORTHOPEDIC 2D73ET85

## (undated) DEVICE — NDL BLUNT 17GA 1.5" 8881202330

## (undated) DEVICE — DRSG ABDOMINAL 07 1/2X8" 7197D

## (undated) DEVICE — BRUSH INTRAMEDULLARY

## (undated) DEVICE — DRSG TEGADERM 4X4 3/4" 1626

## (undated) DEVICE — BLADE CLIPPER 4412A

## (undated) DEVICE — MARKER SPHERES PASSIVE MEDT PACK 5 8801075

## (undated) DEVICE — RX SURGIFLO W/THROMBIN KIT 2ML 1991

## (undated) DEVICE — ESU BIPOLAR SEALER AQUAMANTYS 6MM 23-112-1

## (undated) DEVICE — PAD CHUX UNDERPAD 23X24" 7136

## (undated) DEVICE — HOOD FLYTE W/PEELAWAY 408-800-100

## (undated) DEVICE — DEVICE RETRIEVER HEWSON 71111579

## (undated) DEVICE — SPONGE SURGIFOAM 50

## (undated) DEVICE — SU MONOCRYL 4-0 PS-2 18" UND Y496G

## (undated) DEVICE — DRSG ADAPTIC 3X8" 6113

## (undated) DEVICE — SYR 30ML LL W/O NDL 302832

## (undated) DEVICE — GOWN IMPERVIOUS SPECIALTY XL/XLONG 39049

## (undated) RX ORDER — HYDROMORPHONE HYDROCHLORIDE 1 MG/ML
INJECTION, SOLUTION INTRAMUSCULAR; INTRAVENOUS; SUBCUTANEOUS
Status: DISPENSED
Start: 2021-03-25

## (undated) RX ORDER — DEXAMETHASONE SODIUM PHOSPHATE 4 MG/ML
INJECTION, SOLUTION INTRA-ARTICULAR; INTRALESIONAL; INTRAMUSCULAR; INTRAVENOUS; SOFT TISSUE
Status: DISPENSED
Start: 2021-03-25

## (undated) RX ORDER — BUPIVACAINE HYDROCHLORIDE 2.5 MG/ML
INJECTION, SOLUTION EPIDURAL; INFILTRATION; INTRACAUDAL
Status: DISPENSED
Start: 2021-04-27

## (undated) RX ORDER — CEFAZOLIN SODIUM 2 G/100ML
INJECTION, SOLUTION INTRAVENOUS
Status: DISPENSED
Start: 2021-04-27

## (undated) RX ORDER — HYDROMORPHONE HYDROCHLORIDE 1 MG/ML
INJECTION, SOLUTION INTRAMUSCULAR; INTRAVENOUS; SUBCUTANEOUS
Status: DISPENSED
Start: 2021-04-27

## (undated) RX ORDER — PROPOFOL 10 MG/ML
INJECTION, EMULSION INTRAVENOUS
Status: DISPENSED
Start: 2021-03-25

## (undated) RX ORDER — KETAMINE HCL IN NACL, ISO-OSM 100MG/10ML
SYRINGE (ML) INJECTION
Status: DISPENSED
Start: 2021-04-27

## (undated) RX ORDER — ALBUMIN, HUMAN INJ 5% 5 %
SOLUTION INTRAVENOUS
Status: DISPENSED
Start: 2021-04-27

## (undated) RX ORDER — LIDOCAINE HYDROCHLORIDE 20 MG/ML
INJECTION, SOLUTION EPIDURAL; INFILTRATION; INTRACAUDAL; PERINEURAL
Status: DISPENSED
Start: 2021-04-27

## (undated) RX ORDER — ALBUMIN, HUMAN INJ 5% 5 %
SOLUTION INTRAVENOUS
Status: DISPENSED
Start: 2021-03-25

## (undated) RX ORDER — KETAMINE HCL IN NACL, ISO-OSM 100MG/10ML
SYRINGE (ML) INJECTION
Status: DISPENSED
Start: 2021-03-25

## (undated) RX ORDER — OXYCODONE HCL 10 MG/1
TABLET, FILM COATED, EXTENDED RELEASE ORAL
Status: DISPENSED
Start: 2017-02-14

## (undated) RX ORDER — ACETAMINOPHEN 325 MG/1
TABLET ORAL
Status: DISPENSED
Start: 2021-04-27

## (undated) RX ORDER — REMIFENTANIL HYDROCHLORIDE 1 MG/ML
INJECTION, POWDER, LYOPHILIZED, FOR SOLUTION INTRAVENOUS
Status: DISPENSED
Start: 2021-03-25

## (undated) RX ORDER — GLYCOPYRROLATE 0.2 MG/ML
INJECTION, SOLUTION INTRAMUSCULAR; INTRAVENOUS
Status: DISPENSED
Start: 2021-04-27

## (undated) RX ORDER — KETOROLAC TROMETHAMINE 30 MG/ML
INJECTION, SOLUTION INTRAMUSCULAR; INTRAVENOUS
Status: DISPENSED
Start: 2017-02-14

## (undated) RX ORDER — GABAPENTIN 300 MG/1
CAPSULE ORAL
Status: DISPENSED
Start: 2017-02-14

## (undated) RX ORDER — DEXAMETHASONE SODIUM PHOSPHATE 4 MG/ML
INJECTION, SOLUTION INTRA-ARTICULAR; INTRALESIONAL; INTRAMUSCULAR; INTRAVENOUS; SOFT TISSUE
Status: DISPENSED
Start: 2017-02-14

## (undated) RX ORDER — ONDANSETRON 2 MG/ML
INJECTION INTRAMUSCULAR; INTRAVENOUS
Status: DISPENSED
Start: 2021-04-27

## (undated) RX ORDER — CEFAZOLIN SODIUM 2 G/100ML
INJECTION, SOLUTION INTRAVENOUS
Status: DISPENSED
Start: 2017-02-14

## (undated) RX ORDER — ACETAMINOPHEN 325 MG/1
TABLET ORAL
Status: DISPENSED
Start: 2021-03-25

## (undated) RX ORDER — ONDANSETRON 2 MG/ML
INJECTION INTRAMUSCULAR; INTRAVENOUS
Status: DISPENSED
Start: 2021-03-25

## (undated) RX ORDER — EPINEPHRINE 1 MG/ML
INJECTION, SOLUTION INTRAMUSCULAR; SUBCUTANEOUS
Status: DISPENSED
Start: 2021-04-27

## (undated) RX ORDER — FENTANYL CITRATE 50 UG/ML
INJECTION, SOLUTION INTRAMUSCULAR; INTRAVENOUS
Status: DISPENSED
Start: 2017-02-14

## (undated) RX ORDER — FENTANYL CITRATE 0.05 MG/ML
INJECTION, SOLUTION INTRAMUSCULAR; INTRAVENOUS
Status: DISPENSED
Start: 2021-03-25

## (undated) RX ORDER — CEFAZOLIN SODIUM 2 G/100ML
INJECTION, SOLUTION INTRAVENOUS
Status: DISPENSED
Start: 2021-03-25

## (undated) RX ORDER — GINSENG 100 MG
CAPSULE ORAL
Status: DISPENSED
Start: 2021-03-25

## (undated) RX ORDER — HYDROMORPHONE HYDROCHLORIDE 1 MG/ML
INJECTION, SOLUTION INTRAMUSCULAR; INTRAVENOUS; SUBCUTANEOUS
Status: DISPENSED
Start: 2017-02-14

## (undated) RX ORDER — FENTANYL CITRATE 50 UG/ML
INJECTION, SOLUTION INTRAMUSCULAR; INTRAVENOUS
Status: DISPENSED
Start: 2021-04-27

## (undated) RX ORDER — NEOSTIGMINE METHYLSULFATE 1 MG/ML
VIAL (ML) INJECTION
Status: DISPENSED
Start: 2021-04-27

## (undated) RX ORDER — BUPIVACAINE HYDROCHLORIDE AND EPINEPHRINE 2.5; 5 MG/ML; UG/ML
INJECTION, SOLUTION EPIDURAL; INFILTRATION; INTRACAUDAL; PERINEURAL
Status: DISPENSED
Start: 2021-03-25

## (undated) RX ORDER — CEFAZOLIN SODIUM 1 G/3ML
INJECTION, POWDER, FOR SOLUTION INTRAMUSCULAR; INTRAVENOUS
Status: DISPENSED
Start: 2017-02-14

## (undated) RX ORDER — FENTANYL CITRATE 50 UG/ML
INJECTION, SOLUTION INTRAMUSCULAR; INTRAVENOUS
Status: DISPENSED
Start: 2021-03-25

## (undated) RX ORDER — KETAMINE HCL IN 0.9 % NACL 20 MG/2 ML
SYRINGE (ML) INTRAVENOUS
Status: DISPENSED
Start: 2017-02-14

## (undated) RX ORDER — DEXAMETHASONE SODIUM PHOSPHATE 4 MG/ML
INJECTION, SOLUTION INTRA-ARTICULAR; INTRALESIONAL; INTRAMUSCULAR; INTRAVENOUS; SOFT TISSUE
Status: DISPENSED
Start: 2021-04-27

## (undated) RX ORDER — ROPIVACAINE HYDROCHLORIDE 2 MG/ML
INJECTION, SOLUTION EPIDURAL; INFILTRATION; PERINEURAL
Status: DISPENSED
Start: 2017-02-14

## (undated) RX ORDER — HYDRALAZINE HYDROCHLORIDE 20 MG/ML
INJECTION INTRAMUSCULAR; INTRAVENOUS
Status: DISPENSED
Start: 2021-04-27

## (undated) RX ORDER — ACYCLOVIR 200 MG/1
CAPSULE ORAL
Status: DISPENSED
Start: 2017-02-14

## (undated) RX ORDER — PROPOFOL 10 MG/ML
INJECTION, EMULSION INTRAVENOUS
Status: DISPENSED
Start: 2021-04-27